# Patient Record
Sex: FEMALE | Race: WHITE | NOT HISPANIC OR LATINO | Employment: OTHER | URBAN - METROPOLITAN AREA
[De-identification: names, ages, dates, MRNs, and addresses within clinical notes are randomized per-mention and may not be internally consistent; named-entity substitution may affect disease eponyms.]

---

## 2017-01-06 RX ORDER — VENLAFAXINE HYDROCHLORIDE 37.5 MG/1
CAPSULE, EXTENDED RELEASE ORAL
Qty: 30 CAPSULE | Refills: 2 | Status: SHIPPED | OUTPATIENT
Start: 2017-01-06 | End: 2017-04-06 | Stop reason: SDUPTHER

## 2017-02-20 RX ORDER — OMEPRAZOLE 20 MG/1
CAPSULE, DELAYED RELEASE ORAL
Qty: 60 CAPSULE | Refills: 2 | Status: SHIPPED | OUTPATIENT
Start: 2017-02-20 | End: 2017-07-04 | Stop reason: SDUPTHER

## 2017-03-07 RX ORDER — CLOPIDOGREL BISULFATE 75 MG/1
75 TABLET ORAL DAILY
Qty: 30 TABLET | Refills: 0 | Status: SHIPPED | OUTPATIENT
Start: 2017-03-07 | End: 2017-04-01 | Stop reason: SDUPTHER

## 2017-03-14 ENCOUNTER — OFFICE VISIT (OUTPATIENT)
Dept: FAMILY MEDICINE CLINIC | Facility: CLINIC | Age: 57
End: 2017-03-14

## 2017-03-14 VITALS
SYSTOLIC BLOOD PRESSURE: 120 MMHG | DIASTOLIC BLOOD PRESSURE: 68 MMHG | OXYGEN SATURATION: 100 % | WEIGHT: 203 LBS | BODY MASS INDEX: 28.42 KG/M2 | HEIGHT: 71 IN | TEMPERATURE: 98.7 F | HEART RATE: 87 BPM

## 2017-03-14 DIAGNOSIS — N93.9 VAGINAL BLEEDING: ICD-10-CM

## 2017-03-14 DIAGNOSIS — L30.9 ECZEMA OF BOTH HANDS: Primary | ICD-10-CM

## 2017-03-14 PROCEDURE — 99213 OFFICE O/P EST LOW 20 MIN: CPT | Performed by: NURSE PRACTITIONER

## 2017-03-14 PROCEDURE — 96372 THER/PROPH/DIAG INJ SC/IM: CPT | Performed by: NURSE PRACTITIONER

## 2017-03-14 RX ORDER — TRIAMCINOLONE ACETONIDE 5 MG/G
OINTMENT TOPICAL 2 TIMES DAILY
Qty: 60 G | Refills: 3 | Status: SHIPPED | OUTPATIENT
Start: 2017-03-14 | End: 2018-10-11

## 2017-03-14 RX ORDER — METHYLPREDNISOLONE ACETATE 80 MG/ML
80 INJECTION, SUSPENSION INTRA-ARTICULAR; INTRALESIONAL; INTRAMUSCULAR; SOFT TISSUE ONCE
Status: COMPLETED | OUTPATIENT
Start: 2017-03-14 | End: 2017-03-14

## 2017-03-14 RX ORDER — HYDROXYZINE HYDROCHLORIDE 25 MG/1
TABLET, FILM COATED ORAL
Qty: 30 TABLET | Refills: 3 | Status: SHIPPED | OUTPATIENT
Start: 2017-03-14 | End: 2019-02-01 | Stop reason: SDUPTHER

## 2017-03-14 RX ADMIN — METHYLPREDNISOLONE ACETATE 80 MG: 80 INJECTION, SUSPENSION INTRA-ARTICULAR; INTRALESIONAL; INTRAMUSCULAR; SOFT TISSUE at 15:41

## 2017-03-14 NOTE — PROGRESS NOTES
Subjective   Tiera Abbott is a 57 y.o. female.     HPI Comments: Chronic eczema hands      Several time she's had some intermittent spotting  Last Menses. over 5 years ago  No pelvic pain no active bleeding presently        Rash   This is a recurrent problem. The current episode started more than 1 year ago. The problem has been gradually worsening since onset. Location: Bilateral hands, or surface between fingers. The rash is characterized by dryness, itchiness, peeling, scaling and redness. She was exposed to nothing. Treatments tried: clobesol cream. The treatment provided mild relief. Her past medical history is significant for allergies and eczema.        The following portions of the patient's history were reviewed and updated as appropriate: allergies, past family history, past medical history, past social history, past surgical history and problem list.    Review of Systems   Constitutional: Negative.    HENT: Negative.    Eyes: Negative.    Respiratory: Negative.    Cardiovascular: Negative.    Skin: Positive for rash.   Neurological: Negative.    Psychiatric/Behavioral: Negative.        Objective   Physical Exam   Constitutional: She is oriented to person, place, and time. She appears well-developed.   HENT:   Head: Normocephalic.   Eyes: Pupils are equal, round, and reactive to light.   Pulmonary/Chest: Effort normal.   Neurological: She is alert and oriented to person, place, and time.   Skin:   Bilateral irritated dry scaly mildly erythemic palm hands  Approximately 20% surface  No active vesicles     Psychiatric: She has a normal mood and affect. Her behavior is normal. Judgment and thought content normal.   Vitals reviewed.      Assessment/Plan   Tiera was seen today for rash.    Diagnoses and all orders for this visit:    Eczema of both hands  -     methylPREDNISolone acetate (DEPO-medrol) injection 80 mg; Inject 1 mL into the shoulder, thigh, or buttocks 1 (One) Time.    Vaginal  bleeding  Comments:  Postmenopausal intermittent refer to GYN  Orders:  -     Ambulatory Referral to Gynecology    Other orders  -     triamcinolone (KENALOG) 0.5 % ointment; Apply  topically 2 (Two) Times a Day. Sparingly to hands as needed avoid face  -     hydrOXYzine (ATARAX) 25 MG tablet; 1-2 tablets at bedtime as needed for itching                Caution steroid ointment  avoid face  Long-term use may call skin damage

## 2017-03-15 PROBLEM — L30.9 ECZEMA OF BOTH HANDS: Status: ACTIVE | Noted: 2017-03-15

## 2017-03-15 PROBLEM — N93.9 VAGINAL BLEEDING: Status: ACTIVE | Noted: 2017-03-15

## 2017-03-28 ENCOUNTER — TELEPHONE (OUTPATIENT)
Dept: FAMILY MEDICINE CLINIC | Facility: CLINIC | Age: 57
End: 2017-03-28

## 2017-03-28 DIAGNOSIS — L30.9 DERMATITIS: Primary | ICD-10-CM

## 2017-03-28 NOTE — TELEPHONE ENCOUNTER
----- Message from Rosa Espitia sent at 3/28/2017  8:38 AM EDT -----  Regarding: referral for dermatology  [employer] IS NOT WORKING. PATIENT WOULD LIKE REFERRAL FOR DERMATOLOGY

## 2017-04-04 RX ORDER — CLOPIDOGREL BISULFATE 75 MG/1
75 TABLET ORAL DAILY
Qty: 30 TABLET | Refills: 0 | Status: SHIPPED | OUTPATIENT
Start: 2017-04-04 | End: 2017-06-05 | Stop reason: SDUPTHER

## 2017-04-06 RX ORDER — VENLAFAXINE HYDROCHLORIDE 37.5 MG/1
CAPSULE, EXTENDED RELEASE ORAL
Qty: 30 CAPSULE | Refills: 2 | Status: SHIPPED | OUTPATIENT
Start: 2017-04-06 | End: 2017-07-05 | Stop reason: SDUPTHER

## 2017-04-06 RX ORDER — ATORVASTATIN CALCIUM 80 MG/1
80 TABLET, FILM COATED ORAL DAILY
Qty: 30 TABLET | Refills: 0 | Status: SHIPPED | OUTPATIENT
Start: 2017-04-06 | End: 2017-06-05 | Stop reason: SDUPTHER

## 2017-04-06 RX ORDER — CARVEDILOL 3.12 MG/1
3.12 TABLET ORAL 2 TIMES DAILY WITH MEALS
Qty: 60 TABLET | Refills: 0 | Status: SHIPPED | OUTPATIENT
Start: 2017-04-06 | End: 2017-06-05 | Stop reason: SDUPTHER

## 2017-04-13 ENCOUNTER — OFFICE VISIT (OUTPATIENT)
Dept: OBSTETRICS AND GYNECOLOGY | Facility: CLINIC | Age: 57
End: 2017-04-13

## 2017-04-13 VITALS
HEIGHT: 71 IN | WEIGHT: 194 LBS | HEART RATE: 93 BPM | BODY MASS INDEX: 27.16 KG/M2 | DIASTOLIC BLOOD PRESSURE: 81 MMHG | SYSTOLIC BLOOD PRESSURE: 130 MMHG

## 2017-04-13 DIAGNOSIS — N95.0 POSTMENOPAUSAL BLEEDING: ICD-10-CM

## 2017-04-13 DIAGNOSIS — Z01.419 ENCOUNTER FOR GYNECOLOGICAL EXAMINATION: Primary | ICD-10-CM

## 2017-04-13 PROCEDURE — G0101 CA SCREEN;PELVIC/BREAST EXAM: HCPCS | Performed by: OBSTETRICS & GYNECOLOGY

## 2017-04-13 NOTE — PROGRESS NOTES
Subjective   Tiera Abbott is a 57 y.o. female     History of Present Illness  CC: NP here for annual exam. Pt last pap 12/07/10 NL. Pt last mammogram last year NL. Pt also c/o post menopausal bleeding.     Patient is a 58 yo  who presents for annual gynecological exam.  Patient also has complaints of two episodes of spotting over the last years.  She underwent menopause approximately 4 years ago and denies any bleeding until her most recent two episodes of spotting.  She states both episodes lasted only two days and were only when wiping.  She has not been sexually active in over five years.  She does complain of vaginal dryness and occasional itching related to the dryness.  She denies any h/o abnormal pap smears.  She reports a h/o breast cancer in her mother.  Her last gyn exam was in  and her last mammogram was over 1 year ago.  She states she is up to date on her colonoscopy.  Her PMH is significant for prior MI.    The following portions of the patient's history were reviewed and updated as appropriate: allergies, current medications, past family history, past medical history, past social history, past surgical history and problem list.    Review of Systems   Genitourinary: Positive for vaginal bleeding.   All other systems reviewed and are negative.      Objective   Physical Exam   Constitutional: She appears well-developed and well-nourished.   Cardiovascular: Normal rate and regular rhythm.    Pulmonary/Chest: Effort normal and breath sounds normal. Right breast exhibits no inverted nipple, no mass, no nipple discharge, no skin change and no tenderness. Left breast exhibits no inverted nipple, no mass, no nipple discharge, no skin change and no tenderness.   Previous augmentation scars noted   Abdominal: Soft. She exhibits no distension. There is no tenderness.   Genitourinary:   Genitourinary Comments: Vaginal atrophy noted   Neurological: She is alert.   Psychiatric: She has a normal mood and  affect.   Vitals reviewed.        Assessment/Plan   Diagnoses and all orders for this visit:    Encounter for gynecological examination  -     IGP, Apt HPV,rfx 16 / 18,45    Postmenopausal bleeding  -     IGP, Apt HPV,rfx 16 / 18,45    Discussed need to order pelvic ultrasound and evaluate endometrial lining in the setting of postmenopausal bleeding.  Patient will followup after her pelvic ultrasound.  Screening mammogram ordered.

## 2017-04-19 LAB
CYTOLOGIST CVX/VAG CYTO: NORMAL
CYTOLOGY CVX/VAG DOC THIN PREP: NORMAL
DX ICD CODE: NORMAL
HIV 1 & 2 AB SER-IMP: NORMAL
HPV I/H RISK 4 DNA CVX QL PROBE+SIG AMP: NEGATIVE
OTHER STN SPEC: NORMAL
PATH REPORT.FINAL DX SPEC: NORMAL
STAT OF ADQ CVX/VAG CYTO-IMP: NORMAL

## 2017-04-24 ENCOUNTER — TELEPHONE (OUTPATIENT)
Dept: OBSTETRICS AND GYNECOLOGY | Facility: CLINIC | Age: 57
End: 2017-04-24

## 2017-04-24 NOTE — TELEPHONE ENCOUNTER
Richard Ann,  Can you call this patient and see why she is not scheduled for pelvic ultrasound and f/u visit with me for postmenopausal bleeding.  She was suppose to be scheduled for those things after her last appt, but I don't see that any of it was scheduled.

## 2017-04-25 ENCOUNTER — TELEPHONE (OUTPATIENT)
Dept: OBSTETRICS AND GYNECOLOGY | Facility: CLINIC | Age: 57
End: 2017-04-25

## 2017-04-25 NOTE — TELEPHONE ENCOUNTER
----- Message from Ann Marie Ramirez sent at 4/25/2017  2:08 PM EDT -----  PT RETURNED MISS CALL, STATED SHE NEEDS TO LOOK AT HER SX BEFORE SHE ABLE TO MAKE AN APPT.

## 2017-05-03 RX ORDER — GABAPENTIN 300 MG/1
CAPSULE ORAL
Qty: 60 CAPSULE | Refills: 3 | Status: SHIPPED | OUTPATIENT
Start: 2017-05-03 | End: 2018-07-23

## 2017-05-17 ENCOUNTER — OFFICE VISIT (OUTPATIENT)
Dept: OBSTETRICS AND GYNECOLOGY | Facility: CLINIC | Age: 57
End: 2017-05-17

## 2017-05-17 ENCOUNTER — PROCEDURE VISIT (OUTPATIENT)
Dept: OBSTETRICS AND GYNECOLOGY | Facility: CLINIC | Age: 57
End: 2017-05-17

## 2017-05-17 VITALS
HEART RATE: 82 BPM | WEIGHT: 190.2 LBS | SYSTOLIC BLOOD PRESSURE: 125 MMHG | HEIGHT: 71 IN | BODY MASS INDEX: 26.63 KG/M2 | DIASTOLIC BLOOD PRESSURE: 75 MMHG

## 2017-05-17 DIAGNOSIS — R93.89 ENDOMETRIAL THICKENING ON ULTRA SOUND: ICD-10-CM

## 2017-05-17 DIAGNOSIS — N83.202 CYSTS OF BOTH OVARIES: ICD-10-CM

## 2017-05-17 DIAGNOSIS — D25.9 UTERINE LEIOMYOMA, UNSPECIFIED LOCATION: ICD-10-CM

## 2017-05-17 DIAGNOSIS — N95.0 PMB (POSTMENOPAUSAL BLEEDING): Primary | ICD-10-CM

## 2017-05-17 DIAGNOSIS — N95.0 POSTMENOPAUSAL BLEEDING: Primary | ICD-10-CM

## 2017-05-17 DIAGNOSIS — N83.201 CYSTS OF BOTH OVARIES: ICD-10-CM

## 2017-05-17 PROCEDURE — 76830 TRANSVAGINAL US NON-OB: CPT | Performed by: OBSTETRICS & GYNECOLOGY

## 2017-05-17 PROCEDURE — 99213 OFFICE O/P EST LOW 20 MIN: CPT | Performed by: OBSTETRICS & GYNECOLOGY

## 2017-05-17 PROCEDURE — 58100 BIOPSY OF UTERUS LINING: CPT | Performed by: OBSTETRICS & GYNECOLOGY

## 2017-05-19 LAB
DX ICD CODE: NORMAL
DX ICD CODE: NORMAL
PATH REPORT.FINAL DX SPEC: NORMAL
PATH REPORT.GROSS SPEC: NORMAL
PATH REPORT.SITE OF ORIGIN SPEC: NORMAL
PATHOLOGIST NAME: NORMAL
PAYMENT PROCEDURE: NORMAL

## 2017-05-25 ENCOUNTER — TELEPHONE (OUTPATIENT)
Dept: OBSTETRICS AND GYNECOLOGY | Facility: CLINIC | Age: 57
End: 2017-05-25

## 2017-06-06 ENCOUNTER — OFFICE VISIT (OUTPATIENT)
Dept: CARDIOLOGY | Facility: CLINIC | Age: 57
End: 2017-06-06

## 2017-06-06 VITALS
SYSTOLIC BLOOD PRESSURE: 110 MMHG | WEIGHT: 192 LBS | HEIGHT: 71 IN | DIASTOLIC BLOOD PRESSURE: 66 MMHG | HEART RATE: 66 BPM | BODY MASS INDEX: 26.88 KG/M2

## 2017-06-06 DIAGNOSIS — I25.10 CORONARY ARTERY DISEASE INVOLVING NATIVE CORONARY ARTERY OF NATIVE HEART WITHOUT ANGINA PECTORIS: Primary | ICD-10-CM

## 2017-06-06 DIAGNOSIS — E78.49 OTHER HYPERLIPIDEMIA: ICD-10-CM

## 2017-06-06 DIAGNOSIS — I25.2 OLD MI (MYOCARDIAL INFARCTION): ICD-10-CM

## 2017-06-06 PROCEDURE — 93000 ELECTROCARDIOGRAM COMPLETE: CPT | Performed by: INTERNAL MEDICINE

## 2017-06-06 PROCEDURE — 99214 OFFICE O/P EST MOD 30 MIN: CPT | Performed by: INTERNAL MEDICINE

## 2017-06-06 NOTE — PROGRESS NOTES
Date of Office Visit: 17  Encounter Provider: Hollis Fontaine MD  Place of Service: [facility] CARDIOLOGY  Patient Name: Tiera Abbott  :1960      Chief Complaint   Patient presents with   • Coronary Artery Disease     History of Present Illness  HPI Comments: The patient is a 57-year-old female who presented in 2008 with an acute inferior  myocardial infarction. She had multiple ventricular fibrillation arrests during that  time. She was cardioverted a number of times. She ultimately was found to have total  occlusion of the right coronary artery. Her other vessels were free of disease. She had  angioplasty, clot extraction, and stent placement of that vessel. Her echocardiogram then  showed a left ventricular ejection fraction of 45%. She had atrial fibrillation and was  transiently on amiodarone. Then, in 2008, she had some atypical chest pain. Repeat  catheterization showed normal left ventricular systolic function and insignificant disease  of the left anterior descending. The stent was patent.  She now comes in for follow-up.  Since she was last year she had her left knee replaced at a perfusion stress test in 2016 that was normal.  She's working at [employer] where she walks constantly and has no problems doing that she doesn't do any structured exercise though but she does do some strengthening and stretching exercises.  She's had no chest pain or pressure.  No shortness breath, orthopnea, or PND.  No palpitations, near-syncope or syncope overall she feels like she's doing very well.    Coronary Artery Disease   Pertinent negatives include no dizziness, muscle weakness or weight gain. Her past medical history is significant for past myocardial infarction.         Past Medical History:   Diagnosis Date   • Acid reflux    • Alcoholism in remission     SINCE AGE 22   • Back pain    • Cardiac disease    • Coronary artery disease     mi   • Depression    •  Eczema    • Heart attack 2008   • History of bruising easily    • IBS (irritable bowel syndrome)    • Inappropriate (too hot or too cold) temperature in local application and packing     always hot or cold   • Joint pain, knee     LEFT KNEE   • Menopausal state    • Muscle pain    • Sleep apnea     DOES NOT USE MACHINE   • Stiffness in joint          Past Surgical History:   Procedure Laterality Date   • BILATERAL BREAST REDUCTION  2009   • CARDIAC SURGERY     • COLONOSCOPY  2010   • CORONARY STENT PLACEMENT      2   • ENDOSCOPY N/A 8/25/2016    Procedure: ESOPHAGOGASTRODUODENOSCOPY;  Surgeon: Ottoniel Alonso MD;  Location: [facility] ENDOSCOPY;  Service:    • JOINT REPLACEMENT Left    • KNEE SURGERY      2 knee sugeries on left knee   • PA TOTAL KNEE ARTHROPLASTY Left 3/14/2016    Procedure: LEFT TOTAL KNEE ARTHROPLASTY;  Surgeon: Bladimir Mehta MD;  Location: [facility] MAIN OR;  Service: Orthopedics   • WISDOM TOOTH EXTRACTION     • WOUND DEBRIDEMENT Bilateral     BREAST.  AFTER REDUCTION         Current Outpatient Prescriptions on File Prior to Visit   Medication Sig Dispense Refill   • Acetaminophen (TYLENOL ARTHRITIS PAIN PO) Take  by mouth.     • atorvastatin (LIPITOR) 80 MG tablet Take 1 tablet by mouth Daily. Pt needs to contact office for an appt to receive more refills. 30 tablet 0   • carvedilol (COREG) 3.125 MG tablet Take 1 tablet by mouth 2 (Two) Times a Day With Meals. Needs to contact office for an appt to receive more refills. (Patient taking differently: Take 3.125 mg by mouth Daily.) 60 tablet 0   • clobetasol (TEMOVATE) 0.05 % cream APPLY SPARINGLY TO RIGHT HAND TWICE DAILY FOR SHORT DURATION AND AVOID FACE OR PROLONGED USE 1 g 0   • clopidogrel (PLAVIX) 75 MG tablet Take 1 tablet by mouth Daily. * NEEDS TO CONTACT OFFICE FOR AN APPOINTMENT FOR MORE REFILLS 30 tablet 0   • gabapentin (NEURONTIN) 300 MG capsule take 2 capsules by mouth EVERY NIGHT 60 capsule 3   • hydrOXYzine (ATARAX) 25 MG tablet 1-2  tablets at bedtime as needed for itching 30 tablet 3   • montelukast (SINGULAIR) 10 MG tablet Take  by mouth as needed.     • omeprazole (priLOSEC) 20 MG capsule take 1 capsule by mouth twice a day 60 capsule 2   • triamcinolone (KENALOG) 0.5 % ointment Apply  topically 2 (Two) Times a Day. Sparingly to hands as needed avoid face 60 g 3   • venlafaxine XR (EFFEXOR-XR) 37.5 MG 24 hr capsule take 1 capsule by mouth once daily 30 capsule 2   • [DISCONTINUED] Calcium Carbonate Antacid (ROBERTO-SELTZER ANTACID PO) Take  by mouth.     • [DISCONTINUED] Dexlansoprazole (DEXILANT PO) Take 1 tablet by mouth daily.       No current facility-administered medications on file prior to visit.          Social History     Social History   • Marital status: Single     Spouse name: N/A   • Number of children: 1   • Years of education: N/A     Occupational History   • disabled      Social History Main Topics   • Smoking status: Former Smoker     Quit date: 2014   • Smokeless tobacco: Never Used      Comment: caffiene use - 3 cups coffee daily   • Alcohol use No   • Drug use: No   • Sexual activity: Not Currently     Birth control/ protection: Post-menopausal     Other Topics Concern   • Not on file     Social History Narrative       Family History   Problem Relation Age of Onset   • Alzheimer's disease Mother    • Breast cancer Mother    • Diabetes Mother    • Lung disease Father      pullmonary artery disease   • Heart disease Father    • Heart disease Brother    • JOSHUA disease Brother    • Heart attack Brother    • Heart disease Brother    • Diabetes Other          Review of Systems   Constitution: Negative for decreased appetite, diaphoresis, fever, weakness, malaise/fatigue, weight gain and weight loss.   HENT: Negative for congestion, headaches, hearing loss, nosebleeds and tinnitus.    Eyes: Negative for blurred vision, double vision, vision loss in left eye, vision loss in right eye and visual disturbance.   Cardiovascular:         "As noted in HPI   Respiratory:        As noted HPI   Endocrine: Negative for cold intolerance and heat intolerance.   Hematologic/Lymphatic: Negative for bleeding problem. Does not bruise/bleed easily.   Skin: Positive for itching. Negative for color change, flushing and rash.   Musculoskeletal: Negative for arthritis, back pain, joint pain, joint swelling, muscle weakness and myalgias.   Gastrointestinal: Negative for bloating, abdominal pain, constipation, diarrhea, dysphagia, heartburn, hematemesis, hematochezia, melena, nausea and vomiting.   Genitourinary: Negative for bladder incontinence, dysuria, frequency, nocturia and urgency.   Neurological: Negative for dizziness, focal weakness, light-headedness, loss of balance, numbness, paresthesias and vertigo.   Psychiatric/Behavioral: Negative for depression, memory loss and substance abuse.       Procedures      ECG 12 Lead  Date/Time: 6/6/2017 1:47 PM  Performed by: SAMEER GREEN  Authorized by: SAMEER GREEN   Comparison: compared with previous ECG   Similar to previous ECG  Rhythm: sinus rhythm  Rate: normal  QRS axis: normal                 Objective:    /66 (BP Location: Left arm, Patient Position: Sitting)  Pulse 66  Ht 70.5\" (179.1 cm)  Wt 192 lb (87.1 kg)  BMI 27.16 kg/m2       Physical Exam  Physical Exam   Constitutional: She is oriented to person, place, and time. She appears well-developed and well-nourished. No distress.   HENT:   Head: Normocephalic.   Eyes: Conjunctivae are normal. Pupils are equal, round, and reactive to light. No scleral icterus.   Neck: Normal carotid pulses, no hepatojugular reflux and no JVD present. Carotid bruit is not present. No tracheal deviation, no edema and no erythema present. No thyromegaly present.   Cardiovascular: Normal rate, regular rhythm, S1 normal, S2 normal, normal heart sounds and intact distal pulses.   No extrasystoles are present. PMI is not displaced.  Exam reveals no gallop, no " distant heart sounds and no friction rub.    No murmur heard.  Pulses:       Carotid pulses are 2+ on the right side, and 2+ on the left side.       Radial pulses are 2+ on the right side, and 2+ on the left side.        Femoral pulses are 2+ on the right side, and 2+ on the left side.       Dorsalis pedis pulses are 2+ on the right side, and 2+ on the left side.        Posterior tibial pulses are 2+ on the right side, and 2+ on the left side.   Pulmonary/Chest: Effort normal and breath sounds normal. No respiratory distress. She has no decreased breath sounds. She has no wheezes. She has no rhonchi. She has no rales. She exhibits no tenderness.   Abdominal: Soft. Bowel sounds are normal. She exhibits no distension and no mass. There is no hepatosplenomegaly. There is no tenderness. There is no rebound and no guarding.   Musculoskeletal: She exhibits no edema, tenderness or deformity.   Neurological: She is alert and oriented to person, place, and time.   Skin: Skin is warm and dry. No rash noted. She is not diaphoretic. No cyanosis or erythema. No pallor. Nails show no clubbing.   Psychiatric: She has a normal mood and affect. Her speech is normal and behavior is normal. Judgment and thought content normal.           Assessment:   1. This is a 57-year-old female with history of previous inferior myocardial infarction in 01/2008 with primary angioplasty, clot extraction, and stent placement of the right  coronary artery. Follow-up cardiac catheterization in 02/2008 showed normal left ventricular function, no evidence of stent stenosis, and insignificant disease of the left  anterior descending. She then had follow-up perfusion stress test in 04/2014 for atypical chest pain that was normal. Follow-up perfusion stress test in January 2016 was normal.  Coronary Artery Disease  Assessment  • The patient has no angina    Plan  • Lifestyle modifications discussed include adhering to a heart healthy diet, avoidance of  tobacco products, maintenance of a healthy weight, medication compliance, regular exercise and regular monitoring of cholesterol and blood pressure    Subjective - Objective  • There is a history of past MI  • There has been a previous stent procedure using STACEY  • Current antiplatelet therapy includes aspirin 81 mg and clopidogrel 75 mg    She is to continue the same.  She'll see skin follow up in urine callback for problems.    2. Hyperlipidemia.  Now on target dose atorvastatin.  Continue the same.  3.  Esophageal reflux.  She could not afford dexilant so she went back to omeprazolewe have asked her to try to take Protonix.         Plan:

## 2017-06-08 RX ORDER — MONTELUKAST SODIUM 10 MG/1
TABLET ORAL
Qty: 90 TABLET | Refills: 3 | Status: SHIPPED | OUTPATIENT
Start: 2017-06-08 | End: 2018-09-06 | Stop reason: SDUPTHER

## 2017-06-08 RX ORDER — CARVEDILOL 3.12 MG/1
TABLET ORAL
Qty: 60 TABLET | Refills: 4 | Status: SHIPPED | OUTPATIENT
Start: 2017-06-08 | End: 2017-12-02 | Stop reason: SDUPTHER

## 2017-06-08 RX ORDER — ATORVASTATIN CALCIUM 80 MG/1
80 TABLET, FILM COATED ORAL NIGHTLY
Qty: 30 TABLET | Refills: 3 | Status: SHIPPED | OUTPATIENT
Start: 2017-06-08 | End: 2017-10-01 | Stop reason: SDUPTHER

## 2017-06-08 RX ORDER — CLOPIDOGREL BISULFATE 75 MG/1
TABLET ORAL
Qty: 30 TABLET | Refills: 4 | Status: SHIPPED | OUTPATIENT
Start: 2017-06-08 | End: 2017-11-02 | Stop reason: SDUPTHER

## 2017-07-05 RX ORDER — VENLAFAXINE HYDROCHLORIDE 37.5 MG/1
CAPSULE, EXTENDED RELEASE ORAL
Qty: 30 CAPSULE | Refills: 2 | Status: SHIPPED | OUTPATIENT
Start: 2017-07-05 | End: 2017-10-01 | Stop reason: SDUPTHER

## 2017-07-05 RX ORDER — OMEPRAZOLE 20 MG/1
CAPSULE, DELAYED RELEASE ORAL
Qty: 60 CAPSULE | Refills: 2 | Status: SHIPPED | OUTPATIENT
Start: 2017-07-05 | End: 2017-08-22

## 2017-07-10 ENCOUNTER — TELEPHONE (OUTPATIENT)
Dept: FAMILY MEDICINE CLINIC | Facility: CLINIC | Age: 57
End: 2017-07-10

## 2017-07-10 RX ORDER — CEPHALEXIN 500 MG/1
CAPSULE ORAL
Qty: 4 CAPSULE | Refills: 3 | Status: SHIPPED | OUTPATIENT
Start: 2017-07-10 | End: 2017-08-18

## 2017-07-10 NOTE — TELEPHONE ENCOUNTER
Patient called stating that she needed a refill of her KEFLEX due to her breaking a tooth and will be having  some dental work done soon.

## 2017-07-10 NOTE — TELEPHONE ENCOUNTER
If she is referring to prophylaxis related to her total knee replacement etc.    Then she should take Amoxil 500  4 tablets 1 hour ×1 prior to dental procedure  Dispense 12      Please give her the prescription after you confirmed thank you

## 2017-07-11 ENCOUNTER — TELEPHONE (OUTPATIENT)
Dept: FAMILY MEDICINE CLINIC | Facility: CLINIC | Age: 57
End: 2017-07-11

## 2017-07-11 RX ORDER — AMOXICILLIN 500 MG/1
CAPSULE ORAL
Qty: 12 CAPSULE | Refills: 0 | Status: SHIPPED | OUTPATIENT
Start: 2017-07-11 | End: 2017-08-18

## 2017-07-17 ENCOUNTER — TELEPHONE (OUTPATIENT)
Dept: FAMILY MEDICINE CLINIC | Facility: CLINIC | Age: 57
End: 2017-07-17

## 2017-07-17 NOTE — TELEPHONE ENCOUNTER
PATIENT HAD KNEE REPLACEMENT 1 YR AGO. KNEE IS ACTING UP AGAIN AND PATIENT NEEDS A PHYSICAL THERAPY ORDER FOR RESULTS PHYSIOTHERAPY TO RE START

## 2017-07-17 NOTE — TELEPHONE ENCOUNTER
Tell patient I would  Physical therapy for her knee  But if she is having more issues with it she would need x-rays as well as to see her orthopedist    not been that long since the replacement     Thank you

## 2017-07-18 ENCOUNTER — TELEPHONE (OUTPATIENT)
Dept: FAMILY MEDICINE CLINIC | Facility: CLINIC | Age: 57
End: 2017-07-18

## 2017-07-18 ENCOUNTER — TELEPHONE (OUTPATIENT)
Dept: ORTHOPEDIC SURGERY | Facility: CLINIC | Age: 57
End: 2017-07-18

## 2017-08-18 ENCOUNTER — OFFICE VISIT (OUTPATIENT)
Dept: FAMILY MEDICINE CLINIC | Facility: CLINIC | Age: 57
End: 2017-08-18

## 2017-08-18 VITALS
HEIGHT: 71 IN | TEMPERATURE: 98 F | HEART RATE: 82 BPM | DIASTOLIC BLOOD PRESSURE: 70 MMHG | OXYGEN SATURATION: 100 % | SYSTOLIC BLOOD PRESSURE: 120 MMHG | BODY MASS INDEX: 26.46 KG/M2 | WEIGHT: 189 LBS

## 2017-08-18 DIAGNOSIS — H61.22 EXCESSIVE EAR WAX, LEFT: ICD-10-CM

## 2017-08-18 DIAGNOSIS — Z11.59 NEED FOR HEPATITIS C SCREENING TEST: ICD-10-CM

## 2017-08-18 DIAGNOSIS — R27.8 DECREASED COORDINATION: ICD-10-CM

## 2017-08-18 DIAGNOSIS — D50.8 OTHER IRON DEFICIENCY ANEMIA: ICD-10-CM

## 2017-08-18 DIAGNOSIS — R26.81 GAIT INSTABILITY: Primary | ICD-10-CM

## 2017-08-18 DIAGNOSIS — R07.9 EXERTIONAL CHEST PAIN: ICD-10-CM

## 2017-08-18 DIAGNOSIS — Z13.9 SCREENING: ICD-10-CM

## 2017-08-18 DIAGNOSIS — R53.83 FATIGUE, UNSPECIFIED TYPE: ICD-10-CM

## 2017-08-18 PROCEDURE — 99214 OFFICE O/P EST MOD 30 MIN: CPT | Performed by: NURSE PRACTITIONER

## 2017-08-18 RX ORDER — METHYLPREDNISOLONE 4 MG/1
TABLET ORAL
Qty: 21 TABLET | Refills: 0 | Status: SHIPPED | OUTPATIENT
Start: 2017-08-18 | End: 2017-08-22

## 2017-08-18 RX ORDER — FLUTICASONE PROPIONATE 50 MCG
2 SPRAY, SUSPENSION (ML) NASAL DAILY
Qty: 1 EACH | Refills: 11 | Status: SHIPPED | OUTPATIENT
Start: 2017-08-18 | End: 2018-07-23

## 2017-08-18 NOTE — PROGRESS NOTES
Procedure   Procedures     Adult ECG Report     Name: Tiera Abbott   Age: 57 y.o.   Gender: female       Rate: 59   Rhythm: normal sinus rhythm   QRS Axis: nml   IA Interval: 134   QRS Duration: 92   QTc: 422   Voltages: .   Conduction Disturbances: none   Other Abnormalities: none     Narrative Interpretation: nml, indication exertional chest pain, no change compared to EKG 6/6/2017

## 2017-08-18 NOTE — PATIENT INSTRUCTIONS
Discharge instructions    Follow-up with her cardiologist within the next 1-2 weeks  Any frequent or persistent chest pain  Nausea vomiting weakness shortness of breath emergency room    Neurology to evaluate balance  Discussed these issues as well with Dr. Mehta  Continue physical therapy    If confusion weakness slurred speech  Or gait instability loss of coordination emergency room      Recheck in 3 weeks    Thank You,      James Epley,  SATHISH

## 2017-08-18 NOTE — PROGRESS NOTES
Subjective   Tiera Abbott is a 57 y.o. female.     HPI Comments: Sinus drainage sneezing pressure summer cold without chest pain shortness of breath cough mild nonproductive      Symptoms to 3 days      Patient complains of coordination problems  Several times a week over the last several months 3-4 as she is walking she has difficult time stepping  Not due to pain not due to dizziness confusion or weakness  Seems to be a coordination problem with her gait  Knee replacement last year and she did not have this initially  She does not think it's related she concern is something else could be going on    Presently no back pain  She is getting physical therapy for her knee  Her knee is not giving out  Stepping difficulty occurs with walking or stairs and can occur with either leg    Due to the difficulty in coordination of her step be caused her to fall  No injury this occurred last week      She complains of an increase in exertional chest pain  Walking several blocks stairs etc.  None presently    Dr. Can is her cardiologist  She has had a heart attack in the past  I believe she's had a cardiac cath within the last year or so  I was unable to locate this  I do not believe she has any obstructive coronary artery disease  She does take Plavix daily      Eczema hands long history seeing allergy previously  Would like a steroid shot always helps  Received one in March  Using steroid ointment      Sinusitis   This is a new problem. The current episode started yesterday. The problem is unchanged. There has been no fever. Her pain is at a severity of 0/10. She is experiencing no pain. Associated symptoms include congestion, coughing, sinus pressure, sneezing and a sore throat. Pertinent negatives include no chills, diaphoresis, headaches, hoarse voice or shortness of breath. Past treatments include nothing. The treatment provided no relief.        The following portions of the patient's history were reviewed and updated  as appropriate: allergies, current medications, past family history, past medical history, past social history and problem list.    Review of Systems   Constitutional: Negative for chills and diaphoresis.   HENT: Positive for congestion, sinus pressure, sneezing and sore throat. Negative for hoarse voice.    Respiratory: Positive for cough. Negative for shortness of breath.    Cardiovascular: Positive for chest pain.   Musculoskeletal: Positive for gait problem.   Skin: Positive for rash.   Neurological: Negative for headaches.       Objective   Physical Exam   Constitutional: She is oriented to person, place, and time. She appears well-developed and well-nourished.   HENT:   Head: Normocephalic.   Nose: Nose normal.   Mouth/Throat: Oropharynx is clear and moist.   Tm clear chito no mass canal patent without d/c   Eyes: Conjunctivae are normal. Pupils are equal, round, and reactive to light. No scleral icterus.   Neck: Neck supple. No JVD present. No thyromegaly present.   Cardiovascular: Normal rate, regular rhythm and normal heart sounds.  Exam reveals no gallop and no friction rub.    No murmur heard.  Pulmonary/Chest: Effort normal and breath sounds normal. No stridor. No respiratory distress. She has no wheezes. She has no rales.   Abdominal: Soft. Bowel sounds are normal. She exhibits no distension. There is no tenderness.   No hepatosplenomegaly, no ascites,   Musculoskeletal: She exhibits no edema or tenderness.   Lymphadenopathy:     She has no cervical adenopathy.   Neurological: She is alert and oriented to person, place, and time. She has normal reflexes. She displays normal reflexes. No cranial nerve deficit. She exhibits normal muscle tone. Coordination normal.   Gait normal negative Romberg   Skin: Skin is warm and dry. No rash noted. No erythema.   Skin dry scaly patches fairly discrete to mild palmar surface  Bilateral   Psychiatric: She has a normal mood and affect. Her behavior is normal. Judgment  and thought content normal.   Vitals reviewed.      Assessment/Plan   Tiera was seen today for balance issues and sinusitis.    Diagnoses and all orders for this visit:    Gait instability  -     TSH Rfx On Abnormal To Free T4  -     Comprehensive Metabolic Panel  -     CBC & Differential  -     Lipid Panel With LDL / HDL Ratio  -     Ambulatory Referral to Neurology    Excessive ear wax, left  -     TSH Rfx On Abnormal To Free T4  -     Comprehensive Metabolic Panel  -     CBC & Differential  -     Lipid Panel With LDL / HDL Ratio    Exertional chest pain  -     TSH Rfx On Abnormal To Free T4  -     Comprehensive Metabolic Panel  -     CBC & Differential  -     Lipid Panel With LDL / HDL Ratio  -     ECG 12 Lead  -     XR Chest PA & Lateral    Fatigue, unspecified type  -     TSH Rfx On Abnormal To Free T4  -     Comprehensive Metabolic Panel  -     CBC & Differential  -     Lipid Panel With LDL / HDL Ratio    Decreased coordination  -     TSH Rfx On Abnormal To Free T4  -     Comprehensive Metabolic Panel  -     CBC & Differential  -     Lipid Panel With LDL / HDL Ratio  -     Ambulatory Referral to Neurology    Other iron deficiency anemia  Comments:  prev  Orders:  -     TSH Rfx On Abnormal To Free T4  -     Comprehensive Metabolic Panel  -     CBC & Differential  -     Lipid Panel With LDL / HDL Ratio    Screening  -     TSH Rfx On Abnormal To Free T4  -     Comprehensive Metabolic Panel  -     CBC & Differential  -     Lipid Panel With LDL / HDL Ratio    Need for hepatitis C screening test  -     Hepatitis C Antibody    Other orders  -     fluticasone (FLONASE) 50 MCG/ACT nasal spray; 2 sprays into each nostril Daily.  -     MethylPREDNISolone (MEDROL, ANISA,) 4 MG tablet; follow package directions                  EKG normal  No ST changes    She has a difficulty describing her concern  But basically she is complaining of a coordination problem which is developing more frequent  When she is taking steps  She  does not think is related to her knee replacement as that would be my first thought,  Not limited by pain  Not related to any joint given out  Difficult time with coordination of muscle movement as she pushes her extremity forward to take a step   No weakness or back pain    Please send her to a neurologist as although unlikely, neurological conditions could present early with gait difficulty    Discharge instructions    Follow-up with her cardiologist within the next 1-2 weeks  Any frequent or persistent chest pain  Nausea vomiting weakness shortness of breath emergency room    Neurology to evaluate balance  Discussed these issues as well with Dr. Mehta  Continue physical therapy    If confusion weakness slurred speech  Or gait instability loss of coordination emergency room      Recheck in 3 weeks    Thank You,      James Epley, NP    Regarding eczema hands  Appears to be pretty mild and fairly controlled  She uses cash register all day as well  I prefer not giving injection  I prefer no steroids, compromised with a Medrol Dosepak risk benefit  Patient agrees  See dermatology if not improving or worsening

## 2017-08-19 LAB
ALBUMIN SERPL-MCNC: 4 G/DL (ref 3.5–5.2)
ALBUMIN/GLOB SERPL: 1.5 G/DL
ALP SERPL-CCNC: 115 U/L (ref 39–117)
ALT SERPL-CCNC: 14 U/L (ref 1–33)
AST SERPL-CCNC: 26 U/L (ref 1–32)
BASOPHILS # BLD AUTO: 0.03 10*3/MM3 (ref 0–0.2)
BASOPHILS NFR BLD AUTO: 0.4 % (ref 0–1.5)
BILIRUB SERPL-MCNC: 0.2 MG/DL (ref 0.1–1.2)
BUN SERPL-MCNC: 12 MG/DL (ref 6–20)
BUN/CREAT SERPL: 17.9 (ref 7–25)
CALCIUM SERPL-MCNC: 9.5 MG/DL (ref 8.6–10.5)
CHLORIDE SERPL-SCNC: 108 MMOL/L (ref 98–107)
CHOLEST SERPL-MCNC: 154 MG/DL (ref 0–200)
CO2 SERPL-SCNC: 25.9 MMOL/L (ref 22–29)
CREAT SERPL-MCNC: 0.67 MG/DL (ref 0.57–1)
DIFFERENTIAL COMMENT: NORMAL
EOSINOPHIL # BLD AUTO: 0.23 10*3/MM3 (ref 0–0.7)
EOSINOPHIL NFR BLD AUTO: 3.2 % (ref 0.3–6.2)
ERYTHROCYTE [DISTWIDTH] IN BLOOD BY AUTOMATED COUNT: 18.6 % (ref 11.7–13)
GLOBULIN SER CALC-MCNC: 2.6 GM/DL
GLUCOSE SERPL-MCNC: 99 MG/DL (ref 65–99)
HCT VFR BLD AUTO: 27.2 % (ref 35.6–45.5)
HCV AB S/CO SERPL IA: <0.1 S/CO RATIO (ref 0–0.9)
HDLC SERPL-MCNC: 48 MG/DL (ref 40–60)
HGB BLD-MCNC: 7.5 G/DL (ref 11.9–15.5)
IMM GRANULOCYTES # BLD: 0 10*3/MM3 (ref 0–0.03)
IMM GRANULOCYTES NFR BLD: 0 % (ref 0–0.5)
LDLC SERPL CALC-MCNC: 89 MG/DL (ref 0–100)
LDLC/HDLC SERPL: 1.86 {RATIO}
LYMPHOCYTES # BLD AUTO: 2.3 10*3/MM3 (ref 0.9–4.8)
LYMPHOCYTES NFR BLD AUTO: 31.8 % (ref 19.6–45.3)
MCH RBC QN AUTO: 18.6 PG (ref 26.9–32)
MCHC RBC AUTO-ENTMCNC: 27.6 G/DL (ref 32.4–36.3)
MCV RBC AUTO: 67.3 FL (ref 80.5–98.2)
MONOCYTES # BLD AUTO: 0.42 10*3/MM3 (ref 0.2–1.2)
MONOCYTES NFR BLD AUTO: 5.8 % (ref 5–12)
NEUTROPHILS # BLD AUTO: 4.25 10*3/MM3 (ref 1.9–8.1)
NEUTROPHILS NFR BLD AUTO: 58.8 % (ref 42.7–76)
PLATELET # BLD AUTO: 238 10*3/MM3 (ref 140–500)
PLATELET BLD QL SMEAR: NORMAL
POTASSIUM SERPL-SCNC: 5.2 MMOL/L (ref 3.5–5.2)
PROT SERPL-MCNC: 6.6 G/DL (ref 6–8.5)
RBC # BLD AUTO: 4.04 10*6/MM3 (ref 3.9–5.2)
RBC MORPH BLD: NORMAL
SODIUM SERPL-SCNC: 145 MMOL/L (ref 136–145)
TRIGL SERPL-MCNC: 84 MG/DL (ref 0–150)
TSH SERPL DL<=0.005 MIU/L-ACNC: 0.68 MIU/ML (ref 0.27–4.2)
VLDLC SERPL CALC-MCNC: 16.8 MG/DL (ref 5–40)
WBC # BLD AUTO: 7.23 10*3/MM3 (ref 4.5–10.7)

## 2017-08-21 ENCOUNTER — TELEPHONE (OUTPATIENT)
Dept: FAMILY MEDICINE CLINIC | Facility: CLINIC | Age: 57
End: 2017-08-21

## 2017-08-21 DIAGNOSIS — D50.8 OTHER IRON DEFICIENCY ANEMIA: Primary | ICD-10-CM

## 2017-08-21 NOTE — TELEPHONE ENCOUNTER
Left patient a message  Make sure she keeps her appointment tomorrow  Has severe anemia    James Epley NP

## 2017-08-21 NOTE — TELEPHONE ENCOUNTER
Spoke with patient about her labs and the medications she needs to take with all instructions per James Epley. Patient wants to know the medication will be sent into her pharmacy or is it over the counter

## 2017-08-22 ENCOUNTER — OFFICE VISIT (OUTPATIENT)
Dept: FAMILY MEDICINE CLINIC | Facility: CLINIC | Age: 57
End: 2017-08-22

## 2017-08-22 VITALS
WEIGHT: 190 LBS | HEIGHT: 71 IN | DIASTOLIC BLOOD PRESSURE: 72 MMHG | HEART RATE: 88 BPM | TEMPERATURE: 97.9 F | OXYGEN SATURATION: 99 % | SYSTOLIC BLOOD PRESSURE: 122 MMHG | BODY MASS INDEX: 26.6 KG/M2

## 2017-08-22 DIAGNOSIS — D50.9 IRON DEFICIENCY ANEMIA, UNSPECIFIED IRON DEFICIENCY ANEMIA TYPE: Primary | ICD-10-CM

## 2017-08-22 DIAGNOSIS — R53.83 OTHER FATIGUE: ICD-10-CM

## 2017-08-22 LAB
IRON SATN MFR SERPL: 2 % (ref 20–50)
IRON SERPL-MCNC: 13 MCG/DL (ref 37–145)
SPECIMEN STATUS: NORMAL
TIBC SERPL-MCNC: 544 MCG/DL
UIBC SERPL-MCNC: 531 MCG/DL
WRITTEN AUTHORIZATION: NORMAL

## 2017-08-22 PROCEDURE — 99214 OFFICE O/P EST MOD 30 MIN: CPT | Performed by: NURSE PRACTITIONER

## 2017-08-22 RX ORDER — PANTOPRAZOLE SODIUM 40 MG/1
40 TABLET, DELAYED RELEASE ORAL DAILY
Qty: 30 TABLET | Refills: 5 | Status: SHIPPED | OUTPATIENT
Start: 2017-08-22 | End: 2018-03-11 | Stop reason: SDUPTHER

## 2017-08-22 NOTE — PATIENT INSTRUCTIONS
Stop omeprazole    Start generic Protonix superior product very similar side effect profile  Safe with Prevacid    Ferrous sulfate 325  Take 3 times a day with meals  Take with 250 mg vitamin C as tolerated    Push fluids water  Stay hydrated very important  Avoid strenuous activity  If chest pain shortness of breath syncope abdominal pain weakness  Coffee-ground emesis black tarry stools emergency room    Return occult blood  ASAP

## 2017-08-22 NOTE — PROGRESS NOTES
Subjective   Tiera Abbott is a 57 y.o. female.     HPI Comments: Follow-up severe anemia microcytic likely chronic blood loss noted on yesterday's labs    Likely explains patient's fatigue  Gait difficulty  As well as she has had some exertional chest pain nothing acute    Patient has a history nonobstructive coronary artery disease  Presently without chest pains    Hemoglobin 7.5 hematocrit 27.2    Added TIBC panel pending Re: T count pending   Hemoccult pending     Patient's here for recheck, she was called to return office     No history GI bleed   EGD normal last year   For reflux   Does not take chronic NSAIDs   Occasionally aspirin for ache or pain   Plavix daily     No coffee-ground emesis no black tarry stools   No fever chills or other issues     Colonoscopy several years ago       Postmenopausal bleeding nothing active   Initial biopsy was negative   Will have close follow-up GYN to repeat         The following portions of the patient's history were reviewed and updated as appropriate: allergies, current medications, past medical history, past social history, past surgical history and problem list.    Review of Systems   Constitutional: Positive for fever. Negative for appetite change, chills, fatigue and unexpected weight change.   HENT: Negative for congestion, ear pain and sore throat.    Eyes: Negative.    Respiratory: Negative for cough, chest tightness, shortness of breath and wheezing.    Cardiovascular: Negative for chest pain, palpitations and leg swelling.   Gastrointestinal: Negative for abdominal pain and constipation.   Genitourinary: Negative for difficulty urinating, dysuria and urgency.   Musculoskeletal: Negative for arthralgias and myalgias.        Gait difficulty   Skin: Negative for rash and wound.   Neurological: Negative for dizziness, speech difficulty, weakness, numbness and headaches.   Psychiatric/Behavioral: Negative for sleep disturbance. The patient is not nervous/anxious.         Objective   Physical Exam   Constitutional: She is oriented to person, place, and time. She appears well-developed and well-nourished.   Pleasant appears well  Talkative   HENT:   Head: Normocephalic.   Nose: Nose normal.   Mouth/Throat: Oropharynx is clear and moist.   Tm clear chito no mass canal patent without d/c   Eyes: Conjunctivae are normal. Pupils are equal, round, and reactive to light. No scleral icterus.   Neck: Neck supple. No JVD present. No thyromegaly present.   Cardiovascular: Normal rate, regular rhythm and normal heart sounds.  Exam reveals no gallop and no friction rub.    No murmur heard.  Pulmonary/Chest: Effort normal and breath sounds normal. No stridor. No respiratory distress. She has no wheezes. She has no rales.   Abdominal: Soft. Bowel sounds are normal. She exhibits no distension. There is no tenderness.   No hepatosplenomegaly, no ascites, nontender normal   Musculoskeletal: She exhibits no edema or tenderness.   Lymphadenopathy:     She has no cervical adenopathy.   Neurological: She is alert and oriented to person, place, and time. She has normal reflexes.   Skin: Skin is warm and dry. No rash noted. No erythema.   Psychiatric: She has a normal mood and affect. Her behavior is normal. Judgment and thought content normal.   Vitals reviewed.      Assessment/Plan   Tiera was seen today for follow-up.    Diagnoses and all orders for this visit:    Iron deficiency anemia, unspecified iron deficiency anemia type  -     CBC & Differential  -     Protime-INR  -     Reticulocytes  -     Iron Profile  -     Occult Blood, Fecal By Immunoassay  -     Ambulatory Referral to Gastroenterology    Other fatigue  -     CBC & Differential  -     Protime-INR  -     Reticulocytes  -     Iron Profile  -     Occult Blood, Fecal By Immunoassay  -     Ambulatory Referral to Gastroenterology    Other orders  -     pantoprazole (PROTONIX) 40 MG EC tablet; Take 1 tablet by mouth Daily.                   Patient's anemia explains her fatigue exertional shortness of breath  Previous episodes exertional chest pain none presently stable      She will follow-up 1 week  Referral to GI  No naproxen  No aspirin  Continue Plavix  Any active bleeding emergency room    Likely iron deficiency anemia quite severe  CAD stable without chest pain    Ferrous sulfate 325, not started yet  Take with vitamin C    Stop omeprazole    Start generic Protonix superior product very similar side effect profile  Safe with Prevacid    Ferrous sulfate 325  Take 3 times a day with meals  Take with 250 mg vitamin C as tolerated    Push fluids water  Stay hydrated very important  Avoid strenuous activity  If chest pain shortness of breath syncope abdominal pain weakness  Coffee-ground emesis black tarry stools emergency room    Return occult blood  ASAP

## 2017-08-23 LAB
BASOPHILS # BLD AUTO: 0.03 10*3/MM3 (ref 0–0.2)
BASOPHILS NFR BLD AUTO: 0.4 % (ref 0–1.5)
DIFFERENTIAL COMMENT: NORMAL
EOSINOPHIL # BLD AUTO: 0.25 10*3/MM3 (ref 0–0.7)
EOSINOPHIL NFR BLD AUTO: 3.4 % (ref 0.3–6.2)
ERYTHROCYTE [DISTWIDTH] IN BLOOD BY AUTOMATED COUNT: 18.7 % (ref 11.7–13)
HCT VFR BLD AUTO: 26.4 % (ref 35.6–45.5)
HGB BLD-MCNC: 7 G/DL (ref 11.9–15.5)
IMM GRANULOCYTES # BLD: 0 10*3/MM3 (ref 0–0.03)
IMM GRANULOCYTES NFR BLD: 0 % (ref 0–0.5)
INR PPP: 1.15 (ref 0.9–1.1)
IRON SATN MFR SERPL: 3 % (ref 20–50)
IRON SERPL-MCNC: 16 MCG/DL (ref 37–145)
LYMPHOCYTES # BLD AUTO: 2.38 10*3/MM3 (ref 0.9–4.8)
LYMPHOCYTES NFR BLD AUTO: 32 % (ref 19.6–45.3)
MCH RBC QN AUTO: 17.9 PG (ref 26.9–32)
MCHC RBC AUTO-ENTMCNC: 26.5 G/DL (ref 32.4–36.3)
MCV RBC AUTO: 67.7 FL (ref 80.5–98.2)
MONOCYTES # BLD AUTO: 0.57 10*3/MM3 (ref 0.2–1.2)
MONOCYTES NFR BLD AUTO: 7.7 % (ref 5–12)
NEUTROPHILS # BLD AUTO: 4.2 10*3/MM3 (ref 1.9–8.1)
NEUTROPHILS NFR BLD AUTO: 56.5 % (ref 42.7–76)
NRBC BLD AUTO-RTO: 0 /100 WBC (ref 0–0)
PLATELET # BLD AUTO: 261 10*3/MM3 (ref 140–500)
PLATELET BLD QL SMEAR: NORMAL
PROTHROMBIN TIME: 14.3 SECONDS (ref 11.7–14.2)
RBC # BLD AUTO: 3.9 10*6/MM3 (ref 3.9–5.2)
RBC MORPH BLD: NORMAL
RETICS/RBC NFR AUTO: 1.68 % (ref 0.5–1.5)
TIBC SERPL-MCNC: 536 MCG/DL
UIBC SERPL-MCNC: 520 MCG/DL
WBC # BLD AUTO: 7.43 10*3/MM3 (ref 4.5–10.7)

## 2017-08-24 ENCOUNTER — HOSPITAL ENCOUNTER (INPATIENT)
Facility: HOSPITAL | Age: 57
LOS: 4 days | Discharge: HOME | End: 2017-08-28
Attending: EMERGENCY MEDICINE | Admitting: INTERNAL MEDICINE

## 2017-08-24 ENCOUNTER — APPOINTMENT (OUTPATIENT)
Dept: GENERAL RADIOLOGY | Facility: HOSPITAL | Age: 57
End: 2017-08-24

## 2017-08-24 ENCOUNTER — TELEPHONE (OUTPATIENT)
Dept: GASTROENTEROLOGY | Facility: CLINIC | Age: 57
End: 2017-08-24

## 2017-08-24 ENCOUNTER — TELEPHONE (OUTPATIENT)
Dept: FAMILY MEDICINE CLINIC | Facility: CLINIC | Age: 57
End: 2017-08-24

## 2017-08-24 DIAGNOSIS — D64.9 ANEMIA, UNSPECIFIED TYPE: Primary | ICD-10-CM

## 2017-08-24 DIAGNOSIS — D50.0 IRON DEFICIENCY ANEMIA DUE TO CHRONIC BLOOD LOSS: ICD-10-CM

## 2017-08-24 DIAGNOSIS — R53.1 GENERALIZED WEAKNESS: ICD-10-CM

## 2017-08-24 DIAGNOSIS — Z12.11 COLON CANCER SCREENING: Primary | ICD-10-CM

## 2017-08-24 LAB
ABO GROUP BLD: NORMAL
ALBUMIN SERPL-MCNC: 4.1 G/DL (ref 3.5–5.2)
ALBUMIN/GLOB SERPL: 1.4 G/DL
ALP SERPL-CCNC: 117 U/L (ref 39–117)
ALT SERPL W P-5'-P-CCNC: 12 U/L (ref 1–33)
ANION GAP SERPL CALCULATED.3IONS-SCNC: 10.8 MMOL/L
ANISOCYTOSIS BLD QL: NORMAL
AST SERPL-CCNC: 21 U/L (ref 1–32)
BASOPHILS # BLD AUTO: 0.05 10*3/MM3 (ref 0–0.2)
BASOPHILS NFR BLD AUTO: 0.5 % (ref 0–1.5)
BILIRUB SERPL-MCNC: 0.2 MG/DL (ref 0.1–1.2)
BLD GP AB SCN SERPL QL: NEGATIVE
BUN BLD-MCNC: 8 MG/DL (ref 6–20)
BUN/CREAT SERPL: 10.5 (ref 7–25)
CALCIUM SPEC-SCNC: 9.6 MG/DL (ref 8.6–10.5)
CHLORIDE SERPL-SCNC: 104 MMOL/L (ref 98–107)
CO2 SERPL-SCNC: 26.2 MMOL/L (ref 22–29)
CREAT BLD-MCNC: 0.76 MG/DL (ref 0.57–1)
DEPRECATED RDW RBC AUTO: 45.5 FL (ref 37–54)
EOSINOPHIL # BLD AUTO: 0.31 10*3/MM3 (ref 0–0.7)
EOSINOPHIL NFR BLD AUTO: 3.1 % (ref 0.3–6.2)
ERYTHROCYTE [DISTWIDTH] IN BLOOD BY AUTOMATED COUNT: 18.9 % (ref 11.7–13)
GFR SERPL CREATININE-BSD FRML MDRD: 78 ML/MIN/1.73
GLOBULIN UR ELPH-MCNC: 3 GM/DL
GLUCOSE BLD-MCNC: 96 MG/DL (ref 65–99)
HCT VFR BLD AUTO: 26.6 % (ref 35.6–45.5)
HGB BLD-MCNC: 7 G/DL (ref 11.9–15.5)
HYPOCHROMIA BLD QL: NORMAL
IMM GRANULOCYTES # BLD: 0.07 10*3/MM3 (ref 0–0.03)
IMM GRANULOCYTES NFR BLD: 0.7 % (ref 0–0.5)
LYMPHOCYTES # BLD AUTO: 3.96 10*3/MM3 (ref 0.9–4.8)
LYMPHOCYTES NFR BLD AUTO: 39.6 % (ref 19.6–45.3)
MCH RBC QN AUTO: 17.9 PG (ref 26.9–32)
MCHC RBC AUTO-ENTMCNC: 26.3 G/DL (ref 32.4–36.3)
MCV RBC AUTO: 68 FL (ref 80.5–98.2)
MICROCYTES BLD QL: NORMAL
MONOCYTES # BLD AUTO: 0.62 10*3/MM3 (ref 0.2–1.2)
MONOCYTES NFR BLD AUTO: 6.2 % (ref 5–12)
NEUTROPHILS # BLD AUTO: 4.99 10*3/MM3 (ref 1.9–8.1)
NEUTROPHILS NFR BLD AUTO: 49.9 % (ref 42.7–76)
NRBC BLD MANUAL-RTO: 0.7 /100 WBC (ref 0–0)
OVALOCYTES BLD QL SMEAR: NORMAL
PLAT MORPH BLD: NORMAL
PLATELET # BLD AUTO: 278 10*3/MM3 (ref 140–500)
PMV BLD AUTO: ABNORMAL FL (ref 6–12)
POLYCHROMASIA BLD QL SMEAR: NORMAL
POTASSIUM BLD-SCNC: 3.8 MMOL/L (ref 3.5–5.2)
PROT SERPL-MCNC: 7.1 G/DL (ref 6–8.5)
RBC # BLD AUTO: 3.91 10*6/MM3 (ref 3.9–5.2)
RH BLD: POSITIVE
SODIUM BLD-SCNC: 141 MMOL/L (ref 136–145)
WBC MORPH BLD: NORMAL
WBC NRBC COR # BLD: 10 10*3/MM3 (ref 4.5–10.7)

## 2017-08-24 PROCEDURE — 71020 HC CHEST PA AND LATERAL: CPT

## 2017-08-24 PROCEDURE — 86900 BLOOD TYPING SEROLOGIC ABO: CPT | Performed by: INTERNAL MEDICINE

## 2017-08-24 PROCEDURE — 82728 ASSAY OF FERRITIN: CPT | Performed by: INTERNAL MEDICINE

## 2017-08-24 PROCEDURE — 86923 COMPATIBILITY TEST ELECTRIC: CPT

## 2017-08-24 PROCEDURE — P9016 RBC LEUKOCYTES REDUCED: HCPCS

## 2017-08-24 PROCEDURE — 93010 ELECTROCARDIOGRAM REPORT: CPT | Performed by: INTERNAL MEDICINE

## 2017-08-24 PROCEDURE — 85007 BL SMEAR W/DIFF WBC COUNT: CPT | Performed by: INTERNAL MEDICINE

## 2017-08-24 PROCEDURE — 84484 ASSAY OF TROPONIN QUANT: CPT | Performed by: NURSE PRACTITIONER

## 2017-08-24 PROCEDURE — 86901 BLOOD TYPING SEROLOGIC RH(D): CPT | Performed by: INTERNAL MEDICINE

## 2017-08-24 PROCEDURE — 36430 TRANSFUSION BLD/BLD COMPNT: CPT

## 2017-08-24 PROCEDURE — 80053 COMPREHEN METABOLIC PANEL: CPT | Performed by: INTERNAL MEDICINE

## 2017-08-24 PROCEDURE — 86850 RBC ANTIBODY SCREEN: CPT | Performed by: INTERNAL MEDICINE

## 2017-08-24 PROCEDURE — 86900 BLOOD TYPING SEROLOGIC ABO: CPT

## 2017-08-24 PROCEDURE — 93005 ELECTROCARDIOGRAM TRACING: CPT | Performed by: NURSE PRACTITIONER

## 2017-08-24 PROCEDURE — 99284 EMERGENCY DEPT VISIT MOD MDM: CPT

## 2017-08-24 PROCEDURE — 85025 COMPLETE CBC W/AUTO DIFF WBC: CPT | Performed by: INTERNAL MEDICINE

## 2017-08-24 RX ORDER — MULTIVIT WITH MINERALS/LUTEIN
250 TABLET ORAL DAILY
COMMUNITY
End: 2018-07-23

## 2017-08-24 RX ORDER — SODIUM CHLORIDE 0.9 % (FLUSH) 0.9 %
10 SYRINGE (ML) INJECTION AS NEEDED
Status: DISCONTINUED | OUTPATIENT
Start: 2017-08-24 | End: 2017-08-28 | Stop reason: HOSPADM

## 2017-08-24 RX ADMIN — SODIUM CHLORIDE 1000 ML: 9 INJECTION, SOLUTION INTRAVENOUS at 22:53

## 2017-08-24 NOTE — TELEPHONE ENCOUNTER
Patient called requesting her lab resutls and also stating that she had some dark stool this morning and doesnt know what she should do

## 2017-08-24 NOTE — TELEPHONE ENCOUNTER
Pt called the office because her hgb is 7.0 on CBC drawn by James Epley 8/22/17. Pt states he recommends for pt to f/u with Dr Celeste LOCKHART. 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 Pt states he stool has been fairly dark but hard to tell if just very dark brown or black. Pt states she turned in her occult blood stool sample to that office yesterday and is awaiting the results. Pt states she is very weak and tired and is taking iron but needs to follow up with Dr Alonso as soon as possible. Advised will speak with Dr Alonso and see what he recommends. Verified she is still taking Plavix prescribed by Dr Fontaine.     Spoke with Dr Alonso. MD states pt needs to have blood transfused ASAP, today or tomorrow, and should call the ordering provider who checked the CBC to get this set up. Dr Alonso states pt can have blood transfused this week and then he can see pt on Monday afternoon and most likely scope later in the week, possible Wednesday.     When I got back to my phone, pt had hung up. Called pt back and left her a message with info from Dr Alonso. Asked that pt call James Epley's office to request blood transfusion and then call us back to make appt to see Dr Alonso on Monday afternoon.

## 2017-08-24 NOTE — TELEPHONE ENCOUNTER
Please see the message I sent here yesterday regarding her labs    INR will call some darker stools  But she should not have black tarry stools  Or blood  If so the emergency room      Coffee-ground emesis black tarry stools rectal bleeding  Chest pain shortness of breath increased weakness  Increasing fatigue  Skin turning yellow or white  Emergency room

## 2017-08-25 ENCOUNTER — TELEPHONE (OUTPATIENT)
Dept: FAMILY MEDICINE CLINIC | Facility: CLINIC | Age: 57
End: 2017-08-25

## 2017-08-25 PROBLEM — D64.9 ANEMIA: Status: RESOLVED | Noted: 2017-08-24 | Resolved: 2017-08-25

## 2017-08-25 PROBLEM — I25.10 CAD (CORONARY ARTERY DISEASE): Status: ACTIVE | Noted: 2017-08-25

## 2017-08-25 PROBLEM — K92.1 TARRY STOOL: Status: ACTIVE | Noted: 2017-08-25

## 2017-08-25 LAB
ABO + RH BLD: NORMAL
ABO + RH BLD: NORMAL
ALBUMIN SERPL-MCNC: 3.3 G/DL (ref 3.5–5.2)
ALBUMIN/GLOB SERPL: 1.1 G/DL
ALP SERPL-CCNC: 108 U/L (ref 39–117)
ALT SERPL W P-5'-P-CCNC: 16 U/L (ref 1–33)
ANION GAP SERPL CALCULATED.3IONS-SCNC: 12.5 MMOL/L
ANISOCYTOSIS BLD QL: NORMAL
ARTERIAL PATENCY WRIST A: POSITIVE
AST SERPL-CCNC: 32 U/L (ref 1–32)
ATMOSPHERIC PRESS: 750.1 MMHG
BASE EXCESS BLDA CALC-SCNC: -3.6 MMOL/L (ref 0–2)
BASOPHILS # BLD AUTO: 0.04 10*3/MM3 (ref 0–0.2)
BASOPHILS NFR BLD AUTO: 0.4 % (ref 0–1.5)
BDY SITE: ABNORMAL
BH BB BLOOD EXPIRATION DATE: NORMAL
BH BB BLOOD EXPIRATION DATE: NORMAL
BH BB BLOOD TYPE BARCODE: 5100
BH BB BLOOD TYPE BARCODE: 5100
BH BB DISPENSE STATUS: NORMAL
BH BB DISPENSE STATUS: NORMAL
BH BB PRODUCT CODE: NORMAL
BH BB PRODUCT CODE: NORMAL
BH BB UNIT NUMBER: NORMAL
BH BB UNIT NUMBER: NORMAL
BILIRUB SERPL-MCNC: 0.3 MG/DL (ref 0.1–1.2)
BUN BLD-MCNC: 10 MG/DL (ref 6–20)
BUN/CREAT SERPL: 15.6 (ref 7–25)
CALCIUM SPEC-SCNC: 8.9 MG/DL (ref 8.6–10.5)
CHLORIDE SERPL-SCNC: 107 MMOL/L (ref 98–107)
CO2 SERPL-SCNC: 24.5 MMOL/L (ref 22–29)
CREAT BLD-MCNC: 0.64 MG/DL (ref 0.57–1)
DEPRECATED RDW RBC AUTO: 50.3 FL (ref 37–54)
EOSINOPHIL # BLD AUTO: 0.26 10*3/MM3 (ref 0–0.7)
EOSINOPHIL NFR BLD AUTO: 2.8 % (ref 0.3–6.2)
ERYTHROCYTE [DISTWIDTH] IN BLOOD BY AUTOMATED COUNT: 21 % (ref 11.7–13)
FERRITIN SERPL-MCNC: 16.1 NG/ML (ref 13–150)
GFR SERPL CREATININE-BSD FRML MDRD: 96 ML/MIN/1.73
GLOBULIN UR ELPH-MCNC: 3 GM/DL
GLUCOSE BLD-MCNC: 102 MG/DL (ref 65–99)
HCO3 BLDA-SCNC: 18.4 MMOL/L (ref 22–28)
HCT VFR BLD AUTO: 30.8 % (ref 35.6–45.5)
HEMOCCULT STL QL IA: NEGATIVE
HGB BLD-MCNC: 8.9 G/DL (ref 11.9–15.5)
IMM GRANULOCYTES # BLD: 0.03 10*3/MM3 (ref 0–0.03)
IMM GRANULOCYTES NFR BLD: 0.3 % (ref 0–0.5)
INR PPP: 1.11 (ref 0.9–1.1)
LYMPHOCYTES # BLD AUTO: 3.59 10*3/MM3 (ref 0.9–4.8)
LYMPHOCYTES NFR BLD AUTO: 38.2 % (ref 19.6–45.3)
MCH RBC QN AUTO: 20.1 PG (ref 26.9–32)
MCHC RBC AUTO-ENTMCNC: 28.9 G/DL (ref 32.4–36.3)
MCV RBC AUTO: 69.7 FL (ref 80.5–98.2)
MODALITY: ABNORMAL
MONOCYTES # BLD AUTO: 0.55 10*3/MM3 (ref 0.2–1.2)
MONOCYTES NFR BLD AUTO: 5.8 % (ref 5–12)
NEUTROPHILS # BLD AUTO: 4.94 10*3/MM3 (ref 1.9–8.1)
NEUTROPHILS NFR BLD AUTO: 52.5 % (ref 42.7–76)
OVALOCYTES BLD QL SMEAR: NORMAL
PCO2 BLDA: 25.2 MM HG (ref 35–45)
PH BLDA: 7.47 PH UNITS (ref 7.35–7.45)
PLAT MORPH BLD: NORMAL
PLATELET # BLD AUTO: 238 10*3/MM3 (ref 140–500)
PO2 BLDA: 103.2 MM HG (ref 80–100)
POLYCHROMASIA BLD QL SMEAR: NORMAL
POTASSIUM BLD-SCNC: 4.1 MMOL/L (ref 3.5–5.2)
PROT SERPL-MCNC: 6.3 G/DL (ref 6–8.5)
PROTHROMBIN TIME: 13.9 SECONDS (ref 11.7–14.2)
RBC # BLD AUTO: 4.42 10*6/MM3 (ref 3.9–5.2)
SAO2 % BLDCOA: 98.5 % (ref 92–99)
SODIUM BLD-SCNC: 144 MMOL/L (ref 136–145)
TOTAL RATE: 22 BREATHS/MINUTE
TROPONIN T SERPL-MCNC: <0.01 NG/ML (ref 0–0.03)
TROPONIN T SERPL-MCNC: <0.01 NG/ML (ref 0–0.03)
UNIT  ABO: NORMAL
UNIT  ABO: NORMAL
UNIT  RH: NORMAL
UNIT  RH: NORMAL
WBC MORPH BLD: NORMAL
WBC NRBC COR # BLD: 9.41 10*3/MM3 (ref 4.5–10.7)

## 2017-08-25 PROCEDURE — 36430 TRANSFUSION BLD/BLD COMPNT: CPT

## 2017-08-25 PROCEDURE — 97161 PT EVAL LOW COMPLEX 20 MIN: CPT | Performed by: PHYSICAL THERAPIST

## 2017-08-25 PROCEDURE — 99222 1ST HOSP IP/OBS MODERATE 55: CPT | Performed by: INTERNAL MEDICINE

## 2017-08-25 PROCEDURE — 86900 BLOOD TYPING SEROLOGIC ABO: CPT

## 2017-08-25 PROCEDURE — 85610 PROTHROMBIN TIME: CPT | Performed by: INTERNAL MEDICINE

## 2017-08-25 PROCEDURE — 84484 ASSAY OF TROPONIN QUANT: CPT | Performed by: INTERNAL MEDICINE

## 2017-08-25 PROCEDURE — 25010000002 IRON SUCROSE PER 1 MG: Performed by: INTERNAL MEDICINE

## 2017-08-25 PROCEDURE — 63710000001 DIPHENHYDRAMINE PER 50 MG: Performed by: INTERNAL MEDICINE

## 2017-08-25 PROCEDURE — 80053 COMPREHEN METABOLIC PANEL: CPT | Performed by: INTERNAL MEDICINE

## 2017-08-25 PROCEDURE — 99221 1ST HOSP IP/OBS SF/LOW 40: CPT | Performed by: INTERNAL MEDICINE

## 2017-08-25 PROCEDURE — 82803 BLOOD GASES ANY COMBINATION: CPT

## 2017-08-25 PROCEDURE — 97110 THERAPEUTIC EXERCISES: CPT | Performed by: PHYSICAL THERAPIST

## 2017-08-25 PROCEDURE — P9016 RBC LEUKOCYTES REDUCED: HCPCS

## 2017-08-25 PROCEDURE — 99223 1ST HOSP IP/OBS HIGH 75: CPT | Performed by: INTERNAL MEDICINE

## 2017-08-25 PROCEDURE — 85025 COMPLETE CBC W/AUTO DIFF WBC: CPT | Performed by: INTERNAL MEDICINE

## 2017-08-25 PROCEDURE — 85007 BL SMEAR W/DIFF WBC COUNT: CPT | Performed by: INTERNAL MEDICINE

## 2017-08-25 PROCEDURE — 36600 WITHDRAWAL OF ARTERIAL BLOOD: CPT

## 2017-08-25 RX ORDER — HYDROXYZINE HYDROCHLORIDE 25 MG/1
25 TABLET, FILM COATED ORAL EVERY 6 HOURS PRN
Status: DISCONTINUED | OUTPATIENT
Start: 2017-08-25 | End: 2017-08-28 | Stop reason: HOSPADM

## 2017-08-25 RX ORDER — DIPHENHYDRAMINE HCL 25 MG
25 CAPSULE ORAL EVERY 4 HOURS PRN
Status: DISCONTINUED | OUTPATIENT
Start: 2017-08-25 | End: 2017-08-28 | Stop reason: HOSPADM

## 2017-08-25 RX ORDER — ONDANSETRON 4 MG/1
4 TABLET, FILM COATED ORAL EVERY 6 HOURS PRN
Status: DISCONTINUED | OUTPATIENT
Start: 2017-08-25 | End: 2017-08-28 | Stop reason: HOSPADM

## 2017-08-25 RX ORDER — NICOTINE 21 MG/24HR
1 PATCH, TRANSDERMAL 24 HOURS TRANSDERMAL DAILY
Status: DISCONTINUED | OUTPATIENT
Start: 2017-08-25 | End: 2017-08-28 | Stop reason: HOSPADM

## 2017-08-25 RX ORDER — CARVEDILOL 3.12 MG/1
3.12 TABLET ORAL 2 TIMES DAILY WITH MEALS
Status: DISCONTINUED | OUTPATIENT
Start: 2017-08-25 | End: 2017-08-28 | Stop reason: HOSPADM

## 2017-08-25 RX ORDER — FLUTICASONE PROPIONATE 50 MCG
2 SPRAY, SUSPENSION (ML) NASAL DAILY
Status: DISCONTINUED | OUTPATIENT
Start: 2017-08-25 | End: 2017-08-28 | Stop reason: HOSPADM

## 2017-08-25 RX ORDER — MONTELUKAST SODIUM 10 MG/1
10 TABLET ORAL NIGHTLY
Status: DISCONTINUED | OUTPATIENT
Start: 2017-08-25 | End: 2017-08-28 | Stop reason: HOSPADM

## 2017-08-25 RX ORDER — ONDANSETRON 2 MG/ML
4 INJECTION INTRAMUSCULAR; INTRAVENOUS EVERY 6 HOURS PRN
Status: DISCONTINUED | OUTPATIENT
Start: 2017-08-25 | End: 2017-08-28 | Stop reason: HOSPADM

## 2017-08-25 RX ORDER — SODIUM CHLORIDE 0.9 % (FLUSH) 0.9 %
1-10 SYRINGE (ML) INJECTION AS NEEDED
Status: DISCONTINUED | OUTPATIENT
Start: 2017-08-25 | End: 2017-08-28 | Stop reason: HOSPADM

## 2017-08-25 RX ORDER — DIPHENHYDRAMINE HCL 25 MG
25 CAPSULE ORAL ONCE
Status: COMPLETED | OUTPATIENT
Start: 2017-08-25 | End: 2017-08-25

## 2017-08-25 RX ORDER — ONDANSETRON 4 MG/1
4 TABLET, ORALLY DISINTEGRATING ORAL EVERY 6 HOURS PRN
Status: DISCONTINUED | OUTPATIENT
Start: 2017-08-25 | End: 2017-08-28 | Stop reason: HOSPADM

## 2017-08-25 RX ORDER — ATORVASTATIN CALCIUM 80 MG/1
80 TABLET, FILM COATED ORAL NIGHTLY
Status: DISCONTINUED | OUTPATIENT
Start: 2017-08-25 | End: 2017-08-28 | Stop reason: HOSPADM

## 2017-08-25 RX ORDER — GABAPENTIN 300 MG/1
600 CAPSULE ORAL NIGHTLY
Status: DISCONTINUED | OUTPATIENT
Start: 2017-08-25 | End: 2017-08-28 | Stop reason: HOSPADM

## 2017-08-25 RX ORDER — ACETAMINOPHEN 325 MG/1
650 TABLET ORAL EVERY 4 HOURS PRN
Status: DISCONTINUED | OUTPATIENT
Start: 2017-08-25 | End: 2017-08-28 | Stop reason: HOSPADM

## 2017-08-25 RX ORDER — VENLAFAXINE HYDROCHLORIDE 37.5 MG/1
37.5 CAPSULE, EXTENDED RELEASE ORAL
Status: DISCONTINUED | OUTPATIENT
Start: 2017-08-25 | End: 2017-08-28 | Stop reason: HOSPADM

## 2017-08-25 RX ORDER — ACETAMINOPHEN 325 MG/1
650 TABLET ORAL ONCE
Status: COMPLETED | OUTPATIENT
Start: 2017-08-25 | End: 2017-08-25

## 2017-08-25 RX ORDER — PANTOPRAZOLE SODIUM 40 MG/1
40 TABLET, DELAYED RELEASE ORAL DAILY
Status: DISCONTINUED | OUTPATIENT
Start: 2017-08-25 | End: 2017-08-28 | Stop reason: HOSPADM

## 2017-08-25 RX ADMIN — VENLAFAXINE HYDROCHLORIDE 37.5 MG: 37.5 CAPSULE, EXTENDED RELEASE ORAL at 09:42

## 2017-08-25 RX ADMIN — ATORVASTATIN CALCIUM 80 MG: 80 TABLET, FILM COATED ORAL at 04:23

## 2017-08-25 RX ADMIN — DIPHENHYDRAMINE HYDROCHLORIDE 25 MG: 25 CAPSULE ORAL at 14:33

## 2017-08-25 RX ADMIN — NICOTINE 1 PATCH: 14 PATCH, EXTENDED RELEASE TRANSDERMAL at 14:32

## 2017-08-25 RX ADMIN — FLUTICASONE PROPIONATE 2 SPRAY: 50 SPRAY, METERED NASAL at 09:43

## 2017-08-25 RX ADMIN — ACETAMINOPHEN 650 MG: 325 TABLET ORAL at 14:33

## 2017-08-25 RX ADMIN — MONTELUKAST 10 MG: 10 TABLET, FILM COATED ORAL at 04:22

## 2017-08-25 RX ADMIN — GABAPENTIN 600 MG: 300 CAPSULE ORAL at 20:28

## 2017-08-25 RX ADMIN — DIPHENHYDRAMINE HYDROCHLORIDE 25 MG: 25 CAPSULE ORAL at 01:42

## 2017-08-25 RX ADMIN — ATORVASTATIN CALCIUM 80 MG: 80 TABLET, FILM COATED ORAL at 20:28

## 2017-08-25 RX ADMIN — MONTELUKAST 10 MG: 10 TABLET, FILM COATED ORAL at 20:28

## 2017-08-25 RX ADMIN — IRON SUCROSE 300 MG: 20 INJECTION, SOLUTION INTRAVENOUS at 14:33

## 2017-08-25 RX ADMIN — GABAPENTIN 600 MG: 300 CAPSULE ORAL at 02:39

## 2017-08-25 RX ADMIN — CARVEDILOL 3.12 MG: 3.12 TABLET, FILM COATED ORAL at 20:28

## 2017-08-25 RX ADMIN — PANTOPRAZOLE SODIUM 40 MG: 40 TABLET, DELAYED RELEASE ORAL at 09:43

## 2017-08-25 RX ADMIN — CARVEDILOL 3.12 MG: 3.12 TABLET, FILM COATED ORAL at 09:43

## 2017-08-25 NOTE — TELEPHONE ENCOUNTER
Spoke with patient about how she is doing and she was actually at the ER due the increased weakness,they are running test and all of her specialist has been called. They are looking in doing a lower GI and wants to stop some of her medications.

## 2017-08-26 LAB
ANION GAP SERPL CALCULATED.3IONS-SCNC: 10.7 MMOL/L
ANISOCYTOSIS BLD QL: NORMAL
BASOPHILS # BLD AUTO: 0.03 10*3/MM3 (ref 0–0.2)
BASOPHILS NFR BLD AUTO: 0.3 % (ref 0–1.5)
BUN BLD-MCNC: 9 MG/DL (ref 6–20)
BUN/CREAT SERPL: 14.1 (ref 7–25)
CALCIUM SPEC-SCNC: 9.1 MG/DL (ref 8.6–10.5)
CHLORIDE SERPL-SCNC: 103 MMOL/L (ref 98–107)
CO2 SERPL-SCNC: 28.3 MMOL/L (ref 22–29)
CREAT BLD-MCNC: 0.64 MG/DL (ref 0.57–1)
DEPRECATED RDW RBC AUTO: 51.3 FL (ref 37–54)
EOSINOPHIL # BLD AUTO: 0.26 10*3/MM3 (ref 0–0.7)
EOSINOPHIL NFR BLD AUTO: 3 % (ref 0.3–6.2)
ERYTHROCYTE [DISTWIDTH] IN BLOOD BY AUTOMATED COUNT: 22.1 % (ref 11.7–13)
FOLATE SERPL-MCNC: 12.74 NG/ML (ref 4.78–24.2)
GFR SERPL CREATININE-BSD FRML MDRD: 96 ML/MIN/1.73
GLUCOSE BLD-MCNC: 85 MG/DL (ref 65–99)
HCT VFR BLD AUTO: 33.6 % (ref 35.6–45.5)
HGB BLD-MCNC: 9.7 G/DL (ref 11.9–15.5)
HYPOCHROMIA BLD QL: NORMAL
IMM GRANULOCYTES # BLD: 0.02 10*3/MM3 (ref 0–0.03)
IMM GRANULOCYTES NFR BLD: 0.2 % (ref 0–0.5)
LYMPHOCYTES # BLD AUTO: 3.01 10*3/MM3 (ref 0.9–4.8)
LYMPHOCYTES NFR BLD AUTO: 34.4 % (ref 19.6–45.3)
MCH RBC QN AUTO: 20.5 PG (ref 26.9–32)
MCHC RBC AUTO-ENTMCNC: 28.9 G/DL (ref 32.4–36.3)
MCV RBC AUTO: 71 FL (ref 80.5–98.2)
MICROCYTES BLD QL: NORMAL
MONOCYTES # BLD AUTO: 0.66 10*3/MM3 (ref 0.2–1.2)
MONOCYTES NFR BLD AUTO: 7.5 % (ref 5–12)
NEUTROPHILS # BLD AUTO: 4.78 10*3/MM3 (ref 1.9–8.1)
NEUTROPHILS NFR BLD AUTO: 54.6 % (ref 42.7–76)
PLAT MORPH BLD: NORMAL
PLATELET # BLD AUTO: 251 10*3/MM3 (ref 140–500)
POLYCHROMASIA BLD QL SMEAR: NORMAL
POTASSIUM BLD-SCNC: 4 MMOL/L (ref 3.5–5.2)
RBC # BLD AUTO: 4.73 10*6/MM3 (ref 3.9–5.2)
SODIUM BLD-SCNC: 142 MMOL/L (ref 136–145)
STOMATOCYTES BLD QL SMEAR: NORMAL
VIT B12 BLD-MCNC: 267 PG/ML (ref 211–946)
WBC MORPH BLD: NORMAL
WBC NRBC COR # BLD: 8.76 10*3/MM3 (ref 4.5–10.7)

## 2017-08-26 PROCEDURE — 63710000001 DIPHENHYDRAMINE PER 50 MG: Performed by: INTERNAL MEDICINE

## 2017-08-26 PROCEDURE — 99232 SBSQ HOSP IP/OBS MODERATE 35: CPT | Performed by: INTERNAL MEDICINE

## 2017-08-26 PROCEDURE — 85007 BL SMEAR W/DIFF WBC COUNT: CPT | Performed by: INTERNAL MEDICINE

## 2017-08-26 PROCEDURE — 85025 COMPLETE CBC W/AUTO DIFF WBC: CPT | Performed by: INTERNAL MEDICINE

## 2017-08-26 PROCEDURE — 80048 BASIC METABOLIC PNL TOTAL CA: CPT | Performed by: INTERNAL MEDICINE

## 2017-08-26 PROCEDURE — 25010000002 IRON SUCROSE PER 1 MG: Performed by: INTERNAL MEDICINE

## 2017-08-26 PROCEDURE — 82607 VITAMIN B-12: CPT | Performed by: INTERNAL MEDICINE

## 2017-08-26 PROCEDURE — 82746 ASSAY OF FOLIC ACID SERUM: CPT | Performed by: INTERNAL MEDICINE

## 2017-08-26 RX ORDER — ACETAMINOPHEN 325 MG/1
650 TABLET ORAL ONCE
Status: COMPLETED | OUTPATIENT
Start: 2017-08-26 | End: 2017-08-26

## 2017-08-26 RX ORDER — DIPHENHYDRAMINE HCL 25 MG
25 CAPSULE ORAL ONCE
Status: COMPLETED | OUTPATIENT
Start: 2017-08-26 | End: 2017-08-26

## 2017-08-26 RX ADMIN — ACETAMINOPHEN 650 MG: 325 TABLET ORAL at 09:59

## 2017-08-26 RX ADMIN — DIPHENHYDRAMINE HYDROCHLORIDE 25 MG: 25 CAPSULE ORAL at 09:59

## 2017-08-26 RX ADMIN — IRON SUCROSE 300 MG: 20 INJECTION, SOLUTION INTRAVENOUS at 10:05

## 2017-08-26 RX ADMIN — CARVEDILOL 3.12 MG: 3.12 TABLET, FILM COATED ORAL at 17:52

## 2017-08-26 RX ADMIN — ACETAMINOPHEN 650 MG: 325 TABLET ORAL at 06:37

## 2017-08-26 RX ADMIN — CARVEDILOL 3.12 MG: 3.12 TABLET, FILM COATED ORAL at 10:00

## 2017-08-26 RX ADMIN — MONTELUKAST 10 MG: 10 TABLET, FILM COATED ORAL at 21:12

## 2017-08-26 RX ADMIN — VENLAFAXINE HYDROCHLORIDE 37.5 MG: 37.5 CAPSULE, EXTENDED RELEASE ORAL at 10:00

## 2017-08-26 RX ADMIN — NICOTINE 1 PATCH: 14 PATCH, EXTENDED RELEASE TRANSDERMAL at 09:59

## 2017-08-26 RX ADMIN — PANTOPRAZOLE SODIUM 40 MG: 40 TABLET, DELAYED RELEASE ORAL at 09:59

## 2017-08-26 RX ADMIN — HYDROXYZINE HYDROCHLORIDE 25 MG: 25 TABLET ORAL at 22:49

## 2017-08-26 RX ADMIN — FLUTICASONE PROPIONATE 2 SPRAY: 50 SPRAY, METERED NASAL at 09:59

## 2017-08-26 RX ADMIN — ATORVASTATIN CALCIUM 80 MG: 80 TABLET, FILM COATED ORAL at 21:12

## 2017-08-26 RX ADMIN — GABAPENTIN 600 MG: 300 CAPSULE ORAL at 21:12

## 2017-08-27 PROBLEM — D50.0 IRON DEFICIENCY ANEMIA DUE TO CHRONIC BLOOD LOSS: Status: ACTIVE | Noted: 2017-08-24

## 2017-08-27 LAB
BASOPHILS # BLD AUTO: 0.03 10*3/MM3 (ref 0–0.2)
BASOPHILS NFR BLD AUTO: 0.4 % (ref 0–1.5)
DEPRECATED RDW RBC AUTO: 52.3 FL (ref 37–54)
EOSINOPHIL # BLD AUTO: 0.29 10*3/MM3 (ref 0–0.7)
EOSINOPHIL NFR BLD AUTO: 3.4 % (ref 0.3–6.2)
ERYTHROCYTE [DISTWIDTH] IN BLOOD BY AUTOMATED COUNT: 23.4 % (ref 11.7–13)
HCT VFR BLD AUTO: 34.8 % (ref 35.6–45.5)
HGB BLD-MCNC: 9.9 G/DL (ref 11.9–15.5)
IMM GRANULOCYTES # BLD: 0 10*3/MM3 (ref 0–0.03)
IMM GRANULOCYTES NFR BLD: 0 % (ref 0–0.5)
LYMPHOCYTES # BLD AUTO: 2.71 10*3/MM3 (ref 0.9–4.8)
LYMPHOCYTES NFR BLD AUTO: 32 % (ref 19.6–45.3)
MCH RBC QN AUTO: 20.3 PG (ref 26.9–32)
MCHC RBC AUTO-ENTMCNC: 28.4 G/DL (ref 32.4–36.3)
MCV RBC AUTO: 71.3 FL (ref 80.5–98.2)
MONOCYTES # BLD AUTO: 0.61 10*3/MM3 (ref 0.2–1.2)
MONOCYTES NFR BLD AUTO: 7.2 % (ref 5–12)
NEUTROPHILS # BLD AUTO: 4.82 10*3/MM3 (ref 1.9–8.1)
NEUTROPHILS NFR BLD AUTO: 57 % (ref 42.7–76)
PLATELET # BLD AUTO: 278 10*3/MM3 (ref 140–500)
RBC # BLD AUTO: 4.88 10*6/MM3 (ref 3.9–5.2)
WBC NRBC COR # BLD: 8.46 10*3/MM3 (ref 4.5–10.7)

## 2017-08-27 PROCEDURE — 99231 SBSQ HOSP IP/OBS SF/LOW 25: CPT | Performed by: INTERNAL MEDICINE

## 2017-08-27 PROCEDURE — 99232 SBSQ HOSP IP/OBS MODERATE 35: CPT | Performed by: INTERNAL MEDICINE

## 2017-08-27 PROCEDURE — 85025 COMPLETE CBC W/AUTO DIFF WBC: CPT | Performed by: INTERNAL MEDICINE

## 2017-08-27 RX ORDER — POLYETHYLENE GLYCOL 3350, SODIUM CHLORIDE, POTASSIUM CHLORIDE, SODIUM BICARBONATE, AND SODIUM SULFATE 240; 5.84; 2.98; 6.72; 22.72 G/4L; G/4L; G/4L; G/4L; G/4L
2000 POWDER, FOR SOLUTION ORAL
Status: COMPLETED | OUTPATIENT
Start: 2017-08-27 | End: 2017-08-28

## 2017-08-27 RX ORDER — POLYETHYLENE GLYCOL 3350, SODIUM CHLORIDE, POTASSIUM CHLORIDE, SODIUM BICARBONATE, AND SODIUM SULFATE 240; 5.84; 2.98; 6.72; 22.72 G/4L; G/4L; G/4L; G/4L; G/4L
2000 POWDER, FOR SOLUTION ORAL 2 TIMES DAILY
Status: DISCONTINUED | OUTPATIENT
Start: 2017-08-27 | End: 2017-08-27

## 2017-08-27 RX ADMIN — GABAPENTIN 600 MG: 300 CAPSULE ORAL at 20:24

## 2017-08-27 RX ADMIN — NICOTINE 1 PATCH: 14 PATCH, EXTENDED RELEASE TRANSDERMAL at 09:54

## 2017-08-27 RX ADMIN — FLUTICASONE PROPIONATE 2 SPRAY: 50 SPRAY, METERED NASAL at 09:51

## 2017-08-27 RX ADMIN — CARVEDILOL 3.12 MG: 3.12 TABLET, FILM COATED ORAL at 18:14

## 2017-08-27 RX ADMIN — MONTELUKAST 10 MG: 10 TABLET, FILM COATED ORAL at 20:24

## 2017-08-27 RX ADMIN — ATORVASTATIN CALCIUM 80 MG: 80 TABLET, FILM COATED ORAL at 20:24

## 2017-08-27 RX ADMIN — CARVEDILOL 3.12 MG: 3.12 TABLET, FILM COATED ORAL at 09:50

## 2017-08-27 RX ADMIN — VENLAFAXINE HYDROCHLORIDE 37.5 MG: 37.5 CAPSULE, EXTENDED RELEASE ORAL at 09:50

## 2017-08-27 RX ADMIN — POLYETHYLENE GLYCOL 3350, SODIUM CHLORIDE, POTASSIUM CHLORIDE, SODIUM BICARBONATE, AND SODIUM SULFATE 2000 ML: 240; 5.84; 2.98; 6.72; 22.72 POWDER, FOR SOLUTION ORAL at 18:15

## 2017-08-27 RX ADMIN — PANTOPRAZOLE SODIUM 40 MG: 40 TABLET, DELAYED RELEASE ORAL at 09:50

## 2017-08-28 ENCOUNTER — ANESTHESIA EVENT (OUTPATIENT)
Dept: GASTROENTEROLOGY | Facility: HOSPITAL | Age: 57
End: 2017-08-28

## 2017-08-28 ENCOUNTER — ANESTHESIA (OUTPATIENT)
Dept: GASTROENTEROLOGY | Facility: HOSPITAL | Age: 57
End: 2017-08-28

## 2017-08-28 VITALS
HEIGHT: 70 IN | DIASTOLIC BLOOD PRESSURE: 71 MMHG | BODY MASS INDEX: 26.63 KG/M2 | WEIGHT: 186 LBS | HEART RATE: 66 BPM | TEMPERATURE: 97.6 F | OXYGEN SATURATION: 97 % | SYSTOLIC BLOOD PRESSURE: 119 MMHG | RESPIRATION RATE: 16 BRPM

## 2017-08-28 LAB
HCT VFR BLD AUTO: 36.2 % (ref 35.6–45.5)
HGB BLD-MCNC: 10.5 G/DL (ref 11.9–15.5)

## 2017-08-28 PROCEDURE — 85014 HEMATOCRIT: CPT | Performed by: INTERNAL MEDICINE

## 2017-08-28 PROCEDURE — 88305 TISSUE EXAM BY PATHOLOGIST: CPT | Performed by: INTERNAL MEDICINE

## 2017-08-28 PROCEDURE — 43239 EGD BIOPSY SINGLE/MULTIPLE: CPT | Performed by: INTERNAL MEDICINE

## 2017-08-28 PROCEDURE — 45380 COLONOSCOPY AND BIOPSY: CPT | Performed by: INTERNAL MEDICINE

## 2017-08-28 PROCEDURE — 0DBK8ZZ EXCISION OF ASCENDING COLON, VIA NATURAL OR ARTIFICIAL OPENING ENDOSCOPIC: ICD-10-PCS | Performed by: INTERNAL MEDICINE

## 2017-08-28 PROCEDURE — 0DB98ZX EXCISION OF DUODENUM, VIA NATURAL OR ARTIFICIAL OPENING ENDOSCOPIC, DIAGNOSTIC: ICD-10-PCS | Performed by: INTERNAL MEDICINE

## 2017-08-28 PROCEDURE — 0DBN8ZZ EXCISION OF SIGMOID COLON, VIA NATURAL OR ARTIFICIAL OPENING ENDOSCOPIC: ICD-10-PCS | Performed by: INTERNAL MEDICINE

## 2017-08-28 PROCEDURE — 0DBP8ZZ EXCISION OF RECTUM, VIA NATURAL OR ARTIFICIAL OPENING ENDOSCOPIC: ICD-10-PCS | Performed by: INTERNAL MEDICINE

## 2017-08-28 PROCEDURE — 85018 HEMOGLOBIN: CPT | Performed by: INTERNAL MEDICINE

## 2017-08-28 PROCEDURE — 88312 SPECIAL STAINS GROUP 1: CPT | Performed by: INTERNAL MEDICINE

## 2017-08-28 PROCEDURE — 0DB58ZX EXCISION OF ESOPHAGUS, VIA NATURAL OR ARTIFICIAL OPENING ENDOSCOPIC, DIAGNOSTIC: ICD-10-PCS | Performed by: INTERNAL MEDICINE

## 2017-08-28 PROCEDURE — 99232 SBSQ HOSP IP/OBS MODERATE 35: CPT | Performed by: INTERNAL MEDICINE

## 2017-08-28 PROCEDURE — 25010000002 PROPOFOL 10 MG/ML EMULSION: Performed by: NURSE ANESTHETIST, CERTIFIED REGISTERED

## 2017-08-28 PROCEDURE — 45385 COLONOSCOPY W/LESION REMOVAL: CPT | Performed by: INTERNAL MEDICINE

## 2017-08-28 PROCEDURE — 0DB68ZX EXCISION OF STOMACH, VIA NATURAL OR ARTIFICIAL OPENING ENDOSCOPIC, DIAGNOSTIC: ICD-10-PCS | Performed by: INTERNAL MEDICINE

## 2017-08-28 DEVICE — DEV CLIP ENDO RESOLUTION360 CONTRL ROT 235CM: Type: IMPLANTABLE DEVICE | Site: RECTUM | Status: FUNCTIONAL

## 2017-08-28 RX ORDER — LIDOCAINE HYDROCHLORIDE 20 MG/ML
INJECTION, SOLUTION INFILTRATION; PERINEURAL AS NEEDED
Status: DISCONTINUED | OUTPATIENT
Start: 2017-08-28 | End: 2017-08-28 | Stop reason: SURG

## 2017-08-28 RX ORDER — SODIUM CHLORIDE 0.9 % (FLUSH) 0.9 %
1-10 SYRINGE (ML) INJECTION AS NEEDED
Status: DISCONTINUED | OUTPATIENT
Start: 2017-08-28 | End: 2017-08-28

## 2017-08-28 RX ORDER — SODIUM CHLORIDE 9 MG/ML
50 INJECTION, SOLUTION INTRAVENOUS CONTINUOUS
Status: DISCONTINUED | OUTPATIENT
Start: 2017-08-28 | End: 2017-08-28 | Stop reason: HOSPADM

## 2017-08-28 RX ORDER — GLYCOPYRROLATE 0.2 MG/ML
INJECTION INTRAMUSCULAR; INTRAVENOUS AS NEEDED
Status: DISCONTINUED | OUTPATIENT
Start: 2017-08-28 | End: 2017-08-28 | Stop reason: SURG

## 2017-08-28 RX ORDER — SODIUM CHLORIDE, SODIUM LACTATE, POTASSIUM CHLORIDE, CALCIUM CHLORIDE 600; 310; 30; 20 MG/100ML; MG/100ML; MG/100ML; MG/100ML
30 INJECTION, SOLUTION INTRAVENOUS CONTINUOUS PRN
Status: DISCONTINUED | OUTPATIENT
Start: 2017-08-28 | End: 2017-08-28

## 2017-08-28 RX ORDER — PROPOFOL 10 MG/ML
VIAL (ML) INTRAVENOUS AS NEEDED
Status: DISCONTINUED | OUTPATIENT
Start: 2017-08-28 | End: 2017-08-28 | Stop reason: SURG

## 2017-08-28 RX ORDER — PROPOFOL 10 MG/ML
VIAL (ML) INTRAVENOUS CONTINUOUS PRN
Status: DISCONTINUED | OUTPATIENT
Start: 2017-08-28 | End: 2017-08-28 | Stop reason: SURG

## 2017-08-28 RX ADMIN — HYDROXYZINE HYDROCHLORIDE 25 MG: 25 TABLET ORAL at 00:05

## 2017-08-28 RX ADMIN — SODIUM CHLORIDE 50 ML/HR: 9 INJECTION, SOLUTION INTRAVENOUS at 13:42

## 2017-08-28 RX ADMIN — NICOTINE 1 PATCH: 14 PATCH, EXTENDED RELEASE TRANSDERMAL at 08:20

## 2017-08-28 RX ADMIN — FLUTICASONE PROPIONATE 2 SPRAY: 50 SPRAY, METERED NASAL at 08:18

## 2017-08-28 RX ADMIN — GLYCOPYRROLATE 0.2 MCG: 0.2 INJECTION INTRAMUSCULAR; INTRAVENOUS at 14:15

## 2017-08-28 RX ADMIN — PROPOFOL 200 MCG/KG/MIN: 10 INJECTION, EMULSION INTRAVENOUS at 14:16

## 2017-08-28 RX ADMIN — PROPOFOL 60 MG: 10 INJECTION, EMULSION INTRAVENOUS at 14:16

## 2017-08-28 RX ADMIN — PANTOPRAZOLE SODIUM 40 MG: 40 TABLET, DELAYED RELEASE ORAL at 08:17

## 2017-08-28 RX ADMIN — CARVEDILOL 3.12 MG: 3.12 TABLET, FILM COATED ORAL at 08:17

## 2017-08-28 RX ADMIN — POLYETHYLENE GLYCOL 3350, SODIUM CHLORIDE, POTASSIUM CHLORIDE, SODIUM BICARBONATE, AND SODIUM SULFATE 2000 ML: 240; 5.84; 2.98; 6.72; 22.72 POWDER, FOR SOLUTION ORAL at 04:02

## 2017-08-28 RX ADMIN — LIDOCAINE HYDROCHLORIDE 60 MG: 20 INJECTION, SOLUTION INFILTRATION; PERINEURAL at 14:16

## 2017-08-28 RX ADMIN — VENLAFAXINE HYDROCHLORIDE 37.5 MG: 37.5 CAPSULE, EXTENDED RELEASE ORAL at 08:17

## 2017-08-28 NOTE — ANESTHESIA PREPROCEDURE EVALUATION
Anesthesia Evaluation     Patient summary reviewed and Nursing notes reviewed          Airway   Mallampati: II  TM distance: >3 FB  Neck ROM: full  no difficulty expected  Dental - normal exam     Pulmonary - normal exam   (+) a smoker Former, sleep apnea,   Cardiovascular - normal exam    (+) past MI  >12 months, CAD,       Neuro/Psych  (+) psychiatric history Depression,    GI/Hepatic/Renal/Endo    (+)  GERD,     Musculoskeletal     (+) back pain,   Abdominal  - normal exam   Substance History   (+) alcohol use,      OB/GYN negative ob/gyn ROS         Other                                    Anesthesia Plan    ASA 3     MAC     intravenous induction   Anesthetic plan and risks discussed with patient.

## 2017-08-28 NOTE — ANESTHESIA POSTPROCEDURE EVALUATION
Patient: Tiera Abbott    Procedure Summary     Date Anesthesia Start Anesthesia Stop Room / Location    08/28/17 1412 1511  [facility]       Procedure Diagnosis Surgeon Provider    COLONOSCOPY into cecum/terminal ileum with polypectomy (N/A ); ESOPHAGOGASTRODUODENOSCOPY with biopsy (N/A Esophagus) Iron deficiency anemia due to chronic blood loss  (Iron deficiency anemia due to chronic blood loss [D50.0]) MD Fareed Whitmore MD          Anesthesia Type: MAC  Last vitals  BP   100/60 (08/28/17 1519)    Temp   36.4 °C (97.6 °F) (08/28/17 1519)    Pulse   81 (08/28/17 1519)   Resp   16 (08/28/17 1519)    SpO2   92 % (08/28/17 1519)      Post Anesthesia Care and Evaluation    Patient location during evaluation: PHASE II  Patient participation: complete - patient participated  Level of consciousness: awake and alert  Pain management: adequate  Airway patency: patent  Anesthetic complications: No anesthetic complications  PONV Status: none  Cardiovascular status: acceptable  Respiratory status: acceptable  Hydration status: acceptable

## 2017-08-30 LAB
LAB AP CASE REPORT: NORMAL
Lab: NORMAL
PATH REPORT.FINAL DX SPEC: NORMAL
PATH REPORT.GROSS SPEC: NORMAL

## 2017-09-07 ENCOUNTER — TELEPHONE (OUTPATIENT)
Dept: FAMILY MEDICINE CLINIC | Facility: CLINIC | Age: 57
End: 2017-09-07

## 2017-09-07 ENCOUNTER — TELEPHONE (OUTPATIENT)
Dept: GASTROENTEROLOGY | Facility: CLINIC | Age: 57
End: 2017-09-07

## 2017-09-07 NOTE — TELEPHONE ENCOUNTER
Call to pt.  Advise per Dr Bee that four of the five polyps removed in colon were tubular adenomas, which are precancerous.  The other one was a benign hyperplastic polyp.  In the absence of symptoms, next c/s should be in 3 yrs.    EGD did not have any evidence of celiac disease, no evidence of H pylori, and only mild inflammation of GE junction with no evidence of Mansfield's.    F/u with Dr Esparza or [facility] for hosp f/u in about 8 weeks.    Pt verb understanding and declines to make f/u appt.  States has other f/u appts to complete, which sees as more important.  Advise pt may contact office for appt as desired.  Verb understanding.    C/s for 8/28/20 placed in recall.

## 2017-09-07 NOTE — TELEPHONE ENCOUNTER
Patient called with concerns of getting her labs drawn again and having to wait over the weekend for the results. Patient has an appointment 9/8/17 to see Edgardo, she informed me that he underwent two blood transfusions while in the hospital for 5 days, She felt as if she was starting to feel the same way and didn't want to end up in the hospital again. Her levels have improved.

## 2017-09-07 NOTE — TELEPHONE ENCOUNTER
----- Message from Joycelyn Lepe sent at 9/7/2017 10:31 AM EDT -----  Regarding: PT CALLED  Contact: [phone]  PT IS CALLING FOR C/S & EGD RESULTS.

## 2017-09-07 NOTE — TELEPHONE ENCOUNTER
----- Message from Minna Bee MD sent at 8/31/2017  2:59 PM EDT -----  For the 5 polyps removed in her colon were tubular adenomas which are precancerous.  The other one was a benign hyperplastic polyp.  In absence of symptoms, her next colonoscopy should be performed in 3 years.  Please place in 3 year recall.    Her EGD did not have any evidence of celiac disease, no evidence of H. pylori infection, and only mild inflammation of her GE junction with no evidence of Mansfield's esophagus.    Please arrange for follow-up with Dr. Esparza or nurse practitioner for hospital follow up in ~8 weeks.  Thx

## 2017-09-08 ENCOUNTER — OFFICE VISIT (OUTPATIENT)
Dept: FAMILY MEDICINE CLINIC | Facility: CLINIC | Age: 57
End: 2017-09-08

## 2017-09-08 VITALS
WEIGHT: 190 LBS | DIASTOLIC BLOOD PRESSURE: 68 MMHG | OXYGEN SATURATION: 100 % | SYSTOLIC BLOOD PRESSURE: 120 MMHG | HEART RATE: 84 BPM | TEMPERATURE: 98.3 F | BODY MASS INDEX: 27.2 KG/M2 | HEIGHT: 70 IN

## 2017-09-08 DIAGNOSIS — D50.8 OTHER IRON DEFICIENCY ANEMIA: Primary | ICD-10-CM

## 2017-09-08 DIAGNOSIS — E53.8 B12 DEFICIENCY: ICD-10-CM

## 2017-09-08 PROBLEM — K92.1 TARRY STOOL: Status: RESOLVED | Noted: 2017-08-25 | Resolved: 2017-09-08

## 2017-09-08 LAB
BASOPHILS # BLD AUTO: 0.03 10*3/MM3 (ref 0–0.2)
BASOPHILS NFR BLD AUTO: 0.3 % (ref 0–1.5)
EOSINOPHIL # BLD AUTO: 0.15 10*3/MM3 (ref 0–0.7)
EOSINOPHIL NFR BLD AUTO: 1.6 % (ref 0.3–6.2)
ERYTHROCYTE [DISTWIDTH] IN BLOOD BY AUTOMATED COUNT: 29.6 % (ref 11.7–13)
HCT VFR BLD AUTO: 42.6 % (ref 35.6–45.5)
HGB BLD-MCNC: 12.7 G/DL (ref 11.9–15.5)
IMM GRANULOCYTES # BLD: 0.02 10*3/MM3 (ref 0–0.03)
IMM GRANULOCYTES NFR BLD: 0.2 % (ref 0–0.5)
LYMPHOCYTES # BLD AUTO: 3.6 10*3/MM3 (ref 0.9–4.8)
LYMPHOCYTES NFR BLD AUTO: 39.3 % (ref 19.6–45.3)
MCH RBC QN AUTO: 22.6 PG (ref 26.9–32)
MCHC RBC AUTO-ENTMCNC: 29.8 G/DL (ref 32.4–36.3)
MCV RBC AUTO: 75.9 FL (ref 80.5–98.2)
MONOCYTES # BLD AUTO: 0.64 10*3/MM3 (ref 0.2–1.2)
MONOCYTES NFR BLD AUTO: 7 % (ref 5–12)
NEUTROPHILS # BLD AUTO: 4.73 10*3/MM3 (ref 1.9–8.1)
NEUTROPHILS NFR BLD AUTO: 51.6 % (ref 42.7–76)
PLATELET # BLD AUTO: 263 10*3/MM3 (ref 140–500)
RBC # BLD AUTO: 5.61 10*6/MM3 (ref 3.9–5.2)
WBC # BLD AUTO: 9.17 10*3/MM3 (ref 4.5–10.7)

## 2017-09-08 PROCEDURE — 99213 OFFICE O/P EST LOW 20 MIN: CPT | Performed by: NURSE PRACTITIONER

## 2017-09-08 PROCEDURE — 96372 THER/PROPH/DIAG INJ SC/IM: CPT | Performed by: NURSE PRACTITIONER

## 2017-09-08 RX ORDER — CYANOCOBALAMIN 1000 UG/ML
1000 INJECTION, SOLUTION INTRAMUSCULAR; SUBCUTANEOUS ONCE
Status: COMPLETED | OUTPATIENT
Start: 2017-09-08 | End: 2017-09-08

## 2017-09-08 RX ADMIN — CYANOCOBALAMIN 1000 MCG: 1000 INJECTION, SOLUTION INTRAMUSCULAR; SUBCUTANEOUS at 14:14

## 2017-09-08 NOTE — PROGRESS NOTES
Subjective   Tiera Abbott is a 57 y.o. female.     HPI Comments: Follow-up recent hospitalization regarding severe iron deficiency anemia she is status post several blood transfusions iron infusions  Presently taking ferrous sulfate 325 3 times a day  She has had EGD and colonoscopy no source of bleeding found Hemoccults have been negative  She has an appointment soon with hematology    She initially had quite a bit of weakness and some gait instability  She had a few episodes of chest pain but none recent    Recent hemoglobin was 7, at that time she was asymptomatic without chest pain but she became weak over the next several days and as instructed she went to the emergency room and was infused blood.  She left the hospital with a hemoglobin around 10 and feeling better    She still feels much better but over the last couple days  Feels a little unsteady a little weak but has no chest pain or shortness of breath and certainly feels much better than she did prior to receiving her transfusions    She is here for recheck, as well as to recheck her CBC    I reviewed her labs and her B12 although not clearly deficient was a little low normal range  She had profound iron deficient anemia,, so cannot rule out that she may have a component of B12 deficiency anemia as well?      Overall she is feeling much better    She has appointment with neurology in a couple months and she was having difficult time stopping and some coordination problems related to balance which I suspect was totally related to her severe anemia.      After she remains stable with her hemoglobin, and improve her B12 levels and iron levels  If her symptoms completely resolve and cancel her neurology appointment  As a suspect this was the etiology of her issues         The following portions of the patient's history were reviewed and updated as appropriate: allergies, current medications, past family history, past social history, past surgical history and  problem list.    Review of Systems   Constitutional: Positive for fatigue. Negative for fever.   HENT: Negative.    Eyes: Negative.    Respiratory: Negative.  Negative for shortness of breath.    Cardiovascular: Negative for chest pain.   Gastrointestinal: Negative.    Genitourinary: Negative.    Musculoskeletal: Negative.    Psychiatric/Behavioral: Negative.        Objective   Physical Exam   Constitutional: She is oriented to person, place, and time. She appears well-developed and well-nourished.   Pleasant appears well   HENT:   Head: Normocephalic.   Nose: Nose normal.   Mouth/Throat: Oropharynx is clear and moist.   Tm clear chito no mass canal patent without d/c   Eyes: Conjunctivae are normal. Pupils are equal, round, and reactive to light. No scleral icterus.   Neck: Neck supple. No JVD present. No thyromegaly present.   Cardiovascular: Normal rate, regular rhythm and normal heart sounds.  Exam reveals no gallop and no friction rub.    No murmur heard.  Pulmonary/Chest: Effort normal and breath sounds normal. No stridor. No respiratory distress. She has no wheezes. She has no rales.   Abdominal: Soft. Bowel sounds are normal. She exhibits no distension. There is no tenderness.   No hepatosplenomegaly, no ascites,   Musculoskeletal: She exhibits no edema or tenderness.   Lymphadenopathy:     She has no cervical adenopathy.   Neurological: She is alert and oriented to person, place, and time. She has normal reflexes.   Skin: Skin is warm and dry. No rash noted. No erythema.   Psychiatric: She has a normal mood and affect. Her behavior is normal. Judgment and thought content normal.   Vitals reviewed.      Assessment/Plan   Tiera was seen today for follow-up.    Diagnoses and all orders for this visit:    Other iron deficiency anemia  -     CBC & Differential    B12 deficiency  -     cyanocobalamin injection 1,000 mcg; Inject 1 mL into the shoulder, thigh, or buttocks 1 (One) Time.  -     CBC &  Differential                    B12 injection given the office today she'll start oral B12  She will continue ferrous sulfate 3 times a day with vitamin C  She will follow-up with hematology  I suspect her hemoglobin likely to be stable  However if less than 7 she will need another blood transfusion  If on-call provider receives a critical level less than 7, please send her to the emergency room for evaluation and blood transfusion    She is presently having no chest pain or shortness of breath, she has a history of stable CAD  If she is symptomatic and hemoglobin less than 8 she will require transfusions as well  Of course with any chest pain or shortness of breath she's been instructed to go to the emergency room    Any weakness increasing fatigue chest pain shortness of breath fever abdominal pain and coffee-ground emesis black tarry stools  Emergency room    Discharge instructions    Keep appointment with hematology  Continue ferrous sulfate    If weakness shortness of breath chest pain syncope nausea vomiting abdominal pain  Emergency room    Start B12 1000 µg daily over-the-counter    Hematology can repeat your  B12 levels in a month or so    Injection today B12 1000 µg IM completed

## 2017-09-08 NOTE — PATIENT INSTRUCTIONS
Discharge instructions    Keep appointment with hematology  Continue ferrous sulfate    If weakness shortness of breath chest pain syncope nausea vomiting abdominal pain  Emergency room    Start B12 1000 µg daily over-the-counter    Hematology can repeat your  B12 levels in a month or so    Injection today

## 2017-09-11 ENCOUNTER — TELEPHONE (OUTPATIENT)
Dept: FAMILY MEDICINE CLINIC | Facility: CLINIC | Age: 57
End: 2017-09-11

## 2017-09-19 ENCOUNTER — OFFICE VISIT (OUTPATIENT)
Dept: ORTHOPEDIC SURGERY | Facility: CLINIC | Age: 57
End: 2017-09-19

## 2017-09-19 VITALS — WEIGHT: 182 LBS | BODY MASS INDEX: 26.05 KG/M2 | TEMPERATURE: 98.1 F | HEIGHT: 70 IN

## 2017-09-19 DIAGNOSIS — M25.562 CHRONIC PAIN OF LEFT KNEE: Primary | ICD-10-CM

## 2017-09-19 DIAGNOSIS — G89.29 CHRONIC PAIN OF LEFT KNEE: Primary | ICD-10-CM

## 2017-09-19 DIAGNOSIS — Z96.652 STATUS POST TOTAL LEFT KNEE REPLACEMENT: ICD-10-CM

## 2017-09-19 PROCEDURE — 99213 OFFICE O/P EST LOW 20 MIN: CPT | Performed by: ORTHOPAEDIC SURGERY

## 2017-09-19 PROCEDURE — 73562 X-RAY EXAM OF KNEE 3: CPT | Performed by: ORTHOPAEDIC SURGERY

## 2017-09-19 RX ORDER — CEPHALEXIN 500 MG/1
CAPSULE ORAL
Qty: 4 CAPSULE | Refills: 2 | Status: SHIPPED | OUTPATIENT
Start: 2017-09-19 | End: 2018-10-05

## 2017-09-19 NOTE — PROGRESS NOTES
Patient: Tiera Abbott  YOB: 1960 57 y.o. female  Medical Record Number: [id number]    Chief Complaints:   Chief Complaint   Patient presents with   • Left Knee - Follow-up       History of Present Illness:Tiera Abbott is a 57 y.o. female who presents for follow-up of  Left total knee she is about a year and half out from surgery.  She just wanted it checked she did have a history of low hemoglobin a few months back she received a transfusion and wanted to have her knee checked to make sure that things were okay there.  Overall from a symptom standpoint she feels good she's very active and no complaints with regard to the knee.    Allergies:   Allergies   Allergen Reactions   • Latex Itching       Medications:   Current Outpatient Prescriptions   Medication Sig Dispense Refill   • Acetaminophen (TYLENOL ARTHRITIS PAIN PO) Take 2 tablets by mouth Every 4 (Four) Hours As Needed.     • atorvastatin (LIPITOR) 80 MG tablet Take 1 tablet by mouth Every Night. 30 tablet 3   • carvedilol (COREG) 3.125 MG tablet take 1 tablet by mouth twice a day with meals (Patient taking differently: take 1 tablet by mouth twice a day with meals / pt taking nightly) 60 tablet 4   • cephalexin (KEFLEX) 500 MG capsule Take 4 tabs by mouth 1 hour prior to dental procedure. 4 capsule 2   • clobetasol (TEMOVATE) 0.05 % cream APPLY SPARINGLY TO RIGHT HAND TWICE DAILY FOR SHORT DURATION AND AVOID FACE OR PROLONGED USE (Patient taking differently: APPLY SPARINGLY TO RIGHT HAND TWICE DAILY FOR SHORT DURATION AND AVOID FACE OR PROLONGED USE / BID prn) 1 g 0   • clopidogrel (PLAVIX) 75 MG tablet take 1 tablet by mouth once daily 30 tablet 4   • fluticasone (FLONASE) 50 MCG/ACT nasal spray 2 sprays into each nostril Daily. 1 each 11   • gabapentin (NEURONTIN) 300 MG capsule take 2 capsules by mouth EVERY NIGHT 60 capsule 3   • hydrOXYzine (ATARAX) 25 MG tablet 1-2 tablets at bedtime as needed for itching 30 tablet 3   • montelukast  "(SINGULAIR) 10 MG tablet take 1 tablet by mouth once daily if needed (Patient taking differently: take 1 tablet by mouth once daily if needed / pt taking nightly) 90 tablet 3   • pantoprazole (PROTONIX) 40 MG EC tablet Take 1 tablet by mouth Daily. 30 tablet 5   • triamcinolone (KENALOG) 0.5 % ointment Apply  topically 2 (Two) Times a Day. Sparingly to hands as needed avoid face 60 g 3   • venlafaxine XR (EFFEXOR-XR) 37.5 MG 24 hr capsule take 1 capsule by mouth once daily 30 capsule 2   • vitamin C (ASCORBIC ACID) 250 MG tablet Take 250 mg by mouth Daily.       No current facility-administered medications for this visit.          The following portions of the patient's history were reviewed and updated as appropriate: allergies, current medications, past family history, past medical history, past social history, past surgical history and problem list.    Review of Systems:   A 14 point review of systems was performed. All systems negative except pertinent positives/negative listed in HPI above    Physical Exam:   Vitals:    09/19/17 1557   Temp: 98.1 °F (36.7 °C)   Weight: 182 lb (82.6 kg)   Height: 70\" (177.8 cm)       General: A and O x 3, ASA, NAD    SCLERA:    Normal    DENTITION:   Normal  Knee:  left    ALIGNMENT:     Neutral  ,   Patella  tracks  Midline without crepitance    GAIT:    Nonantalgic    SKIN:    Well healed midline incision, no erythema or fluctuance    RANGE OF MOTION:   0  -  125   DEG    STRENGTH:   5  / 5    LIGAMENTS:    No varus / valgus instability.   No  Flexion   instability.       DISTAL PULSES:    Paplable    DISTAL SENSATION :   Intact    LYMPHATICS:     No   lymphadenopathy    OTHER:     No   Effusion      Calf soft / nontender ,   Negative Rufina's sign            Radiology:  Xrays 3views left knee (ap,lateral, sunrise) were ordered and reviewed for evaluation of knee pain demonstratinga well positioned knee replacement without evidence of wear, loosening or osteolysis  todays xrays " were compared to previous xrays and demonstrate no change    Assessment/Plan:  Left total knee functioning well x-rays and exam are normal.  I think her anemia has nothing to do with the knee replacement.  She was instructed to continue to work on quad strengthening exercises walking riding a bike etc.  I will see her back as needed.

## 2017-09-29 ENCOUNTER — LAB (OUTPATIENT)
Dept: LAB | Facility: HOSPITAL | Age: 57
End: 2017-09-29

## 2017-09-29 ENCOUNTER — OFFICE VISIT (OUTPATIENT)
Dept: ONCOLOGY | Facility: CLINIC | Age: 57
End: 2017-09-29

## 2017-09-29 VITALS
SYSTOLIC BLOOD PRESSURE: 118 MMHG | RESPIRATION RATE: 18 BRPM | HEIGHT: 70 IN | OXYGEN SATURATION: 98 % | WEIGHT: 192.2 LBS | TEMPERATURE: 97.9 F | HEART RATE: 62 BPM | BODY MASS INDEX: 27.52 KG/M2 | DIASTOLIC BLOOD PRESSURE: 76 MMHG

## 2017-09-29 DIAGNOSIS — D50.8 OTHER IRON DEFICIENCY ANEMIA: Primary | ICD-10-CM

## 2017-09-29 DIAGNOSIS — D50.0 IRON DEFICIENCY ANEMIA DUE TO CHRONIC BLOOD LOSS: Primary | ICD-10-CM

## 2017-09-29 LAB
BASOPHILS # BLD AUTO: 0.03 10*3/MM3 (ref 0–0.1)
BASOPHILS NFR BLD AUTO: 0.5 % (ref 0–1.1)
EOSINOPHIL # BLD AUTO: 0.23 10*3/MM3 (ref 0–0.36)
EOSINOPHIL NFR BLD AUTO: 3.7 % (ref 1–5)
ERYTHROCYTE [DISTWIDTH] IN BLOOD BY AUTOMATED COUNT: ABNORMAL % (ref 11.7–14.5)
FERRITIN SERPL-MCNC: 62.1 NG/ML (ref 11–207)
HCT VFR BLD AUTO: 38.9 % (ref 34–45)
HGB BLD-MCNC: 12.4 G/DL (ref 11.5–14.9)
HOLD SPECIMEN: NORMAL
IMM GRANULOCYTES # BLD: 0.02 10*3/MM3 (ref 0–0.03)
IMM GRANULOCYTES NFR BLD: 0.3 % (ref 0–0.5)
IRON 24H UR-MRATE: 59 MCG/DL (ref 37–145)
IRON SATN MFR SERPL: 18 % (ref 14–48)
LYMPHOCYTES # BLD AUTO: 2.4 10*3/MM3 (ref 1–3.5)
LYMPHOCYTES NFR BLD AUTO: 38.3 % (ref 20–49)
MCH RBC QN AUTO: 24.3 PG (ref 27–33)
MCHC RBC AUTO-ENTMCNC: 31.9 G/DL (ref 32–35)
MCV RBC AUTO: 76.3 FL (ref 83–97)
MONOCYTES # BLD AUTO: 0.45 10*3/MM3 (ref 0.25–0.8)
MONOCYTES NFR BLD AUTO: 7.2 % (ref 4–12)
NEUTROPHILS # BLD AUTO: 3.13 10*3/MM3 (ref 1.5–7)
NEUTROPHILS NFR BLD AUTO: 50 % (ref 39–75)
NRBC BLD MANUAL-RTO: 0 /100 WBC (ref 0–0)
PLATELET # BLD AUTO: 181 10*3/MM3 (ref 150–375)
RBC # BLD AUTO: 5.1 10*6/MM3 (ref 3.9–5)
TIBC SERPL-MCNC: 329 MCG/DL (ref 249–505)
TRANSFERRIN SERPL-MCNC: 235 MG/DL (ref 200–360)
WBC NRBC COR # BLD: 6.26 10*3/MM3 (ref 4–10)

## 2017-09-29 PROCEDURE — 83540 ASSAY OF IRON: CPT | Performed by: INTERNAL MEDICINE

## 2017-09-29 PROCEDURE — 85025 COMPLETE CBC W/AUTO DIFF WBC: CPT | Performed by: INTERNAL MEDICINE

## 2017-09-29 PROCEDURE — 84466 ASSAY OF TRANSFERRIN: CPT | Performed by: INTERNAL MEDICINE

## 2017-09-29 PROCEDURE — 36415 COLL VENOUS BLD VENIPUNCTURE: CPT | Performed by: INTERNAL MEDICINE

## 2017-09-29 PROCEDURE — 99213 OFFICE O/P EST LOW 20 MIN: CPT | Performed by: INTERNAL MEDICINE

## 2017-09-29 PROCEDURE — 82728 ASSAY OF FERRITIN: CPT | Performed by: INTERNAL MEDICINE

## 2017-09-29 NOTE — PROGRESS NOTES
"  REASON FOR FOLLOW-UP:  Iron deficiency anemia      History of Present Illness    Mrs. Abbott is a pleasant 57-year-old woman that I met 8/25/17 during a hospital stay at [facility].  She presented with progressive fatigue and dyspnea with exertion.  She was found to have a significant microcytic, hypochromic anemia with hemoglobin of 7.0.  She was found to be severely iron deficient.  Her hemoglobin had declined over a approximate 2 year period becoming more hypochromic and microcytic.  She has history of menorrhagia but underwent menopause around age 53.    During the hospital stay, she received transfusion of 2 units packed red blood cells and received 600 mg of IV Venofer.  She had an EGD and colonoscopy performed which showed no obvious bleeding.  Several polyps were resected and follow-up colonoscopy recommended in 3 years.  She had no evidence of Mansfield's esophagus, celiac disease or H. pylori.  She continues to take oral iron tablet 2-3 times a day.    She returns today feeling substantially better since her discharge.  She has improved stamina, improved fatigue, resolved ice craving.  She is tolerating oral iron without GI upset or constipation.      Past Medical History, Past Surgical History, Social History, Family History have been reviewed and are without significant changes except as mentioned from my consult note on 8/25/17.    Review of Systems   A comprehensive 14 point review of systems was performed and was negative except as mentioned.    Medications:  The current medication list was reviewed in the EMR    ALLERGIES:    Allergies   Allergen Reactions   • Latex Itching       Objective      Vitals:    09/29/17 1357   BP: 118/76   Pulse: 62   Resp: 18   Temp: 97.9 °F (36.6 °C)   TempSrc: Oral   SpO2: 98%   Weight: 192 lb 3.2 oz (87.2 kg)   Height: 70.47\" (179 cm)   PainSc: 0-No pain  Comment: Headaches x2 weeks.     Current Status 9/29/2017   ECOG score 0       Physical Exam    Gen: pleasant " well-nourished woman  HEENT: no icterus  CV: RRR  CHEST: CTA bilat  ABD: soft, no masses    RECENT LABS:  Hematology WBC   Date Value Ref Range Status   09/29/2017 6.26 4.00 - 10.00 10*3/mm3 Final     RBC   Date Value Ref Range Status   09/29/2017 5.10 (H) 3.90 - 5.00 10*6/mm3 Final     Hemoglobin   Date Value Ref Range Status   09/29/2017 12.4 11.5 - 14.9 g/dL Final     Hematocrit   Date Value Ref Range Status   09/29/2017 38.9 34.0 - 45.0 % Final     Platelets   Date Value Ref Range Status   09/29/2017 181 150 - 375 10*3/mm3 Final              Assessment/Plan   This Abbott returns today for follow-up of her iron deficiency anemia.  Her hemoglobin has now normalized at 12.4.  Her red blood cell indices remain microcytic, so I recommended that she remain on an oral iron tablet 2-3 times a day.  She will likely need 3-4 more months of oral iron therapy to replace her stores.  Colonoscopy and EGD requiring her recent hospital stay showed no evidence of GI blood loss.  She has history of menorrhagia prior to menopause.    I plan to see her back in 3-4 months with repeat CBC, ferritin, iron profile and I will repeat her B12 level that visit.                      9/29/2017      CC:

## 2017-10-02 RX ORDER — VENLAFAXINE HYDROCHLORIDE 37.5 MG/1
CAPSULE, EXTENDED RELEASE ORAL
Qty: 30 CAPSULE | Refills: 2 | Status: SHIPPED | OUTPATIENT
Start: 2017-10-02 | End: 2018-01-11 | Stop reason: SDUPTHER

## 2017-10-02 RX ORDER — ATORVASTATIN CALCIUM 80 MG/1
TABLET, FILM COATED ORAL
Qty: 90 TABLET | Refills: 3 | Status: SHIPPED | OUTPATIENT
Start: 2017-10-02 | End: 2018-08-08 | Stop reason: SDUPTHER

## 2017-11-02 RX ORDER — CLOPIDOGREL BISULFATE 75 MG/1
TABLET ORAL
Qty: 30 TABLET | Refills: 4 | Status: SHIPPED | OUTPATIENT
Start: 2017-11-02 | End: 2018-03-09 | Stop reason: SDUPTHER

## 2017-11-09 ENCOUNTER — OFFICE VISIT (OUTPATIENT)
Dept: FAMILY MEDICINE CLINIC | Facility: CLINIC | Age: 57
End: 2017-11-09

## 2017-11-09 VITALS
WEIGHT: 193 LBS | TEMPERATURE: 98 F | SYSTOLIC BLOOD PRESSURE: 122 MMHG | DIASTOLIC BLOOD PRESSURE: 78 MMHG | HEART RATE: 74 BPM | OXYGEN SATURATION: 100 % | HEIGHT: 70 IN | BODY MASS INDEX: 27.63 KG/M2
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
## 2017-11-09 DIAGNOSIS — S39.012A STRAIN OF LUMBAR REGION, INITIAL ENCOUNTER: Primary | ICD-10-CM

## 2017-11-09 DIAGNOSIS — S46.912A STRAIN OF LEFT SHOULDER, INITIAL ENCOUNTER: ICD-10-CM

## 2017-11-09 PROCEDURE — 99214 OFFICE O/P EST MOD 30 MIN: CPT | Performed by: NURSE PRACTITIONER

## 2017-11-09 RX ORDER — MELOXICAM 7.5 MG/1
7.5 TABLET ORAL DAILY
Qty: 10 TABLET | Refills: 0 | Status: SHIPPED | OUTPATIENT
Start: 2017-11-09 | End: 2018-07-23
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
## 2017-11-09 NOTE — PATIENT INSTRUCTIONS
Discharge instructions    Heat massage as needed  Follow-up with the orthopedic    If pain not resolved in 3 weeks for recheck  We'll check x-rays as well  C-spine, lumbar spine if not resolved  Physical therapy    If weakness groin paresthesias  Incontinence or urine or bowel retention  Urgent recheck ER    Thank You,      James Epley,  NP

## 2017-11-12 PROBLEM — S39.012A STRAIN OF LUMBAR REGION: Status: ACTIVE | Noted: 2017-11-12

## 2017-11-12 PROBLEM — S46.912A STRAIN OF LEFT SHOULDER: Status: ACTIVE | Noted: 2017-11-12
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
## 2017-11-28 ENCOUNTER — OFFICE VISIT (OUTPATIENT)
Dept: FAMILY MEDICINE CLINIC | Facility: CLINIC | Age: 57
End: 2017-11-28

## 2017-11-28 VITALS
OXYGEN SATURATION: 100 % | SYSTOLIC BLOOD PRESSURE: 128 MMHG | BODY MASS INDEX: 28.35 KG/M2 | TEMPERATURE: 98 F | HEIGHT: 70 IN | HEART RATE: 85 BPM | WEIGHT: 198 LBS | DIASTOLIC BLOOD PRESSURE: 80 MMHG

## 2017-11-28 DIAGNOSIS — S16.1XXA STRAIN OF NECK MUSCLE, INITIAL ENCOUNTER: Primary | ICD-10-CM

## 2017-11-28 DIAGNOSIS — S93.401A SPRAIN OF RIGHT ANKLE, UNSPECIFIED LIGAMENT, INITIAL ENCOUNTER: ICD-10-CM

## 2017-11-28 DIAGNOSIS — S46.912A STRAIN OF LEFT SHOULDER, INITIAL ENCOUNTER: ICD-10-CM

## 2017-11-28 DIAGNOSIS — T07.XXXA STRAINS OF MULTIPLE LIGAMENTS OR MUSCLES: ICD-10-CM
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
## 2017-11-28 PROCEDURE — 99214 OFFICE O/P EST MOD 30 MIN: CPT | Performed by: NURSE PRACTITIONER
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
## 2017-12-04 RX ORDER — CARVEDILOL 3.12 MG/1
TABLET ORAL
Qty: 180 TABLET | Refills: 1 | Status: SHIPPED | OUTPATIENT
Start: 2017-12-04 | End: 2018-03-12 | Stop reason: SDUPTHER

## 2017-12-06 ENCOUNTER — TELEPHONE (OUTPATIENT)
Dept: FAMILY MEDICINE CLINIC | Facility: CLINIC | Age: 57
End: 2017-12-06

## 2017-12-06 NOTE — TELEPHONE ENCOUNTER
Patient called stating at her last office visit she was told she would be getting a prescription for massage therapy. She did not recieve it when leaving the office that day

## 2017-12-07 ENCOUNTER — TELEPHONE (OUTPATIENT)
Dept: FAMILY MEDICINE CLINIC | Facility: CLINIC | Age: 57
End: 2017-12-07

## 2017-12-07 NOTE — TELEPHONE ENCOUNTER
Left a detailed message for patient in regards to her massage therapy order, would like to know where she wants to go so the order can be faxed

## 2018-01-08 ENCOUNTER — APPOINTMENT (OUTPATIENT)
Dept: ONCOLOGY | Facility: CLINIC | Age: 58
End: 2018-01-08

## 2018-01-08 ENCOUNTER — APPOINTMENT (OUTPATIENT)
Dept: LAB | Facility: HOSPITAL | Age: 58
End: 2018-01-08

## 2018-01-12 RX ORDER — VENLAFAXINE HYDROCHLORIDE 37.5 MG/1
CAPSULE, EXTENDED RELEASE ORAL
Qty: 30 CAPSULE | Refills: 2 | Status: SHIPPED | OUTPATIENT
Start: 2018-01-12 | End: 2018-04-12 | Stop reason: SDUPTHER

## 2018-01-19 ENCOUNTER — APPOINTMENT (OUTPATIENT)
Dept: ONCOLOGY | Facility: CLINIC | Age: 58
End: 2018-01-19

## 2018-01-19 ENCOUNTER — APPOINTMENT (OUTPATIENT)
Dept: LAB | Facility: HOSPITAL | Age: 58
End: 2018-01-19

## 2018-03-12 RX ORDER — CLOPIDOGREL BISULFATE 75 MG/1
TABLET ORAL
Qty: 90 TABLET | Refills: 0 | Status: SHIPPED | OUTPATIENT
Start: 2018-03-12 | End: 2018-07-23 | Stop reason: SDUPTHER

## 2018-03-12 RX ORDER — PANTOPRAZOLE SODIUM 40 MG/1
TABLET, DELAYED RELEASE ORAL
Qty: 30 TABLET | Refills: 5 | Status: SHIPPED | OUTPATIENT
Start: 2018-03-12 | End: 2018-09-06 | Stop reason: SDUPTHER

## 2018-03-12 RX ORDER — CLOPIDOGREL BISULFATE 75 MG/1
TABLET ORAL
Qty: 30 TABLET | Refills: 2 | Status: SHIPPED | OUTPATIENT
Start: 2018-03-12 | End: 2018-08-08 | Stop reason: SDUPTHER

## 2018-03-12 RX ORDER — CARVEDILOL 3.12 MG/1
TABLET ORAL
Qty: 180 TABLET | Refills: 0 | Status: SHIPPED | OUTPATIENT
Start: 2018-03-12 | End: 2018-10-05

## 2018-04-13 RX ORDER — VENLAFAXINE HYDROCHLORIDE 37.5 MG/1
CAPSULE, EXTENDED RELEASE ORAL
Qty: 30 CAPSULE | Refills: 1 | Status: SHIPPED | OUTPATIENT
Start: 2018-04-13 | End: 2018-06-04 | Stop reason: SDUPTHER

## 2018-06-05 RX ORDER — VENLAFAXINE HYDROCHLORIDE 37.5 MG/1
CAPSULE, EXTENDED RELEASE ORAL
Qty: 30 CAPSULE | Refills: 1 | Status: SHIPPED | OUTPATIENT
Start: 2018-06-05 | End: 2018-08-08 | Stop reason: SDUPTHER

## 2018-06-06 ENCOUNTER — OFFICE VISIT (OUTPATIENT)
Dept: FAMILY MEDICINE CLINIC | Facility: CLINIC | Age: 58
End: 2018-06-06

## 2018-06-06 VITALS
DIASTOLIC BLOOD PRESSURE: 80 MMHG | HEIGHT: 70 IN | HEART RATE: 75 BPM | OXYGEN SATURATION: 98 % | SYSTOLIC BLOOD PRESSURE: 116 MMHG | WEIGHT: 199.9 LBS | BODY MASS INDEX: 28.62 KG/M2

## 2018-06-06 DIAGNOSIS — N30.00 ACUTE CYSTITIS WITHOUT HEMATURIA: Primary | ICD-10-CM

## 2018-06-06 DIAGNOSIS — N93.9 VAGINA BLEEDING: ICD-10-CM

## 2018-06-06 DIAGNOSIS — R35.0 URINARY FREQUENCY: ICD-10-CM

## 2018-06-06 DIAGNOSIS — Z12.31 ENCOUNTER FOR SCREENING MAMMOGRAM FOR BREAST CANCER: ICD-10-CM

## 2018-06-06 LAB
BILIRUB BLD-MCNC: NEGATIVE MG/DL
CLARITY, POC: CLEAR
COLOR UR: YELLOW
GLUCOSE UR STRIP-MCNC: NEGATIVE MG/DL
KETONES UR QL: NEGATIVE
LEUKOCYTE EST, POC: ABNORMAL
NITRITE UR-MCNC: NEGATIVE MG/ML
PH UR: 5.5 [PH] (ref 5–8)
PROT UR STRIP-MCNC: NEGATIVE MG/DL
RBC # UR STRIP: ABNORMAL /UL
SP GR UR: 1.01 (ref 1–1.03)
UROBILINOGEN UR QL: NORMAL

## 2018-06-06 PROCEDURE — 99214 OFFICE O/P EST MOD 30 MIN: CPT | Performed by: NURSE PRACTITIONER

## 2018-06-06 PROCEDURE — 81003 URINALYSIS AUTO W/O SCOPE: CPT | Performed by: NURSE PRACTITIONER

## 2018-06-06 RX ORDER — NITROFURANTOIN 25; 75 MG/1; MG/1
100 CAPSULE ORAL 2 TIMES DAILY
Qty: 10 CAPSULE | Refills: 0 | Status: SHIPPED | OUTPATIENT
Start: 2018-06-06 | End: 2018-06-11

## 2018-06-10 NOTE — PROGRESS NOTES
Subjective   Tiera Abbott is a 58 y.o. female.     Thinks She may have a UTI     utiurinary freqMild urgency  But little coming out  No fever no chills no abdominal pain  No recurrent UTI    Did not go back to GYN  She said ultrasound for postmenopausal bleeding  No Chris bleeding needs a follow-up                    Urinary Tract Infection    Associated symptoms include frequency. Pertinent negatives include no chills or flank pain.        The following portions of the patient's history were reviewed and updated as appropriate: allergies, current medications, past medical history, past social history, past surgical history and problem list.    Review of Systems   Constitutional: Negative for chills, fatigue and fever.   Gastrointestinal: Negative for abdominal pain.   Genitourinary: Positive for difficulty urinating and frequency. Negative for flank pain and vaginal discharge.       Objective   Physical Exam   Constitutional: She is oriented to person, place, and time. She appears well-developed and well-nourished.   HENT:   Head: Normocephalic and atraumatic.   Mouth/Throat: Oropharynx is clear and moist.   Eyes: Conjunctivae are normal. Pupils are equal, round, and reactive to light.   Neck: Neck supple.   Cardiovascular: Normal rate, regular rhythm and normal heart sounds.  Exam reveals no friction rub.    No murmur heard.  Pulmonary/Chest: Effort normal and breath sounds normal. No respiratory distress. She has no wheezes.   Abdominal: Soft. Bowel sounds are normal. There is no tenderness.   No CVA tenderness   Musculoskeletal: She exhibits no edema.   Neurological: She is alert and oriented to person, place, and time.   Skin: Skin is warm and dry.   Psychiatric: She has a normal mood and affect. Her behavior is normal. Judgment and thought content normal.   Vitals reviewed.        Assessment/Plan   Tiera was seen today for follow-up on anemia, urinary tract infection and need referral to gyn.    Diagnoses and  all orders for this visit:    Acute cystitis without hematuria    Urinary frequency  -     POC Urinalysis Dipstick, Automated    Vagina bleeding  -     Ambulatory Referral to Gynecology    Encounter for screening mammogram for breast cancer  -     Mammo Screening Bilateral With CAD    Other orders  -     nitrofurantoin, macrocrystal-monohydrate, (MACROBID) 100 MG capsule; Take 1 capsule by mouth 2 (Two) Times a Day for 5 days.

## 2018-06-11 ENCOUNTER — LAB (OUTPATIENT)
Dept: LAB | Facility: HOSPITAL | Age: 58
End: 2018-06-11

## 2018-06-11 ENCOUNTER — OFFICE VISIT (OUTPATIENT)
Dept: ONCOLOGY | Facility: CLINIC | Age: 58
End: 2018-06-11

## 2018-06-11 VITALS
BODY MASS INDEX: 28.72 KG/M2 | RESPIRATION RATE: 16 BRPM | HEART RATE: 65 BPM | SYSTOLIC BLOOD PRESSURE: 118 MMHG | TEMPERATURE: 97.5 F | HEIGHT: 70 IN | WEIGHT: 200.6 LBS | OXYGEN SATURATION: 98 % | DIASTOLIC BLOOD PRESSURE: 68 MMHG

## 2018-06-11 DIAGNOSIS — D50.9 IRON DEFICIENCY ANEMIA, UNSPECIFIED IRON DEFICIENCY ANEMIA TYPE: Primary | ICD-10-CM

## 2018-06-11 DIAGNOSIS — D50.0 IRON DEFICIENCY ANEMIA DUE TO CHRONIC BLOOD LOSS: ICD-10-CM

## 2018-06-11 LAB
BASOPHILS # BLD AUTO: 0.02 10*3/MM3 (ref 0–0.1)
BASOPHILS NFR BLD AUTO: 0.3 % (ref 0–1.1)
DEPRECATED RDW RBC AUTO: 41.6 FL (ref 37–49)
EOSINOPHIL # BLD AUTO: 0.22 10*3/MM3 (ref 0–0.36)
EOSINOPHIL NFR BLD AUTO: 3.6 % (ref 1–5)
ERYTHROCYTE [DISTWIDTH] IN BLOOD BY AUTOMATED COUNT: 13.4 % (ref 11.7–14.5)
FERRITIN SERPL-MCNC: 10.4 NG/ML (ref 11–207)
HCT VFR BLD AUTO: 38.7 % (ref 34–45)
HGB BLD-MCNC: 12.1 G/DL (ref 11.5–14.9)
IMM GRANULOCYTES # BLD: 0.04 10*3/MM3 (ref 0–0.03)
IMM GRANULOCYTES NFR BLD: 0.7 % (ref 0–0.5)
IRON 24H UR-MRATE: 46 MCG/DL (ref 37–145)
IRON SATN MFR SERPL: 11 % (ref 14–48)
LYMPHOCYTES # BLD AUTO: 2.24 10*3/MM3 (ref 1–3.5)
LYMPHOCYTES NFR BLD AUTO: 36.9 % (ref 20–49)
MCH RBC QN AUTO: 27.1 PG (ref 27–33)
MCHC RBC AUTO-ENTMCNC: 31.3 G/DL (ref 32–35)
MCV RBC AUTO: 86.6 FL (ref 83–97)
MONOCYTES # BLD AUTO: 0.41 10*3/MM3 (ref 0.25–0.8)
MONOCYTES NFR BLD AUTO: 6.8 % (ref 4–12)
NEUTROPHILS # BLD AUTO: 3.14 10*3/MM3 (ref 1.5–7)
NEUTROPHILS NFR BLD AUTO: 51.7 % (ref 39–75)
NRBC BLD MANUAL-RTO: 0 /100 WBC (ref 0–0)
PLATELET # BLD AUTO: 226 10*3/MM3 (ref 150–375)
PMV BLD AUTO: 11.1 FL (ref 8.9–12.1)
RBC # BLD AUTO: 4.47 10*6/MM3 (ref 3.9–5)
TIBC SERPL-MCNC: 423 MCG/DL (ref 249–505)
TRANSFERRIN SERPL-MCNC: 302 MG/DL (ref 200–360)
VIT B12 BLD-MCNC: 317 PG/ML (ref 211–946)
WBC NRBC COR # BLD: 6.07 10*3/MM3 (ref 4–10)

## 2018-06-11 PROCEDURE — 36415 COLL VENOUS BLD VENIPUNCTURE: CPT | Performed by: INTERNAL MEDICINE

## 2018-06-11 PROCEDURE — 85025 COMPLETE CBC W/AUTO DIFF WBC: CPT | Performed by: INTERNAL MEDICINE

## 2018-06-11 PROCEDURE — 82607 VITAMIN B-12: CPT | Performed by: INTERNAL MEDICINE

## 2018-06-11 PROCEDURE — 83540 ASSAY OF IRON: CPT | Performed by: INTERNAL MEDICINE

## 2018-06-11 PROCEDURE — 82728 ASSAY OF FERRITIN: CPT | Performed by: INTERNAL MEDICINE

## 2018-06-11 PROCEDURE — 84466 ASSAY OF TRANSFERRIN: CPT | Performed by: INTERNAL MEDICINE

## 2018-06-11 PROCEDURE — 99213 OFFICE O/P EST LOW 20 MIN: CPT | Performed by: INTERNAL MEDICINE

## 2018-06-11 NOTE — PROGRESS NOTES
REASON FOR FOLLOW-UP:  Iron deficiency anemia      History of Present Illness    Mrs. Abbott is a pleasant 58-year-old woman that I met 8/25/17 during a hospital stay at [facility].  She presented with progressive fatigue and dyspnea with exertion.  She was found to have a significant microcytic, hypochromic anemia with hemoglobin of 7.0.  She was found to be severely iron deficient.  Her hemoglobin had declined over a approximate 2 year period becoming more hypochromic and microcytic.  She has history of menorrhagia but underwent menopause around age 53.    During the hospital stay, she received transfusion of 2 units packed red blood cells and received 600 mg of IV Venofer.  She had an EGD and colonoscopy performed which showed no obvious bleeding.  Several polyps were resected and follow-up colonoscopy recommended in 3 years.  She had no evidence of Mansfield's esophagus, celiac disease or H. pylori.      She returns today for follow-up.  She discontinued oral iron 3 or 4 months ago.  She complains of fatigue but no lightheadedness, shortness of breath or other anemia related symptoms.    Past Medical History, Past Surgical History, Social History, Family History have been reviewed and are without significant changes except as mentioned from my consult note on 8/25/17.    Review of Systems   Constitutional: Positive for fatigue. Negative for activity change and unexpected weight change.   HENT: Negative.    Respiratory: Negative.    Cardiovascular: Negative.    Genitourinary: Negative.    Musculoskeletal: Negative.    Neurological: Negative.    Hematological: Negative.       .    Medications:  The current medication list was reviewed in the EMR    ALLERGIES:    Allergies   Allergen Reactions   • Latex Itching       Objective      Vitals:    06/11/18 1411   BP: 118/68   Pulse: 65   Resp: 16   Temp: 97.5 °F (36.4 °C)   TempSrc: Oral   SpO2: 98%   Weight: 91 kg (200 lb 9.6 oz)   Height: 178.5 cm  "(70.28\")  Comment: New Ht Taken   PainSc: 0-No pain  Comment: UTI     Current Status 6/11/2018   ECOG score 0       Physical Exam    Gen: pleasant well-nourished woman  HEENT: no icterus  CV: RRR  CHEST: CTA bilat  ABD: soft, no masses  SKIN: no petechiae or bruising    RECENT LABS:  Hematology WBC   Date Value Ref Range Status   06/11/2018 6.07 4.00 - 10.00 10*3/mm3 Final     RBC   Date Value Ref Range Status   06/11/2018 4.47 3.90 - 5.00 10*6/mm3 Final     Hemoglobin   Date Value Ref Range Status   06/11/2018 12.1 11.5 - 14.9 g/dL Final     Hematocrit   Date Value Ref Range Status   06/11/2018 38.7 34.0 - 45.0 % Final     Platelets   Date Value Ref Range Status   06/11/2018 226 150 - 375 10*3/mm3 Final              Assessment/Plan   1.   iron deficiency anemia:  Her hemoglobin is normal and the MCV normal.  He discontinued oral iron a few months ago secondary to constipation.  I am checking her ferritin and iron profile today we will call her with results and further recommendations.    2.  low B12 stores: Her B12 level was 267 and August 2017 when she complains of fatigue and a B12 level will be repeated today.  We will call her with this result also.    I will defer the patient's care back to her primary care provider at this time.              6/11/2018      CC:          "

## 2018-06-13 ENCOUNTER — TELEPHONE (OUTPATIENT)
Dept: ONCOLOGY | Facility: CLINIC | Age: 58
End: 2018-06-13

## 2018-06-13 ENCOUNTER — TELEPHONE (OUTPATIENT)
Dept: GENERAL RADIOLOGY | Facility: HOSPITAL | Age: 58
End: 2018-06-13

## 2018-06-13 NOTE — TELEPHONE ENCOUNTER
Called and spoke with patient. She will restart oral Iron and call with any issues. Also, inbasket sent to scheduling to set up appt.     ----- Message from Edgardo Harris MD sent at 6/13/2018  7:25 AM EDT -----  Please let her know iron levels are again very low.  She needs to restart oral iron or if she thinks she can't tolerate because of constipation get insurance approval for Feraheme x2 doses.  Have her come in 3 months with repeat cbc ferritin iron prof and see me couple days after labs.  She may want to follow up with her GI doc for recurrent iron deficiency.  ----- Message -----  From: Lab, Background User  Sent: 6/11/2018   2:22 PM  To: Edgardo Harris MD

## 2018-06-13 NOTE — TELEPHONE ENCOUNTER
----- Message from Mitra Alvarado RN sent at 6/13/2018  9:59 AM EDT -----  Please set patient up for 3 month f/u with Dr Harris -- CBC, Ferritin and iron profile a few days or week before. Thanks

## 2018-07-23 ENCOUNTER — OFFICE VISIT (OUTPATIENT)
Dept: CARDIOLOGY | Facility: CLINIC | Age: 58
End: 2018-07-23

## 2018-07-23 VITALS
BODY MASS INDEX: 28.7 KG/M2 | WEIGHT: 205 LBS | SYSTOLIC BLOOD PRESSURE: 114 MMHG | HEIGHT: 71 IN | DIASTOLIC BLOOD PRESSURE: 72 MMHG | HEART RATE: 62 BPM

## 2018-07-23 DIAGNOSIS — I25.110 CORONARY ARTERY DISEASE INVOLVING NATIVE CORONARY ARTERY OF NATIVE HEART WITH UNSTABLE ANGINA PECTORIS (HCC): Primary | ICD-10-CM

## 2018-07-23 DIAGNOSIS — E78.2 MIXED HYPERLIPIDEMIA: ICD-10-CM

## 2018-07-23 DIAGNOSIS — I25.2 OLD MI (MYOCARDIAL INFARCTION): ICD-10-CM

## 2018-07-23 PROCEDURE — 99214 OFFICE O/P EST MOD 30 MIN: CPT | Performed by: INTERNAL MEDICINE

## 2018-07-23 PROCEDURE — 93000 ELECTROCARDIOGRAM COMPLETE: CPT | Performed by: INTERNAL MEDICINE

## 2018-07-23 RX ORDER — FERROUS SULFATE 325(65) MG
325 TABLET ORAL
COMMUNITY
End: 2018-10-11

## 2018-07-23 NOTE — PROGRESS NOTES
Date of Office Visit: 18  Encounter Provider: Hollis Fontaine MD  Place of Service: [facility] CARDIOLOGY  Patient Name: Tiera Abbott  :1960  Referral Provider:No ref. provider found      Chief Complaint   Patient presents with   • Shortness of Breath     History of Present Illness  The patient is a 58-year-old female who presented in 2008 with an acute inferior myocardial infarction. She had multiple ventricular fibrillation arrests during that time. She was cardioverted a number of times. She ultimately was found to have total occlusion of the right coronary artery. Her other vessels were free of disease. She had angioplasty, clot extraction, and stent placement of that vessel. Her echocardiogram then showed a left ventricular ejection fraction of 45%. She had atrial fibrillation and was transiently on amiodarone. Then, in 2008, she had some atypical chest pain. Repeat catheterization showed normal left ventricular systolic function and insignificant disease of the left anterior descending. The stent was patent.  She now comes in for follow-up.  The patient denies chest pain, pressure and heaviness. No shortness of breath, othopnea or PND. No palpitations, near syncope or syncope. No stroke type symptoms like paralysis, paresthesia, abrupt vision loss and dysarthria. No bleeding like blood in the stool or dark stools.  She did have this episode while in [place] a few weeks ago where she became acutely lightheaded diaphoretic dizzy and his sensation in her throat which is similar to what she had before with her heart attack.  She had none prior to that none since then she's active at work but doesn't know structured exercise.  Also in 2018 she was in the hospital with some anemia and had a go on iron.      Coronary Artery Disease   Pertinent negatives include no dizziness, muscle weakness or weight gain. Her past medical history is significant for past myocardial  infarction.         Past Medical History:   Diagnosis Date   • Acid reflux    • Alcoholism in remission (CMS/HCC)     SINCE AGE 22   • Arthritis    • Back pain    • Cardiac disease    • Coronary artery disease     mi   • Depression    • Diabetic peripheral neuropathy (CMS/HCC)    • Eczema    • Heart attack 2008   • Heart disease    • History of anemia    • History of bruising easily    • Hypercholesterolemia    • Hypertension    • IBS (irritable bowel syndrome)    • Inappropriate (too hot or too cold) temperature in local application and packing     always hot or cold   • Joint pain, knee     LEFT KNEE   • Menopausal state    • Muscle pain    • Sleep apnea     DOES NOT USE MACHINE   • Stiffness in joint    • Type 2 diabetes mellitus (CMS/[facility])          Past Surgical History:   Procedure Laterality Date   • APPENDECTOMY     • BILATERAL BREAST REDUCTION  2009   • CARDIAC CATHETERIZATION     • CARDIAC SURGERY     • CATARACT EXTRACTION     • COLONOSCOPY  2010   • COLONOSCOPY N/A 8/28/2017    Procedure: COLONOSCOPY into cecum/terminal ileum with polypectomy;  Surgeon: Minna Bee MD;  Location: [facility] ENDOSCOPY;  Service:    • CORONARY ANGIOPLASTY     • CORONARY STENT PLACEMENT      2   • ENDOSCOPY N/A 8/25/2016    Procedure: ESOPHAGOGASTRODUODENOSCOPY;  Surgeon: Ottoniel Alonso MD;  Location: [facility] ENDOSCOPY;  Service:    • ENDOSCOPY N/A 8/28/2017    Procedure: ESOPHAGOGASTRODUODENOSCOPY with biopsy;  Surgeon: Minna Bee MD;  Location: [facility] ENDOSCOPY;  Service:    • HYSTERECTOMY     • JOINT REPLACEMENT Left     total knee   • KNEE SURGERY      2 knee sugeries on left knee   • [place] TOTAL KNEE ARTHROPLASTY Left 3/14/2016    Procedure: LEFT TOTAL KNEE ARTHROPLASTY;  Surgeon: Bladimir Mehta MD;  Location: [facility] OR;  Service: Orthopedics   • WISDOM TOOTH EXTRACTION     • WOUND DEBRIDEMENT Bilateral     BREAST.  AFTER REDUCTION         Current Outpatient Prescriptions on File Prior to Visit   Medication Sig  Dispense Refill   • Acetaminophen (TYLENOL ARTHRITIS PAIN PO) Take 2 tablets by mouth Every 4 (Four) Hours As Needed.     • atorvastatin (LIPITOR) 80 MG tablet take 1 tablet by mouth every evening 90 tablet 3   • carvedilol (COREG) 3.125 MG tablet take 1 tablet by mouth twice a day with meals (Patient taking differently: take 1 tablet by mouth twice a day with meals, only taking once a day) 180 tablet 0   • cephalexin (KEFLEX) 500 MG capsule Take 4 tabs by mouth 1 hour prior to dental procedure. 4 capsule 2   • clobetasol (TEMOVATE) 0.05 % cream APPLY SPARINGLY TO RIGHT HAND TWICE DAILY FOR SHORT DURATION AND AVOID FACE OR PROLONGED USE (Patient taking differently: APPLY SPARINGLY TO RIGHT HAND TWICE DAILY FOR SHORT DURATION AND AVOID FACE OR PROLONGED USE / BID prn) 1 g 0   • clopidogrel (PLAVIX) 75 MG tablet take 1 tablet by mouth once daily 30 tablet 2   • hydrOXYzine (ATARAX) 25 MG tablet 1-2 tablets at bedtime as needed for itching 30 tablet 3   • montelukast (SINGULAIR) 10 MG tablet take 1 tablet by mouth once daily if needed (Patient taking differently: take 1 tablet by mouth once daily if needed / pt taking nightly) 90 tablet 3   • pantoprazole (PROTONIX) 40 MG EC tablet take 1 tablet by mouth once daily 30 tablet 5   • triamcinolone (KENALOG) 0.5 % ointment Apply  topically 2 (Two) Times a Day. Sparingly to hands as needed avoid face 60 g 3   • venlafaxine XR (EFFEXOR-XR) 37.5 MG 24 hr capsule take 1 capsule by mouth once daily 30 capsule 1   • [DISCONTINUED] clopidogrel (PLAVIX) 75 MG tablet take 1 tablet by mouth once daily 90 tablet 0   • [DISCONTINUED] fluticasone (FLONASE) 50 MCG/ACT nasal spray 2 sprays into each nostril Daily. 1 each 11   • [DISCONTINUED] gabapentin (NEURONTIN) 300 MG capsule take 2 capsules by mouth EVERY NIGHT 60 capsule 3   • [DISCONTINUED] meloxicam (MOBIC) 7.5 MG tablet Take 1 tablet by mouth Daily. With dinner until better, avoid chronic use 10 tablet 0   • [DISCONTINUED]  vitamin C (ASCORBIC ACID) 250 MG tablet Take 250 mg by mouth Daily.       No current facility-administered medications on file prior to visit.          Social History     Social History   • Marital status:      Spouse name: N/A   • Number of children: 1   • Years of education: College     Occupational History   •  Disabled     Social History Main Topics   • Smoking status: Current Every Day Smoker     Types: Electronic Cigarette     Last attempt to quit: 2014   • Smokeless tobacco: Never Used      Comment: caffiene use - 3 cups coffee daily   • Alcohol use No   • Drug use: No   • Sexual activity: Not Currently     Birth control/ protection: Post-menopausal     Other Topics Concern   • Not on file     Social History Narrative   • No narrative on file       Family History   Problem Relation Age of Onset   • Alzheimer's disease Mother    • Breast cancer Mother    • Diabetes Mother    • Lung disease Father         pullmonary artery disease   • Heart disease Father    • Cancer Father 32   • Heart disease Brother    • JOSHUA disease Brother    • Heart attack Brother    • Heart disease Brother    • Colon cancer Other    • Heart attack Other    • Diabetes Other    • Heart disease Other          Review of Systems   Constitution: Negative for decreased appetite, diaphoresis, fever, weakness, malaise/fatigue, weight gain and weight loss.   HENT: Negative for congestion, hearing loss, nosebleeds and tinnitus.    Eyes: Negative for blurred vision, double vision, vision loss in left eye, vision loss in right eye and visual disturbance.   Cardiovascular:        As noted in HPI   Respiratory:        As noted HPI   Endocrine: Negative for cold intolerance and heat intolerance.   Hematologic/Lymphatic: Negative for bleeding problem. Does not bruise/bleed easily.   Skin: Negative for color change, flushing, itching and rash.   Musculoskeletal: Positive for joint pain and myalgias. Negative for arthritis, back pain, joint swelling  and muscle weakness.   Gastrointestinal: Negative for bloating, abdominal pain, constipation, diarrhea, dysphagia, heartburn, hematemesis, hematochezia, melena, nausea and vomiting.   Genitourinary: Negative for bladder incontinence, dysuria, frequency, nocturia and urgency.   Neurological: Negative for dizziness, focal weakness, headaches, light-headedness, loss of balance, numbness, paresthesias and vertigo.   Psychiatric/Behavioral: Negative for depression, memory loss and substance abuse.       Procedures      ECG 12 Lead  Date/Time: 7/23/2018 3:19 PM  Performed by: SAMEER GREEN  Authorized by: SAMEER GREEN   Comparison: compared with previous ECG   Comparison to previous ECG: PVC's noted  Rhythm: sinus rhythm  Ectopy: unifocal PVCs  Rate: normal  QRS axis: normal                  Objective:    There were no vitals taken for this visit.       Physical Exam  Physical Exam   Constitutional: She is oriented to person, place, and time. She appears well-developed and well-nourished. No distress.   HENT:   Head: Normocephalic.   Eyes: Pupils are equal, round, and reactive to light. Conjunctivae are normal. No scleral icterus.   Neck: Normal carotid pulses, no hepatojugular reflux and no JVD present. Carotid bruit is not present. No tracheal deviation, no edema and no erythema present. No thyromegaly present.   Cardiovascular: Normal rate, regular rhythm, S1 normal, S2 normal, normal heart sounds and intact distal pulses.   No extrasystoles are present. PMI is not displaced.  Exam reveals no gallop, no distant heart sounds and no friction rub.    No murmur heard.  Pulses:       Carotid pulses are 2+ on the right side, and 2+ on the left side.       Radial pulses are 2+ on the right side, and 2+ on the left side.        Femoral pulses are 2+ on the right side, and 2+ on the left side.       Dorsalis pedis pulses are 2+ on the right side, and 2+ on the left side.        Posterior tibial pulses are 2+ on the  right side, and 2+ on the left side.   Pulmonary/Chest: Effort normal and breath sounds normal. No respiratory distress. She has no decreased breath sounds. She has no wheezes. She has no rhonchi. She has no rales. She exhibits no tenderness.   Abdominal: Soft. Bowel sounds are normal. She exhibits no distension and no mass. There is no hepatosplenomegaly. There is no tenderness. There is no rebound and no guarding.   Musculoskeletal: She exhibits no edema, tenderness or deformity.   Neurological: She is alert and oriented to person, place, and time.   Skin: Skin is warm and dry. No rash noted. She is not diaphoretic. No cyanosis or erythema. No pallor. Nails show no clubbing.   Psychiatric: She has a normal mood and affect. Her speech is normal and behavior is normal. Judgment and thought content normal.           Assessment:   1. This is a 58-year-old female with history of previous inferior myocardial infarction in 01/2008 with primary angioplasty, clot extraction, and stent placement of the right  coronary artery. Follow-up cardiac catheterization in 02/2008 showed normal left ventricular function, no evidence of stent stenosis, and insignificant disease of the left  anterior descending. Follow-up perfusion stress test in January 2016 was normal.  Coronary Artery Disease  Assessment  • The patient has CCS class IV - angina with minimal activity, or at rest    Plan  • Lifestyle modifications discussed include adhering to a heart healthy diet, avoidance of tobacco products, maintenance of a healthy weight, medication compliance, regular exercise and regular monitoring of cholesterol and blood pressure    Subjective - Objective  • There is a history of past MI  • There has been a previous stent procedure using STACEY  • Current antiplatelet therapy includes aspirin 81 mg and clopidogrel 75 mg    Now with this atypical episode that certainly could've been an anginal equivalent but very odd.  It could've been  arrhythmia.  She is to return for a perfusion stress test if normal liver LV function is normal we'll follow her if recurs may need prolonged monitoring.     2. Hyperlipidemia.  Now on target dose atorvastatin.  Continue the same.  3.  Esophageal reflux.         Plan:

## 2018-08-09 RX ORDER — ATORVASTATIN CALCIUM 80 MG/1
TABLET, FILM COATED ORAL
Qty: 90 TABLET | Refills: 2 | Status: SHIPPED | OUTPATIENT
Start: 2018-08-09 | End: 2018-10-05

## 2018-08-09 RX ORDER — CLOPIDOGREL BISULFATE 75 MG/1
TABLET ORAL
Qty: 90 TABLET | Refills: 2 | Status: SHIPPED | OUTPATIENT
Start: 2018-08-09 | End: 2018-10-05

## 2018-08-09 RX ORDER — VENLAFAXINE HYDROCHLORIDE 37.5 MG/1
CAPSULE, EXTENDED RELEASE ORAL
Qty: 30 CAPSULE | Refills: 1 | Status: SHIPPED | OUTPATIENT
Start: 2018-08-09 | End: 2018-10-05

## 2018-08-23 ENCOUNTER — APPOINTMENT (OUTPATIENT)
Dept: CARDIOLOGY | Facility: HOSPITAL | Age: 58
End: 2018-08-23
Attending: INTERNAL MEDICINE

## 2018-09-07 RX ORDER — PANTOPRAZOLE SODIUM 40 MG/1
TABLET, DELAYED RELEASE ORAL
Qty: 30 TABLET | Refills: 5 | Status: SHIPPED | OUTPATIENT
Start: 2018-09-07 | End: 2018-10-05

## 2018-09-07 RX ORDER — MONTELUKAST SODIUM 10 MG/1
TABLET ORAL
Qty: 90 TABLET | Refills: 3 | Status: SHIPPED | OUTPATIENT
Start: 2018-09-07 | End: 2018-10-05

## 2018-10-05 ENCOUNTER — APPOINTMENT (OUTPATIENT)
Dept: GENERAL RADIOLOGY | Facility: HOSPITAL | Age: 58
End: 2018-10-05

## 2018-10-05 ENCOUNTER — HOSPITAL ENCOUNTER (OUTPATIENT)
Facility: HOSPITAL | Age: 58
Setting detail: OBSERVATION
Discharge: HOME | End: 2018-10-06
Attending: FAMILY MEDICINE | Admitting: FAMILY MEDICINE

## 2018-10-05 DIAGNOSIS — R07.9 CHEST PAIN, UNSPECIFIED TYPE: Primary | ICD-10-CM

## 2018-10-05 LAB
ALBUMIN SERPL-MCNC: 4.5 G/DL (ref 3.5–5.2)
ALBUMIN/GLOB SERPL: 1.4 G/DL
ALP SERPL-CCNC: 118 U/L (ref 39–117)
ALT SERPL W P-5'-P-CCNC: 15 U/L (ref 1–33)
ANION GAP SERPL CALCULATED.3IONS-SCNC: 11.4 MMOL/L
AST SERPL-CCNC: 20 U/L (ref 1–32)
BASOPHILS # BLD AUTO: 0.03 10*3/MM3 (ref 0–0.2)
BASOPHILS NFR BLD AUTO: 0.4 % (ref 0–1.5)
BILIRUB SERPL-MCNC: 0.3 MG/DL (ref 0.1–1.2)
BUN BLD-MCNC: 13 MG/DL (ref 6–20)
BUN/CREAT SERPL: 15.5 (ref 7–25)
CALCIUM SPEC-SCNC: 9.9 MG/DL (ref 8.6–10.5)
CHLORIDE SERPL-SCNC: 105 MMOL/L (ref 98–107)
CO2 SERPL-SCNC: 25.6 MMOL/L (ref 22–29)
CREAT BLD-MCNC: 0.84 MG/DL (ref 0.57–1)
DEPRECATED RDW RBC AUTO: 46.4 FL (ref 37–54)
EOSINOPHIL # BLD AUTO: 0.23 10*3/MM3 (ref 0–0.7)
EOSINOPHIL NFR BLD AUTO: 3 % (ref 0.3–6.2)
ERYTHROCYTE [DISTWIDTH] IN BLOOD BY AUTOMATED COUNT: 15.5 % (ref 11.7–13)
GFR SERPL CREATININE-BSD FRML MDRD: 70 ML/MIN/1.73
GLOBULIN UR ELPH-MCNC: 3.2 GM/DL
GLUCOSE BLD-MCNC: 102 MG/DL (ref 65–99)
HCT VFR BLD AUTO: 38.4 % (ref 35.6–45.5)
HGB BLD-MCNC: 11.9 G/DL (ref 11.9–15.5)
IMM GRANULOCYTES # BLD: 0.01 10*3/MM3 (ref 0–0.03)
IMM GRANULOCYTES NFR BLD: 0.1 % (ref 0–0.5)
LYMPHOCYTES # BLD AUTO: 3.03 10*3/MM3 (ref 0.9–4.8)
LYMPHOCYTES NFR BLD AUTO: 39.6 % (ref 19.6–45.3)
MCH RBC QN AUTO: 25.5 PG (ref 26.9–32)
MCHC RBC AUTO-ENTMCNC: 31 G/DL (ref 32.4–36.3)
MCV RBC AUTO: 82.2 FL (ref 80.5–98.2)
MONOCYTES # BLD AUTO: 0.48 10*3/MM3 (ref 0.2–1.2)
MONOCYTES NFR BLD AUTO: 6.3 % (ref 5–12)
NEUTROPHILS # BLD AUTO: 3.89 10*3/MM3 (ref 1.9–8.1)
NEUTROPHILS NFR BLD AUTO: 50.7 % (ref 42.7–76)
PLATELET # BLD AUTO: 268 10*3/MM3 (ref 140–500)
PMV BLD AUTO: 11.6 FL (ref 6–12)
POTASSIUM BLD-SCNC: 4.3 MMOL/L (ref 3.5–5.2)
PROT SERPL-MCNC: 7.7 G/DL (ref 6–8.5)
RBC # BLD AUTO: 4.67 10*6/MM3 (ref 3.9–5.2)
SODIUM BLD-SCNC: 142 MMOL/L (ref 136–145)
TROPONIN T SERPL-MCNC: <0.01 NG/ML (ref 0–0.03)
WBC NRBC COR # BLD: 7.66 10*3/MM3 (ref 4.5–10.7)

## 2018-10-05 PROCEDURE — 36415 COLL VENOUS BLD VENIPUNCTURE: CPT | Performed by: INTERNAL MEDICINE

## 2018-10-05 PROCEDURE — G0378 HOSPITAL OBSERVATION PER HR: HCPCS

## 2018-10-05 PROCEDURE — 99220 PR INITIAL OBSERVATION CARE/DAY 70 MINUTES: CPT | Performed by: INTERNAL MEDICINE

## 2018-10-05 PROCEDURE — 93005 ELECTROCARDIOGRAM TRACING: CPT | Performed by: FAMILY MEDICINE

## 2018-10-05 PROCEDURE — 85025 COMPLETE CBC W/AUTO DIFF WBC: CPT | Performed by: NURSE PRACTITIONER

## 2018-10-05 PROCEDURE — 99285 EMERGENCY DEPT VISIT HI MDM: CPT

## 2018-10-05 PROCEDURE — 93005 ELECTROCARDIOGRAM TRACING: CPT

## 2018-10-05 PROCEDURE — 80053 COMPREHEN METABOLIC PANEL: CPT | Performed by: NURSE PRACTITIONER

## 2018-10-05 PROCEDURE — 84484 ASSAY OF TROPONIN QUANT: CPT | Performed by: INTERNAL MEDICINE

## 2018-10-05 PROCEDURE — 84484 ASSAY OF TROPONIN QUANT: CPT | Performed by: NURSE PRACTITIONER

## 2018-10-05 PROCEDURE — 71045 X-RAY EXAM CHEST 1 VIEW: CPT

## 2018-10-05 PROCEDURE — 93010 ELECTROCARDIOGRAM REPORT: CPT | Performed by: INTERNAL MEDICINE

## 2018-10-05 PROCEDURE — 84484 ASSAY OF TROPONIN QUANT: CPT | Performed by: FAMILY MEDICINE

## 2018-10-05 RX ORDER — SODIUM CHLORIDE 0.9 % (FLUSH) 0.9 %
3 SYRINGE (ML) INJECTION EVERY 12 HOURS SCHEDULED
Status: DISCONTINUED | OUTPATIENT
Start: 2018-10-05 | End: 2018-10-06 | Stop reason: HOSPADM

## 2018-10-05 RX ORDER — PANTOPRAZOLE SODIUM 40 MG/1
40 TABLET, DELAYED RELEASE ORAL DAILY
Status: DISCONTINUED | OUTPATIENT
Start: 2018-10-05 | End: 2018-10-06

## 2018-10-05 RX ORDER — ACETAMINOPHEN 325 MG/1
650 TABLET ORAL EVERY 6 HOURS PRN
Status: DISCONTINUED | OUTPATIENT
Start: 2018-10-05 | End: 2018-10-06 | Stop reason: HOSPADM

## 2018-10-05 RX ORDER — ATORVASTATIN CALCIUM 80 MG/1
80 TABLET, FILM COATED ORAL NIGHTLY
COMMUNITY
End: 2019-12-14 | Stop reason: SDUPTHER

## 2018-10-05 RX ORDER — CLOPIDOGREL BISULFATE 75 MG/1
75 TABLET ORAL DAILY
COMMUNITY
End: 2019-04-22 | Stop reason: SDUPTHER

## 2018-10-05 RX ORDER — CARVEDILOL 3.12 MG/1
3.12 TABLET ORAL DAILY
COMMUNITY
End: 2019-12-14 | Stop reason: SDUPTHER

## 2018-10-05 RX ORDER — PANTOPRAZOLE SODIUM 40 MG/1
40 TABLET, DELAYED RELEASE ORAL DAILY
COMMUNITY
End: 2018-10-06 | Stop reason: HOSPADM

## 2018-10-05 RX ORDER — SODIUM CHLORIDE 0.9 % (FLUSH) 0.9 %
3-10 SYRINGE (ML) INJECTION AS NEEDED
Status: DISCONTINUED | OUTPATIENT
Start: 2018-10-05 | End: 2018-10-06 | Stop reason: HOSPADM

## 2018-10-05 RX ORDER — HYDROXYZINE HYDROCHLORIDE 25 MG/1
25 TABLET, FILM COATED ORAL 3 TIMES DAILY PRN
Status: DISCONTINUED | OUTPATIENT
Start: 2018-10-05 | End: 2018-10-06 | Stop reason: HOSPADM

## 2018-10-05 RX ORDER — VENLAFAXINE HYDROCHLORIDE 37.5 MG/1
37.5 CAPSULE, EXTENDED RELEASE ORAL DAILY
COMMUNITY
End: 2018-11-13 | Stop reason: SDUPTHER

## 2018-10-05 RX ORDER — SENNOSIDES 8.6 MG
650 CAPSULE ORAL EVERY 8 HOURS PRN
COMMUNITY

## 2018-10-05 RX ORDER — ATORVASTATIN CALCIUM 80 MG/1
80 TABLET, FILM COATED ORAL NIGHTLY
Status: DISCONTINUED | OUTPATIENT
Start: 2018-10-05 | End: 2018-10-06 | Stop reason: HOSPADM

## 2018-10-05 RX ORDER — MONTELUKAST SODIUM 10 MG/1
10 TABLET ORAL AS NEEDED
COMMUNITY
End: 2020-04-15 | Stop reason: SDUPTHER

## 2018-10-05 RX ORDER — VENLAFAXINE HYDROCHLORIDE 37.5 MG/1
37.5 CAPSULE, EXTENDED RELEASE ORAL DAILY
Status: DISCONTINUED | OUTPATIENT
Start: 2018-10-05 | End: 2018-10-06 | Stop reason: HOSPADM

## 2018-10-05 RX ORDER — NITROGLYCERIN 0.4 MG/1
0.4 TABLET SUBLINGUAL
Status: DISCONTINUED | OUTPATIENT
Start: 2018-10-05 | End: 2018-10-06 | Stop reason: HOSPADM

## 2018-10-05 RX ORDER — CLOBETASOL PROPIONATE 0.5 MG/G
1 CREAM TOPICAL 2 TIMES DAILY PRN
COMMUNITY
End: 2020-04-13

## 2018-10-05 RX ADMIN — PANTOPRAZOLE SODIUM 40 MG: 40 TABLET, DELAYED RELEASE ORAL at 21:43

## 2018-10-05 RX ADMIN — VENLAFAXINE HYDROCHLORIDE 37.5 MG: 37.5 CAPSULE, EXTENDED RELEASE ORAL at 23:44

## 2018-10-05 RX ADMIN — Medication 3 ML: at 23:48

## 2018-10-05 RX ADMIN — ATORVASTATIN CALCIUM 80 MG: 80 TABLET, FILM COATED ORAL at 23:43

## 2018-10-05 RX ADMIN — Medication 3 ML: at 23:47

## 2018-10-05 NOTE — H&P
Date of Hospital Visit: 10/05/2018  Encounter Provider: Corey Pearce MD  Place of Service: [facility] CARDIOLOGY  Patient Name: Tiera Abbott  :1960    Chief complaint  Chest Pain    History of Present Illness  Mrs. Abbott reports chest pain starting around 12 pm today.  It started in her chest and radiated to the left jaw and shoulder.  She associates it with some breathlessness and diaphoresis.  The pain lasted about 15 minutes.  It was improved after she was treated by EMS and received aspirin and possibly some nitroglycerin.  She is currently chest pain free. She denies any chest pain with usual activity.  She had an inferior MI 10 years ago, treated with stenting.  She has had occasional chest pain since then.  Her most recent stress test in  and  were normal.  She remains an occasional user of tobacco.         Past Medical History:   Diagnosis Date   • Acid reflux    • Alcoholism in remission (CMS/[facility])     SINCE AGE 22   • Arthritis    • Back pain    • Cardiac disease    • Coronary artery disease     mi   • Depression    • Diabetic peripheral neuropathy (CMS/[facility])    • Eczema    • Heart attack (CMS/[facility])    • Heart disease    • History of anemia    • History of bruising easily    • Hypercholesterolemia    • Hypertension    • IBS (irritable bowel syndrome)    • Inappropriate (too hot or too cold) temperature in local application and packing     always hot or cold   • Joint pain, knee     LEFT KNEE   • Menopausal state    • Muscle pain    • Sleep apnea     DOES NOT USE MACHINE   • Stiffness in joint    • Type 2 diabetes mellitus (CMS/[facility])          Past Surgical History:   Procedure Laterality Date   • APPENDECTOMY     • BILATERAL BREAST REDUCTION     • CARDIAC CATHETERIZATION     • CARDIAC SURGERY     • CATARACT EXTRACTION     • COLONOSCOPY     • COLONOSCOPY N/A 2017    Procedure: COLONOSCOPY into cecum/terminal ileum with polypectomy;  Surgeon: Minna BOSWELL  MD Rohit;  Location: [facility] ENDOSCOPY;  Service:    • CORONARY ANGIOPLASTY     • CORONARY STENT PLACEMENT      2   • ENDOSCOPY N/A 8/25/2016    Procedure: ESOPHAGOGASTRODUODENOSCOPY;  Surgeon: Ottoniel Alonso MD;  Location: [facility] ENDOSCOPY;  Service:    • ENDOSCOPY N/A 8/28/2017    Procedure: ESOPHAGOGASTRODUODENOSCOPY with biopsy;  Surgeon: Minna Bee MD;  Location: [facility] ENDOSCOPY;  Service:    • HYSTERECTOMY     • JOINT REPLACEMENT Left     total knee   • KNEE SURGERY      2 knee sugeries on left knee   • [place] TOTAL KNEE ARTHROPLASTY Left 3/14/2016    Procedure: LEFT TOTAL KNEE ARTHROPLASTY;  Surgeon: lBadimir Mehta MD;  Location: [facility] MAIN OR;  Service: Orthopedics   • WISDOM TOOTH EXTRACTION     • WOUND DEBRIDEMENT Bilateral     BREAST.  AFTER REDUCTION         (Not in a hospital admission)    Current Meds  No current facility-administered medications on file prior to encounter.      Current Outpatient Prescriptions on File Prior to Encounter   Medication Sig Dispense Refill   • triamcinolone (KENALOG) 0.5 % ointment Apply  topically 2 (Two) Times a Day. Sparingly to hands as needed avoid face 60 g 3   • [DISCONTINUED] Acetaminophen (TYLENOL ARTHRITIS PAIN PO) Take 2 tablets by mouth Every 4 (Four) Hours As Needed.     • [DISCONTINUED] atorvastatin (LIPITOR) 80 MG tablet take 1 tablet by mouth at bedtime 90 tablet 2   • [DISCONTINUED] carvedilol (COREG) 3.125 MG tablet take 1 tablet by mouth twice a day with meals (Patient taking differently: take 1 tablet by mouth twice a day with meals, only taking once a day) 180 tablet 0   • [DISCONTINUED] clobetasol (TEMOVATE) 0.05 % cream APPLY SPARINGLY TO RIGHT HAND TWICE DAILY FOR SHORT DURATION AND AVOID FACE OR PROLONGED USE (Patient taking differently: APPLY SPARINGLY TO RIGHT HAND TWICE DAILY FOR SHORT DURATION AND AVOID FACE OR PROLONGED USE / BID prn) 1 g 0   • [DISCONTINUED] clopidogrel (PLAVIX) 75 MG tablet take 1 tablet by mouth once daily 90 tablet 2    • [DISCONTINUED] pantoprazole (PROTONIX) 40 MG EC tablet take 1 tablet by mouth once daily 30 tablet 5   • [DISCONTINUED] venlafaxine XR (EFFEXOR-XR) 37.5 MG 24 hr capsule take 1 capsule by mouth once daily 30 capsule 1   • ferrous sulfate 325 (65 FE) MG tablet Take 325 mg by mouth Daily With Breakfast.     • hydrOXYzine (ATARAX) 25 MG tablet 1-2 tablets at bedtime as needed for itching (Patient taking differently: Take 25-50 mg by mouth At Night As Needed for Itching.) 30 tablet 3   • [DISCONTINUED] cephalexin (KEFLEX) 500 MG capsule Take 4 tabs by mouth 1 hour prior to dental procedure. 4 capsule 2   • [DISCONTINUED] montelukast (SINGULAIR) 10 MG tablet take 1 tablet by mouth once daily if needed 90 tablet 3       Social History     Social History   • Marital status: Single     Spouse name: N/A   • Number of children: 1   • Years of education: College     Occupational History   •  Disabled     Social History Main Topics   • Smoking status: Current Every Day Smoker     Types: Electronic Cigarette     Last attempt to quit: 2014   • Smokeless tobacco: Never Used      Comment: caffiene use - 3 cups coffee daily   • Alcohol use No   • Drug use: No   • Sexual activity: Not Currently     Birth control/ protection: Post-menopausal     Other Topics Concern   • Not on file     Social History Narrative   • No narrative on file       REVIEW OF SYSTEMS:   CONSTITUTIONAL: No weight loss, fever, chills, weakness or fatigue.   HEENT: Eyes: No visual loss, blurred vision, double vision or yellow sclerae. Ears, Nose, Throat: No hearing loss, sneezing, congestion, runny nose or sore throat.   SKIN: No rash or itching.     RESPIRATORY: No shortness of breath, hemoptysis, cough or sputum.   GASTROINTESTINAL: No anorexia, nausea, vomiting or diarrhea. No abdominal pain, bright red blood per rectum or melena.  GENITOURINARY: No burning on urination, hematuria or increased frequency.  NEUROLOGICAL: No headache, dizziness, syncope,  paralysis, ataxia, numbness or tingling in the extremities. No change in bowel or bladder control.   MUSCULOSKELETAL: No muscle, back pain, joint pain or stiffness.   HEMATOLOGIC: No anemia, bleeding or bruising.   LYMPHATICS: No enlarged nodes. No history of splenectomy.   PSYCHIATRIC: No history of depression, anxiety, hallucinations.   ENDOCRINOLOGIC: No reports of sweating, cold or heat intolerance. No polyuria or polydipsia.        Objective:     Vitals:    10/05/18 1724 10/05/18 1733 10/05/18 1800 10/05/18 1803   BP:  137/71  130/63   Pulse:  59 56 58   Resp: 16      Temp:       TempSrc:       SpO2:  95% 98% 94%   Weight:       Height:         Body mass index is 27.63 kg/m².    General: No acute distress, resting comfortably on stretcher  Neck: no jvd evident, no bruits  Eyes:normal external appearance, no discharge  Ears:normal external appearance, normal hearing  Chest:RRR, no m/g/r  Lungs:normal respiratory effort, clear to auscultation  Abdomen:clear, no tenderness  Extremites: no edema  Vascular:normal radial and dorsalis pedis pulse  Neuro: no significant deficits.   Psych:normal mood and affect    Lab Review:       Results from last 7 days  Lab Units 10/05/18  1343   SODIUM mmol/L 142   POTASSIUM mmol/L 4.3   CHLORIDE mmol/L 105   CO2 mmol/L 25.6   BUN mg/dL 13   CREATININE mg/dL 0.84   CALCIUM mg/dL 9.9   BILIRUBIN mg/dL 0.3   ALK PHOS U/L 118*   ALT (SGPT) U/L 15   AST (SGOT) U/L 20   GLUCOSE mg/dL 102*       Results from last 7 days  Lab Units 10/05/18  1724 10/05/18  1343   TROPONIN T ng/mL <0.010 <0.010     @LABRCNT(bnp)@    Results from last 7 days  Lab Units 10/05/18  1343   WBC 10*3/mm3 7.66   HEMOGLOBIN g/dL 11.9   HEMATOCRIT % 38.4   PLATELETS 10*3/mm3 268                 I personally viewed and interpreted the patient's EKG/Telemetry data    Assessment:       Diagnosis Plan   1. Chest pain, unspecified type       Plan:         Will trend cardiac enzymes, if positive treat as ACS to include  heparin, anti-platlet, and intervention as needed.  If enzymes remain negative, will order perfusion test in am.

## 2018-10-05 NOTE — ED NOTES
"Pt reports she was at work and developed sudden, midsternal chest pain that felt like \"pressure\". Pt states she was nauseous with the pain. She also states \"the pain is gone now, but I can feel it there just a little. Its like residual pain. And since I had a heart attack previously it scared me\". Pt alert and oriented and on monitor.  No acute distress     Niurka Andino RN  10/05/18 2528    "

## 2018-10-05 NOTE — PROGRESS NOTES
Discharge Planning Assessment  [facility]     Patient Name: Tiera Abbott  MRN: [id number]  Today's Date: 10/5/2018    Admit Date: 10/5/2018          Discharge Needs Assessment     Row Name 10/05/18 1744       Living Environment    Lives With sibling(s)    Name(s) of Who Lives With Patient Chapin Abbott    Current Living Arrangements home/apartment/condo    Duration at Residence 9 yrs    Primary Care Provided by self    Provides Primary Care For no one    Family Caregiver if Needed child(warren), adult    Family Caregiver Names Svitlana Abbott    Quality of Family Relationships helpful;supportive    Able to Return to Prior Arrangements yes       Resource/Environmental Concerns    Resource/Environmental Concerns none    Transportation Concerns car, none       Transition Planning    Patient/Family Anticipates Transition to home    Patient/Family Anticipated Services at Transition none    Transportation Anticipated car, drives self;family or friend will provide       Discharge Needs Assessment    Readmission Within the Last 30 Days no previous admission in last 30 days    Concerns to be Addressed no discharge needs identified;denies needs/concerns at this time    Equipment Currently Used at Home cane, straight    Anticipated Changes Related to Illness none    Equipment Needed After Discharge none            Discharge Plan     Row Name 10/05/18 1746       Plan    Plan d/c needs assessment completed. No needs identified at this time    Plan Comments pt intends to return home upon d\c        Destination     No service coordination in this encounter.      Durable Medical Equipment     No service coordination in this encounter.      Dialysis/Infusion     No service coordination in this encounter.      Home Medical Care     No service coordination in this encounter.      Social Care     No service coordination in this encounter.                Demographic Summary    No documentation.           Functional Status    No documentation.            Psychosocial    No documentation.           Abuse/Neglect    No documentation.           Legal    No documentation.           Substance Abuse    No documentation.           Patient Forms    No documentation.         Mary Lawson RN
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
## 2018-10-05 NOTE — ED NOTES
This RN attempted to call report to 4N, RN unable to take report at this time     Niurka Andino RN  10/05/18 4840

## 2018-10-05 NOTE — PROGRESS NOTES
Clinical Pharmacy Services: Medication History    Tiera Abbott is a 58 y.o. female presenting to [facility] for   Chief Complaint   Patient presents with   • Chest Pain       She  has a past medical history of Acid reflux; Alcoholism in remission (CMS/[facility]); Arthritis; Back pain; Cardiac disease; Coronary artery disease; Depression; Diabetic peripheral neuropathy (CMS/[facility]); Eczema; Heart attack (CMS/[facility]) (2008); Heart disease; History of anemia; History of bruising easily; Hypercholesterolemia; Hypertension; IBS (irritable bowel syndrome); Inappropriate (too hot or too cold) temperature in local application and packing; Joint pain, knee; Menopausal state; Muscle pain; Sleep apnea; Stiffness in joint; and Type 2 diabetes mellitus (CMS/[facility]).    Allergies as of 10/05/2018 - Reviewed 10/05/2018   Allergen Reaction Noted   • Latex Itching 08/24/2017       Medication information was obtained from: Patient  Pharmacy and Phone Number: [facility] [phone]    Prior to Admission Medications     Prescriptions Last Dose Informant Patient Reported? Taking?    acetaminophen (TYLENOL) 650 MG 8 hr tablet  Self Yes Yes    Take 650 mg by mouth Every 8 (Eight) Hours As Needed for Mild Pain .    atorvastatin (LIPITOR) 80 MG tablet  Self Yes Yes    Take 80 mg by mouth Every Night.    carvedilol (COREG) 3.125 MG tablet  Self Yes Yes    Take 3.125 mg by mouth Daily.    clobetasol (TEMOVATE) 0.05 % cream  Self Yes No    Apply 1 application topically to the appropriate area as directed 2 (Two) Times a Day As Needed (APPLY TO THE RIGHT HAND SPARINGLY FOR SHORT DURATION. AVOID FACE OR PROLONGED USE.).    clopidogrel (PLAVIX) 75 MG tablet  Self Yes Yes    Take 75 mg by mouth Daily.    ferrous sulfate 325 (65 FE) MG tablet  Self Yes No    Take 325 mg by mouth Daily With Breakfast.    hydrOXYzine (ATARAX) 25 MG tablet  Self No No    1-2 tablets at bedtime as needed for itching    Patient taking differently:  Take 25-50 mg by mouth  At Night As Needed for Itching.    montelukast (SINGULAIR) 10 MG tablet  Self Yes No    Take 10 mg by mouth Daily.    pantoprazole (PROTONIX) 40 MG EC tablet  Self Yes Yes    Take 40 mg by mouth Daily.    triamcinolone (KENALOG) 0.5 % ointment  Self No Yes    Apply  topically 2 (Two) Times a Day. Sparingly to hands as needed avoid face    venlafaxine XR (EFFEXOR-XR) 37.5 MG 24 hr capsule  Self Yes Yes    Take 37.5 mg by mouth Daily.            Medication notes: Patient says she stopped taking Ferrous Sulfate due to issues with constipation. Also, received a recall notice for Singulair from her pharmacy, so has discontinued until she speaks with her physician. Patient says she is not currently using the Clobetasol cream or taking Hydroxyzine, only uses PRN.    This medication list is complete to the best of my knowledge as of 10/5/2018    Please call if questions.    Africa Mercado, Medication History Technician  10/5/2018 5:28 PM

## 2018-10-06 ENCOUNTER — APPOINTMENT (OUTPATIENT)
Dept: NUCLEAR MEDICINE | Facility: HOSPITAL | Age: 58
End: 2018-10-06

## 2018-10-06 VITALS
TEMPERATURE: 97.4 F | SYSTOLIC BLOOD PRESSURE: 99 MMHG | HEIGHT: 72 IN | WEIGHT: 205.2 LBS | HEART RATE: 55 BPM | OXYGEN SATURATION: 94 % | RESPIRATION RATE: 18 BRPM | DIASTOLIC BLOOD PRESSURE: 69 MMHG | BODY MASS INDEX: 27.79 KG/M2

## 2018-10-06 LAB
BH CV NUCLEAR PRIOR STUDY: 3
BH CV STRESS BP STAGE 1: NORMAL
BH CV STRESS BP STAGE 2: NORMAL
BH CV STRESS DURATION MIN STAGE 1: 3
BH CV STRESS DURATION MIN STAGE 2: 3
BH CV STRESS DURATION SEC STAGE 1: 0
BH CV STRESS DURATION SEC STAGE 2: 0
BH CV STRESS GRADE STAGE 1: 10
BH CV STRESS GRADE STAGE 2: 12
BH CV STRESS HR STAGE 1: 129
BH CV STRESS HR STAGE 2: 147
BH CV STRESS METS STAGE 1: 5
BH CV STRESS METS STAGE 2: 7.5
BH CV STRESS PROTOCOL 1: NORMAL
BH CV STRESS RECOVERY BP: NORMAL MMHG
BH CV STRESS RECOVERY HR: 71 BPM
BH CV STRESS SPEED STAGE 1: 1.7
BH CV STRESS SPEED STAGE 2: 2.5
BH CV STRESS STAGE 1: 1
BH CV STRESS STAGE 2: 2
LV EF NUC BP: 69 %
MAXIMAL PREDICTED HEART RATE: 162 BPM
PERCENT MAX PREDICTED HR: 90.74 %
STRESS BASELINE BP: NORMAL MMHG
STRESS BASELINE HR: 63 BPM
STRESS PERCENT HR: 107 %
STRESS POST ESTIMATED WORKLOAD: 6.8 METS
STRESS POST EXERCISE DUR MIN: 5 MIN
STRESS POST EXERCISE DUR SEC: 30 SEC
STRESS POST PEAK BP: NORMAL MMHG
STRESS POST PEAK HR: 147 BPM
STRESS TARGET HR: 138 BPM
TROPONIN T SERPL-MCNC: <0.01 NG/ML (ref 0–0.03)
TROPONIN T SERPL-MCNC: <0.01 NG/ML (ref 0–0.03)

## 2018-10-06 PROCEDURE — G0378 HOSPITAL OBSERVATION PER HR: HCPCS

## 2018-10-06 PROCEDURE — 84484 ASSAY OF TROPONIN QUANT: CPT | Performed by: INTERNAL MEDICINE

## 2018-10-06 PROCEDURE — 93018 CV STRESS TEST I&R ONLY: CPT | Performed by: INTERNAL MEDICINE

## 2018-10-06 PROCEDURE — 93017 CV STRESS TEST TRACING ONLY: CPT

## 2018-10-06 PROCEDURE — G0008 ADMIN INFLUENZA VIRUS VAC: HCPCS | Performed by: INTERNAL MEDICINE

## 2018-10-06 PROCEDURE — 78452 HT MUSCLE IMAGE SPECT MULT: CPT

## 2018-10-06 PROCEDURE — 78452 HT MUSCLE IMAGE SPECT MULT: CPT | Performed by: INTERNAL MEDICINE

## 2018-10-06 PROCEDURE — 90661 CCIIV3 VAC ABX FR 0.5 ML IM: CPT | Performed by: INTERNAL MEDICINE

## 2018-10-06 PROCEDURE — 25010000002 INFLUENZA VAC SUBUNIT QUAD 0.5 ML SUSPENSION PREFILLED SYRINGE: Performed by: INTERNAL MEDICINE

## 2018-10-06 PROCEDURE — A9500 TC99M SESTAMIBI: HCPCS | Performed by: INTERNAL MEDICINE

## 2018-10-06 PROCEDURE — 93016 CV STRESS TEST SUPVJ ONLY: CPT | Performed by: INTERNAL MEDICINE

## 2018-10-06 PROCEDURE — 99217 PR OBSERVATION CARE DISCHARGE MANAGEMENT: CPT | Performed by: INTERNAL MEDICINE

## 2018-10-06 PROCEDURE — 0 TECHNETIUM SESTAMIBI: Performed by: INTERNAL MEDICINE

## 2018-10-06 RX ORDER — CARVEDILOL 3.12 MG/1
3.12 TABLET ORAL DAILY
Status: DISCONTINUED | OUTPATIENT
Start: 2018-10-06 | End: 2018-10-06 | Stop reason: HOSPADM

## 2018-10-06 RX ORDER — CLOPIDOGREL BISULFATE 75 MG/1
75 TABLET ORAL DAILY
Status: DISCONTINUED | OUTPATIENT
Start: 2018-10-06 | End: 2018-10-06 | Stop reason: HOSPADM

## 2018-10-06 RX ORDER — PANTOPRAZOLE SODIUM 40 MG/1
40 TABLET, DELAYED RELEASE ORAL
Status: DISCONTINUED | OUTPATIENT
Start: 2018-10-06 | End: 2018-10-06 | Stop reason: HOSPADM

## 2018-10-06 RX ORDER — PANTOPRAZOLE SODIUM 40 MG/1
40 TABLET, DELAYED RELEASE ORAL
Qty: 60 TABLET | Refills: 0 | Status: SHIPPED | OUTPATIENT
Start: 2018-10-06 | End: 2019-04-22 | Stop reason: SDUPTHER

## 2018-10-06 RX ORDER — MONTELUKAST SODIUM 10 MG/1
10 TABLET ORAL DAILY
Status: DISCONTINUED | OUTPATIENT
Start: 2018-10-06 | End: 2018-10-06 | Stop reason: HOSPADM

## 2018-10-06 RX ADMIN — TECHNETIUM TC 99M SESTAMIBI 1 DOSE: 1 INJECTION INTRAVENOUS at 10:08

## 2018-10-06 RX ADMIN — HYDROXYZINE HYDROCHLORIDE 25 MG: 25 TABLET ORAL at 01:02

## 2018-10-06 RX ADMIN — TECHNETIUM TC 99M SESTAMIBI 1 DOSE: 1 INJECTION INTRAVENOUS at 07:55

## 2018-10-06 RX ADMIN — INFLUENZA A VIRUS A/SINGAPORE/GP1908/2015 IVR-180 (H1N1) ANTIGEN (MDCK CELL DERIVED, PROPIOLACTONE INACTIVATED), INFLUENZA A VIRUS A/NORTH CAROLINA/04/2016 (H3N2) HEMAGGLUTININ ANTIGEN (MDCK CELL DERIVED, PROPIOLACTONE INACTIVATED), INFLUENZA B VIRUS B/IOWA/06/2017 HEMAGGLUTININ ANTIGEN (MDCK CELL DERIVED, PROPIOLACTONE INACTIVATED), INFLUENZA B VIRUS B/SINGAPORE/INFTT-16-0610/2016 HEMAGGLUTININ ANTIGEN (MDCK CELL DERIVED, PROPIOLACTONE INACTIVATED) 0.5 ML: 15; 15; 15; 15 INJECTION, SUSPENSION INTRAMUSCULAR at 16:03

## 2018-10-06 RX ADMIN — Medication 3 ML: at 10:56

## 2018-10-06 NOTE — PROGRESS NOTES
Specificity and sensitivity of the stress test/ cardiac workup has been explained. Pt has been explained if  Symptoms continue please go to ER, and further w/p will be required.    Also explained this does not rule out coronary artery disease or the future events, continue to emphasize on risk reductions for coronary artery disease    Pt also advised to contact PCP for other causes of symptoms

## 2018-10-06 NOTE — DISCHARGE SUMMARY
Tiera Abbott  [id number]    Date of Admit: 10/5/2018  Date of Discharge:  10/6/2018    Discharge Diagnosis:  Active Hospital Problems    Diagnosis Date Noted   • **Chest pain [R07.9] 10/05/2018   • CAD (coronary artery disease) [I25.10] 08/25/2017      Resolved Hospital Problems    Diagnosis Date Noted Date Resolved   No resolved problems to display.           Hospital Course:   Ms. Abbott was admitted yesterday for chest pain. Troponin was <0.010 x 2. She underwent myocardial perfusion stress test today which showed EF 69% and showed no evidence of ischemia. Medicine was to be consulted for noncardiac source of pain but patient declines at this time. She is chest pain free and wishes to be discharged home. She should follow-up with [facility] in one week and Dr. Fontaine in one month.     Procedures Performed  Myocardial Perfusion Stress Test     Interpretation Summary     · Findings consistent with a normal ECG stress test.  · Left ventricular ejection fraction is normal (Calculated EF = 69%).  · Myocardial perfusion imaging indicates a normal myocardial perfusion study with no evidence of ischemia.  · Impressions are consistent with a low risk study.         Consults     Date and Time Order Name Status Description    10/6/2018 1037 Inpatient Internal Medicine Consult      10/5/2018 1702 [facility] (on-call MD unless specified) Completed           Discharge Medications     Your medication list      CHANGE how you take these medications      Instructions Last Dose Given Next Dose Due   hydrOXYzine 25 MG tablet  Commonly known as:  ATARAX  What changed:  · how much to take  · how to take this  · when to take this  · reasons to take this  · additional instructions      1-2 tablets at bedtime as needed for itching          CONTINUE taking these medications      Instructions Last Dose Given Next Dose Due   ferrous sulfate 325 (65 FE) MG tablet      Take 325 mg by mouth Daily With Breakfast.       triamcinolone 0.5 %  ointment  Commonly known as:  KENALOG      Apply  topically 2 (Two) Times a Day. Sparingly to hands as needed avoid face          ASK your doctor about these medications      Instructions Last Dose Given Next Dose Due   acetaminophen 650 MG 8 hr tablet  Commonly known as:  TYLENOL  Ask about: Which instructions should I use?      Take 650 mg by mouth Every 8 (Eight) Hours As Needed for Mild Pain .       atorvastatin 80 MG tablet  Commonly known as:  LIPITOR  Ask about: Which instructions should I use?      Take 80 mg by mouth Every Night.       carvedilol 3.125 MG tablet  Commonly known as:  COREG  Ask about: Which instructions should I use?      Take 3.125 mg by mouth Daily.       clobetasol 0.05 % cream  Commonly known as:  TEMOVATE  Ask about: Which instructions should I use?      Apply 1 application topically to the appropriate area as directed 2 (Two) Times a Day As Needed (APPLY TO THE RIGHT HAND SPARINGLY FOR SHORT DURATION. AVOID FACE OR PROLONGED USE.).       clopidogrel 75 MG tablet  Commonly known as:  PLAVIX  Ask about: Which instructions should I use?      Take 75 mg by mouth Daily.       montelukast 10 MG tablet  Commonly known as:  SINGULAIR  Ask about: Which instructions should I use?      Take 10 mg by mouth Daily.       pantoprazole 40 MG EC tablet  Commonly known as:  PROTONIX  Ask about: Which instructions should I use?      Take 40 mg by mouth Daily.       venlafaxine XR 37.5 MG 24 hr capsule  Commonly known as:  EFFEXOR-XR  Ask about: Which instructions should I use?      Take 37.5 mg by mouth Daily.              Discharge Diet:     Activity at Discharge:     Discharge disposition: home    Condition on Discharge: stable    Follow-up Appointments  No future appointments.           Sharmila Bean MD  10/06/18  12:35 PM

## 2018-10-06 NOTE — PLAN OF CARE
Problem: Patient Care Overview  Goal: Plan of Care Review  Outcome: Ongoing (interventions implemented as appropriate)   10/05/18 4673   Coping/Psychosocial   Plan of Care Reviewed With patient   Plan of Care Review   Progress improving   OTHER   Outcome Summary Pt admitted for observation came in with EMS for with chest pains. Pt states that since she has been here in hospital chest pains seem to have subsided. No c/o pain now. Troponin neg x2. Npo after midnight stress test tomorrow. Cont to monitor, safety maintained,.      Goal: Interprofessional Rounds/Family Conf  Outcome: Ongoing (interventions implemented as appropriate)      Problem: Cardiac: ACS (Acute Coronary Syndrome) (Adult)  Goal: Signs and Symptoms of Listed Potential Problems Will be Absent, Minimized or Managed (Cardiac: ACS)  Outcome: Ongoing (interventions implemented as appropriate)

## 2018-10-06 NOTE — CONSULTS
Internal medicine consult    Ref.  Physician  Dr. MACHADO    Chief complaint  Chest pain    Reason for consult  Follow medical problems    History of present illness  58-year-old white female with known coronary artery disease hypertension gastroesophageal reflux disease admitted through emergency room to cardiology service with midsternal chest pain radiating to the neck while she was at work.  Patient denies any nausea vomiting but she was short of breath.  Pain lasted for 40 minutes and relieved by itself.  She and evaluated in ER with cardiac enzymes EKG chest x-ray and admitted to cardiology service as she has known coronary artery disease.  I'm asked to follow the patient for medical problem.  At the time of interview she denies any chest pain but very anxious.  Patient denies any fever chills cough nausea vomiting diarrhea leg swelling weight loss or weight gain.    PAST MEDICAL HISTORY  • Acid reflux     • Alcoholism in remission (CMS/HCC)     • Arthritis     • Back pain     • Cardiac disease     • Coronary artery disease     • Depression     • Diabetic peripheral neuropathy (CMS/HCC)     • Eczema     • Heart attack (CMS/HCC) 2008   • Heart disease     • History of anemia     • History of bruising easily     • Hypercholesterolemia     • Hypertension     • IBS (irritable bowel syndrome)     • Inappropriate (too hot or too cold) temperature in local application and packing     • Joint pain, knee     • Menopausal state     • Muscle pain     • Sleep apnea     • Stiffness in joint     • Type 2 diabetes mellitus (CMS/HCC)        PAST SURGICAL HISTORY  Surgical History         Past Surgical History:   Procedure Laterality Date   • APPENDECTOMY       • BILATERAL BREAST REDUCTION   2009   • CARDIAC CATHETERIZATION       • CARDIAC SURGERY       • CATARACT EXTRACTION       • COLONOSCOPY   2010   • COLONOSCOPY N/A 8/28/2017     Procedure: COLONOSCOPY into cecum/terminal ileum with polypectomy;  Surgeon: Minna Bee MD;   Location: [facility] ENDOSCOPY;  Service:    • CORONARY ANGIOPLASTY       • CORONARY STENT PLACEMENT         2   • ENDOSCOPY N/A 8/25/2016     Procedure: ESOPHAGOGASTRODUODENOSCOPY;  Surgeon: Ottoniel Alonso MD;  Location: [facility] ENDOSCOPY;  Service:    • ENDOSCOPY N/A 8/28/2017     Procedure: ESOPHAGOGASTRODUODENOSCOPY with biopsy;  Surgeon: Minna Bee MD;  Location: [facility] ENDOSCOPY;  Service:    • HYSTERECTOMY       • JOINT REPLACEMENT Left       total knee   • KNEE SURGERY         2 knee sugeries on left knee   • NE TOTAL KNEE ARTHROPLASTY Left 3/14/2016     Procedure: LEFT TOTAL KNEE ARTHROPLASTY;  Surgeon: Bladimir Mehta MD;  Location: [facility] MAIN OR;  Service: Orthopedics   • WISDOM TOOTH EXTRACTION       • WOUND DEBRIDEMENT Bilateral       BREAST.  AFTER REDUCTION         FAMILY HISTORY        Family History   Problem Relation Age of Onset   • Alzheimer's disease Mother     • Breast cancer Mother     • Diabetes Mother     • Lung disease Father           pullmonary artery disease   • Heart disease Father     • Cancer Father 32   • Heart disease Brother     • JOSHUA disease Brother     • Heart attack Brother     • Heart disease Brother     • Colon cancer Other     • Heart attack Other     • Diabetes Other     • Heart disease Other        SOCIAL HISTORY  Social History   Social History            Social History   • Marital status: Single       Spouse name: N/A   • Number of children: 1   • Years of education: College           Occupational History   •   Disabled             Social History Main Topics   • Smoking status: Current Every Day Smoker       Types: Electronic Cigarette       Last attempt to quit: 2014   • Smokeless tobacco: Never Used         Comment: caffiene use - 3 cups coffee daily   • Alcohol use No   • Drug use: No   • Sexual activity: Not Currently       Birth control/ protection: Post-menopausal           Other Topics Concern   • Not on file          Social History Narrative   • No narrative on  "file         ALLERGIES  Latex  Home medications reviewed     REVIEW OF SYSTEMS  Review of Systems   Constitutional: Negative for fever.   HENT: Negative for sore throat.    Eyes: Negative.    Respiratory: Positive for shortness of breath. Negative for cough.    Cardiovascular: Positive for chest pain (midsternal radiating into neck ).   Gastrointestinal: Negative for abdominal pain, diarrhea, nausea and vomiting.   Genitourinary: Negative for dysuria.   Musculoskeletal: Negative for neck pain.   Skin: Negative for rash.   Allergic/Immunologic: Negative.    Neurological: Negative for weakness, numbness and headaches.   Hematological: Negative.    Psychiatric/Behavioral: Negative.    All other systems reviewed and are negative.     PHYSICAL EXAM  Blood pressure 99/69, pulse 55, temperature 97.4 °F (36.3 °C), temperature source Oral, resp. rate 18, height 182.9 cm (72\"), weight 93.1 kg (205 lb 3.2 oz), SpO2 94 %, not currently breastfeeding.    Constitutional: She is oriented to person, place, and time. No distress.   Head: Normocephalic and atraumatic.   Eyes: Pupils are equal, round, and reactive to light. EOM are normal.   Neck: Normal range of motion. Neck supple.   Cardiovascular: Normal rate, regular rhythm and normal heart sounds.    Pulmonary/Chest: Effort normal and breath sounds normal. No respiratory distress.   Abdominal: Soft. There is no tenderness. There is no rebound and no guarding.   Musculoskeletal: Normal range of motion. She exhibits no edema.   Neurological: She is alert and oriented to person, place, and time. She has normal sensation and normal strength.   Skin: Skin is warm and dry. No rash noted.   Psychiatric: Mood and affect normal.     LAB RESULTS  Lab Results (last 24 hours)     Procedure Component Value Units Date/Time    Troponin [[id number]]  (Normal) Collected:  10/06/18 0607    Specimen:  Blood Updated:  10/06/18 0732     Troponin T <0.010 ng/mL     Narrative:       Troponin T " Reference Ranges:  Less than 0.03 ng/mL:    Negative for AMI  0.03 to 0.09 ng/mL:      Indeterminant for AMI  Greater than 0.09 ng/mL: Positive for AMI    Troponin [[id number]]  (Normal) Collected:  10/06/18 0008    Specimen:  Blood Updated:  10/06/18 0053     Troponin T <0.010 ng/mL     Narrative:       Troponin T Reference Ranges:  Less than 0.03 ng/mL:    Negative for AMI  0.03 to 0.09 ng/mL:      Indeterminant for AMI  Greater than 0.09 ng/mL: Positive for AMI    Troponin [[id number]]  (Normal) Collected:  10/05/18 1838    Specimen:  Blood Updated:  10/05/18 1947     Troponin T <0.010 ng/mL     Narrative:       Troponin T Reference Ranges:  Less than 0.03 ng/mL:    Negative for AMI  0.03 to 0.09 ng/mL:      Indeterminant for AMI  Greater than 0.09 ng/mL: Positive for AMI    Troponin [[id number]]  (Normal) Collected:  10/05/18 1724    Specimen:  Blood Updated:  10/05/18 1756     Troponin T <0.010 ng/mL     Narrative:       Troponin T Reference Ranges:  Less than 0.03 ng/mL:    Negative for AMI  0.03 to 0.09 ng/mL:      Indeterminant for AMI  Greater than 0.09 ng/mL: Positive for AMI        Imaging Results (last 24 hours)     Procedure Component Value Units Date/Time    XR Chest 1 View [[id number]] Collected:  10/05/18 1435     Updated:  10/05/18 1609    Narrative:       ONE VIEW PORTABLE CHEST     HISTORY: Sternal chest pain.     FINDINGS: The lungs are well-expanded and clear. The heart is top normal  in size and there is no acute disease or change from 08/24/2017.      This report was finalized on 10/5/2018 4:06 PM by Dr. Raúl Perez M.D.         EKG                              Rhythm/Rate: NSR, 74  P waves and CA: NL  QRS, axis: NL   ST and T waves: NL     Current Facility-Administered Medications:   •  acetaminophen (TYLENOL) tablet 650 mg, 650 mg, Oral, Q6H PRN, Corey Pearce MD  •  atorvastatin (LIPITOR) tablet 80 mg, 80 mg, Oral, Nightly, Corey Pearce MD, 80 mg at 10/05/18 1799  •   hydrOXYzine (ATARAX) tablet 25 mg, 25 mg, Oral, TID PRN, Leslie Lees S, APRN, 25 mg at 10/06/18 0102  •  Influenza Vac Subunit Quad (FLUCELVAX) injection 0.5 mL, 0.5 mL, Intramuscular, Once, Corey Pearce MD  •  nitroglycerin (NITROSTAT) SL tablet 0.4 mg, 0.4 mg, Sublingual, Q5 Min PRN, Corey Pearce MD  •  pantoprazole (PROTONIX) EC tablet 40 mg, 40 mg, Oral, Daily, Corey Pearce MD, 40 mg at 10/05/18 2143  •  pneumococcal polysaccharide 23-valent (PNEUMOVAX-23) vaccine 0.5 mL, 0.5 mL, Intramuscular, During Hospitalization, Corey Pearce MD  •  sodium chloride 0.9 % flush 3 mL, 3 mL, Intravenous, Q12H, Corey Pearce MD, 3 mL at 10/06/18 1056  •  sodium chloride 0.9 % flush 3 mL, 3 mL, Intravenous, Q12H, Corey Pearce MD, 3 mL at 10/05/18 2347  •  sodium chloride 0.9 % flush 3-10 mL, 3-10 mL, Intravenous, PRN, Corey Pearce MD  •  sodium chloride 0.9 % flush 3-10 mL, 3-10 mL, Intravenous, PRN, Corey Pearce MD  •  venlafaxine XR (EFFEXOR-XR) 24 hr capsule 37.5 mg, 37.5 mg, Oral, Daily, Corey Pearce MD, 37.5 mg at 10/05/18 2344     ASSESSMENT  Chest pain with known coronary artery disease  Gastroesophageal reflux disease  Hypertension  Hyperlipidemia  Depression    PLAN  Agree with current care  Increase Protonix to twice a day  Continue home medications  May go home after stress Cardiolite if no ischemia on home medications if okay with cardiology.  May need outpatient GI workup if chest pain recurs for possible upper endoscopy.  Patient stable to be discharged.  Patient will follow-up with primary care doctor in 1 week.    LILIA QURESHI MD

## 2018-10-11 ENCOUNTER — OFFICE VISIT (OUTPATIENT)
Dept: CARDIOLOGY | Facility: CLINIC | Age: 58
End: 2018-10-11

## 2018-10-11 VITALS
DIASTOLIC BLOOD PRESSURE: 88 MMHG | HEIGHT: 72 IN | WEIGHT: 205 LBS | BODY MASS INDEX: 27.77 KG/M2 | SYSTOLIC BLOOD PRESSURE: 130 MMHG | HEART RATE: 66 BPM

## 2018-10-11 DIAGNOSIS — Z13.6 SCREENING FOR AAA (AORTIC ABDOMINAL ANEURYSM): ICD-10-CM

## 2018-10-11 DIAGNOSIS — I25.2 OLD MI (MYOCARDIAL INFARCTION): ICD-10-CM

## 2018-10-11 DIAGNOSIS — Z86.74 HISTORY OF CARDIAC ARREST: ICD-10-CM

## 2018-10-11 DIAGNOSIS — R06.02 SHORTNESS OF BREATH: ICD-10-CM

## 2018-10-11 DIAGNOSIS — E78.5 HYPERLIPIDEMIA LDL GOAL <70: ICD-10-CM

## 2018-10-11 DIAGNOSIS — I25.10 CORONARY ARTERY DISEASE INVOLVING NATIVE CORONARY ARTERY OF NATIVE HEART WITHOUT ANGINA PECTORIS: Primary | ICD-10-CM

## 2018-10-11 PROCEDURE — 93000 ELECTROCARDIOGRAM COMPLETE: CPT | Performed by: NURSE PRACTITIONER

## 2018-10-11 PROCEDURE — 99214 OFFICE O/P EST MOD 30 MIN: CPT | Performed by: NURSE PRACTITIONER

## 2018-10-11 NOTE — PROGRESS NOTES
Date of Office Visit: 10/11/2018  Encounter Provider: SUMMER Graham  Place of Service: [facility] CARDIOLOGY  Patient Name: Tiera Abbott  :1960    Chief Complaint   Patient presents with   • Coronary Artery Disease   • Hyperlipidemia   • Follow-up   :     HPI: Tiera Abbott is a 58 y.o. female is a patient of Dr. Fontaine. I am seeing her today and have reviewed his/her record.     Her past medical history is significant of coronary artery disease, hypertension, hyperlipidemia, diabetes mellitus, obstructive sleep apnea.    Rate  she had an acute inferior myocardial infarction.  She had multiple ventricular fibrillation arrest during that time and was cardioverted numerous times. She was found to have total occlusion of the RCA and other vessels were free of disease.  She had angioplasty, clot extracted in its stent placement of that vessel.  Left ventricular ejection fraction on echocardiogram revealed 45%.  She had atrial fibrillation and was on amiodarone for short period of time.  Then in 2008 she had some atypical chest pain.  Repeat catheterization showed normal left ventricular systolic function and insignificant these of the LAD.  The stent was patent and RCA.    She was last seenn the office in 2018.  She had an episode a few weeks prior while in [place] when she became acutely lightheaded, diaphoretic and dizzy and had a sensation in her throat similar to what she had prior to heart attack.  She had no other episodes since then and denied doing structured exercise.  She also reported being on iron for some anemia prior.  A perfusion stress test was ordered but never completed.    She then presented to the hospital with chest discomfort.  Serial troponins were negative.  She continued to have chest discomfort and had a myocardial perfusion study on 10/6/2018 which showed EF 69% and no evidence of ischemia.  Medicine was consultative for noncardiac  source of pain however the patient was chest pain-free and wished to be discharged home to have follow-up as outpatient.    Patient presents today for hospital follow-up.  She has had no further chest discomfort since the ER visit.  She reported that she had been on vacation and actually smoked cigarettes for the week prior and perhaps that contributed.  However she only smokes a cigarette daily now.   She did not palpitation, lightheadedness, near syncope, syncope, edema, or blood in the urine or stool.  She has shortness of breath sometimes at rest as well as with activity but is able to walk 2 miles twice a week without great limitation.  She has fatigue that is waxing and waning.  She continues to have neck and back pain and has had physical therapy for this in the past. She is concerned about neck pain being stroke like symptoms. She denies slurred speech, weakness, dysarthria, or paraesthesias.      Allergies   Allergen Reactions   • Latex Itching       Past Medical History:   Diagnosis Date   • Acid reflux    • Alcoholism in remission (CMS/[facility])     SINCE AGE 22   • Arthritis    • Back pain    • Cardiac disease    • Chest pain    • Coronary artery disease     mi   • Depression    • Diabetic peripheral neuropathy (CMS/[facility])    • Eczema    • Heart attack (CMS/[facility]) 2008   • Heart disease    • History of anemia    • History of bruising easily    • Hypercholesterolemia    • Hypertension    • IBS (irritable bowel syndrome)    • Inappropriate (too hot or too cold) temperature in local application and packing     always hot or cold   • Joint pain, knee     LEFT KNEE   • Menopausal state    • Muscle pain    • Sleep apnea     DOES NOT USE MACHINE   • Stiffness in joint    • Type 2 diabetes mellitus (CMS/[facility])        Past Surgical History:   Procedure Laterality Date   • APPENDECTOMY     • BILATERAL BREAST REDUCTION  2009   • CARDIAC CATHETERIZATION     • CARDIAC SURGERY     • CATARACT EXTRACTION     • COLONOSCOPY  2010  "  • COLONOSCOPY N/A 8/28/2017    Procedure: COLONOSCOPY into cecum/terminal ileum with polypectomy;  Surgeon: Minna Bee MD;  Location: [facility] ENDOSCOPY;  Service:    • CORONARY ANGIOPLASTY     • CORONARY STENT PLACEMENT      2   • ENDOSCOPY N/A 8/25/2016    Procedure: ESOPHAGOGASTRODUODENOSCOPY;  Surgeon: Ottoniel Alonso MD;  Location: [facility] ENDOSCOPY;  Service:    • ENDOSCOPY N/A 8/28/2017    Procedure: ESOPHAGOGASTRODUODENOSCOPY with biopsy;  Surgeon: Minna Bee MD;  Location: [facility] ENDOSCOPY;  Service:    • HYSTERECTOMY     • JOINT REPLACEMENT Left     total knee   • KNEE SURGERY      2 knee sugeries on left knee   • MI TOTAL KNEE ARTHROPLASTY Left 3/14/2016    Procedure: LEFT TOTAL KNEE ARTHROPLASTY;  Surgeon: Bladimir Mehta MD;  Location: [facility] OR;  Service: Orthopedics   • WISDOM TOOTH EXTRACTION     • WOUND DEBRIDEMENT Bilateral     BREAST.  AFTER REDUCTION         Family and social history reviewed.     Review of Systems   Constitution: Positive for malaise/fatigue.   Cardiovascular: Positive for dyspnea on exertion.   Respiratory: Positive for shortness of breath.    Skin: Positive for itching.   Musculoskeletal: Positive for back pain, joint pain, myalgias and neck pain.     All other systems were reviewed and are negative          Objective:     Vitals:    10/11/18 1411   BP: 130/88   BP Location: Left arm   Patient Position: Sitting   Pulse: 66   Weight: 93 kg (205 lb)   Height: 182.9 cm (72\")     Body mass index is 27.8 kg/m².    PHYSICAL EXAM:  Physical Exam   Constitutional: She is oriented to person, place, and time. She appears well-developed and well-nourished. No distress.   HENT:   Head: Normocephalic.   Eyes: Conjunctivae are normal.   glasses   Neck: Normal range of motion. No JVD present.   Cardiovascular: Normal rate, regular rhythm, normal heart sounds and intact distal pulses.    No murmur heard.  Pulses:       Carotid pulses are 2+ on the right side, and 2+ on the left " side.       Radial pulses are 2+ on the right side, and 2+ on the left side.        Posterior tibial pulses are 2+ on the right side, and 2+ on the left side.   Pulmonary/Chest: Effort normal and breath sounds normal. No respiratory distress. She has no wheezes. She has no rhonchi. She has no rales. She exhibits no tenderness.   Abdominal: Soft. Bowel sounds are normal. She exhibits no distension.   Musculoskeletal: Normal range of motion. She exhibits no edema.   Neurological: She is alert and oriented to person, place, and time.   Skin: Skin is warm, dry and intact. No rash noted. She is not diaphoretic. No cyanosis.   Psychiatric: She has a normal mood and affect. Her behavior is normal. Judgment and thought content normal.         ECG 12 Lead  Date/Time: 10/11/2018 3:44 PM  Performed by: RICKI FIGUEROA  Authorized by: RICKI FIGUEROA   Comparison: compared with previous ECG from 10/5/2018  Similar to previous ECG  Rhythm: sinus rhythm  Rate: normal  Conduction: conduction normal  ST Segments: ST segments normal  T Waves: T waves normal  QRS axis: normal  Clinical impression: normal ECG            Current Outpatient Prescriptions   Medication Sig Dispense Refill   • acetaminophen (TYLENOL) 650 MG 8 hr tablet Take 650 mg by mouth Every 8 (Eight) Hours As Needed for Mild Pain .     • atorvastatin (LIPITOR) 80 MG tablet Take 80 mg by mouth Every Night.     • carvedilol (COREG) 3.125 MG tablet Take 3.125 mg by mouth Daily.     • clobetasol (TEMOVATE) 0.05 % cream Apply 1 application topically to the appropriate area as directed 2 (Two) Times a Day As Needed (APPLY TO THE RIGHT HAND SPARINGLY FOR SHORT DURATION. AVOID FACE OR PROLONGED USE.).     • clopidogrel (PLAVIX) 75 MG tablet Take 75 mg by mouth Daily.     • hydrOXYzine (ATARAX) 25 MG tablet 1-2 tablets at bedtime as needed for itching (Patient taking differently: Take 25-50 mg by mouth At Night As Needed for Itching.) 30 tablet 3   • montelukast (SINGULAIR) 10 MG  tablet Take 10 mg by mouth Daily.     • pantoprazole (PROTONIX) 40 MG EC tablet Take 1 tablet by mouth 2 (Two) Times a Day Before Meals for 30 days. 60 tablet 0   • venlafaxine XR (EFFEXOR-XR) 37.5 MG 24 hr capsule Take 37.5 mg by mouth Daily.       No current facility-administered medications for this visit.      Assessment:       Diagnosis Plan   1. Coronary artery disease involving native coronary artery of native heart without angina pectoris  Vascular Screening    Adult Transthoracic Echo Complete W/ Cont if Necessary Per Protocol   2. Hyperlipidemia LDL goal <70  Vascular Screening   3. Screening for AAA (aortic abdominal aneurysm)  Vascular Screening   4. Shortness of breath  Adult Transthoracic Echo Complete W/ Cont if Necessary Per Protocol   5. Old MI (myocardial infarction)     6. History of cardiac arrest          Orders Placed This Encounter   Procedures   • ECG 12 Lead     This order was created via procedure documentation   • Adult Transthoracic Echo Complete W/ Cont if Necessary Per Protocol     Standing Status:   Future     Standing Expiration Date:   10/11/2019     Order Specific Question:   Reason for exam?     Answer:   Dyspnea         Plan:         1. Coronary artery disease with history of inferior myocardial infarction in January 2008 with multiple ventricular fibrillation arrests and multiple cardioversion.  Ultimately found to have total occlusion of the RCA then had angioplasty, clot extraction and stent placement of that vessel.  EF 45%.  Then repeat catheterization due to atypical chest pain showing insignificant disease of LAD and patent RCA stent, normal LVEF in February 2008.  Then with recurrent chest pain in October 2018 and had an exercise perfusion study showing normal ECG stress test normal EF 69% and no evidence of ischemia. No further pain. No ischemic changes on EKG  Coronary Artery Disease  Assessment  • The patient has no angina    Plan  • Lifestyle modifications discussed  include adhering to a heart healthy diet, avoidance of tobacco products, maintenance of a healthy weight, medication compliance, regular exercise and regular monitoring of cholesterol and blood pressure    Subjective - Objective  • There is a history of past MI  • There has been a previous stent procedure using STACEY  • Current antiplatelet therapy includes clopidogrel 75 mg         2. Hypertension adequate control  3.Hypelipidemia on maximumdose atorvastatin  4. She has never had vascular screening and is interested in that due to her concern for stroke risk. She will inquire about free vascular screening  5. Shortness of breath- this seems worse at times however she tolerates a 2 mile walk twice a week. There may be a deconditioning component. She has not had an echo in several year. Will obtain a TTE to evaluate LV function and valvular structure and function.  6. History of iron deficient anemia- not on iron supplement currently due to constipation  7. History of Atrial fibrillation after arrest, transiently on amiodarone. No further dysthymia   8. Infrequent tobacco use- advised complete abstinence  9.Chest pain- she is to follow up with Gastroenterology for non cardiac source of chest pain. Formerly followed by Dr. Alonso. Interested in following with female doctor.     Follow up on 11/13/2018 with Dr. tan    Patient was instructed to call the office if new symptoms develop or report to nearest ER if heart attack or stroke is suspected.        It has been a pleasure to participate in this patient's care.      Thank you,  SUMMER Graham      **Debra Disclaimer:**  Much of this encounter note is an electronic transcription/translation of spoken language to printed text. The electronic translation of spoken language may permit erroneous, or at times, nonsensical words or phrases to be inadvertently transcribed. Although I have reviewed the note for such errors, some may still exist.

## 2018-10-22 ENCOUNTER — TELEPHONE (OUTPATIENT)
Dept: OBSTETRICS AND GYNECOLOGY | Age: 58
End: 2018-10-22

## 2018-10-31 ENCOUNTER — OFFICE VISIT (OUTPATIENT)
Dept: FAMILY MEDICINE CLINIC | Facility: CLINIC | Age: 58
End: 2018-10-31

## 2018-10-31 VITALS
SYSTOLIC BLOOD PRESSURE: 120 MMHG | BODY MASS INDEX: 28.31 KG/M2 | OXYGEN SATURATION: 95 % | HEART RATE: 98 BPM | DIASTOLIC BLOOD PRESSURE: 80 MMHG | HEIGHT: 72 IN | WEIGHT: 209 LBS

## 2018-10-31 DIAGNOSIS — R07.9 CHEST PAIN, UNSPECIFIED TYPE: ICD-10-CM

## 2018-10-31 DIAGNOSIS — D50.9 IRON DEFICIENCY ANEMIA, UNSPECIFIED IRON DEFICIENCY ANEMIA TYPE: Primary | ICD-10-CM

## 2018-10-31 DIAGNOSIS — Z72.0 TOBACCO ABUSE: ICD-10-CM

## 2018-10-31 PROCEDURE — 99214 OFFICE O/P EST MOD 30 MIN: CPT | Performed by: NURSE PRACTITIONER

## 2018-10-31 RX ORDER — ALBUTEROL SULFATE 90 UG/1
2 AEROSOL, METERED RESPIRATORY (INHALATION) EVERY 4 HOURS PRN
Qty: 1 INHALER | Refills: 1 | Status: SHIPPED | OUTPATIENT
Start: 2018-10-31 | End: 2020-04-13

## 2018-11-04 NOTE — PROGRESS NOTES
Subjective   Tiera Abbott is a 58 y.o. female.     Recent episode of weakness chest heaviness nausea shortness of breath  Occurred at work  Evaluating the emergency room  Stress test negative  She doing much better now    Quit smoking quite a few years ago but still uses vapor nicotine  We discussed about the risk as well  Concerned about cardiovascular risk  We discussed chantix risk-benefit appropriate use    Occasionally has some shortness of breath  No chest pain no fever no chills overall she feels much better  No leg pain or swelling or redness or fever         The following portions of the patient's history were reviewed and updated as appropriate: allergies, past family history, past medical history, past social history, past surgical history and problem list.    Review of Systems   Constitutional: Negative.  Negative for chills, fatigue and fever.   Respiratory: Positive for shortness of breath.    Cardiovascular: Positive for chest pain. Negative for palpitations and leg swelling.   Gastrointestinal: Negative.    Genitourinary: Negative.        Objective   Physical Exam   Constitutional: She is oriented to person, place, and time. She appears well-developed and well-nourished. No distress.   HENT:   Head: Normocephalic.   Eyes: Pupils are equal, round, and reactive to light. Conjunctivae are normal.   Neck: Neck supple.   Cardiovascular: Normal rate, regular rhythm and normal heart sounds.  Exam reveals no friction rub.    No murmur heard.  Pulmonary/Chest: Effort normal and breath sounds normal. No respiratory distress. She has no wheezes.   Musculoskeletal: She exhibits no edema.   Neurological: She is alert and oriented to person, place, and time.   Skin: Skin is warm and dry.   Psychiatric: She has a normal mood and affect. Her behavior is normal. Judgment and thought content normal.   Vitals reviewed.        Assessment/Plan   Tiera was seen today for hospital f/up.    Diagnoses and all orders for this  visit:    Iron deficiency anemia, unspecified iron deficiency anemia type    Chest pain, unspecified type    Tobacco abuse    Other orders  -     albuterol (PROVENTIL HFA;VENTOLIN HFA) 108 (90 Base) MCG/ACT inhaler; Inhale 2 puffs Every 4 (Four) Hours As Needed for Wheezing.  -     varenicline (CHANTIX STARTING MONTH PAK) 0.5 MG X 11 & 1 MG X 42 tablet; Take 0.5 mg one daily on days 1-3 and and 0.5 mg twice daily on days 4-7.Then 1 mg twice daily as directed                Encourage patient he quit smoking  Teaching with Chantix  Albuterol trial  If chest pain increase shortness of breath fever or chills  Emergency room

## 2018-11-13 RX ORDER — VENLAFAXINE HYDROCHLORIDE 37.5 MG/1
37.5 CAPSULE, EXTENDED RELEASE ORAL DAILY
Qty: 90 CAPSULE | Refills: 1 | Status: SHIPPED | OUTPATIENT
Start: 2018-11-13 | End: 2019-04-22 | Stop reason: SDUPTHER

## 2018-12-03 ENCOUNTER — OFFICE VISIT (OUTPATIENT)
Dept: OBSTETRICS AND GYNECOLOGY | Age: 58
End: 2018-12-03

## 2018-12-03 VITALS
WEIGHT: 195.6 LBS | BODY MASS INDEX: 26.49 KG/M2 | DIASTOLIC BLOOD PRESSURE: 68 MMHG | HEIGHT: 72 IN | SYSTOLIC BLOOD PRESSURE: 92 MMHG

## 2018-12-03 DIAGNOSIS — N93.9 VAGINAL BLEEDING: ICD-10-CM

## 2018-12-03 DIAGNOSIS — Z12.31 SCREENING MAMMOGRAM, ENCOUNTER FOR: Primary | ICD-10-CM

## 2018-12-03 PROCEDURE — 99213 OFFICE O/P EST LOW 20 MIN: CPT | Performed by: OBSTETRICS & GYNECOLOGY

## 2018-12-03 RX ORDER — ESTRADIOL 0.1 MG/G
2 CREAM VAGINAL DAILY
Qty: 42.5 G | Refills: 12 | Status: SHIPPED | OUTPATIENT
Start: 2018-12-03 | End: 2020-04-13

## 2018-12-03 RX ORDER — PANTOPRAZOLE SODIUM 40 MG/1
40 TABLET, DELAYED RELEASE ORAL DAILY
COMMUNITY
End: 2019-02-25 | Stop reason: SDUPTHER

## 2018-12-03 NOTE — PROGRESS NOTES
New GYN Problem    Chief Complaint   Patient presents with   • Gynecologic Exam     Concerned about hx of postmenopausal bleeding and biopsy results       She comes to the visit to discuss her history of postmenopausal bleeding and biopsy.  She notes that April of last year she had had a couple of spots of bleeding, and she even seen another practice.  Endometrial biopsy was performed, however she was confused about the results and was concerned that there was something else going on.  She denies any further bleeding since that time.  She was aware that she had a normal Pap at that time, and she denies history of abnormal Paps.    Hx of MI    Review of Systems- she has occasional pain in her neck, occasional shortness of breath, rare chest pain history of breast reduction with scarring, she has frequent headaches and weakness.    Health Maintenance  Last mammogram: 2015  Last pap: 2017  Colonoscopy: 2017    Histories  Past Medical History:   Diagnosis Date   • Acid reflux    • Alcoholism in remission (CMS/HCC)     SINCE AGE 22   • Arthritis    • Back pain    • Cardiac disease    • Chest pain    • Coronary artery disease     mi   • Depression    • Diabetic peripheral neuropathy (CMS/HCC)    • Eczema    • Eczema    • Heart attack (CMS/HCC) 2008   • Heart attack (CMS/HCC)    • Heart disease    • History of anemia    • History of bruising easily    • Hypercholesterolemia    • Hypertension    • IBS (irritable bowel syndrome)    • Inappropriate (too hot or too cold) temperature in local application and packing     always hot or cold   • Joint pain, knee     LEFT KNEE   • Menopausal state    • Muscle pain    • Sleep apnea     DOES NOT USE MACHINE   • Stiffness in joint    • Type 2 diabetes mellitus (CMS/HCC)        Past Surgical History:   Procedure Laterality Date   • APPENDECTOMY     • BILATERAL BREAST REDUCTION  2009   • CARDIAC CATHETERIZATION     • CARDIAC SURGERY     • CATARACT EXTRACTION     • COLONOSCOPY  2010   •  "CORONARY ANGIOPLASTY     • CORONARY STENT PLACEMENT      2   • JOINT REPLACEMENT Left     total knee   • KNEE SURGERY      2 knee sugeries on left knee   • WISDOM TOOTH EXTRACTION     • WOUND DEBRIDEMENT Bilateral     BREAST.  AFTER REDUCTION       Family History   Problem Relation Age of Onset   • Alzheimer's disease Mother    • Breast cancer Mother    • Diabetes Mother    • Lung disease Father         pullmonary artery disease   • Heart disease Father    • Cancer Father 32   • Heart disease Brother    • Diabetes Brother    • Heart attack Brother    • Heart disease Brother    • Colon cancer Other    • Heart attack Other    • Diabetes Other    • Heart disease Other    • Diabetes Brother    • JOSHUA disease Brother        Social History     Socioeconomic History   • Marital status: Single     Spouse name: Not on file   • Number of children: 1   • Years of education: College   • Highest education level: Not on file   Social Needs   • Financial resource strain: Not on file   • Food insecurity - worry: Not on file   • Food insecurity - inability: Not on file   • Transportation needs - medical: Not on file   • Transportation needs - non-medical: Not on file   Occupational History     Employer: DISABLED   Tobacco Use   • Smoking status: Current Every Day Smoker     Types: Electronic Cigarette     Last attempt to quit: 2014     Years since quittin.9   • Smokeless tobacco: Never Used   • Tobacco comment: caffiene use - 3 cups coffee daily   Substance and Sexual Activity   • Alcohol use: No   • Drug use: No   • Sexual activity: Not Currently     Birth control/protection: Post-menopausal   Other Topics Concern   • Not on file   Social History Narrative   • Not on file       OB History      Para Term  AB Living    3 1 1   2 1    SAB TAB Ectopic Molar Multiple Live Births                         Physical Exam    BP 92/68   Ht 182.9 cm (72\")   Wt 88.7 kg (195 lb 9.6 oz)   BMI 26.53 kg/m²     BMI: Body mass " index is 26.53 kg/m².     General:   alert, appears stated age and cooperative   Neck Nontender, no lymphadenopathy, no thyromegaly   Lung lungs clear to auscultation, no wheezes or rhonchi   Heart: heart regular rate and rhythm, no murmurs   Abdomen: soft, non-tender, without masses or organomegaly   Breast: inspection negative, no nipple discharge or bleeding, no masses or nodularity palpable and dense scar tissue in breast crease and inferior to nipple bilaterally. hx of breast surgery with nonhealing incisions   Urethra and bladder: urethral meatus normal; bladder nontender to palpation;   Vulva: normal, Bartholin's, Urethra, [place]'s normal   Vagina: normal mucosa, normal discharge   Cervix: no lesions   Uterus: normal size or anteverted   Adnexa: normal adnexa and no mass, fullness, tenderness         Assessment/Plan    Problems Addressed this Visit     Vaginal bleeding     She had an episode of postmenopausal bleeding and had an endometrial biopsy in April 2017.  She denies any further bleeding after this.  Records reviewed from endometrial biopsy at that time and was inactive endometrium, no hyperplasia or malignancy.  Given that she has not had any further bleeding, would not perform a repeat biopsy at this time.  Given that it was a very small amount of spotting, it was likely leading due to atrophy at that time.  She also had a GYN ultrasound at the time with thin stripe at 8 mm and no evidence of a polyp.           Other Visit Diagnoses     Screening mammogram, encounter for    -  Primary    Relevant Orders    Mammo Screening Bilateral With CAD          Pap not done, done last yr and not abnormal. Recommend q2 annual exam with medicare, repeat pap April 2020 (three years from prior)    Marion Rodriguez MD  12/03/2018  3:55 PM

## 2018-12-03 NOTE — ASSESSMENT & PLAN NOTE
She had an episode of postmenopausal bleeding and had an endometrial biopsy in April 2017.  She denies any further bleeding after this.  Records reviewed from endometrial biopsy at that time and was inactive endometrium, no hyperplasia or malignancy.  Given that she has not had any further bleeding, would not perform a repeat biopsy at this time.  Given that it was a very small amount of spotting, it was likely leading due to atrophy at that time.  She also had a GYN ultrasound at the time with thin stripe at 8 mm and no evidence of a polyp.

## 2018-12-20 ENCOUNTER — TELEPHONE (OUTPATIENT)
Dept: FAMILY MEDICINE CLINIC | Facility: CLINIC | Age: 58
End: 2018-12-20

## 2018-12-20 NOTE — TELEPHONE ENCOUNTER
Patient states she has a head cold has been working out in the garden at [employer] for 3 hour or so. She has to work out there for the next couple of days she would like a note stating she should not be working out in the cold due to her being sick.    Please advise.

## 2019-01-02 ENCOUNTER — TELEPHONE (OUTPATIENT)
Dept: OBSTETRICS AND GYNECOLOGY | Age: 59
End: 2019-01-02

## 2019-01-02 NOTE — TELEPHONE ENCOUNTER
RE: SCREENING MAMMOGRAM  We have left the patient 5 voicemails to call us back re: scheduling.  Mailed a reminder letter on 1/2/19.      Patient can call [facility] [phone], or she can schedule wherever she would like and we can forward the order.

## 2019-02-01 RX ORDER — HYDROXYZINE HYDROCHLORIDE 25 MG/1
TABLET, FILM COATED ORAL
Qty: 30 TABLET | Refills: 3 | Status: SHIPPED | OUTPATIENT
Start: 2019-02-01 | End: 2020-04-13

## 2019-02-11 DIAGNOSIS — L30.9 ECZEMA, UNSPECIFIED TYPE: Primary | ICD-10-CM

## 2019-02-25 RX ORDER — PANTOPRAZOLE SODIUM 40 MG/1
40 TABLET, DELAYED RELEASE ORAL DAILY
Qty: 60 TABLET | Refills: 2 | Status: SHIPPED | OUTPATIENT
Start: 2019-02-25 | End: 2019-04-01 | Stop reason: SDUPTHER

## 2019-04-02 RX ORDER — PANTOPRAZOLE SODIUM 40 MG/1
TABLET, DELAYED RELEASE ORAL
Qty: 90 TABLET | Refills: 0 | Status: SHIPPED | OUTPATIENT
Start: 2019-04-02 | End: 2019-04-23 | Stop reason: SDUPTHER

## 2019-04-23 RX ORDER — CLOPIDOGREL BISULFATE 75 MG/1
TABLET ORAL
Qty: 90 TABLET | Refills: 0 | Status: SHIPPED | OUTPATIENT
Start: 2019-04-23 | End: 2019-05-16 | Stop reason: SDUPTHER

## 2019-04-23 RX ORDER — PANTOPRAZOLE SODIUM 40 MG/1
TABLET, DELAYED RELEASE ORAL
Qty: 60 TABLET | Refills: 0 | Status: SHIPPED | OUTPATIENT
Start: 2019-04-23 | End: 2019-04-23 | Stop reason: SDUPTHER

## 2019-04-23 RX ORDER — VENLAFAXINE HYDROCHLORIDE 37.5 MG/1
37.5 CAPSULE, EXTENDED RELEASE ORAL DAILY
Qty: 90 CAPSULE | Refills: 0 | Status: SHIPPED | OUTPATIENT
Start: 2019-04-23 | End: 2019-08-21 | Stop reason: SDUPTHER

## 2019-04-23 RX ORDER — PANTOPRAZOLE SODIUM 40 MG/1
TABLET, DELAYED RELEASE ORAL
Qty: 180 TABLET | Refills: 0 | Status: SHIPPED | OUTPATIENT
Start: 2019-04-23 | End: 2020-01-13

## 2019-05-16 RX ORDER — CLOPIDOGREL BISULFATE 75 MG/1
TABLET ORAL
Qty: 90 TABLET | Refills: 0 | Status: SHIPPED | OUTPATIENT
Start: 2019-05-16 | End: 2019-11-26

## 2019-05-20 ENCOUNTER — TELEPHONE (OUTPATIENT)
Dept: FAMILY MEDICINE CLINIC | Facility: CLINIC | Age: 59
End: 2019-05-20

## 2019-05-22 ENCOUNTER — TELEPHONE (OUTPATIENT)
Dept: OBSTETRICS AND GYNECOLOGY | Age: 59
End: 2019-05-22

## 2019-05-22 NOTE — TELEPHONE ENCOUNTER
"----- Message from aMrion Rodriguez MD sent at 5/21/2019  6:19 PM EDT -----  Regarding: RE: Cancellation of Order # [id number]  I had ordered a mammogram for her but was cancelled as she refused. Could you please call and see if she had one done elsewhere or if just refuses to have MG. Thanks.    ----- Message -----  From: Ashanti Kiran MA  Sent: 5/20/2019  11:17 AM  To: Marion Rodriguez MD  Subject: Cancellation of Order # [id number]                Order number [id number] for the procedure MAMMO SCREENING   BILATERAL W CAD [[id number]] has been canceled by Ashanti Kiran MA   [[id number]]. This procedure was ordered by you on Dec 3, 2018 for   the patient Tiera Abbott [[id number]]. The reason for cancellation  was \"Patient Refused\".    "

## 2019-05-23 DIAGNOSIS — Z00.00 PREVENTATIVE HEALTH CARE: ICD-10-CM

## 2019-05-23 DIAGNOSIS — R53.83 OTHER FATIGUE: ICD-10-CM

## 2019-05-23 DIAGNOSIS — D50.9 IRON DEFICIENCY ANEMIA, UNSPECIFIED IRON DEFICIENCY ANEMIA TYPE: Primary | ICD-10-CM

## 2019-05-23 DIAGNOSIS — R53.83 FATIGUE, UNSPECIFIED TYPE: ICD-10-CM

## 2019-05-24 LAB
ALBUMIN SERPL-MCNC: 3.8 G/DL (ref 3.5–5.2)
ALBUMIN/GLOB SERPL: 1.4 G/DL
ALP SERPL-CCNC: 112 U/L (ref 39–117)
ALT SERPL-CCNC: 7 U/L (ref 1–33)
APPEARANCE UR: CLEAR
AST SERPL-CCNC: 10 U/L (ref 1–32)
BASOPHILS # BLD AUTO: (no result) 10*3/UL
BASOPHILS # BLD MANUAL: 0.24 10*3/MM3 (ref 0–0.2)
BASOPHILS NFR BLD MANUAL: 3 % (ref 0–1.5)
BILIRUB SERPL-MCNC: 0.2 MG/DL (ref 0.2–1.2)
BILIRUB UR QL STRIP: NEGATIVE
BUN SERPL-MCNC: 12 MG/DL (ref 6–20)
BUN/CREAT SERPL: 18.8 (ref 7–25)
CALCIUM SERPL-MCNC: 9.6 MG/DL (ref 8.6–10.5)
CHLORIDE SERPL-SCNC: 103 MMOL/L (ref 98–107)
CO2 SERPL-SCNC: 25.2 MMOL/L (ref 22–29)
COLOR UR: YELLOW
CREAT SERPL-MCNC: 0.64 MG/DL (ref 0.57–1)
DIFFERENTIAL COMMENT: ABNORMAL
EOSINOPHIL # BLD AUTO: (no result) 10*3/UL
EOSINOPHIL # BLD MANUAL: 0.24 10*3/MM3 (ref 0–0.4)
EOSINOPHIL NFR BLD AUTO: (no result) %
EOSINOPHIL NFR BLD MANUAL: 3 % (ref 0.3–6.2)
ERYTHROCYTE [DISTWIDTH] IN BLOOD BY AUTOMATED COUNT: 18.9 % (ref 12.3–15.4)
GLOBULIN SER CALC-MCNC: 2.8 GM/DL
GLUCOSE SERPL-MCNC: 70 MG/DL (ref 65–99)
GLUCOSE UR QL: NEGATIVE
HCT VFR BLD AUTO: 28.2 % (ref 34–46.6)
HGB BLD-MCNC: 7.8 G/DL (ref 12–15.9)
HGB UR QL STRIP: NEGATIVE
IRON SATN MFR SERPL: 3 % (ref 20–50)
IRON SERPL-MCNC: 15 MCG/DL (ref 37–145)
KETONES UR QL STRIP: NEGATIVE
LEUKOCYTE ESTERASE UR QL STRIP: NEGATIVE
LYMPHOCYTES # BLD AUTO: (no result) 10*3/UL
LYMPHOCYTES # BLD MANUAL: 1.81 10*3/MM3 (ref 0.7–3.1)
LYMPHOCYTES NFR BLD AUTO: (no result) %
LYMPHOCYTES NFR BLD MANUAL: 23 % (ref 19.6–45.3)
MCH RBC QN AUTO: 19.2 PG (ref 26.6–33)
MCHC RBC AUTO-ENTMCNC: 27.7 G/DL (ref 31.5–35.7)
MCV RBC AUTO: 69.3 FL (ref 79–97)
MONOCYTES # BLD MANUAL: 0.24 10*3/MM3 (ref 0.1–0.9)
MONOCYTES NFR BLD AUTO: (no result) %
MONOCYTES NFR BLD MANUAL: 3 % (ref 5–12)
NEUTROPHILS # BLD MANUAL: 5.35 10*3/MM3 (ref 1.7–7)
NEUTROPHILS NFR BLD AUTO: (no result) %
NEUTROPHILS NFR BLD MANUAL: 68 % (ref 42.7–76)
NITRITE UR QL STRIP: NEGATIVE
PH UR STRIP: 6 [PH] (ref 5–8)
PLATELET # BLD AUTO: 294 10*3/MM3 (ref 140–450)
PLATELET BLD QL SMEAR: ABNORMAL
POTASSIUM SERPL-SCNC: 4.2 MMOL/L (ref 3.5–5.2)
PROT SERPL-MCNC: 6.6 G/DL (ref 6–8.5)
PROT UR QL STRIP: NEGATIVE
RBC # BLD AUTO: 4.07 10*6/MM3 (ref 3.77–5.28)
RBC MORPH BLD: ABNORMAL
RETICS/RBC NFR AUTO: 1.43 % (ref 0.7–1.9)
SODIUM SERPL-SCNC: 140 MMOL/L (ref 136–145)
SP GR UR: 1.01 (ref 1–1.03)
TIBC SERPL-MCNC: 495 MCG/DL
TSH SERPL DL<=0.005 MIU/L-ACNC: 0.86 MIU/ML (ref 0.27–4.2)
UIBC SERPL-MCNC: 480 MCG/DL (ref 112–346)
UROBILINOGEN UR STRIP-MCNC: NORMAL MG/DL
VIT B12 SERPL-MCNC: 246 PG/ML (ref 211–946)
WBC # BLD AUTO: 7.87 10*3/MM3 (ref 3.4–10.8)

## 2019-05-28 ENCOUNTER — TELEPHONE (OUTPATIENT)
Dept: ONCOLOGY | Facility: HOSPITAL | Age: 59
End: 2019-05-28

## 2019-05-28 NOTE — TELEPHONE ENCOUNTER
Returned call to pt.  No answer, no voicemail available at this number.  Sent message to Dr. Harris to see when he would like for us to get pt in.       ----- Message from Kristen Dunn sent at 5/28/2019  3:13 PM EDT -----  Contact: [phone]  Pt says she needs to be seen ASAP because her HGB is 7.8

## 2019-05-29 ENCOUNTER — TELEPHONE (OUTPATIENT)
Dept: ONCOLOGY | Facility: CLINIC | Age: 59
End: 2019-05-29

## 2019-05-29 ENCOUNTER — OFFICE VISIT (OUTPATIENT)
Dept: GASTROENTEROLOGY | Facility: CLINIC | Age: 59
End: 2019-05-29

## 2019-05-29 ENCOUNTER — TELEPHONE (OUTPATIENT)
Dept: FAMILY MEDICINE CLINIC | Facility: CLINIC | Age: 59
End: 2019-05-29

## 2019-05-29 VITALS
DIASTOLIC BLOOD PRESSURE: 72 MMHG | WEIGHT: 179.8 LBS | HEIGHT: 72 IN | BODY MASS INDEX: 24.35 KG/M2 | TEMPERATURE: 98.1 F | SYSTOLIC BLOOD PRESSURE: 106 MMHG

## 2019-05-29 DIAGNOSIS — R11.0 NAUSEA: ICD-10-CM

## 2019-05-29 DIAGNOSIS — D64.9 ANEMIA, UNSPECIFIED TYPE: Primary | ICD-10-CM

## 2019-05-29 PROCEDURE — 99214 OFFICE O/P EST MOD 30 MIN: CPT | Performed by: NURSE PRACTITIONER

## 2019-05-29 RX ORDER — CYCLOBENZAPRINE HCL 10 MG
10 TABLET ORAL 3 TIMES DAILY PRN
COMMUNITY
End: 2020-08-24

## 2019-05-29 RX ORDER — DICLOFENAC SODIUM 75 MG/1
75 TABLET, DELAYED RELEASE ORAL 2 TIMES DAILY
COMMUNITY
End: 2020-02-10 | Stop reason: SDDI

## 2019-05-29 NOTE — TELEPHONE ENCOUNTER
----- Message from Edgardo Harris MD sent at 5/28/2019  7:01 PM EDT -----  Contact: [phone]  I have an opening Thursday 11 am if you want to offer her that.  Needs CBC and ferritin  ----- Message -----  From: Carol Hutchins RN  Sent: 5/28/2019   3:21 PM  To: Edgardo Harris MD    Labs in epic.  Let the appt desk know when to bring her in please.  Last seen June 2018.  Are there any labs we need to order?  ----- Message -----  From: Kristen Dunn  Sent: 5/28/2019   3:13 PM  To: [facility]    Pt says she needs to be seen ASAP because her HGB is 7.8

## 2019-05-29 NOTE — TELEPHONE ENCOUNTER
Grzegorz....Patient saw GI today. Have appointment tomorrow at 10:30  With CBC. She had to cancel her appointment tomorrow with you. She will call back to reschedule.

## 2019-05-29 NOTE — PROGRESS NOTES
Chief Complaint   Patient presents with   • Anemia     HPI    Tiera Abbott is a  59 y.o. female here for a follow up visit for anemia.  In the issue for her with prior hospitalization in 2017 with work-up as follows:     I reviewed EGD performed in 2017 which revealed irregular Z line, hiatal hernia, erythematous mucosa the gastric body, antrum.  Erythematous to adenopathy.  Normal second part of the duodenum and third part of the duodenum.    I reviewed colonoscopy performed in 2017 with polyps, diverticulosis, internal hemorrhoids.    Pathology revealed tubular adenomas polyps and she was recommended to have repeat colonoscopy in 3 years.  She did not have any evidence of celiac disease, no H. pylori, only mild inflammation of the GE junction, no evidence of [place]'s esophagus.    At that time patient required 2 units of blood and IV iron infusions and hemoglobin stabilized.    Patient is been somewhat inconsistent with following up with hematology.  She states she is appointment tomorrow. Labs from 5/23/2019 with hemoglobin of 7.9, hematocrit 28.2, platelet count of 294.  Normal kidney and liver function.  Following lab results patient was started on iron supplements 2 times a day.    On visit today the patient reports nausea but no vomiting.  No abdominal pain.  She is on Protonix 40 mg once daily, controls her reflux.  No dysphagia.    BM are daily with soft stools.  Stools have been dark since starting iron supplementation.  No diarrhea, constipation, rectal bleeding.    Patient does report excessive fatigue, shortness of breath, and episodes of dizziness.  She had to take off work yesterday and today due to dizziness.    Past Medical History:   Diagnosis Date   • Acid reflux    • Alcoholism in remission (CMS/HCC)     SINCE AGE 22   • Arthritis    • Back pain    • Cardiac disease    • Chest pain    • Coronary artery disease     mi   • Depression    • Diabetic peripheral neuropathy (CMS/HCC)    • Eczema    •  Eczema    • Heart attack (CMS/HCC) 2008   • Heart attack (CMS/HCC)    • Heart disease    • History of anemia    • History of bruising easily    • Hypercholesterolemia    • Hypertension    • IBS (irritable bowel syndrome)    • Inappropriate (too hot or too cold) temperature in local application and packing     always hot or cold   • Joint pain, knee     LEFT KNEE   • Menopausal state    • Muscle pain    • Sleep apnea     DOES NOT USE MACHINE   • Stiffness in joint    • Type 2 diabetes mellitus (CMS/HCC)        Past Surgical History:   Procedure Laterality Date   • APPENDECTOMY     • BILATERAL BREAST REDUCTION  2009   • CARDIAC CATHETERIZATION     • CARDIAC SURGERY     • CATARACT EXTRACTION     • COLONOSCOPY  2010   • COLONOSCOPY N/A 8/28/2017    One 4 mm polyp in the ascending colon, One 6 mm polyp in the ascending colon, One 4 mm polyp in the sigmoid colon, One 6 mm polyp in the rectum, One 5 mm polyp in the rectum,Diverticulosis, IH. PATH:  Tubular adenoma, hyperplastic polyp    • CORONARY ANGIOPLASTY     • CORONARY STENT PLACEMENT      2   • ENDOSCOPY N/A 8/25/2016    small hiatal hernia, normal stomach/duodenum, no specimens collected   • ENDOSCOPY N/A 8/28/2017    Z line irregular, 3cm hiatal hernia, erythematous mucosa in gastric body/antrum and duodenopathy, normal 2-3 portions of duodenum   • JOINT REPLACEMENT Left     total knee   • KNEE SURGERY      2 knee sugeries on left knee   • DE TOTAL KNEE ARTHROPLASTY Left 3/14/2016    Procedure: LEFT TOTAL KNEE ARTHROPLASTY;  Surgeon: Bladimir Mehta MD;  Location: [facility];  Service: Orthopedics   • WISDOM TOOTH EXTRACTION     • WOUND DEBRIDEMENT Bilateral     BREAST.  AFTER REDUCTION       Scheduled Meds:  Outpatient Encounter Medications as of 5/29/2019   Medication Sig Dispense Refill   • acetaminophen (TYLENOL) 650 MG 8 hr tablet Take 650 mg by mouth Every 8 (Eight) Hours As Needed for Mild Pain .     • albuterol (PROVENTIL HFA;VENTOLIN HFA) 108 (90 Base)  MCG/ACT inhaler Inhale 2 puffs Every 4 (Four) Hours As Needed for Wheezing. 1 inhaler 1   • atorvastatin (LIPITOR) 80 MG tablet Take 80 mg by mouth Every Night.     • carvedilol (COREG) 3.125 MG tablet Take 3.125 mg by mouth Daily.     • clobetasol (TEMOVATE) 0.05 % cream Apply 1 application topically to the appropriate area as directed 2 (Two) Times a Day As Needed (APPLY TO THE RIGHT HAND SPARINGLY FOR SHORT DURATION. AVOID FACE OR PROLONGED USE.).     • clopidogrel (PLAVIX) 75 MG tablet TAKE 1 TABLET BY MOUTH EVERY DAY 90 tablet 0   • cyclobenzaprine (FLEXERIL) 10 MG tablet Take 10 mg by mouth 3 (Three) Times a Day As Needed for Muscle Spasms.     • diclofenac (VOLTAREN) 75 MG EC tablet Take 75 mg by mouth 2 (Two) Times a Day.     • estradiol (ESTRACE VAGINAL) 0.1 MG/GM vaginal cream Insert 2 g into the vagina Daily. 42.5 g 12   • hydrOXYzine (ATARAX) 25 MG tablet 1-2 tablets at bedtime as needed for itching 30 tablet 3   • montelukast (SINGULAIR) 10 MG tablet Take 10 mg by mouth Daily.     • pantoprazole (PROTONIX) 40 MG EC tablet TAKE 1 TABLET BY MOUTH TWICE DAILY BEFORE MEALS 180 tablet 0   • venlafaxine XR (EFFEXOR-XR) 37.5 MG 24 hr capsule TAKE 1 CAPSULE BY MOUTH DAILY 90 capsule 0   • varenicline (CHANTIX STARTING MONTH ANISA) 0.5 MG X 11 & 1 MG X 42 tablet Take 0.5 mg one daily on days 1-3 and and 0.5 mg twice daily on days 4-7.Then 1 mg twice daily as directed 42 tablet 0     No facility-administered encounter medications on file as of 5/29/2019.        Continuous Infusions:  No current facility-administered medications for this visit.     PRN Meds:.    Allergies   Allergen Reactions   • Latex Itching       Social History     Socioeconomic History   • Marital status: Single     Spouse name: Not on file   • Number of children: 1   • Years of education: College   • Highest education level: Not on file   Occupational History     Employer: DISABLED   Tobacco Use   • Smoking status: Current Every Day Smoker      Types: Electronic Cigarette     Last attempt to quit:      Years since quittin.4   • Smokeless tobacco: Never Used   • Tobacco comment: caffiene use - 3 cups coffee daily   Substance and Sexual Activity   • Alcohol use: No   • Drug use: No   • Sexual activity: Not Currently     Birth control/protection: Post-menopausal       Family History   Problem Relation Age of Onset   • Alzheimer's disease Mother    • Breast cancer Mother    • Diabetes Mother    • Lung disease Father         pullmonary artery disease   • Heart disease Father    • Cancer Father 32   • Heart disease Brother    • Diabetes Brother    • Heart attack Brother    • Heart disease Brother    • Colon cancer Other    • Heart attack Other    • Diabetes Other    • Heart disease Other    • Diabetes Brother    • JOSHUA disease Brother        Review of Systems   Constitutional: Positive for fatigue. Negative for activity change, appetite change, fever and unexpected weight change.   HENT: Negative for trouble swallowing.    Respiratory: Negative for apnea, cough, choking, chest tightness, shortness of breath and wheezing.    Cardiovascular: Negative for chest pain, palpitations and leg swelling.   Gastrointestinal: Positive for nausea. Negative for abdominal distention, abdominal pain, anal bleeding, blood in stool, constipation, diarrhea, rectal pain and vomiting.       Vitals:    19 1429   BP: 106/72   Temp: 98.1 °F (36.7 °C)       Physical Exam   Constitutional: She is oriented to person, place, and time. She appears well-developed and well-nourished.   Eyes: Pupils are equal, round, and reactive to light.   Cardiovascular: Normal rate, regular rhythm and normal heart sounds.   Pulmonary/Chest: Effort normal and breath sounds normal. No respiratory distress. She has no wheezes.   Abdominal: Soft. Bowel sounds are normal. She exhibits no distension and no mass. There is no tenderness. There is no guarding. No hernia.   Musculoskeletal: Normal range  of motion.   Neurological: She is alert and oriented to person, place, and time.   Skin: Skin is warm and dry. Capillary refill takes less than 2 seconds.   Psychiatric: She has a normal mood and affect. Her behavior is normal.       No images are attached to the encounter.    Tiera was seen today for anemia.    Diagnoses and all orders for this visit:    Anemia, unspecified type  -     Case Request; Standing  -     Obtain Informed Consent; Standing  -     Verify Bowel Prep Was Successful; Standing  -     Give Tap Water Enema If Bowel Prep Insufficient; Standing  -     Case Request    Nausea    Impression:    This is a 59-year-old female seen today for anemia and nausea.  She had similar symptoms in 2017 with unclear etiology for anemia.  Patient presents now with recurrence of anemia with associated nausea.  Given complaints of dizziness and fatigue I instructed the patient to go to the emergency room but she declines.  I discussed patient issues with Dr. Bee and she also agrees with transferring the patient to the emergency room but again the patient declines.  The patient would like to see her hematologist tomorrow and schedule bidirectional endoscopic evaluation as an outpatient.     Differential diagnosis for iron-deficiency anemia must include sources of occult gastrointestinal blood loss such as ulceration, AVM, inflammation, celiac disease, or mass/malignancy. We will proceed with bidirectional endoscopy for further evaluation. Further recommendations will be made pending results of procedures.     I did asked that the patient go to the emergency room if symptoms worsen.

## 2019-05-30 ENCOUNTER — OFFICE VISIT (OUTPATIENT)
Dept: ONCOLOGY | Facility: CLINIC | Age: 59
End: 2019-05-30

## 2019-05-30 ENCOUNTER — APPOINTMENT (OUTPATIENT)
Dept: LAB | Facility: HOSPITAL | Age: 59
End: 2019-05-30

## 2019-05-30 VITALS
WEIGHT: 180 LBS | DIASTOLIC BLOOD PRESSURE: 74 MMHG | RESPIRATION RATE: 18 BRPM | HEIGHT: 72 IN | SYSTOLIC BLOOD PRESSURE: 139 MMHG | BODY MASS INDEX: 24.38 KG/M2 | OXYGEN SATURATION: 98 % | TEMPERATURE: 97.8 F | HEART RATE: 72 BPM

## 2019-05-30 DIAGNOSIS — D50.0 IRON DEFICIENCY ANEMIA DUE TO CHRONIC BLOOD LOSS: Primary | ICD-10-CM

## 2019-05-30 LAB
BASOPHILS # BLD AUTO: 0.04 10*3/MM3 (ref 0–0.2)
BASOPHILS NFR BLD AUTO: 0.5 % (ref 0–1.5)
DEPRECATED RDW RBC AUTO: 45.1 FL (ref 37–54)
EOSINOPHIL # BLD AUTO: 0.4 10*3/MM3 (ref 0–0.4)
EOSINOPHIL NFR BLD AUTO: 4.7 % (ref 0.3–6.2)
ERYTHROCYTE [DISTWIDTH] IN BLOOD BY AUTOMATED COUNT: 20.9 % (ref 12.3–15.4)
FERRITIN SERPL-MCNC: 29.6 NG/ML (ref 11–207)
HCT VFR BLD AUTO: 29.9 % (ref 34–46.6)
HGB BLD-MCNC: 8.6 G/DL (ref 12–15.9)
IMM GRANULOCYTES # BLD AUTO: 0.16 10*3/MM3 (ref 0–0.05)
IMM GRANULOCYTES NFR BLD AUTO: 1.9 % (ref 0–0.5)
LYMPHOCYTES # BLD AUTO: 2.61 10*3/MM3 (ref 0.7–3.1)
LYMPHOCYTES NFR BLD AUTO: 30.9 % (ref 19.6–45.3)
MCH RBC QN AUTO: 19.5 PG (ref 26.6–33)
MCHC RBC AUTO-ENTMCNC: 28.8 G/DL (ref 31.5–35.7)
MCV RBC AUTO: 68 FL (ref 79–97)
MONOCYTES # BLD AUTO: 0.68 10*3/MM3 (ref 0.1–0.9)
MONOCYTES NFR BLD AUTO: 8 % (ref 5–12)
NEUTROPHILS # BLD AUTO: 4.56 10*3/MM3 (ref 1.7–7)
NEUTROPHILS NFR BLD AUTO: 54 % (ref 42.7–76)
NRBC BLD AUTO-RTO: 0.2 /100 WBC (ref 0–0.2)
PLATELET # BLD AUTO: 293 10*3/MM3 (ref 140–450)
PMV BLD AUTO: 10.9 FL (ref 6–12)
RBC # BLD AUTO: 4.4 10*6/MM3 (ref 3.77–5.28)
WBC NRBC COR # BLD: 8.45 10*3/MM3 (ref 3.4–10.8)

## 2019-05-30 PROCEDURE — 36416 COLLJ CAPILLARY BLOOD SPEC: CPT | Performed by: INTERNAL MEDICINE

## 2019-05-30 PROCEDURE — 82728 ASSAY OF FERRITIN: CPT | Performed by: INTERNAL MEDICINE

## 2019-05-30 PROCEDURE — 85025 COMPLETE CBC W/AUTO DIFF WBC: CPT | Performed by: INTERNAL MEDICINE

## 2019-05-30 PROCEDURE — 36415 COLL VENOUS BLD VENIPUNCTURE: CPT | Performed by: INTERNAL MEDICINE

## 2019-05-30 PROCEDURE — 99213 OFFICE O/P EST LOW 20 MIN: CPT | Performed by: INTERNAL MEDICINE

## 2019-05-30 RX ORDER — PROCHLORPERAZINE MALEATE 10 MG
10 TABLET ORAL ONCE
Status: CANCELLED
Start: 2019-05-30 | End: 2019-05-30

## 2019-05-30 RX ORDER — METHYLPREDNISOLONE SODIUM SUCCINATE 125 MG/2ML
125 INJECTION, POWDER, LYOPHILIZED, FOR SOLUTION INTRAMUSCULAR; INTRAVENOUS AS NEEDED
Status: CANCELLED | OUTPATIENT
Start: 2019-05-30

## 2019-05-30 RX ORDER — SODIUM CHLORIDE 9 MG/ML
250 INJECTION, SOLUTION INTRAVENOUS ONCE
Status: CANCELLED | OUTPATIENT
Start: 2019-05-30 | End: 2019-05-30

## 2019-05-30 RX ORDER — DIPHENHYDRAMINE HYDROCHLORIDE 50 MG/ML
50 INJECTION INTRAMUSCULAR; INTRAVENOUS AS NEEDED
Status: CANCELLED | OUTPATIENT
Start: 2019-05-30

## 2019-05-30 NOTE — PROGRESS NOTES
"  REASON FOR FOLLOW-UP:  Iron deficiency anemia      History of Present Illness    Mrs. Abbott is a pleasant 59-year-old woman seen back today for recurrent iron deficiency anemia.  She canceled her last appointment and did not reschedule.  She recently has been having increased lightheadedness and dizziness and had a CBC with her primary care physician on 5/23/2019 showing significant microcytic anemia with hemoglobin 7.8 and MCV 69.3.  The iron saturation was 3% and no ferritin available.  She was placed on oral iron therapy which is causing nausea and constipation.  She saw GI yesterday and repeat scopes are planned followed by capsule study if scopes are negative.    Past Medical History, Past Surgical History, Social History, Family History have been reviewed and are without significant changes except as mentioned from my consult note on 8/25/17.    Review of Systems   Constitutional: Positive for fatigue. Negative for activity change and unexpected weight change.   HENT: Negative.    Respiratory: Negative.    Cardiovascular: Negative.    Gastrointestinal: Positive for constipation and nausea.   Genitourinary: Negative.    Musculoskeletal: Negative.    Neurological: Positive for dizziness.   Hematological: Negative.       .    Medications:  The current medication list was reviewed in the EMR    ALLERGIES:    Allergies   Allergen Reactions   • Latex Itching       Objective      Vitals:    05/30/19 1111   BP: 139/74   Pulse: 72   Resp: 18   Temp: 97.8 °F (36.6 °C)   TempSrc: Oral   SpO2: 98%   Weight: 81.6 kg (180 lb)   Height: 182.9 cm (72.01\")   PainSc: 0-No pain     Current Status 5/30/2019   ECOG score 0       Physical Exam    Gen: pleasant well-nourished woman  HEENT: no icterus  CV: RRR  CHEST: CTA bilat  ABD: soft, no masses  SKIN: no petechiae or bruising, pallor noted    RECENT LABS:  Hematology WBC   Date Value Ref Range Status   05/30/2019 8.45 3.40 - 10.80 10*3/mm3 Final   05/23/2019 7.87 3.40 - 10.80 " 10*3/mm3 Final     RBC   Date Value Ref Range Status   05/30/2019 4.40 3.77 - 5.28 10*6/mm3 Final   05/23/2019 4.07 3.77 - 5.28 10*6/mm3 Final     Hemoglobin   Date Value Ref Range Status   05/30/2019 8.6 (L) 12.0 - 15.9 g/dL Final     Hematocrit   Date Value Ref Range Status   05/30/2019 29.9 (L) 34.0 - 46.6 % Final     Platelets   Date Value Ref Range Status   05/30/2019 293 140 - 450 10*3/mm3 Final              Assessment/Plan   Recurrent iron deficiency anemia, worse: The patient is again iron deficient with significant anemia hemoglobin 7.8.  She has been started on oral iron for the past week and her hemoglobin improved today to 8.6.  She is however having difficulty tolerating oral iron secondary to nausea and constipation.  I therefore recommended we replace her iron intravenously with 2 doses of Injectafer.  She was agreeable.  I agree with repeat GI evaluation which is underway.  We will recheck her CBC, ferritin, iron profile in 3 months.              5/30/2019      CC:

## 2019-05-31 ENCOUNTER — OFFICE VISIT (OUTPATIENT)
Dept: FAMILY MEDICINE CLINIC | Facility: CLINIC | Age: 59
End: 2019-05-31

## 2019-05-31 VITALS
DIASTOLIC BLOOD PRESSURE: 70 MMHG | HEIGHT: 72 IN | BODY MASS INDEX: 24.38 KG/M2 | WEIGHT: 180 LBS | TEMPERATURE: 98.4 F | OXYGEN SATURATION: 98 % | HEART RATE: 79 BPM | SYSTOLIC BLOOD PRESSURE: 110 MMHG

## 2019-05-31 DIAGNOSIS — I25.10 CORONARY ARTERY DISEASE DUE TO LIPID RICH PLAQUE: ICD-10-CM

## 2019-05-31 DIAGNOSIS — I25.83 CORONARY ARTERY DISEASE DUE TO LIPID RICH PLAQUE: ICD-10-CM

## 2019-05-31 DIAGNOSIS — D50.0 IRON DEFICIENCY ANEMIA DUE TO CHRONIC BLOOD LOSS: Primary | ICD-10-CM

## 2019-05-31 DIAGNOSIS — R53.83 FATIGUE, UNSPECIFIED TYPE: ICD-10-CM

## 2019-05-31 PROCEDURE — 99213 OFFICE O/P EST LOW 20 MIN: CPT | Performed by: NURSE PRACTITIONER

## 2019-06-06 ENCOUNTER — INFUSION (OUTPATIENT)
Dept: ONCOLOGY | Facility: HOSPITAL | Age: 59
End: 2019-06-06

## 2019-06-06 VITALS
HEART RATE: 86 BPM | SYSTOLIC BLOOD PRESSURE: 119 MMHG | WEIGHT: 179.6 LBS | BODY MASS INDEX: 24.35 KG/M2 | DIASTOLIC BLOOD PRESSURE: 66 MMHG | TEMPERATURE: 98.6 F

## 2019-06-06 DIAGNOSIS — D50.0 IRON DEFICIENCY ANEMIA DUE TO CHRONIC BLOOD LOSS: Primary | ICD-10-CM

## 2019-06-06 PROCEDURE — 63710000001 PROCHLORPERAZINE MALEATE PER 5 MG: Performed by: INTERNAL MEDICINE

## 2019-06-06 PROCEDURE — 96374 THER/PROPH/DIAG INJ IV PUSH: CPT | Performed by: INTERNAL MEDICINE

## 2019-06-06 PROCEDURE — 25010000002 FERRIC CARBOXYMALTOSE 750 MG/15ML SOLUTION 15 ML VIAL: Performed by: INTERNAL MEDICINE

## 2019-06-06 RX ORDER — DIPHENHYDRAMINE HYDROCHLORIDE 50 MG/ML
50 INJECTION INTRAMUSCULAR; INTRAVENOUS AS NEEDED
Status: CANCELLED | OUTPATIENT
Start: 2019-06-06

## 2019-06-06 RX ORDER — PROCHLORPERAZINE MALEATE 10 MG
10 TABLET ORAL ONCE
Status: CANCELLED
Start: 2019-06-06 | End: 2019-06-06

## 2019-06-06 RX ORDER — SODIUM CHLORIDE 9 MG/ML
250 INJECTION, SOLUTION INTRAVENOUS ONCE
Status: COMPLETED | OUTPATIENT
Start: 2019-06-06 | End: 2019-06-06

## 2019-06-06 RX ORDER — PROCHLORPERAZINE MALEATE 5 MG/1
10 TABLET ORAL ONCE
Status: COMPLETED | OUTPATIENT
Start: 2019-06-06 | End: 2019-06-06

## 2019-06-06 RX ORDER — METHYLPREDNISOLONE SODIUM SUCCINATE 125 MG/2ML
125 INJECTION, POWDER, LYOPHILIZED, FOR SOLUTION INTRAMUSCULAR; INTRAVENOUS AS NEEDED
Status: CANCELLED | OUTPATIENT
Start: 2019-06-06

## 2019-06-06 RX ORDER — SODIUM CHLORIDE 9 MG/ML
250 INJECTION, SOLUTION INTRAVENOUS ONCE
Status: CANCELLED | OUTPATIENT
Start: 2019-06-06 | End: 2019-06-06

## 2019-06-06 RX ADMIN — PROCHLORPERAZINE MALEATE 10 MG: 5 TABLET, FILM COATED ORAL at 15:24

## 2019-06-06 RX ADMIN — SODIUM CHLORIDE 250 ML: 9 INJECTION, SOLUTION INTRAVENOUS at 15:40

## 2019-06-06 RX ADMIN — FERRIC CARBOXYMALTOSE INJECTION 750 MG: 50 INJECTION, SOLUTION INTRAVENOUS at 15:40

## 2019-06-08 ENCOUNTER — TELEPHONE (OUTPATIENT)
Dept: CARDIOLOGY | Facility: CLINIC | Age: 59
End: 2019-06-08

## 2019-06-08 NOTE — TELEPHONE ENCOUNTER
06/08/19  2:40 PM  Tiera Abbott  1960  Home Phone [phone]   Mobile [phone]     Ms. Abbott is on plavix for history of stent placed in 2008. She has previously had issues with anemia and required hospitalization while her plavix was held prior to scope.    She was recently found to have drop in hemoglobin and scope is planned for Tuesday, 6/11/19. She is going to hold her Plavix starting Saturday, 6/8/19.    Marianna BARRIENTOS RN

## 2019-06-11 ENCOUNTER — ANESTHESIA (OUTPATIENT)
Dept: GASTROENTEROLOGY | Facility: HOSPITAL | Age: 59
End: 2019-06-11

## 2019-06-11 ENCOUNTER — ANESTHESIA EVENT (OUTPATIENT)
Dept: GASTROENTEROLOGY | Facility: HOSPITAL | Age: 59
End: 2019-06-11

## 2019-06-11 ENCOUNTER — HOSPITAL ENCOUNTER (OUTPATIENT)
Facility: HOSPITAL | Age: 59
Setting detail: HOSPITAL OUTPATIENT SURGERY
Discharge: HOME | End: 2019-06-11
Attending: INTERNAL MEDICINE | Admitting: INTERNAL MEDICINE

## 2019-06-11 VITALS
OXYGEN SATURATION: 99 % | DIASTOLIC BLOOD PRESSURE: 74 MMHG | RESPIRATION RATE: 16 BRPM | SYSTOLIC BLOOD PRESSURE: 119 MMHG | BODY MASS INDEX: 24.12 KG/M2 | HEIGHT: 72 IN | WEIGHT: 178.06 LBS | HEART RATE: 64 BPM

## 2019-06-11 DIAGNOSIS — D64.9 ANEMIA, UNSPECIFIED TYPE: ICD-10-CM

## 2019-06-11 PROCEDURE — 25010000002 PROPOFOL 1000 MG/ML EMULSION: Performed by: ANESTHESIOLOGY

## 2019-06-11 PROCEDURE — 25010000002 PROPOFOL 10 MG/ML EMULSION: Performed by: ANESTHESIOLOGY

## 2019-06-11 PROCEDURE — 45380 COLONOSCOPY AND BIOPSY: CPT | Performed by: INTERNAL MEDICINE

## 2019-06-11 PROCEDURE — 43239 EGD BIOPSY SINGLE/MULTIPLE: CPT | Performed by: INTERNAL MEDICINE

## 2019-06-11 PROCEDURE — 88305 TISSUE EXAM BY PATHOLOGIST: CPT | Performed by: INTERNAL MEDICINE

## 2019-06-11 RX ORDER — SODIUM CHLORIDE 0.9 % (FLUSH) 0.9 %
3 SYRINGE (ML) INJECTION AS NEEDED
Status: DISCONTINUED | OUTPATIENT
Start: 2019-06-11 | End: 2019-06-11 | Stop reason: HOSPADM

## 2019-06-11 RX ORDER — LIDOCAINE HYDROCHLORIDE 10 MG/ML
0.5 INJECTION, SOLUTION INFILTRATION; PERINEURAL ONCE AS NEEDED
Status: DISCONTINUED | OUTPATIENT
Start: 2019-06-11 | End: 2019-06-11 | Stop reason: HOSPADM

## 2019-06-11 RX ORDER — LIDOCAINE HYDROCHLORIDE 20 MG/ML
INJECTION, SOLUTION INFILTRATION; PERINEURAL AS NEEDED
Status: DISCONTINUED | OUTPATIENT
Start: 2019-06-11 | End: 2019-06-11 | Stop reason: SURG

## 2019-06-11 RX ORDER — SODIUM CHLORIDE, SODIUM LACTATE, POTASSIUM CHLORIDE, CALCIUM CHLORIDE 600; 310; 30; 20 MG/100ML; MG/100ML; MG/100ML; MG/100ML
1000 INJECTION, SOLUTION INTRAVENOUS CONTINUOUS
Status: DISCONTINUED | OUTPATIENT
Start: 2019-06-11 | End: 2019-06-11 | Stop reason: HOSPADM

## 2019-06-11 RX ORDER — PROPOFOL 10 MG/ML
VIAL (ML) INTRAVENOUS AS NEEDED
Status: DISCONTINUED | OUTPATIENT
Start: 2019-06-11 | End: 2019-06-11 | Stop reason: SURG

## 2019-06-11 RX ADMIN — PROPOFOL 140 MG: 10 INJECTION, EMULSION INTRAVENOUS at 07:33

## 2019-06-11 RX ADMIN — PROPOFOL 180 MCG/KG/MIN: 10 INJECTION, EMULSION INTRAVENOUS at 07:33

## 2019-06-11 RX ADMIN — SODIUM CHLORIDE, POTASSIUM CHLORIDE, SODIUM LACTATE AND CALCIUM CHLORIDE 1000 ML: 600; 310; 30; 20 INJECTION, SOLUTION INTRAVENOUS at 07:16

## 2019-06-11 RX ADMIN — LIDOCAINE HYDROCHLORIDE 60 MG: 20 INJECTION, SOLUTION INFILTRATION; PERINEURAL at 07:33

## 2019-06-11 NOTE — ANESTHESIA POSTPROCEDURE EVALUATION
"Patient: Tiera Abbott    Procedure Summary     Date:  06/11/19 Room / Location:  [facility] ENDOSCOPY 8 /  [facility]    Anesthesia Start:  0728 Anesthesia Stop:  0808    Procedures:       ESOPHAGOGASTRODUODENOSCOPY WITH BIOPSY (N/A Esophagus)      COLONOSCOPY TO CECUM TO TERMINAL ILEUM WITH COLD BIOPSY POLYPECTOMY (N/A ) Diagnosis:       Anemia, unspecified type      (Anemia, unspecified type [D64.9])    Surgeon:  Minna Bee MD Provider:  Tiera Blackwell MD    Anesthesia Type:  MAC ASA Status:  3          Anesthesia Type: MAC  Last vitals  BP   119/74 (06/11/19 0830)   Temp       Pulse   64 (06/11/19 0830)   Resp   16 (06/11/19 0830)     SpO2   99 % (06/11/19 0830)     Post Anesthesia Care and Evaluation    Patient location during evaluation: bedside  Patient participation: complete - patient participated  Level of consciousness: awake and alert  Pain management: adequate  Airway patency: patent  Anesthetic complications: No anesthetic complications  PONV Status: none  Cardiovascular status: acceptable  Respiratory status: acceptable  Hydration status: acceptable    Comments: /74 (BP Location: Left arm, Patient Position: Lying)   Pulse 64   Resp 16   Ht 182.9 cm (72\")   Wt 80.8 kg (178 lb 1 oz)   SpO2 99%   BMI 24.15 kg/m²         "

## 2019-06-11 NOTE — ANESTHESIA PREPROCEDURE EVALUATION
Anesthesia Evaluation     Patient summary reviewed   NPO Solid Status: > 8 hours  NPO Liquid Status: > 8 hours           Airway   Mallampati: III  TM distance: >3 FB  Dental      Pulmonary    (+) a smoker Current Abstained day of surgery, sleep apnea,   Cardiovascular     Rhythm: regular  Rate: normal    (+) past MI  >12 months, CAD, cardiac stents more than 12 months ago hyperlipidemia,       Neuro/Psych  GI/Hepatic/Renal/Endo    (+)  GERD,      Musculoskeletal     Abdominal    Substance History      OB/GYN          Other   (+) arthritis                     Anesthesia Plan    ASA 3     MAC   total IV anesthesia  Anesthetic plan, all risks, benefits, and alternatives have been provided, discussed and informed consent has been obtained with: patient.

## 2019-06-12 LAB
CYTO UR: NORMAL
LAB AP CASE REPORT: NORMAL
PATH REPORT.FINAL DX SPEC: NORMAL
PATH REPORT.GROSS SPEC: NORMAL

## 2019-06-13 ENCOUNTER — LAB (OUTPATIENT)
Dept: LAB | Facility: HOSPITAL | Age: 59
End: 2019-06-13

## 2019-06-13 ENCOUNTER — APPOINTMENT (OUTPATIENT)
Dept: ONCOLOGY | Facility: HOSPITAL | Age: 59
End: 2019-06-13

## 2019-06-13 ENCOUNTER — TELEPHONE (OUTPATIENT)
Dept: GENERAL RADIOLOGY | Facility: HOSPITAL | Age: 59
End: 2019-06-13

## 2019-06-13 ENCOUNTER — INFUSION (OUTPATIENT)
Dept: ONCOLOGY | Facility: HOSPITAL | Age: 59
End: 2019-06-13

## 2019-06-13 ENCOUNTER — TELEPHONE (OUTPATIENT)
Dept: ONCOLOGY | Facility: HOSPITAL | Age: 59
End: 2019-06-13

## 2019-06-13 VITALS
WEIGHT: 180 LBS | TEMPERATURE: 97.7 F | SYSTOLIC BLOOD PRESSURE: 125 MMHG | BODY MASS INDEX: 24.41 KG/M2 | RESPIRATION RATE: 16 BRPM | DIASTOLIC BLOOD PRESSURE: 73 MMHG | OXYGEN SATURATION: 99 % | HEART RATE: 73 BPM

## 2019-06-13 DIAGNOSIS — D50.9 IRON DEFICIENCY ANEMIA, UNSPECIFIED IRON DEFICIENCY ANEMIA TYPE: ICD-10-CM

## 2019-06-13 DIAGNOSIS — D50.0 IRON DEFICIENCY ANEMIA DUE TO CHRONIC BLOOD LOSS: Primary | ICD-10-CM

## 2019-06-13 LAB
BASOPHILS # BLD AUTO: 0.03 10*3/MM3 (ref 0–0.2)
BASOPHILS NFR BLD AUTO: 0.4 % (ref 0–1.5)
EOSINOPHIL # BLD AUTO: 0.27 10*3/MM3 (ref 0–0.4)
EOSINOPHIL NFR BLD AUTO: 3.9 % (ref 0.3–6.2)
ERYTHROCYTE [DISTWIDTH] IN BLOOD BY AUTOMATED COUNT: ABNORMAL % (ref 12.3–15.4)
HCT VFR BLD AUTO: 37.9 % (ref 34–46.6)
HGB BLD-MCNC: 11.4 G/DL (ref 12–15.9)
IMM GRANULOCYTES # BLD AUTO: 0.03 10*3/MM3 (ref 0–0.05)
IMM GRANULOCYTES NFR BLD AUTO: 0.4 % (ref 0–0.5)
LYMPHOCYTES # BLD AUTO: 1.67 10*3/MM3 (ref 0.7–3.1)
LYMPHOCYTES NFR BLD AUTO: 24.1 % (ref 19.6–45.3)
MCH RBC QN AUTO: 22.8 PG (ref 26.6–33)
MCHC RBC AUTO-ENTMCNC: 30.1 G/DL (ref 31.5–35.7)
MCV RBC AUTO: 75.6 FL (ref 79–97)
MONOCYTES # BLD AUTO: 0.46 10*3/MM3 (ref 0.1–0.9)
MONOCYTES NFR BLD AUTO: 6.6 % (ref 5–12)
NEUTROPHILS # BLD AUTO: 4.47 10*3/MM3 (ref 1.7–7)
NEUTROPHILS NFR BLD AUTO: 64.6 % (ref 42.7–76)
NRBC BLD AUTO-RTO: 0 /100 WBC (ref 0–0.2)
PLATELET # BLD AUTO: 242 10*3/MM3 (ref 140–450)
PMV BLD AUTO: 11 FL (ref 6–12)
RBC # BLD AUTO: 5.01 10*6/MM3 (ref 3.77–5.28)
WBC NRBC COR # BLD: 6.93 10*3/MM3 (ref 3.4–10.8)

## 2019-06-13 PROCEDURE — 85025 COMPLETE CBC W/AUTO DIFF WBC: CPT | Performed by: INTERNAL MEDICINE

## 2019-06-13 PROCEDURE — 96374 THER/PROPH/DIAG INJ IV PUSH: CPT | Performed by: INTERNAL MEDICINE

## 2019-06-13 PROCEDURE — 63710000001 PROCHLORPERAZINE MALEATE PER 5 MG: Performed by: INTERNAL MEDICINE

## 2019-06-13 PROCEDURE — 25010000002 FERRIC CARBOXYMALTOSE 750 MG/15ML SOLUTION 15 ML VIAL: Performed by: INTERNAL MEDICINE

## 2019-06-13 PROCEDURE — 36415 COLL VENOUS BLD VENIPUNCTURE: CPT | Performed by: INTERNAL MEDICINE

## 2019-06-13 RX ORDER — SODIUM CHLORIDE 9 MG/ML
250 INJECTION, SOLUTION INTRAVENOUS ONCE
Status: CANCELLED | OUTPATIENT
Start: 2019-06-13 | End: 2019-06-13

## 2019-06-13 RX ORDER — SODIUM CHLORIDE 9 MG/ML
250 INJECTION, SOLUTION INTRAVENOUS ONCE
Status: COMPLETED | OUTPATIENT
Start: 2019-06-13 | End: 2019-06-13

## 2019-06-13 RX ORDER — PROCHLORPERAZINE MALEATE 5 MG/1
10 TABLET ORAL ONCE
Status: COMPLETED | OUTPATIENT
Start: 2019-06-13 | End: 2019-06-13

## 2019-06-13 RX ORDER — METHYLPREDNISOLONE SODIUM SUCCINATE 125 MG/2ML
125 INJECTION, POWDER, LYOPHILIZED, FOR SOLUTION INTRAMUSCULAR; INTRAVENOUS AS NEEDED
Status: CANCELLED | OUTPATIENT
Start: 2019-06-13

## 2019-06-13 RX ORDER — PROCHLORPERAZINE MALEATE 10 MG
10 TABLET ORAL ONCE
Status: CANCELLED
Start: 2019-06-13 | End: 2019-06-13

## 2019-06-13 RX ORDER — DIPHENHYDRAMINE HYDROCHLORIDE 50 MG/ML
50 INJECTION INTRAMUSCULAR; INTRAVENOUS AS NEEDED
Status: CANCELLED | OUTPATIENT
Start: 2019-06-13

## 2019-06-13 RX ADMIN — PROCHLORPERAZINE MALEATE 10 MG: 5 TABLET, FILM COATED ORAL at 15:44

## 2019-06-13 RX ADMIN — SODIUM CHLORIDE 250 ML: 9 INJECTION, SOLUTION INTRAVENOUS at 15:42

## 2019-06-13 RX ADMIN — FERRIC CARBOXYMALTOSE INJECTION 750 MG: 50 INJECTION, SOLUTION INTRAVENOUS at 15:55

## 2019-06-13 NOTE — PROGRESS NOTES
The biopsies from her EGD all were benign    The polyps removed from her colon were benign    Her next colonoscopy should be in 10 years, please place in recall    Follow-up with Elzbieta in 4 weeks.  I have ordered iron supplementation for her to be taking daily if she is not already on it.

## 2019-06-13 NOTE — TELEPHONE ENCOUNTER
----- Message from Miriam Zacarias sent at 6/13/2019 12:37 PM EDT -----  Contact: [phone]  Pt has been feeling very weak recently would like labs drawn to check HGB.    Spoke with pt. She states since this weekend after a graduation party she has been consistently staying more tired and weak. She would like to have a CBC drawn today before her iron infusion to see what her HGB is. Order placed. Message sent to scheduling to add lab appt.

## 2019-06-13 NOTE — PROGRESS NOTES
"CBC results reviewed with pt. Informed her hgb has improved to 11.4. Pt pleased to hear this. She does c/o continued fatigue and weakness. Also, c/o unusual \"feeling\" across chest at sternum. Denies chest pain, pressure, SOA, or cough. No recent fevers or infectious symptoms. Pt states she does have recent injury to shoulder and questions if pain is related to that. Pt instructed to f/u with PCP or go to ER if develops chest pain, pressure, SOA, or worsening complaints. Pt v/u.   "

## 2019-06-13 NOTE — TELEPHONE ENCOUNTER
----- Message from Pilar Ann RN sent at 6/13/2019  1:49 PM EDT -----  Please add pt on for CBC to today's appt. Thanks. Please call pt if arrival time changes.

## 2019-06-19 ENCOUNTER — OFFICE VISIT (OUTPATIENT)
Dept: FAMILY MEDICINE CLINIC | Facility: CLINIC | Age: 59
End: 2019-06-19

## 2019-06-19 ENCOUNTER — TELEPHONE (OUTPATIENT)
Dept: GASTROENTEROLOGY | Facility: CLINIC | Age: 59
End: 2019-06-19

## 2019-06-19 VITALS
SYSTOLIC BLOOD PRESSURE: 130 MMHG | BODY MASS INDEX: 24.52 KG/M2 | DIASTOLIC BLOOD PRESSURE: 70 MMHG | TEMPERATURE: 98.3 F | HEART RATE: 75 BPM | HEIGHT: 72 IN | OXYGEN SATURATION: 97 % | WEIGHT: 181 LBS

## 2019-06-19 DIAGNOSIS — Z78.0 POST-MENOPAUSAL: ICD-10-CM

## 2019-06-19 DIAGNOSIS — D50.8 OTHER IRON DEFICIENCY ANEMIA: Primary | ICD-10-CM

## 2019-06-19 DIAGNOSIS — I25.10 CORONARY ARTERY DISEASE DUE TO LIPID RICH PLAQUE: ICD-10-CM

## 2019-06-19 DIAGNOSIS — Z12.31 ENCOUNTER FOR SCREENING MAMMOGRAM FOR BREAST CANCER: ICD-10-CM

## 2019-06-19 DIAGNOSIS — R53.83 FATIGUE, UNSPECIFIED TYPE: ICD-10-CM

## 2019-06-19 DIAGNOSIS — I25.83 CORONARY ARTERY DISEASE DUE TO LIPID RICH PLAQUE: ICD-10-CM

## 2019-06-19 PROCEDURE — 99213 OFFICE O/P EST LOW 20 MIN: CPT | Performed by: NURSE PRACTITIONER

## 2019-06-19 NOTE — TELEPHONE ENCOUNTER
----- Message from Minna Bee MD sent at 6/13/2019  2:34 PM EDT -----  The biopsies from her EGD all were benign    The polyps removed from her colon were benign    Her next colonoscopy should be in 10 years, please place in recall    Follow-up with Elzbieta in 4 weeks.  I have ordered iron supplementation for her to be taking daily if she is not already on it.

## 2019-06-19 NOTE — PROGRESS NOTES
Subjective   Tiera Abbott is a 59 y.o. female.     Follow-up moderately severe anemia, fatigue weakness without chest pain shortness of breath nausea vomiting  Recent iron infusion x2 sees hematology  Coronary artery disease stable she is having no chest pain with exertion  The anemia cause profound fatigue she is been unable to work due to fatigue  Weakness  As well as some nausea weakness after the infusion no new complaints  No abdominal pain no bleeding no coffee-ground emesis or black tarry stools taking oral iron as well      Anemia          The following portions of the patient's history were reviewed and updated as appropriate: allergies, current medications, past family history, past medical history, past social history, past surgical history and problem list.    Review of Systems   Constitutional: Positive for fatigue.   All other systems reviewed and are negative.      Objective   Physical Exam   Constitutional: She is oriented to person, place, and time. She appears well-developed and well-nourished.   HENT:   Head: Normocephalic.   Mouth/Throat: Oropharynx is clear and moist. No oropharyngeal exudate.   Eyes: Conjunctivae are normal. Pupils are equal, round, and reactive to light.   Neck: Neck supple. No JVD present.   Cardiovascular: Normal rate, regular rhythm and normal heart sounds. Exam reveals no friction rub.   No murmur heard.  Pulmonary/Chest: Effort normal and breath sounds normal. No respiratory distress. She has no wheezes.   Abdominal: Soft. Bowel sounds are normal. She exhibits no distension and no mass. There is no tenderness. There is no guarding. No hernia.   Musculoskeletal: She exhibits no edema or tenderness.   Lymphadenopathy:     She has no cervical adenopathy.   Neurological: She is alert and oriented to person, place, and time.   Skin: Skin is warm and dry.   Psychiatric: She has a normal mood and affect. Her behavior is normal. Judgment and thought content normal.   Vitals  reviewed.        Assessment/Plan   Tiera was seen today for anemia.    Diagnoses and all orders for this visit:    Other iron deficiency anemia    Encounter for screening mammogram for breast cancer  -     Mammo Screening Bilateral With CAD  -     DEXA Bone Density Axial    Post-menopausal  -     DEXA Bone Density Axial    Coronary artery disease due to lipid rich plaque    Fatigue, unspecified type      Plenty fluids rest strength will come back  Avoid prolonged standing walking reaching pulling pushing squatting repetitive motion  Unable to work at this time  Continue over-the-counter iron follow-up with hematology if chest pain shortness of breath weakness emergency room              There are no Patient Instructions on file for this visit.

## 2019-06-21 NOTE — TELEPHONE ENCOUNTER
Call from pt.  Advise per Dr Bee that bx from EGD all were benign.    Polyps removed from colon were benign.    Next c/s should be in 10  Yrs.    Iron supplement has been ordered to be taking daily.    Verb understanding.  Requests switch rx to [facility].  Did so.    Appt scheduled with HIEU Menchaca for 7/22 @ 1pm.    Pt concerned that next c/s will be in 10 yrs.  Understood that next step would be capsule study.  Advise that DR Bee out of office today - will send question.  Verb understanding.

## 2019-06-23 PROBLEM — Z78.0 POST-MENOPAUSAL: Status: ACTIVE | Noted: 2019-06-23

## 2019-06-23 PROBLEM — Z12.31 ENCOUNTER FOR SCREENING MAMMOGRAM FOR BREAST CANCER: Status: ACTIVE | Noted: 2019-06-23

## 2019-06-24 NOTE — TELEPHONE ENCOUNTER
We will see how she responds to the iron supplementation. I have ordered labs for her to have drawn in the first part of July. If she persists with low blood count, capsule study is next, otherwise will continue to monitor.

## 2019-06-25 NOTE — TELEPHONE ENCOUNTER
Call from pt.  Advise per Dr Bee that will see how responds to the iron supplementation.  Labs have been ordered to be done in the first part of July.  If persists with low blood count, capsule study is next, otherwise will continue to monitor.    Verb understanding.  Lab appt scheduled for 7/8 @ 3pm.

## 2019-06-27 ENCOUNTER — TELEPHONE (OUTPATIENT)
Dept: FAMILY MEDICINE CLINIC | Facility: CLINIC | Age: 59
End: 2019-06-27

## 2019-06-27 NOTE — TELEPHONE ENCOUNTER
Insurance company received disability forms but it's missing two pieces of information.    1. First day patient missed work.  2. When do you plan to let pt RETURN to work date    If you do not know what day she can return to work, then please write date of her next OV and specify.    Rep can be reached at [phone] if there are concerns or questions.

## 2019-07-01 ENCOUNTER — TELEPHONE (OUTPATIENT)
Dept: FAMILY MEDICINE CLINIC | Facility: CLINIC | Age: 59
End: 2019-07-01

## 2019-07-01 ENCOUNTER — RESULTS ENCOUNTER (OUTPATIENT)
Dept: GASTROENTEROLOGY | Facility: CLINIC | Age: 59
End: 2019-07-01

## 2019-07-01 DIAGNOSIS — D50.8 OTHER IRON DEFICIENCY ANEMIA: ICD-10-CM

## 2019-07-02 NOTE — TELEPHONE ENCOUNTER
I do not think we work these up anytime soon as this correct I do not see anything in the chart recently regarding back hip  Return to office to discuss if not    Thanks

## 2019-07-08 ENCOUNTER — TELEPHONE (OUTPATIENT)
Dept: GASTROENTEROLOGY | Facility: CLINIC | Age: 59
End: 2019-07-08

## 2019-07-08 ENCOUNTER — TELEPHONE (OUTPATIENT)
Dept: ONCOLOGY | Facility: HOSPITAL | Age: 59
End: 2019-07-08

## 2019-07-08 NOTE — TELEPHONE ENCOUNTER
Pt is freaking out over how she is feeling.  Thinks she is feeling this way because her hgb might be low.  Is at the GI doc office now and had blood work done but not seeing anyone to go over results.  She does not want to got to ER.  Results are not available yet in the computer.  Pt will call tomorrow to see what the results are.  Suggested she call the PCP as this may not be related to her hgb and may need to be seen.       ----- Message from Mary Pan sent at 7/8/2019  3:50 PM EDT -----  [phone]    Having trouble with  Many issues, many. Dizziness  And cognitive ability is worse. Almost turned into oncoming traffic

## 2019-07-08 NOTE — TELEPHONE ENCOUNTER
Called pt, directly to VM, left message advising to ER if she thinks her brain isn't functioning correctly or following up with her PCP regarding this.

## 2019-07-09 LAB
FERRITIN SERPL-MCNC: 266 NG/ML (ref 13–150)
HCT VFR BLD AUTO: 38.8 % (ref 34–46.6)
HGB BLD-MCNC: 11.6 G/DL (ref 12–15.9)

## 2019-07-10 ENCOUNTER — TELEPHONE (OUTPATIENT)
Dept: GASTROENTEROLOGY | Facility: CLINIC | Age: 59
End: 2019-07-10

## 2019-07-10 NOTE — TELEPHONE ENCOUNTER
VM to pt - identifies as Tiera.  Advise per Dr Bee that Hgb stable at 11.6, ferritin good at 266.  Contact office if any questions.

## 2019-07-10 NOTE — TELEPHONE ENCOUNTER
----- Message from Minna Bee MD sent at 7/9/2019  5:53 PM EDT -----  Hb stable at 11.6, ferritin (iron stores) good at 266

## 2019-07-17 ENCOUNTER — TELEPHONE (OUTPATIENT)
Dept: FAMILY MEDICINE CLINIC | Facility: CLINIC | Age: 59
End: 2019-07-17

## 2019-07-17 NOTE — TELEPHONE ENCOUNTER
Patient may return to work on    with restrictions  Maximum 4-hour  shifts per day with at least 1 day off per week.  No working in outside heat or excessive heat indoors  No working outdoors in direct sunlight        Please ask patient how long she feels like she needs these restrictions  Clarify before sending thank you

## 2019-07-17 NOTE — TELEPHONE ENCOUNTER
----- Message from Qing Jay MA sent at 7/17/2019  8:53 AM EDT -----  Pt called and states she was not able to make it to her appt this morning due to weather, she is requesting that you call her to speak with her about a letter she is wanting with restrictions. Please Advise.    Qing GUO

## 2019-07-17 NOTE — TELEPHONE ENCOUNTER
Spoke to pt, she is requesting that we write a letter to her employer [employer] and Fax to : (pt calling rebecca leonardo fax #)  She is requesting letter to say, her restrictions are :   Return to Work , with Restrictions....  Start Slow... 4 Hr Shifts Only.... No work in Heat.. Out of the Sun

## 2019-07-22 ENCOUNTER — OFFICE VISIT (OUTPATIENT)
Dept: GASTROENTEROLOGY | Facility: CLINIC | Age: 59
End: 2019-07-22

## 2019-07-22 VITALS — DIASTOLIC BLOOD PRESSURE: 70 MMHG | SYSTOLIC BLOOD PRESSURE: 100 MMHG | TEMPERATURE: 98.2 F

## 2019-07-22 DIAGNOSIS — K21.00 GASTROESOPHAGEAL REFLUX DISEASE WITH ESOPHAGITIS: ICD-10-CM

## 2019-07-22 DIAGNOSIS — D64.9 ANEMIA, UNSPECIFIED TYPE: Primary | ICD-10-CM

## 2019-07-22 PROCEDURE — 99213 OFFICE O/P EST LOW 20 MIN: CPT | Performed by: NURSE PRACTITIONER

## 2019-07-22 NOTE — PROGRESS NOTES
Chief Complaint   Patient presents with   • Follow-up   • Anemia     HPI    Tiera Abbott is a  59 y.o. female here for a follow up visit for anemia.  Patient follows Dr. Bee.  Patient subsequently underwent bidirectional endoscopic evaluation which I reviewed as follows:    EGD dated 6/11/2019 --nonsevere esophagitis, gastritis, small paraesophageal hernia.    Colonoscopy--perianal skin tags, normal ileum, polyps, diverticulosis, nonbleeding internal hemorrhoids.    Biopsies from EGD were benign.  Colon polyps were benign, recall 10 years for surveillance purposes.  Patient was started on iron supplement.    On visit today patient still complains of fatigue which is improving.  She continues on Protonix 40 mg once daily.  She is trying to quit smoking.  She is on Chantix.  No issues with nausea, vomiting, heartburn, acid reflux, or dysphagia.  Appetite is good.  Weight is stable.    Denies issues diarrhea, constipation, rectal bleeding.  Bowels are moving daily with soft stools.  She is currently taking iron tablets once daily.    Patient is also following with hematology, [facility].  I reviewed lab work dated 7/8/2019 with hemoglobin of 11.6, hematocrit 38.8.  She is to follow-up with their office in August.    Past Medical History:   Diagnosis Date   • Acid reflux    • Alcoholism in remission (CMS/HCC)     SINCE AGE 22   • Arthritis    • Back pain    • Cardiac disease    • Chest pain    • Coronary artery disease     mi   • Depression    • Eczema    • Eczema    • Heart attack (CMS/HCC) 2008   • Heart attack (CMS/HCC)    • Heart disease    • History of anemia    • History of bruising easily    • Hypercholesterolemia    • IBS (irritable bowel syndrome)    • Inappropriate (too hot or too cold) temperature in local application and packing     always hot or cold   • Joint pain, knee     LEFT KNEE   • Menopausal state    • Muscle pain    • Sleep apnea     DOES NOT USE MACHINE   • Stiffness in joint        Past Surgical  History:   Procedure Laterality Date   • BILATERAL BREAST REDUCTION  2009   • CARDIAC CATHETERIZATION     • COLONOSCOPY  2010   • COLONOSCOPY N/A 8/28/2017    One 4 mm polyp in the ascending colon, One 6 mm polyp in the ascending colon, One 4 mm polyp in the sigmoid colon, One 6 mm polyp in the rectum, One 5 mm polyp in the rectum,Diverticulosis, IH. PATH:  Tubular adenoma, hyperplastic polyp    • COLONOSCOPY N/A 6/11/2019    Perianal skin tags found on perianal exam, One 3 mm polyp in the transverse colon, Two 3 to 4 mm polyps in the sigmoid colon, Diverticulosis, NBIH. Path: Hyperplastic polyp.   • CORONARY STENT PLACEMENT      2   • ENDOSCOPY N/A 8/25/2016    small hiatal hernia, normal stomach/duodenum, no specimens collected   • ENDOSCOPY N/A 8/28/2017    Z line irregular, 3cm hiatal hernia, erythematous mucosa in gastric body/antrum and duodenopathy, normal 2-3 portions of duodenum   • ENDOSCOPY N/A 6/11/2019    Z-line regular, Non-severe esophagitis, Gastritis, Small paraesophageal hernia. Path: Mild chronic inflammation.   • JOINT REPLACEMENT Left     total knee   • KNEE SURGERY      2 knee sugeries on left knee   • VT TOTAL KNEE ARTHROPLASTY Left 3/14/2016    Procedure: LEFT TOTAL KNEE ARTHROPLASTY;  Surgeon: Bladimir Mehta MD;  Location: [facility];  Service: Orthopedics   • WISDOM TOOTH EXTRACTION     • WOUND DEBRIDEMENT Bilateral     BREAST.  AFTER REDUCTION       Scheduled Meds:  Outpatient Encounter Medications as of 7/22/2019   Medication Sig Dispense Refill   • acetaminophen (TYLENOL) 650 MG 8 hr tablet Take 650 mg by mouth Every 8 (Eight) Hours As Needed for Mild Pain .     • albuterol (PROVENTIL HFA;VENTOLIN HFA) 108 (90 Base) MCG/ACT inhaler Inhale 2 puffs Every 4 (Four) Hours As Needed for Wheezing. 1 inhaler 1   • atorvastatin (LIPITOR) 80 MG tablet Take 80 mg by mouth Every Night.     • carvedilol (COREG) 3.125 MG tablet Take 3.125 mg by mouth Daily.     • clobetasol (TEMOVATE) 0.05 % cream  Apply 1 application topically to the appropriate area as directed 2 (Two) Times a Day As Needed (APPLY TO THE RIGHT HAND SPARINGLY FOR SHORT DURATION. AVOID FACE OR PROLONGED USE.).     • clopidogrel (PLAVIX) 75 MG tablet TAKE 1 TABLET BY MOUTH EVERY DAY 90 tablet 0   • cyclobenzaprine (FLEXERIL) 10 MG tablet Take 10 mg by mouth 3 (Three) Times a Day As Needed for Muscle Spasms.     • diclofenac (VOLTAREN) 75 MG EC tablet Take 75 mg by mouth 2 (Two) Times a Day.     • estradiol (ESTRACE VAGINAL) 0.1 MG/GM vaginal cream Insert 2 g into the vagina Daily. 42.5 g 12   • hydrOXYzine (ATARAX) 25 MG tablet 1-2 tablets at bedtime as needed for itching 30 tablet 3   • Iron-Vitamin C (VITRON-C)  MG tablet Take 1 tablet by mouth Daily. 60 tablet 0   • montelukast (SINGULAIR) 10 MG tablet Take 10 mg by mouth Daily.     • pantoprazole (PROTONIX) 40 MG EC tablet TAKE 1 TABLET BY MOUTH TWICE DAILY BEFORE MEALS 180 tablet 0   • varenicline (CHANTIX STARTING MONTH ANISA) 0.5 MG X 11 & 1 MG X 42 tablet Take 0.5 mg one daily on days 1-3 and and 0.5 mg twice daily on days 4-7.Then 1 mg twice daily as directed 42 tablet 0   • venlafaxine XR (EFFEXOR-XR) 37.5 MG 24 hr capsule TAKE 1 CAPSULE BY MOUTH DAILY 90 capsule 0     No facility-administered encounter medications on file as of 2019.        Continuous Infusions:  No current facility-administered medications for this visit.     PRN Meds:.    Allergies   Allergen Reactions   • Latex Itching       Social History     Socioeconomic History   • Marital status: Single     Spouse name: Not on file   • Number of children: 1   • Years of education: College   • Highest education level: Not on file   Occupational History     Employer: DISABLED   Tobacco Use   • Smoking status: Current Every Day Smoker     Types: Electronic Cigarette     Last attempt to quit:      Years since quittin.5   • Smokeless tobacco: Never Used   • Tobacco comment: caffiene use - 3 cups coffee daily    Substance and Sexual Activity   • Alcohol use: No   • Drug use: No   • Sexual activity: Not Currently     Birth control/protection: Post-menopausal       Family History   Problem Relation Age of Onset   • Alzheimer's disease Mother    • Breast cancer Mother    • Diabetes Mother    • Lung disease Father         pullmonary artery disease   • Heart disease Father    • Cancer Father 32   • Heart disease Brother    • Diabetes Brother    • Heart attack Brother    • Heart disease Brother    • Colon cancer Other    • Heart attack Other    • Diabetes Other    • Heart disease Other    • Diabetes Brother    • JOSHUA disease Brother        Review of Systems   Constitutional: Positive for fatigue. Negative for activity change, appetite change, fever and unexpected weight change.   HENT: Negative for trouble swallowing.    Respiratory: Negative for apnea, cough, choking, chest tightness, shortness of breath and wheezing.    Cardiovascular: Negative for chest pain, palpitations and leg swelling.   Gastrointestinal: Negative for abdominal distention, abdominal pain, anal bleeding, blood in stool, constipation, diarrhea, nausea, rectal pain and vomiting.       Vitals:    07/22/19 1340   BP: 100/70   Temp: 98.2 °F (36.8 °C)       Physical Exam   Constitutional: She is oriented to person, place, and time. She appears well-developed and well-nourished.   Eyes: Pupils are equal, round, and reactive to light.   Cardiovascular: Normal rate, regular rhythm and normal heart sounds.   Pulmonary/Chest: Effort normal and breath sounds normal. No respiratory distress. She has no wheezes.   Abdominal: Soft. Bowel sounds are normal. She exhibits no distension and no mass. There is no tenderness. There is no guarding. No hernia.   Musculoskeletal: Normal range of motion.   Neurological: She is alert and oriented to person, place, and time.   Skin: Skin is warm and dry. Capillary refill takes less than 2 seconds.   Psychiatric: She has a normal mood  and affect. Her behavior is normal.       No images are attached to the encounter.    Tiera was seen today for follow-up and anemia.    Diagnoses and all orders for this visit:    Anemia, unspecified type    Gastroesophageal reflux disease with esophagitis      Impression:      This is a 59-year-old female seen today in follow-up for anemia with recent endoscopic evaluation.  I reviewed endoscopic findings as well as pathology and recommendations moving forward.  Her only symptom on visit today is continued fatigue which is slightly improved.  She is on daily PPI as well as iron supplement.  Recent hemoglobin stable at 11.6.  Patient follows the hematology group.  At this point I will discuss patient issues with Dr. Bee.  She was found to have esophagitis and gastritis on recent endoscopy.  May need to consider small bowel follow-through or capsule endoscopy versus follow-up labs in 6 weeks to reassess anemia.  For now continue current medication regimen and follow-up in the office in 6 weeks.

## 2019-07-23 ENCOUNTER — OFFICE VISIT (OUTPATIENT)
Dept: FAMILY MEDICINE CLINIC | Facility: CLINIC | Age: 59
End: 2019-07-23

## 2019-07-23 ENCOUNTER — TELEPHONE (OUTPATIENT)
Dept: GASTROENTEROLOGY | Facility: CLINIC | Age: 59
End: 2019-07-23

## 2019-07-23 VITALS
DIASTOLIC BLOOD PRESSURE: 68 MMHG | BODY MASS INDEX: 24.52 KG/M2 | WEIGHT: 181 LBS | HEART RATE: 82 BPM | SYSTOLIC BLOOD PRESSURE: 102 MMHG | HEIGHT: 72 IN | OXYGEN SATURATION: 97 %

## 2019-07-23 DIAGNOSIS — D50.9 IRON DEFICIENCY ANEMIA, UNSPECIFIED IRON DEFICIENCY ANEMIA TYPE: Primary | ICD-10-CM

## 2019-07-23 DIAGNOSIS — G89.29 CHRONIC MIDLINE LOW BACK PAIN WITH LEFT-SIDED SCIATICA: ICD-10-CM

## 2019-07-23 DIAGNOSIS — R53.1 WEAKNESS: ICD-10-CM

## 2019-07-23 DIAGNOSIS — M54.42 CHRONIC MIDLINE LOW BACK PAIN WITH LEFT-SIDED SCIATICA: ICD-10-CM

## 2019-07-23 PROCEDURE — 99214 OFFICE O/P EST MOD 30 MIN: CPT | Performed by: NURSE PRACTITIONER

## 2019-07-23 NOTE — PROGRESS NOTES
Subjective   Tiera Abbott is a 59 y.o. female.     Follow-up severe anemia  Fatigue weakness as well as recent episode of weakness having to go to the emergency room by EMS.  Patient is feeling better, she cannot afford to be out of work any longer despite getting disability.  She seen hematology, her CBC is improved and her strength is coming back.  No chest pain no shortness of breath no dizziness.  She has no active bleeding.  Recent EGD and colonoscopy without any evidence of any occult bleeding.  she has a follow-up plan for lab with Dr. Harris hematology.  She had an EGD as well in 2017 for similar episode  As well as negative colonoscopy and her Hemoccults were negative at that time  I do not know if she is had a recent Hemoccult will need to clarify this at her follow-up.    At discharge she is requesting physical therapy he has chronic intermittent low back pain degenerative disc disease lumbar  Says she has no new back pain no severe back pain no weakness fevers chills abdominal pain nausea vomiting bowel bladder changes  Mostly back pain mid radiates to the left and left upper buttock and hip  She has no pain radiating to her groin she has no weakness    Usually physical therapy and exercises to help is already getting therapy for her shoulder by a different prescriber, but Work[employer]'s Comp. injury she would like to go ahead and get physical therapy at the same location but for  Chronic intermittent back pain.        She would like to return to work ASAP with the only restrictions as prescribed by her note be sent in yesterday to avoid excessive heat, as well as direct sunlight  See letters.  She may return to full duty starting tomorrow             The following portions of the patient's history were reviewed and updated as appropriate: allergies, current medications, past family history, past medical history, past social history, past surgical history and problem list.    Review of Systems    Constitutional: Negative for chills, fatigue, fever and unexpected weight loss.   HENT: Negative.  Negative for trouble swallowing.    Eyes: Negative.    Respiratory: Negative for cough and shortness of breath.    Cardiovascular: Negative for chest pain, palpitations and leg swelling.   Gastrointestinal: Negative for abdominal pain and blood in stool.   Genitourinary: Negative.  Negative for pelvic pain.   Musculoskeletal: Negative.    Skin: Negative.    Neurological: Negative.  Negative for dizziness, speech difficulty, weakness and confusion.   Psychiatric/Behavioral: Negative.        Objective   Physical Exam   Constitutional: She is oriented to person, place, and time. She appears well-developed and well-nourished.   HENT:   Head: Normocephalic.   Mouth/Throat: Oropharynx is clear and moist. No oropharyngeal exudate.   Eyes: Conjunctivae are normal. Pupils are equal, round, and reactive to light.   Neck: Neck supple. No JVD present.   Cardiovascular: Normal rate, regular rhythm, normal heart sounds and intact distal pulses. Exam reveals no friction rub.   No murmur heard.  Pulmonary/Chest: Effort normal and breath sounds normal. No respiratory distress. She has no wheezes.   Abdominal: Soft. Bowel sounds are normal. She exhibits no distension and no mass. There is no tenderness. There is no guarding. No hernia.   Musculoskeletal: Normal range of motion. She exhibits no edema or tenderness.   No lumbar tenderness straight leg raise negative plantar flexion dorsiflexion normal normal gait   Lymphadenopathy:     She has no cervical adenopathy.   Neurological: She is alert and oriented to person, place, and time.   Skin: Skin is warm and dry.   Psychiatric: She has a normal mood and affect. Her behavior is normal. Judgment and thought content normal.   Vitals reviewed.        Assessment/Plan   Tiera was seen today for follow-up on iron.    Diagnoses and all orders for this visit:    Iron deficiency anemia,  unspecified iron deficiency anemia type    Chronic midline low back pain with left-sided sciatica  -     Ambulatory Referral to Physical Therapy    Weakness    Other orders  -     varenicline (CHANTIX STARTING MONTH ANISA) 0.5 MG X 11 & 1 MG X 42 tablet; Take 0.5 mg one daily on days 1-3 and and 0.5 mg twice daily on days 4-7.Then 1 mg twice daily as directed      Patient to follow-up with hematology  She will continue ferrous sulfate she will need serial CBC monitoring  She will follow-up with gastroenterology as well  Her anemia continues without a source and if not related to GI bleeding, she may need a camera evaluation.    We should avoid long periods without monitoring her blood work to avoid any weakness episodes and severe anemia especially as she has a history of CAD.    Physical therapy for her back regular exercise  She will follow-up after physical therapy in 6 to 8 weeks any increasing back pain fever chills nausea vomiting weakness saddlebag paresthesias emergency room    Any persistent or increased back pain she should get an MRI and not delay.    Also with discharge requesting refill Chantix did find previously help needs to quit smoking                There are no Patient Instructions on file for this visit.

## 2019-07-23 NOTE — TELEPHONE ENCOUNTER
----- Message from SUMMER Wood sent at 7/22/2019  3:55 PM EDT -----  Please notify patient that I spoke with Dr. Bee regarding her issues.  No need for further testing.  Lets repeat CBC and iron studies in 4  weeks.  Continue with once daily PPI.      ----- Message -----  From: Minna Bee MD  Sent: 7/22/2019   2:33 PM  To: SUMMER Wood    Only if her Hb drops again.  Would follow labs,  thx    ----- Message -----  From: Elzbitea Menchaca APRN  Sent: 7/22/2019   2:18 PM  To: Minna Bee MD    Patient seen in f/u for anemia. Recent Hgb 11.6. EGD/C/s with you in June with nonsevere esophagitis, gastritis.  No evidence of bleeding on colonoscopy.  Patient is on iron supplement following the hematology group.  Any reason to move forward with a small bowel follow-through or capsule endoscopy?  Thanks BG

## 2019-07-24 NOTE — TELEPHONE ENCOUNTER
Call from pt.  Advise per HIEU Menchaca that case discussed with DR Bee.  No need for further testing.  Repeat labs in 4 wks.  Continue with once daily ppi.    Verb understanding.  [facility] has f/u lab/appt with CBC on 8/22 - it is noted that labs that will be obtained there will be cbc, ferritin, iron profile.    Has f/u appt with DR Bee on 9/4.   [facility] has been on pantoprazole 40 mg twice daily since hospitalization.  Asking if should decrease this to once daily.      Update/question to Dr Bee.

## 2019-07-24 NOTE — TELEPHONE ENCOUNTER
Okay to decrease the pantoprazole to once daily--she should take it every morning before breakfast.

## 2019-07-25 NOTE — TELEPHONE ENCOUNTER
Call to pt.  Advise per Dr Bee that ok to decrease the pantoprazole to once daily - should take every morning before breakfast.  Verb understanding.

## 2019-08-07 ENCOUNTER — TELEPHONE (OUTPATIENT)
Dept: OBSTETRICS AND GYNECOLOGY | Age: 59
End: 2019-08-07

## 2019-08-22 RX ORDER — VENLAFAXINE HYDROCHLORIDE 37.5 MG/1
CAPSULE, EXTENDED RELEASE ORAL
Qty: 90 CAPSULE | Refills: 2 | Status: SHIPPED | OUTPATIENT
Start: 2019-08-22 | End: 2020-05-29 | Stop reason: SDUPTHER

## 2019-09-04 ENCOUNTER — OFFICE VISIT (OUTPATIENT)
Dept: GASTROENTEROLOGY | Facility: CLINIC | Age: 59
End: 2019-09-04

## 2019-09-04 VITALS
SYSTOLIC BLOOD PRESSURE: 112 MMHG | BODY MASS INDEX: 24.71 KG/M2 | TEMPERATURE: 98 F | HEIGHT: 72 IN | WEIGHT: 182.4 LBS | DIASTOLIC BLOOD PRESSURE: 82 MMHG

## 2019-09-04 DIAGNOSIS — D50.8 OTHER IRON DEFICIENCY ANEMIA: Primary | ICD-10-CM

## 2019-09-04 DIAGNOSIS — Z86.010 PERSONAL HISTORY OF COLONIC POLYPS: ICD-10-CM

## 2019-09-04 PROBLEM — Z86.0100 PERSONAL HISTORY OF COLONIC POLYPS: Status: ACTIVE | Noted: 2019-09-04

## 2019-09-04 PROCEDURE — 99213 OFFICE O/P EST LOW 20 MIN: CPT | Performed by: INTERNAL MEDICINE

## 2019-09-04 NOTE — PATIENT INSTRUCTIONS
Labs today    Will follow blood count, small bowel study if blood count is still low or not improving    For any additional questions, concerns or changes to your condition after today's office visit please contact the office at [phone].

## 2019-09-04 NOTE — PROGRESS NOTES
Chief Complaint   Patient presents with   • Anemia     Subjective     HPI  Tiera Abbott is a 59 y.o. female who presents for follow up of iron deficiency anemia that is chronic and personal history of colon polyps.  She had repeat endoscopy in  with no attributable cause to her anemia.      Last labs early July showed Hb 11.6    She is taking oral iron.  She has seen hematology.     Reports normal diet.  Feeling better-- no lightheadedness, dizziness, shortness of breath.    Denies any overt bleeding.    Past Medical History:   Diagnosis Date   • Acid reflux    • Alcoholism in remission (CMS/[facility])     SINCE AGE 22   • Arthritis    • Back pain    • Cardiac disease    • Chest pain    • Coronary artery disease     mi   • Depression    • Eczema    • Eczema    • Heart attack (CMS/[facility])    • Heart attack (CMS/[facility])    • Heart disease    • History of anemia    • History of bruising easily    • Hypercholesterolemia    • IBS (irritable bowel syndrome)    • Inappropriate (too hot or too cold) temperature in local application and packing     always hot or cold   • Joint pain, knee     LEFT KNEE   • Menopausal state    • Muscle pain    • Sleep apnea     DOES NOT USE MACHINE   • Stiffness in joint        Social History     Socioeconomic History   • Marital status: Single     Spouse name: Not on file   • Number of children: 1   • Years of education: College   • Highest education level: Not on file   Occupational History     Employer: DISABLED   Tobacco Use   • Smoking status: Current Every Day Smoker     Types: Electronic Cigarette     Last attempt to quit: 2014     Years since quittin.6   • Smokeless tobacco: Never Used   • Tobacco comment: caffiene use - 3 cups coffee daily   Substance and Sexual Activity   • Alcohol use: No   • Drug use: No   • Sexual activity: Not Currently     Birth control/protection: Post-menopausal         Current Outpatient Medications:   •  acetaminophen (TYLENOL) 650 MG 8 hr tablet, Take 650 mg  by mouth Every 8 (Eight) Hours As Needed for Mild Pain ., Disp: , Rfl:   •  albuterol (PROVENTIL HFA;VENTOLIN HFA) 108 (90 Base) MCG/ACT inhaler, Inhale 2 puffs Every 4 (Four) Hours As Needed for Wheezing., Disp: 1 inhaler, Rfl: 1  •  atorvastatin (LIPITOR) 80 MG tablet, Take 80 mg by mouth Every Night., Disp: , Rfl:   •  carvedilol (COREG) 3.125 MG tablet, Take 3.125 mg by mouth Daily., Disp: , Rfl:   •  clobetasol (TEMOVATE) 0.05 % cream, Apply 1 application topically to the appropriate area as directed 2 (Two) Times a Day As Needed (APPLY TO THE RIGHT HAND SPARINGLY FOR SHORT DURATION. AVOID FACE OR PROLONGED USE.)., Disp: , Rfl:   •  clopidogrel (PLAVIX) 75 MG tablet, TAKE 1 TABLET BY MOUTH EVERY DAY, Disp: 90 tablet, Rfl: 0  •  cyclobenzaprine (FLEXERIL) 10 MG tablet, Take 10 mg by mouth 3 (Three) Times a Day As Needed for Muscle Spasms., Disp: , Rfl:   •  diclofenac (VOLTAREN) 75 MG EC tablet, Take 75 mg by mouth 2 (Two) Times a Day., Disp: , Rfl:   •  estradiol (ESTRACE VAGINAL) 0.1 MG/GM vaginal cream, Insert 2 g into the vagina Daily., Disp: 42.5 g, Rfl: 12  •  hydrOXYzine (ATARAX) 25 MG tablet, 1-2 tablets at bedtime as needed for itching, Disp: 30 tablet, Rfl: 3  •  Iron-Vitamin C (VITRON-C)  MG tablet, Take 1 tablet by mouth Daily., Disp: 60 tablet, Rfl: 0  •  montelukast (SINGULAIR) 10 MG tablet, Take 10 mg by mouth Daily., Disp: , Rfl:   •  pantoprazole (PROTONIX) 40 MG EC tablet, TAKE 1 TABLET BY MOUTH TWICE DAILY BEFORE MEALS, Disp: 180 tablet, Rfl: 0  •  varenicline (CHANTIX STARTING MONTH ANISA) 0.5 MG X 11 & 1 MG X 42 tablet, Take 0.5 mg one daily on days 1-3 and and 0.5 mg twice daily on days 4-7.Then 1 mg twice daily as directed, Disp: 53 tablet, Rfl: 0  •  venlafaxine XR (EFFEXOR-XR) 37.5 MG 24 hr capsule, TAKE ONE CAPSULE BY MOUTH EVERY DAY, Disp: 90 capsule, Rfl: 2    Review of Systems   Constitutional: Negative for activity change, appetite change, chills and fever.   HENT: Negative for  trouble swallowing.    Respiratory: Negative.    Cardiovascular: Negative.  Negative for chest pain.   Gastrointestinal: Negative for abdominal distention, abdominal pain, anal bleeding, constipation, diarrhea, nausea and vomiting.   Genitourinary: Negative for dysuria, frequency and hematuria.       Objective   Vitals:    09/04/19 1512   BP: 112/82   Temp: 98 °F (36.7 °C)         09/04/19  1512   Weight: 82.7 kg (182 lb 6.4 oz)     Body mass index is 24.74 kg/m².      Physical Exam   Constitutional: She is oriented to person, place, and time. She appears well-developed and well-nourished. No distress.   HENT:   Head: Normocephalic and atraumatic.   Right Ear: External ear normal.   Left Ear: External ear normal.   Nose: Nose normal.   Mouth/Throat: Oropharynx is clear and moist.   Eyes: Conjunctivae and EOM are normal. Right eye exhibits no discharge. Left eye exhibits no discharge. No scleral icterus.   Neck: Normal range of motion. Neck supple. No thyromegaly present.   No supraclavicular adenopathy   Cardiovascular: Normal rate, regular rhythm, normal heart sounds and intact distal pulses. Exam reveals no gallop.   No murmur heard.  No lower extremity edema   Pulmonary/Chest: Effort normal and breath sounds normal. No respiratory distress. She has no wheezes.   Abdominal: Soft. Normal appearance and bowel sounds are normal. She exhibits no distension and no mass. There is no hepatosplenomegaly. There is no tenderness. There is no rigidity, no rebound and no guarding. No hernia.   Genitourinary:   Genitourinary Comments: Rectal exam deferred   Musculoskeletal: Normal range of motion. She exhibits no edema or tenderness.   No atrophy of upper or lower extremities.  Normal digits and nails of both hands.   Lymphadenopathy:     She has no cervical adenopathy.   Neurological: She is alert and oriented to person, place, and time. She displays no atrophy. Coordination normal.   Skin: Skin is warm and dry. No rash  noted. She is not diaphoretic. No erythema.   Psychiatric: She has a normal mood and affect. Her behavior is normal. Judgment and thought content normal.   Vitals reviewed.      WBC   Date Value Ref Range Status   06/13/2019 6.93 3.40 - 10.80 10*3/mm3 Final   05/23/2019 7.87 3.40 - 10.80 10*3/mm3 Final     RBC   Date Value Ref Range Status   06/13/2019 5.01 3.77 - 5.28 10*6/mm3 Final   05/23/2019 4.07 3.77 - 5.28 10*6/mm3 Final     Hemoglobin   Date Value Ref Range Status   07/08/2019 11.6 (L) 12.0 - 15.9 g/dL Final   06/13/2019 11.4 (L) 12.0 - 15.9 g/dL Final     Hematocrit   Date Value Ref Range Status   07/08/2019 38.8 34.0 - 46.6 % Final   06/13/2019 37.9 34.0 - 46.6 % Final     MCV   Date Value Ref Range Status   06/13/2019 75.6 (L) 79.0 - 97.0 fL Final     MCH   Date Value Ref Range Status   06/13/2019 22.8 (L) 26.6 - 33.0 pg Final     MCHC   Date Value Ref Range Status   06/13/2019 30.1 (L) 31.5 - 35.7 g/dL Final     RDW   Date Value Ref Range Status   06/13/2019  12.3 - 15.4 % Final     Comment:     NOT NEEDED     RDW-SD   Date Value Ref Range Status   05/30/2019 45.1 37.0 - 54.0 fl Final     MPV   Date Value Ref Range Status   06/13/2019 11.0 6.0 - 12.0 fL Final     Platelets   Date Value Ref Range Status   06/13/2019 242 140 - 450 10*3/mm3 Final     Neutrophil %   Date Value Ref Range Status   06/13/2019 64.6 42.7 - 76.0 % Final     Lymphocyte %   Date Value Ref Range Status   06/13/2019 24.1 19.6 - 45.3 % Final     Monocyte %   Date Value Ref Range Status   06/13/2019 6.6 5.0 - 12.0 % Final     Eosinophil %   Date Value Ref Range Status   06/13/2019 3.9 0.3 - 6.2 % Final     Basophil %   Date Value Ref Range Status   06/13/2019 0.4 0.0 - 1.5 % Final     Immature Grans %   Date Value Ref Range Status   06/13/2019 0.4 0.0 - 0.5 % Final     Neutrophils, Absolute   Date Value Ref Range Status   06/13/2019 4.47 1.70 - 7.00 10*3/mm3 Final     Lymphocytes, Absolute   Date Value Ref Range Status   06/13/2019 1.67  0.70 - 3.10 10*3/mm3 Final     Monocytes, Absolute   Date Value Ref Range Status   06/13/2019 0.46 0.10 - 0.90 10*3/mm3 Final     Eosinophils, Absolute   Date Value Ref Range Status   06/13/2019 0.27 0.00 - 0.40 10*3/mm3 Final     Basophils, Absolute   Date Value Ref Range Status   06/13/2019 0.03 0.00 - 0.20 10*3/mm3 Final     Immature Grans, Absolute   Date Value Ref Range Status   06/13/2019 0.03 0.00 - 0.05 10*3/mm3 Final     nRBC   Date Value Ref Range Status   06/13/2019 0.0 0.0 - 0.2 /100 WBC Final       Lab Results   Component Value Date    GLUCOSE 102 (H) 10/05/2018    BUN 12 05/23/2019    CREATININE 0.64 05/23/2019    EGFRIFNONA 95 05/23/2019    EGFRIFAFRI 115 05/23/2019    BCR 18.8 05/23/2019    CO2 25.2 05/23/2019    CALCIUM 9.6 05/23/2019    PROTENTOTREF 6.6 05/23/2019    ALBUMIN 3.80 05/23/2019    LABIL2 1.4 05/23/2019    AST 10 05/23/2019    ALT 7 05/23/2019         Imaging Results (last 7 days)     ** No results found for the last 168 hours. **            Assessment/Plan    Iron deficiency anemia: recurrent issue.  Improving on supplementation    Personal hx colon polyps: next colonoscopy will be due 2024    Plan  H and H today, ferritin level today  Continue iron  If her hemoglobin is lower or not improved from the last check, will pursue capsule endoscopy.  Otherwise we will continue to follow her hemoglobin on a regular basis.  She will need capsule endoscopy at any point that it declines  Tiera was seen today for anemia.    Diagnoses and all orders for this visit:    Other iron deficiency anemia  -     Hemoglobin & Hematocrit, Blood  -     Ferritin    Personal history of colonic polyps        Dictated utilizing Dragon dictation

## 2019-09-05 ENCOUNTER — TELEPHONE (OUTPATIENT)
Dept: GASTROENTEROLOGY | Facility: CLINIC | Age: 59
End: 2019-09-05

## 2019-09-05 LAB
FERRITIN SERPL-MCNC: 40.9 NG/ML (ref 13–150)
HCT VFR BLD AUTO: 39.8 % (ref 34–46.6)
HGB BLD-MCNC: 12.2 G/DL (ref 12–15.9)

## 2019-09-09 NOTE — PROGRESS NOTES
Hemoglobin as well as iron stores have normalized.    Continue iron supplementation.    Office follow-up with nurse practitioner in 4 months

## 2019-09-10 NOTE — PROGRESS NOTES
REASON FOR FOLLOW-UP: History of recurrent iron deficiency anemia requiring repeated IV iron therapy.      History of Present Illness    Mrs. Abbott is a pleasant 59-year-old woman who returns today for four-month follow-up.  When she was seen in May 2019 by Dr. Harris, the patient was significantly anemic with a hemoglobin of 7.8, MCV 69.  Iron saturation was 3%, ferritin 29.  Though she was placed on oral iron, this caused her nausea and constipation.  We elected to give her IV Injectafer which took place in June 2019.  She also underwent repeat upper and lower endoscopy per Dr. Minna Bee with evidence of nonsevere esophagitis, scattered mild inflammation with biopsies taken, pathology negative.  She had 3 polyps removed in the colon, again pathology negative.  She had internal bleeding hemorrhoids.    All that to say, she just saw Dr. Bee in follow-up last week and repeat labs done showed a hemoglobin of 12.2 (improved from 11.4 in May) and ferritin of 40.  The patient reports feeling well overall with no pica (previously a significant issue).  She states there are no plans for small bowel capsule endoscopy at this time.    I explained to her that I would like her to at least have an iron saturation drawn today to have this stayed in conjunction with labs done last week.  We discussed the importance of this in combination with knowing the ferritin and hemoglobin.  She verbalized understanding after lengthy discussion.  Of note, she states she actually has continued to take oral iron even after receiving IV Injectafer.  She notes her stools are a black sticky consistency but is not having much in the way of nausea or GI upset currently.    She denies other concerns this time.    Past Medical History, Past Surgical History, Social History, Family History have been reviewed and are without significant changes except as mentioned from my consult note on 8/25/17.    Review of Systems   Constitutional: Positive  "for fatigue (improved). Negative for activity change and unexpected weight change.   HENT: Negative.    Respiratory: Negative.    Cardiovascular: Negative.    Gastrointestinal: Positive for constipation. Negative for nausea.   Genitourinary: Negative.    Musculoskeletal: Negative.    Skin: Negative.    Neurological: Negative for dizziness.   Hematological: Negative.    Psychiatric/Behavioral: Negative.        Medications:  The current medication list was reviewed in the EMR    ALLERGIES:    Allergies   Allergen Reactions   • Latex Itching       Objective      Vitals:    09/11/19 1559   BP: 104/68   Pulse: 76   Resp: 16   Temp: 97.9 °F (36.6 °C)   TempSrc: Oral   SpO2: 96%   Weight: 83.4 kg (183 lb 12.8 oz)   Height: 182.9 cm (72.01\")   PainSc: 0-No pain     Current Status 9/11/2019   ECOG score 0       Physical Exam    Gen: pleasant well-nourished woman  HEENT: no icterus  CV: RRR  CHEST: CTA bilat  ABD: soft, no masses  SKIN: no petechiae or bruising, pallor noted    RECENT LABS:              Labs from Dr. Bee 9/4/19  Hgb = 12.2, Hct = 39.8  Ferritin = 40  Iron Sat pending today    Assessment/Plan   Recurrent iron deficiency anemia, requiring previous IV iron,, most recently receiving Injectafer in June 2019 after she was found previously to have a hemoglobin of 7.8, MCV 69, ferritin of 40 and iron saturation of 3%.  This was while attempting oral iron though at the time she was reporting nausea and constipation.    She underwent upper and lower endoscopies per Dr. Bee, GI in June 2019 with no significant findings.  No plans for small bowel capsule endoscopy at this time.    She is feeling fairly good at this time with hemoglobin of 12.2 which is improved from May 2019.  Ferritin reported at 40 from labs done with Dr. Noonan last week.  I requested the patient return to the lab today for an iron saturation to be drawn to have this information in conjunction with most recent ferritin and hemoglobin.  Will help " determine if recent Injectafer and continuation of oral iron is maintaining her iron stores.    As long as this is acceptable, we will otherwise plan for her to return in 3 months to see Dr. Harris, 1 week prior checking CBC, ferritin and iron profile.  She is agreeable with this plan.    I did spend 30 minutes with the patient today, 25 minutes in face-to-face consultation and coordination of care reviewing issues outlined in the assessment and plan above.              9/11/2019      CC:

## 2019-09-11 ENCOUNTER — LAB (OUTPATIENT)
Dept: LAB | Facility: HOSPITAL | Age: 59
End: 2019-09-11

## 2019-09-11 ENCOUNTER — OFFICE VISIT (OUTPATIENT)
Dept: ONCOLOGY | Facility: CLINIC | Age: 59
End: 2019-09-11

## 2019-09-11 VITALS
SYSTOLIC BLOOD PRESSURE: 104 MMHG | HEART RATE: 76 BPM | RESPIRATION RATE: 16 BRPM | OXYGEN SATURATION: 96 % | WEIGHT: 183.8 LBS | BODY MASS INDEX: 24.89 KG/M2 | HEIGHT: 72 IN | DIASTOLIC BLOOD PRESSURE: 68 MMHG | TEMPERATURE: 97.9 F

## 2019-09-11 DIAGNOSIS — D50.0 IRON DEFICIENCY ANEMIA DUE TO CHRONIC BLOOD LOSS: ICD-10-CM

## 2019-09-11 DIAGNOSIS — D50.9 IRON DEFICIENCY ANEMIA, UNSPECIFIED IRON DEFICIENCY ANEMIA TYPE: Primary | ICD-10-CM

## 2019-09-11 LAB
FERRITIN SERPL-MCNC: 30.9 NG/ML (ref 11–207)
IRON 24H UR-MRATE: 39 MCG/DL (ref 37–145)
IRON SATN MFR SERPL: 11 % (ref 14–48)
TIBC SERPL-MCNC: 367 MCG/DL (ref 249–505)
TRANSFERRIN SERPL-MCNC: 262 MG/DL (ref 200–360)

## 2019-09-11 PROCEDURE — 99214 OFFICE O/P EST MOD 30 MIN: CPT | Performed by: NURSE PRACTITIONER

## 2019-09-11 PROCEDURE — G0463 HOSPITAL OUTPT CLINIC VISIT: HCPCS | Performed by: NURSE PRACTITIONER

## 2019-09-11 PROCEDURE — 36415 COLL VENOUS BLD VENIPUNCTURE: CPT | Performed by: NURSE PRACTITIONER

## 2019-09-11 PROCEDURE — 84466 ASSAY OF TRANSFERRIN: CPT | Performed by: NURSE PRACTITIONER

## 2019-09-11 PROCEDURE — 83540 ASSAY OF IRON: CPT | Performed by: NURSE PRACTITIONER

## 2019-09-11 PROCEDURE — 82728 ASSAY OF FERRITIN: CPT | Performed by: NURSE PRACTITIONER

## 2019-09-11 RX ORDER — SODIUM, POTASSIUM,MAG SULFATES 17.5-3.13G
SOLUTION, RECONSTITUTED, ORAL ORAL
Refills: 0 | COMMUNITY
Start: 2019-06-10 | End: 2020-02-10 | Stop reason: SDDI

## 2019-09-11 RX ORDER — IBUPROFEN 800 MG/1
TABLET ORAL
Refills: 0 | COMMUNITY
Start: 2019-06-10 | End: 2020-08-24

## 2019-09-19 ENCOUNTER — TELEPHONE (OUTPATIENT)
Dept: GASTROENTEROLOGY | Facility: CLINIC | Age: 59
End: 2019-09-19

## 2019-09-19 NOTE — TELEPHONE ENCOUNTER
Call to pt.  Advise per DR Bee that Hgb as well as iron stores have normalized.    Continue iron supplement.    Office f/u with APRN in 4 mo's.    Advise pt that Jan schedule not yet open - instructed call back in Nov or Dec to schedule.  States will do so.

## 2019-09-19 NOTE — TELEPHONE ENCOUNTER
----- Message from Minna Bee MD sent at 9/9/2019  5:23 PM EDT -----  Hemoglobin as well as iron stores have normalized.    Continue iron supplementation.    Office follow-up with nurse practitioner in 4 months

## 2019-11-01 ENCOUNTER — TELEPHONE (OUTPATIENT)
Dept: FAMILY MEDICINE CLINIC | Facility: CLINIC | Age: 59
End: 2019-11-01

## 2019-11-01 DIAGNOSIS — Z12.31 SCREENING MAMMOGRAM, ENCOUNTER FOR: Primary | ICD-10-CM

## 2019-11-01 NOTE — TELEPHONE ENCOUNTER
Pt hasn't had a mammogram this year so would like an order put in for that. She has also found a tiny bump in her left breast.

## 2019-11-04 ENCOUNTER — TELEPHONE (OUTPATIENT)
Dept: FAMILY MEDICINE CLINIC | Facility: CLINIC | Age: 59
End: 2019-11-04

## 2019-11-04 NOTE — TELEPHONE ENCOUNTER
Pt calling for exploratory mammogram order. Pt states she has found a small lump in breast. Pt is requesting a call back to discuss her findings.

## 2019-11-14 DIAGNOSIS — N63.0 BREAST LUMP: Primary | ICD-10-CM

## 2019-11-14 DIAGNOSIS — N64.51 INDURATION OF BREAST: ICD-10-CM

## 2019-11-14 DIAGNOSIS — Z12.31 ENCOUNTER FOR SCREENING MAMMOGRAM FOR BREAST CANCER: ICD-10-CM

## 2019-11-20 ENCOUNTER — TELEPHONE (OUTPATIENT)
Dept: FAMILY MEDICINE CLINIC | Facility: CLINIC | Age: 59
End: 2019-11-20

## 2019-11-20 NOTE — TELEPHONE ENCOUNTER
Patient called and stated she is needing a routine bilateral  mammo, and unless Edgardo sends in a order her appointment will be canceled on Friday. Mr. Abbott is having a diagnostic left breast Mammo on Friday per the hospital they will not do a routine Mammo with a diagnostic left breat. They can do a bilateral diagnotic Mammo due to the patents having a left lump and a history of breast reduction. Patient has stated she does not want to do a bilateral diagnostic due to it would cost her more. Per the hospital they will not do a routine bilateral Mammo with a diagnostic, She would need to be seen on Friday for the breast she is having issues with and they will scheduled her a few weeks out for a routine Mammo to which she does not need a order for. I am not sure what else can be done about this. I have tried to call the patient back but she did not answer. I have left her a detailed VM on her cell.

## 2019-11-21 ENCOUNTER — TELEPHONE (OUTPATIENT)
Dept: FAMILY MEDICINE CLINIC | Facility: CLINIC | Age: 59
End: 2019-11-21

## 2019-11-21 ENCOUNTER — TELEPHONE (OUTPATIENT)
Dept: MAMMOGRAPHY | Facility: HOSPITAL | Age: 59
End: 2019-11-21

## 2019-11-21 DIAGNOSIS — N63.20 LUMP OF LEFT BREAST: Primary | ICD-10-CM

## 2019-11-21 NOTE — TELEPHONE ENCOUNTER
Left message for patient to return call concerning where her last mammogram was done also the notes state she is having a problem with breast need new order. patient stated with epley not doing both breast due to insurance.patient adivised to call mammo

## 2019-11-21 NOTE — TELEPHONE ENCOUNTER
I referred patient to breast surgeon Dr. Li    He can decide if she needs a diagnostic mammogram at this time as she is not actually had a clinical exam and she called for the referral     Please discussed with patient      Thanks

## 2019-11-21 NOTE — TELEPHONE ENCOUNTER
Patient to continue with a diagnostic mammogram left breast    Then we can get the mammogram screening  Afterward I have suggested    Please communicate this to the patient thank you

## 2019-11-21 NOTE — TELEPHONE ENCOUNTER
Spoke with Beena in radiology they will not do screening mammogram they will only do a diagnostic mammogram since you order diagnostic of left breast they will cancel it.

## 2019-11-22 ENCOUNTER — HOSPITAL ENCOUNTER (OUTPATIENT)
Dept: MAMMOGRAPHY | Facility: HOSPITAL | Age: 59
Discharge: HOME | End: 2019-11-22
Admitting: NURSE PRACTITIONER

## 2019-11-22 DIAGNOSIS — Z12.31 ENCOUNTER FOR SCREENING MAMMOGRAM FOR BREAST CANCER: ICD-10-CM

## 2019-11-22 DIAGNOSIS — N63.20 LUMP OF LEFT BREAST: Primary | ICD-10-CM

## 2019-11-22 DIAGNOSIS — N64.89 OTHER SPECIFIED DISORDERS OF BREAST: ICD-10-CM

## 2019-11-22 DIAGNOSIS — Z98.890 HISTORY OF BILATERAL BREAST REDUCTION SURGERY: ICD-10-CM

## 2019-11-22 PROCEDURE — 77066 DX MAMMO INCL CAD BI: CPT

## 2019-11-27 RX ORDER — CLOPIDOGREL BISULFATE 75 MG/1
TABLET ORAL
Qty: 90 TABLET | Refills: 0 | Status: SHIPPED | OUTPATIENT
Start: 2019-11-27 | End: 2020-01-13

## 2019-12-16 RX ORDER — ATORVASTATIN CALCIUM 80 MG/1
TABLET, FILM COATED ORAL
Qty: 30 TABLET | Refills: 0 | Status: SHIPPED | OUTPATIENT
Start: 2019-12-16 | End: 2020-02-24

## 2019-12-16 RX ORDER — CARVEDILOL 3.12 MG/1
TABLET ORAL
Qty: 60 TABLET | Refills: 0 | Status: SHIPPED | OUTPATIENT
Start: 2019-12-16 | End: 2020-04-09 | Stop reason: SDUPTHER

## 2020-01-13 RX ORDER — PANTOPRAZOLE SODIUM 40 MG/1
TABLET, DELAYED RELEASE ORAL
Qty: 90 TABLET | Refills: 0 | Status: SHIPPED | OUTPATIENT
Start: 2020-01-13 | End: 2020-04-15 | Stop reason: SDUPTHER

## 2020-01-13 RX ORDER — CLOPIDOGREL BISULFATE 75 MG/1
TABLET ORAL
Qty: 90 TABLET | Refills: 0 | Status: SHIPPED | OUTPATIENT
Start: 2020-01-13 | End: 2020-04-09 | Stop reason: SDUPTHER

## 2020-02-04 ENCOUNTER — TELEPHONE (OUTPATIENT)
Dept: ONCOLOGY | Facility: CLINIC | Age: 60
End: 2020-02-04

## 2020-02-04 DIAGNOSIS — D50.0 IRON DEFICIENCY ANEMIA DUE TO CHRONIC BLOOD LOSS: Primary | ICD-10-CM

## 2020-02-04 NOTE — TELEPHONE ENCOUNTER
PT FEELS LIKE HER NUMBERS ARE GETTING LOW. PT WAS LAST SEEN HERE IN SEPT AND WAS SUPPOSED TO F/U IN DEC W/ MD. PT STATES THAT APPT WAS NEVER MADE AND SHE DIDN'T KNOW SHE WAS TO DO THAT. PT REQUESTING LAB APPT FOR THIS THURS. WILL HAVE PT COME IN FOR CBC, STAT FERRITIN AND IRON PANEL W/ RN REV. PT TO THEN HAVE MD APPT MADE FOR SOME TIME NEAR FUTURE. APPT DESK TO SET UP. PT V/U.

## 2020-02-06 ENCOUNTER — TELEPHONE (OUTPATIENT)
Dept: GASTROENTEROLOGY | Facility: CLINIC | Age: 60
End: 2020-02-06

## 2020-02-06 ENCOUNTER — LAB (OUTPATIENT)
Dept: LAB | Facility: HOSPITAL | Age: 60
End: 2020-02-06

## 2020-02-06 ENCOUNTER — INFUSION (OUTPATIENT)
Dept: ONCOLOGY | Facility: HOSPITAL | Age: 60
End: 2020-02-06

## 2020-02-06 VITALS
HEART RATE: 96 BPM | RESPIRATION RATE: 18 BRPM | BODY MASS INDEX: 25.49 KG/M2 | WEIGHT: 188 LBS | SYSTOLIC BLOOD PRESSURE: 111 MMHG | TEMPERATURE: 97.9 F | DIASTOLIC BLOOD PRESSURE: 69 MMHG

## 2020-02-06 DIAGNOSIS — D50.8 OTHER IRON DEFICIENCY ANEMIA: Primary | ICD-10-CM

## 2020-02-06 DIAGNOSIS — D50.0 IRON DEFICIENCY ANEMIA DUE TO CHRONIC BLOOD LOSS: ICD-10-CM

## 2020-02-06 LAB
BASOPHILS # BLD AUTO: 0.03 10*3/MM3 (ref 0–0.2)
BASOPHILS NFR BLD AUTO: 0.4 % (ref 0–1.5)
DEPRECATED RDW RBC AUTO: 47.7 FL (ref 37–54)
EOSINOPHIL # BLD AUTO: 0.24 10*3/MM3 (ref 0–0.4)
EOSINOPHIL NFR BLD AUTO: 3.1 % (ref 0.3–6.2)
ERYTHROCYTE [DISTWIDTH] IN BLOOD BY AUTOMATED COUNT: 15.7 % (ref 12.3–15.4)
FERRITIN SERPL-MCNC: 40.5 NG/ML (ref 11–207)
HCT VFR BLD AUTO: 34.4 % (ref 34–46.6)
HGB BLD-MCNC: 10.1 G/DL (ref 12–15.9)
IMM GRANULOCYTES # BLD AUTO: 0.07 10*3/MM3 (ref 0–0.05)
IMM GRANULOCYTES NFR BLD AUTO: 0.9 % (ref 0–0.5)
IRON 24H UR-MRATE: 38 MCG/DL (ref 37–145)
IRON SATN MFR SERPL: 9 % (ref 14–48)
LYMPHOCYTES # BLD AUTO: 2.26 10*3/MM3 (ref 0.7–3.1)
LYMPHOCYTES NFR BLD AUTO: 29.5 % (ref 19.6–45.3)
MCH RBC QN AUTO: 24.9 PG (ref 26.6–33)
MCHC RBC AUTO-ENTMCNC: 29.4 G/DL (ref 31.5–35.7)
MCV RBC AUTO: 84.9 FL (ref 79–97)
MONOCYTES # BLD AUTO: 0.62 10*3/MM3 (ref 0.1–0.9)
MONOCYTES NFR BLD AUTO: 8.1 % (ref 5–12)
NEUTROPHILS # BLD AUTO: 4.44 10*3/MM3 (ref 1.7–7)
NEUTROPHILS NFR BLD AUTO: 58 % (ref 42.7–76)
NRBC BLD AUTO-RTO: 0.3 /100 WBC (ref 0–0.2)
PLATELET # BLD AUTO: 239 10*3/MM3 (ref 140–450)
PMV BLD AUTO: 10.8 FL (ref 6–12)
RBC # BLD AUTO: 4.05 10*6/MM3 (ref 3.77–5.28)
TIBC SERPL-MCNC: 409 MCG/DL (ref 249–505)
TRANSFERRIN SERPL-MCNC: 292 MG/DL (ref 200–360)
WBC NRBC COR # BLD: 7.66 10*3/MM3 (ref 3.4–10.8)

## 2020-02-06 PROCEDURE — 83540 ASSAY OF IRON: CPT

## 2020-02-06 PROCEDURE — 84466 ASSAY OF TRANSFERRIN: CPT

## 2020-02-06 PROCEDURE — 85025 COMPLETE CBC W/AUTO DIFF WBC: CPT

## 2020-02-06 PROCEDURE — 82728 ASSAY OF FERRITIN: CPT

## 2020-02-06 PROCEDURE — 36415 COLL VENOUS BLD VENIPUNCTURE: CPT

## 2020-02-06 NOTE — TELEPHONE ENCOUNTER
----- Message from Carlene Agustin sent at 2/6/2020  2:52 PM EST -----  Regarding: HEMOGLOBIN   Contact: [phone]  ASKING FOR AN CALL BACK

## 2020-02-06 NOTE — PROGRESS NOTES
Lab Results   Component Value Date    WBC 7.66 02/06/2020    HGB 10.1 (L) 02/06/2020    HCT 34.4 02/06/2020    MCV 84.9 02/06/2020     02/06/2020   labs reviewed with patient. Sees Dr Harris on Monday. Will call with Iron studies.

## 2020-02-06 NOTE — TELEPHONE ENCOUNTER
Called pt and pt reports that she just had labs done at [facility].  She reports her hgb is 10.1.  Pt reports that previously they had wanted her to have a capsule study but her hgb was too high.  Pt is asking if it is possible to have this done now.  Advised will send message to Dr Bee. Pt verb understanding.

## 2020-02-07 NOTE — TELEPHONE ENCOUNTER
Her blood count is low but her iron stores are actually good which does not suggest any significant blood losses.    Let us get a small bowel follow-through x-ray test and repeat her blood count and iron stores in 1 month    If any of these further tests are abnormal, will go ahead and proceed with a capsule study.

## 2020-02-10 ENCOUNTER — APPOINTMENT (OUTPATIENT)
Dept: LAB | Facility: HOSPITAL | Age: 60
End: 2020-02-10

## 2020-02-10 ENCOUNTER — OFFICE VISIT (OUTPATIENT)
Dept: ONCOLOGY | Facility: CLINIC | Age: 60
End: 2020-02-10

## 2020-02-10 VITALS
RESPIRATION RATE: 16 BRPM | BODY MASS INDEX: 25.33 KG/M2 | DIASTOLIC BLOOD PRESSURE: 63 MMHG | WEIGHT: 187 LBS | SYSTOLIC BLOOD PRESSURE: 98 MMHG | HEIGHT: 72 IN | OXYGEN SATURATION: 99 % | HEART RATE: 79 BPM | TEMPERATURE: 97.8 F

## 2020-02-10 DIAGNOSIS — D50.0 IRON DEFICIENCY ANEMIA DUE TO CHRONIC BLOOD LOSS: Primary | ICD-10-CM

## 2020-02-10 PROCEDURE — 99213 OFFICE O/P EST LOW 20 MIN: CPT | Performed by: INTERNAL MEDICINE

## 2020-02-10 RX ORDER — SODIUM CHLORIDE 9 MG/ML
250 INJECTION, SOLUTION INTRAVENOUS ONCE
Status: CANCELLED | OUTPATIENT
Start: 2020-02-10

## 2020-02-10 RX ORDER — PROCHLORPERAZINE MALEATE 10 MG
10 TABLET ORAL ONCE
Status: CANCELLED
Start: 2020-02-10

## 2020-02-10 RX ORDER — METHYLPREDNISOLONE SODIUM SUCCINATE 125 MG/2ML
125 INJECTION, POWDER, LYOPHILIZED, FOR SOLUTION INTRAMUSCULAR; INTRAVENOUS AS NEEDED
Status: CANCELLED | OUTPATIENT
Start: 2020-02-10

## 2020-02-10 RX ORDER — DIPHENHYDRAMINE HYDROCHLORIDE 50 MG/ML
50 INJECTION INTRAMUSCULAR; INTRAVENOUS AS NEEDED
Status: CANCELLED | OUTPATIENT
Start: 2020-02-10

## 2020-02-10 NOTE — TELEPHONE ENCOUNTER
Called pt and advised per Dr Bee that her blood count is low but her iron stores are actually good which does not suggest any significant blood losses.  She would like her to get a sm bowel follow thru and repeat her blood count and iron stores in one month.      If any of these further tests are abnormal , will go ahead and proceed with a capsule study.  Pt verb understanding and will get labs done at [facility].

## 2020-02-10 NOTE — PROGRESS NOTES
REASON FOR FOLLOW-UP: History of recurrent iron deficiency anemia requiring repeated IV iron therapy.      History of Present Illness    Mrs. Abbott is a pleasant 59-year-old woman who returns today for four-month follow-up.  When she was seen in May 2019 by Dr. Harris, the patient was significantly anemic with a hemoglobin of 7.8, MCV 69.  Iron saturation was 3%, ferritin 29.  Though she was placed on oral iron, this caused her nausea and constipation.  We elected to give her IV Injectafer which took place in June 2019.  She also underwent repeat upper and lower endoscopy per Dr. Minna Bee with evidence of nonsevere esophagitis, scattered mild inflammation with biopsies taken, pathology negative.  She had 3 polyps removed in the colon, again pathology negative.  She had internal bleeding hemorrhoids.    The patient called in recently with increasing fatigue and mild dyspnea on exertion concerned that her hemoglobin was dropping again.  She restarted oral iron but having difficulty tolerating secondary to nausea and constipation.  Labs on 2/6/2020 showed worsening anemia with hemoglobin 10.1, MCV 84.9, low iron saturation 9% with increasing TIBC and the ferritin 40.    Past Medical History, Past Surgical History, Social History, Family History have been reviewed and are without significant changes except as mentioned from my consult note on 8/25/17.    Review of Systems   Constitutional: Positive for fatigue (improved). Negative for activity change and unexpected weight change.   HENT: Negative.    Respiratory: Positive for shortness of breath.    Cardiovascular: Negative.    Gastrointestinal: Positive for constipation and nausea.   Genitourinary: Negative.    Musculoskeletal: Negative.    Skin: Negative.    Neurological: Negative for dizziness.   Hematological: Negative.    Psychiatric/Behavioral: Negative.        Medications:  The current medication list was reviewed in the EMR    ALLERGIES:    Allergies  "  Allergen Reactions   • Latex Itching       Objective      Vitals:    02/10/20 1246   BP: 98/63   Pulse: 79   Resp: 16   Temp: 97.8 °F (36.6 °C)   TempSrc: Oral   SpO2: 99%   Weight: 84.8 kg (187 lb)   Height: 182.9 cm (72.01\")   PainSc: 0-No pain     Current Status 2/10/2020   ECOG score 0       Physical Exam    Gen: pleasant well-nourished woman  HEENT: no icterus  CV: RRR  CHEST: CTA bilat  ABD: soft, no masses  SKIN: no petechiae or bruising no pallor    RECENT LABS:  Results from last 7 days   Lab Units 02/06/20  1437   WBC 10*3/mm3 7.66   NEUTROS ABS 10*3/mm3 4.44   HEMOGLOBIN g/dL 10.1*   HEMATOCRIT % 34.4   PLATELETS 10*3/mm3 239     Results from last 7 days   Lab Units 02/06/20  1437   FERRITIN ng/mL 40.50   IRON mcg/dL 38   TIBC mcg/dL 409             Assessment/Plan   Recurrent iron deficiency anemia: The patient is again anemic with low iron saturation and low normal ferritin consistent with iron deficiency.  She has difficulty tolerating oral iron secondary to nausea and constipation.  We will give her another 2 doses of Injectafer IV.  I recommended to repeat her CBC in 1 month and her CBC, ferritin and iron profile in 3 to 4 months.  GI has ordered evaluation of the small bowel.              2/10/2020      CC:          "

## 2020-02-14 ENCOUNTER — INFUSION (OUTPATIENT)
Dept: ONCOLOGY | Facility: HOSPITAL | Age: 60
End: 2020-02-14

## 2020-02-14 ENCOUNTER — APPOINTMENT (OUTPATIENT)
Dept: ONCOLOGY | Facility: HOSPITAL | Age: 60
End: 2020-02-14

## 2020-02-14 VITALS
OXYGEN SATURATION: 96 % | DIASTOLIC BLOOD PRESSURE: 71 MMHG | SYSTOLIC BLOOD PRESSURE: 139 MMHG | WEIGHT: 187 LBS | HEART RATE: 71 BPM | BODY MASS INDEX: 25.36 KG/M2 | TEMPERATURE: 98.7 F

## 2020-02-14 DIAGNOSIS — D50.0 IRON DEFICIENCY ANEMIA DUE TO CHRONIC BLOOD LOSS: Primary | ICD-10-CM

## 2020-02-14 PROCEDURE — 63710000001 PROCHLORPERAZINE MALEATE PER 5 MG: Performed by: INTERNAL MEDICINE

## 2020-02-14 PROCEDURE — 96365 THER/PROPH/DIAG IV INF INIT: CPT

## 2020-02-14 PROCEDURE — 25010000002 FERRIC CARBOXYMALTOSE 750 MG/15ML SOLUTION 15 ML VIAL: Performed by: INTERNAL MEDICINE

## 2020-02-14 RX ORDER — PROCHLORPERAZINE MALEATE 5 MG/1
10 TABLET ORAL ONCE
Status: CANCELLED
Start: 2020-02-21

## 2020-02-14 RX ORDER — DIPHENHYDRAMINE HYDROCHLORIDE 50 MG/ML
50 INJECTION INTRAMUSCULAR; INTRAVENOUS AS NEEDED
Status: CANCELLED | OUTPATIENT
Start: 2020-02-21

## 2020-02-14 RX ORDER — METHYLPREDNISOLONE SODIUM SUCCINATE 125 MG/2ML
125 INJECTION, POWDER, LYOPHILIZED, FOR SOLUTION INTRAMUSCULAR; INTRAVENOUS AS NEEDED
Status: CANCELLED | OUTPATIENT
Start: 2020-02-21

## 2020-02-14 RX ORDER — SODIUM CHLORIDE 9 MG/ML
250 INJECTION, SOLUTION INTRAVENOUS ONCE
Status: COMPLETED | OUTPATIENT
Start: 2020-02-14 | End: 2020-02-14

## 2020-02-14 RX ORDER — SODIUM CHLORIDE 9 MG/ML
250 INJECTION, SOLUTION INTRAVENOUS ONCE
Status: CANCELLED | OUTPATIENT
Start: 2020-02-21

## 2020-02-14 RX ORDER — PROCHLORPERAZINE MALEATE 5 MG/1
10 TABLET ORAL ONCE
Status: COMPLETED | OUTPATIENT
Start: 2020-02-14 | End: 2020-02-14

## 2020-02-14 RX ADMIN — SODIUM CHLORIDE 250 ML: 9 INJECTION, SOLUTION INTRAVENOUS at 10:48

## 2020-02-14 RX ADMIN — FERRIC CARBOXYMALTOSE INJECTION 750 MG: 50 INJECTION, SOLUTION INTRAVENOUS at 10:50

## 2020-02-14 RX ADMIN — PROCHLORPERAZINE MALEATE 10 MG: 5 TABLET ORAL at 10:48

## 2020-02-24 RX ORDER — ATORVASTATIN CALCIUM 80 MG/1
TABLET, FILM COATED ORAL
Qty: 30 TABLET | Refills: 3 | Status: SHIPPED | OUTPATIENT
Start: 2020-02-24 | End: 2020-04-09 | Stop reason: SDUPTHER

## 2020-02-25 ENCOUNTER — INFUSION (OUTPATIENT)
Dept: ONCOLOGY | Facility: HOSPITAL | Age: 60
End: 2020-02-25

## 2020-02-25 VITALS
BODY MASS INDEX: 25.06 KG/M2 | SYSTOLIC BLOOD PRESSURE: 102 MMHG | DIASTOLIC BLOOD PRESSURE: 63 MMHG | WEIGHT: 184.8 LBS | HEART RATE: 72 BPM | RESPIRATION RATE: 20 BRPM

## 2020-02-25 DIAGNOSIS — D50.0 IRON DEFICIENCY ANEMIA DUE TO CHRONIC BLOOD LOSS: Primary | ICD-10-CM

## 2020-02-25 PROCEDURE — 63710000001 PROCHLORPERAZINE MALEATE PER 5 MG: Performed by: INTERNAL MEDICINE

## 2020-02-25 PROCEDURE — 96374 THER/PROPH/DIAG INJ IV PUSH: CPT

## 2020-02-25 PROCEDURE — 96365 THER/PROPH/DIAG IV INF INIT: CPT

## 2020-02-25 PROCEDURE — 25010000002 FERRIC CARBOXYMALTOSE 750 MG/15ML SOLUTION 15 ML VIAL: Performed by: INTERNAL MEDICINE

## 2020-02-25 RX ORDER — METHYLPREDNISOLONE SODIUM SUCCINATE 125 MG/2ML
125 INJECTION, POWDER, LYOPHILIZED, FOR SOLUTION INTRAMUSCULAR; INTRAVENOUS AS NEEDED
Status: CANCELLED | OUTPATIENT
Start: 2020-02-28

## 2020-02-25 RX ORDER — PROCHLORPERAZINE MALEATE 5 MG/1
10 TABLET ORAL ONCE
Status: CANCELLED
Start: 2020-02-28

## 2020-02-25 RX ORDER — DIPHENHYDRAMINE HYDROCHLORIDE 50 MG/ML
50 INJECTION INTRAMUSCULAR; INTRAVENOUS AS NEEDED
Status: CANCELLED | OUTPATIENT
Start: 2020-02-28

## 2020-02-25 RX ORDER — SODIUM CHLORIDE 9 MG/ML
250 INJECTION, SOLUTION INTRAVENOUS ONCE
Status: COMPLETED | OUTPATIENT
Start: 2020-02-25 | End: 2020-02-25

## 2020-02-25 RX ORDER — PROCHLORPERAZINE MALEATE 5 MG/1
10 TABLET ORAL ONCE
Status: COMPLETED | OUTPATIENT
Start: 2020-02-25 | End: 2020-02-25

## 2020-02-25 RX ORDER — SODIUM CHLORIDE 9 MG/ML
250 INJECTION, SOLUTION INTRAVENOUS ONCE
Status: CANCELLED | OUTPATIENT
Start: 2020-02-28

## 2020-02-25 RX ADMIN — FERRIC CARBOXYMALTOSE INJECTION 750 MG: 50 INJECTION, SOLUTION INTRAVENOUS at 15:23

## 2020-02-25 RX ADMIN — SODIUM CHLORIDE 250 ML: 9 INJECTION, SOLUTION INTRAVENOUS at 15:16

## 2020-02-25 RX ADMIN — PROCHLORPERAZINE MALEATE 10 MG: 5 TABLET ORAL at 15:16

## 2020-03-02 ENCOUNTER — RESULTS ENCOUNTER (OUTPATIENT)
Dept: GASTROENTEROLOGY | Facility: CLINIC | Age: 60
End: 2020-03-02

## 2020-03-02 DIAGNOSIS — D50.8 OTHER IRON DEFICIENCY ANEMIA: ICD-10-CM

## 2020-03-24 ENCOUNTER — TELEPHONE (OUTPATIENT)
Dept: FAMILY MEDICINE CLINIC | Facility: CLINIC | Age: 60
End: 2020-03-24

## 2020-03-24 NOTE — TELEPHONE ENCOUNTER
I do not think she takes any autoimmune medications    I do not see autoimmune disease on her chart please clarify    Her greatest risk is previous history of MI and CAD otherwise    She is higher risk because of that    And is not recommended to be in higher risk areas now  During the pandemic sURGE    If there is any way she can avoid from working I would agree    Let me know if she needs a note or anything, whether she will be excused or qualify for anything assistance that I do not know  Been unwilling to give her a note to be out of work  For the next 6 weeks if need be possibly longer    Thank

## 2020-03-24 NOTE — TELEPHONE ENCOUNTER
Yes she is at risk    Please see the note I just sent I think I dressed it to Miriam  And discussed this with patient thank you

## 2020-03-24 NOTE — TELEPHONE ENCOUNTER
----- Message from Sarbjit Cano MA sent at 3/24/2020  3:25 PM EDT -----  Regarding: FW: Non-Urgent Medical Question  Contact: [phone]      ----- Message -----  From: Tiera Abbott  Sent: 3/24/2020   3:23 PM EDT  To: [facility]  Subject: Non-Urgent Medical Question                      Question whether l am at risk with working right now.

## 2020-03-24 NOTE — TELEPHONE ENCOUNTER
Patient wants to know if she should continue to work at [employer] considering her health history with low iron and her infusions due to auto immune issues. Patient is not concerned with having to work but her family is very concerned. Patient did get reset on mychart and maybe sending a message as well but she wanted to make sure her message got to her pcp     Patient can be reached at [phone]

## 2020-03-25 ENCOUNTER — TELEPHONE (OUTPATIENT)
Dept: FAMILY MEDICINE CLINIC | Facility: CLINIC | Age: 60
End: 2020-03-25

## 2020-03-25 NOTE — TELEPHONE ENCOUNTER
Pt called and stated she is worried to go to work because she is a high risk patient from a heart attack. Pt works at [employer] around people. Pt is not experiencing any symptoms currently. Pt needs to know if she should be going to work    Please advise     Pt call back  [phone]

## 2020-03-25 NOTE — TELEPHONE ENCOUNTER
Please call patient      Because of her history of CAD she is categorized as higher risk with CDC    Health wise it would be best if she was not working with the public  And she stayed home was much as possible    Patients with cardiovascular disease may be some of the highest risk of severe  Outcomes with covid  That said overall she is pretty healthy    I would recommend being off work for at least  4 to 6 weeks    To whom it may concern    Due to patient's medical history, she is considered higher risk with CDC current guidelines,  It is advisable that she does not work in the public setting at this time.  No work 6 weeks then reevaluate at that time.      Sincerely,        James Epley, APRN        I am under the understanding she is not presently sick and she is doing well make sure this is correct  She can follow-up in the office  In 2 to 3 months depending on situation make sure she has everything she need  And there is nothing else I am missing here thank you

## 2020-04-09 RX ORDER — CARVEDILOL 3.12 MG/1
3.12 TABLET ORAL 2 TIMES DAILY WITH MEALS
Qty: 180 TABLET | Refills: 0 | Status: SHIPPED | OUTPATIENT
Start: 2020-04-09 | End: 2021-01-11

## 2020-04-09 RX ORDER — ATORVASTATIN CALCIUM 80 MG/1
80 TABLET, FILM COATED ORAL
Qty: 90 TABLET | Refills: 0 | Status: SHIPPED | OUTPATIENT
Start: 2020-04-09 | End: 2020-10-26 | Stop reason: SDUPTHER

## 2020-04-09 RX ORDER — CLOPIDOGREL BISULFATE 75 MG/1
TABLET ORAL
Qty: 90 TABLET | Refills: 0 | Status: SHIPPED | OUTPATIENT
Start: 2020-04-09 | End: 2020-10-08

## 2020-04-13 ENCOUNTER — APPOINTMENT (OUTPATIENT)
Dept: CT IMAGING | Facility: HOSPITAL | Age: 60
End: 2020-04-13

## 2020-04-13 ENCOUNTER — TELEMEDICINE (OUTPATIENT)
Dept: CARDIOLOGY | Facility: CLINIC | Age: 60
End: 2020-04-13

## 2020-04-13 ENCOUNTER — APPOINTMENT (OUTPATIENT)
Dept: GENERAL RADIOLOGY | Facility: HOSPITAL | Age: 60
End: 2020-04-13

## 2020-04-13 ENCOUNTER — HOSPITAL ENCOUNTER (EMERGENCY)
Facility: HOSPITAL | Age: 60
Discharge: HOME | End: 2020-04-13
Attending: EMERGENCY MEDICINE | Admitting: EMERGENCY MEDICINE

## 2020-04-13 VITALS — WEIGHT: 182 LBS | HEIGHT: 70 IN | BODY MASS INDEX: 26.05 KG/M2

## 2020-04-13 VITALS
RESPIRATION RATE: 16 BRPM | DIASTOLIC BLOOD PRESSURE: 77 MMHG | SYSTOLIC BLOOD PRESSURE: 140 MMHG | TEMPERATURE: 97.2 F | HEART RATE: 78 BPM | OXYGEN SATURATION: 97 %

## 2020-04-13 DIAGNOSIS — S09.90XA CLOSED HEAD INJURY, INITIAL ENCOUNTER: ICD-10-CM

## 2020-04-13 DIAGNOSIS — I25.10 CORONARY ARTERY DISEASE INVOLVING NATIVE CORONARY ARTERY OF NATIVE HEART WITHOUT ANGINA PECTORIS: Primary | ICD-10-CM

## 2020-04-13 DIAGNOSIS — I25.2 OLD MI (MYOCARDIAL INFARCTION): ICD-10-CM

## 2020-04-13 DIAGNOSIS — S62.102A CLOSED FRACTURE OF LEFT WRIST, INITIAL ENCOUNTER: Primary | ICD-10-CM

## 2020-04-13 DIAGNOSIS — Z79.01 ANTICOAGULATED BY ANTICOAGULATION TREATMENT: ICD-10-CM

## 2020-04-13 DIAGNOSIS — E78.2 MIXED HYPERLIPIDEMIA: ICD-10-CM

## 2020-04-13 PROCEDURE — 73110 X-RAY EXAM OF WRIST: CPT

## 2020-04-13 PROCEDURE — 99283 EMERGENCY DEPT VISIT LOW MDM: CPT

## 2020-04-13 PROCEDURE — 99214 OFFICE O/P EST MOD 30 MIN: CPT | Performed by: INTERNAL MEDICINE

## 2020-04-13 PROCEDURE — 70450 CT HEAD/BRAIN W/O DYE: CPT

## 2020-04-13 NOTE — ED TRIAGE NOTES
Slipped and fell in basement today.  Hit head.  Is on plavix.  Hurt left wrist and tail bone.  Twisted left knee.   Denies cough, fever, gi issues, change in taste/smell.  Mask placed at triage
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
## 2020-04-13 NOTE — PROGRESS NOTES
Date of Office Visit: 20  Encounter Provider: Hollis Fontaine MD  Place of Service: [facility] CARDIOLOGY  Patient Name: Tiera Abbott  :1960  Referral Provider:No ref. provider found      No chief complaint on file.    History of Present Illness  This patient has consented to a telehealth visit via video. The visit was scheduled as a follow up visit to comply with patient safety concerns in accordance with CDC recommendations.  All vitals recorded within this visit are reported by the patient.  I spent  25 minutes in total including but not limited to the 15 minutes spent in direct conversation with this patient.     The patient is a 60-year-old female who presented in 2008 with an acute inferior myocardial infarction. She had multiple ventricular fibrillation arrests during that time. She was cardioverted a number of times. She ultimately was found to have total occlusion of the right coronary artery. Her other vessels were free of disease. She had angioplasty, clot extraction, and stent placement of that vessel. Her echocardiogram then showed a left ventricular ejection fraction of 45%. She had atrial fibrillation and was transiently on amiodarone. Then, in 2008, she had some atypical chest pain. Repeat catheterization showed normal left ventricular systolic function and insignificant disease of the left anterior descending. The stent was patent.  She then had some atypical dyspnea in 2018 had a normal perfusion stress test.  She now comes in for follow-up.  She has been doing reasonably well.  She did get anemic again and was iron deficient again and now received another iron infusion in February and following with hematology.  When her blood counts were low she does get fatigue.  But she denies chest pain or pressure.  Denies shortness of breath, orthopnea, or PND.  Denies palpitations, near-syncope or syncope.  Denies any fever chills or sweats.  Denies  any blood in her stool or black tarry stools.  No abrupt loss of vision, paralysis, paresthesia, or dysarthria.  Another brother was diagnosed with diabetes.    Coronary Artery Disease   Pertinent negatives include no dizziness, muscle weakness or weight gain. Her past medical history is significant for past myocardial infarction.         Past Medical History:   Diagnosis Date   • Acid reflux    • Alcoholism in remission (CMS/HCC)     SINCE AGE 22   • Arthritis    • Back pain    • Cardiac disease    • Chest pain    • Coronary artery disease     mi   • Depression    • Eczema    • Fibrocystic breast    • Heart attack (CMS/HCC) 2008   • Heart attack (CMS/HCC)    • Heart disease    • History of anemia    • History of bruising easily    • Hypercholesterolemia    • IBS (irritable bowel syndrome)    • Inappropriate (too hot or too cold) temperature in local application and packing     always hot or cold   • Joint pain, knee     LEFT KNEE   • Menopausal state    • Muscle pain    • Sleep apnea     DOES NOT USE MACHINE   • Stiffness in joint          Past Surgical History:   Procedure Laterality Date   • BILATERAL BREAST REDUCTION  2009   • BREAST BIOPSY     • CARDIAC CATHETERIZATION     • COLONOSCOPY  2010   • COLONOSCOPY N/A 8/28/2017    One 4 mm polyp in the ascending colon, One 6 mm polyp in the ascending colon, One 4 mm polyp in the sigmoid colon, One 6 mm polyp in the rectum, One 5 mm polyp in the rectum,Diverticulosis, IH. PATH:  Tubular adenoma, hyperplastic polyp    • COLONOSCOPY N/A 6/11/2019    Perianal skin tags found on perianal exam, One 3 mm polyp in the transverse colon, Two 3 to 4 mm polyps in the sigmoid colon, Diverticulosis, NBIH. Path: Hyperplastic polyp.   • CORONARY STENT PLACEMENT      2   • ENDOSCOPY N/A 8/25/2016    small hiatal hernia, normal stomach/duodenum, no specimens collected   • ENDOSCOPY N/A 8/28/2017    Z line irregular, 3cm hiatal hernia, erythematous mucosa in gastric body/antrum and  duodenopathy, normal 2-3 portions of duodenum   • ENDOSCOPY N/A 6/11/2019    Z-line regular, Non-severe esophagitis, Gastritis, Small paraesophageal hernia. Path: Mild chronic inflammation.   • JOINT REPLACEMENT Left     total knee   • KNEE SURGERY      2 knee sugeries on left knee   • HI TOTAL KNEE ARTHROPLASTY Left 3/14/2016    Procedure: LEFT TOTAL KNEE ARTHROPLASTY;  Surgeon: Bladimir Mehta MD;  Location: [facility];  Service: Orthopedics   • REDUCTION MAMMAPLASTY     • WISDOM TOOTH EXTRACTION     • WOUND DEBRIDEMENT Bilateral     BREAST.  AFTER REDUCTION         Current Outpatient Medications on File Prior to Visit   Medication Sig Dispense Refill   • acetaminophen (TYLENOL) 650 MG 8 hr tablet Take 650 mg by mouth Every 8 (Eight) Hours As Needed for Mild Pain .     • albuterol (PROVENTIL HFA;VENTOLIN HFA) 108 (90 Base) MCG/ACT inhaler Inhale 2 puffs Every 4 (Four) Hours As Needed for Wheezing. 1 inhaler 1   • atorvastatin (LIPITOR) 80 MG tablet Take 1 tablet by mouth every night at bedtime. 90 tablet 0   • carvedilol (COREG) 3.125 MG tablet Take 1 tablet by mouth 2 (Two) Times a Day With Meals. 180 tablet 0   • clobetasol (TEMOVATE) 0.05 % cream Apply 1 application topically to the appropriate area as directed 2 (Two) Times a Day As Needed (APPLY TO THE RIGHT HAND SPARINGLY FOR SHORT DURATION. AVOID FACE OR PROLONGED USE.).     • clopidogrel (PLAVIX) 75 MG tablet Take 1 tablet by mouth daily.  PAST DUE FOR APPT, PLEASE CALL OFFICE. 90 tablet 0   • cyclobenzaprine (FLEXERIL) 10 MG tablet Take 10 mg by mouth 3 (Three) Times a Day As Needed for Muscle Spasms.     • estradiol (ESTRACE VAGINAL) 0.1 MG/GM vaginal cream Insert 2 g into the vagina Daily. 42.5 g 12   • hydrOXYzine (ATARAX) 25 MG tablet 1-2 tablets at bedtime as needed for itching 30 tablet 3   • ibuprofen (ADVIL,MOTRIN) 800 MG tablet TK 1 T PO  TID PRF PAIN  0   • Iron-Vitamin C (VITRON-C)  MG tablet Take 1 tablet by mouth Daily. 60 tablet 0    • montelukast (SINGULAIR) 10 MG tablet Take 10 mg by mouth Daily.     • pantoprazole (PROTONIX) 40 MG EC tablet TAKE 1 TABLET BY MOUTH ONE TIME A DAY  90 tablet 0   • varenicline (CHANTIX STARTING MONTH ) 0.5 MG X 11 & 1 MG X 42 tablet Take 0.5 mg one daily on days 1-3 and and 0.5 mg twice daily on days 4-7.Then 1 mg twice daily as directed 53 tablet 0   • venlafaxine XR (EFFEXOR-XR) 37.5 MG 24 hr capsule TAKE ONE CAPSULE BY MOUTH EVERY DAY 90 capsule 2     No current facility-administered medications on file prior to visit.          Social History     Socioeconomic History   • Marital status: Single     Spouse name: Not on file   • Number of children: 1   • Years of education: College   • Highest education level: Not on file   Occupational History     Employer: [employer]   Tobacco Use   • Smoking status: Current Every Day Smoker     Types: Electronic Cigarette     Last attempt to quit:      Years since quittin.2   • Smokeless tobacco: Never Used   • Tobacco comment: caffiene use - 3 cups coffee daily   Substance and Sexual Activity   • Alcohol use: No   • Drug use: No   • Sexual activity: Not Currently     Birth control/protection: Post-menopausal       Family History   Problem Relation Age of Onset   • Alzheimer's disease Mother    • Breast cancer Mother    • Diabetes Mother    • Lung disease Father         pullmonary artery disease   • Heart disease Father    • Cancer Father 32   • Heart disease Brother    • Diabetes Brother    • Heart attack Brother    • Heart disease Brother    • Colon cancer Other    • Heart attack Other    • Diabetes Other    • Heart disease Other    • Diabetes Brother    • JOSHUA disease Brother          Review of Systems   Constitution: Positive for malaise/fatigue. Negative for decreased appetite, diaphoresis, fever, weight gain and weight loss.   HENT: Negative for congestion, hearing loss, nosebleeds and tinnitus.    Eyes: Negative for blurred vision, double vision, vision loss  in left eye, vision loss in right eye and visual disturbance.   Cardiovascular:        As noted in HPI   Respiratory:        As noted HPI   Endocrine: Negative for cold intolerance and heat intolerance.   Hematologic/Lymphatic: Negative for bleeding problem. Does not bruise/bleed easily.   Skin: Negative for color change, flushing, itching and rash.   Musculoskeletal: Negative for arthritis, back pain, joint pain, joint swelling, muscle weakness and myalgias.   Gastrointestinal: Negative for bloating, abdominal pain, constipation, diarrhea, dysphagia, heartburn, hematemesis, hematochezia, melena, nausea and vomiting.   Genitourinary: Negative for bladder incontinence, dysuria, frequency, nocturia and urgency.   Neurological: Negative for dizziness, focal weakness, headaches, light-headedness, loss of balance, numbness, paresthesias, vertigo and weakness.   Psychiatric/Behavioral: Negative for depression, memory loss and substance abuse.       Procedures    Procedures        Objective:    There were no vitals taken for this visit.       Physical Exam  Physical Exam  Video CAll      Assessment:   1. This is a 60-year-old female with history of previous inferior myocardial infarction in 01/2008 with primary angioplasty, clot extraction, and stent placement of the right coronary artery. Follow-up cardiac catheterization in 02/2008 showed normal left ventricular function, no evidence of stent stenosis, and insignificant disease of the left anterior descending. Follow-up perfusion stress test in October 2018 for atypical dyspnea was normal.  Stable.  She will continue the same and see us in follow-up in 1 year.  2. Hyperlipidemia.  Now on target dose atorvastatin.    When she sees her PCP would recommend they check lipid profile if she has not had a goal of LDL 70 or less would consider adding PSK 9 inhibitor.  3.  Esophageal reflux.    Appears stable.  4.  Iron deficiency receiving periodic iron infusion.  Following with  hematology.  5.  Strong family history of diabetes mellitus now third brother was diagnosed consider hemoglobin A1c measurement when she sees her PCP.         Plan:

## 2020-04-15 RX ORDER — PANTOPRAZOLE SODIUM 40 MG/1
40 TABLET, DELAYED RELEASE ORAL DAILY
Qty: 90 TABLET | Refills: 1 | Status: SHIPPED | OUTPATIENT
Start: 2020-04-15 | End: 2020-12-14

## 2020-04-15 RX ORDER — MONTELUKAST SODIUM 10 MG/1
10 TABLET ORAL NIGHTLY
Qty: 30 TABLET | Refills: 3 | Status: SHIPPED | OUTPATIENT
Start: 2020-04-15 | End: 2021-04-02

## 2020-04-20 ENCOUNTER — TELEPHONE (OUTPATIENT)
Dept: FAMILY MEDICINE CLINIC | Facility: CLINIC | Age: 60
End: 2020-04-20

## 2020-04-20 NOTE — TELEPHONE ENCOUNTER
Patient is calling to check the status of a letter for her employer that she is high risk to work in the public.  Her employer is [employer].    She is requesting a letter to remain off from work until May 24, 2020.    Please mail to patient home.    Patient call back [phone]

## 2020-04-21 ENCOUNTER — TELEMEDICINE (OUTPATIENT)
Dept: FAMILY MEDICINE CLINIC | Facility: CLINIC | Age: 60
End: 2020-04-21

## 2020-04-21 DIAGNOSIS — R53.83 FATIGUE, UNSPECIFIED TYPE: ICD-10-CM

## 2020-04-21 DIAGNOSIS — I25.10 CORONARY ARTERY DISEASE INVOLVING NATIVE CORONARY ARTERY OF NATIVE HEART WITHOUT ANGINA PECTORIS: Primary | ICD-10-CM

## 2020-04-21 DIAGNOSIS — D64.9 ANEMIA, UNSPECIFIED TYPE: ICD-10-CM

## 2020-04-21 DIAGNOSIS — F41.9 ANXIETY: ICD-10-CM

## 2020-04-21 PROCEDURE — 99213 OFFICE O/P EST LOW 20 MIN: CPT | Performed by: NURSE PRACTITIONER

## 2020-04-29 ENCOUNTER — TELEPHONE (OUTPATIENT)
Dept: FAMILY MEDICINE CLINIC | Facility: CLINIC | Age: 60
End: 2020-04-29

## 2020-04-29 NOTE — TELEPHONE ENCOUNTER
PT NEEDS A DOCTORS NOTE REGARDING HER LEAVE DUE TO HER BEING HIGH RISK AND IT NEEDS TO HAVE A RETURN DATE. 04/29-06/01    HOME [employer] FAX NUMBER [phone]    THE DEADLINE IS TOMORROW THAT THIS HAS TO BE FAXED TO THEM.

## 2020-04-29 NOTE — TELEPHONE ENCOUNTER
To whom it may concern,      Tiera Abbott is high risk of severe COVID-19 complications per CDC guidelines 2020.  Consistent with CDC guidelines, I have recommend she avoid social situations and continue to social distance.  If her present employer does not have a position for her to meet this qualification then I advised her not to work at this time and social distance at home.  Tiera Abbott is unable to work in any situation having to interact within 6 feet of customers or other employees beginning 4/29/2020 until 6/1/2020  She may return to work full duty without restriction 6/2/2020.      Sincerely,        James Epley APRN, FNP  04/29/2020

## 2020-05-05 ENCOUNTER — PATIENT OUTREACH (OUTPATIENT)
Dept: CASE MANAGEMENT | Facility: OTHER | Age: 60
End: 2020-05-05

## 2020-05-05 NOTE — OUTREACH NOTE
Care Plan Note      Responses   Lifestyle Goals  Routine follow-up with doctor(s), Fewer ER/urgent care visits, Medication management, Decrease falls risk   Barriers  Pain, Disease education   Self Management  Other (See Comment) [continue to wear splint]   Suggested Appointments  Other (See Comment) [call ortho for follow up regarding wrist and call Epley APRN for AWV]   Annual Wellness Visit:   Patient Will Schedule   Care Gaps Addressed  -- [Reviewed with patient all gaps are not completed. Encouraged patient to schedule an AWV to create a wellness plan. ]   Specific Disease Process Teaching  -- [Continue to wear split and follow up with ortho regarding continued pain with movement. ]   Does patient have depression diagnosis?  No   Advanced Directives:  Not Interested At This Time   Ed Visits past 12 months:  1   Hospitalizations past 12 months  1   Discharge destination:  Home   Medication Adherence  N/A   Readiness Scale  7   Confidence Scale  7   Health Literacy  Good        The main concerns and/or symptoms the patient would like to address are: Spoke with patient regarding ED visit with wrist fracture. Patient states her wrist is small and the splint does not seem to stabilize enough at times. Patient concerned about the wrist healing in the splint. Patient is unable to do simple activities such as wiping dog paws after they have been outside due to only having the use of one hand.    Education/instruction provided by Care Coordinator: Introduced self, explained ACM RN role and provided contact information. Reviewed the need to follow up with ortho regarding the wrist splint and pain. Patient has follow up scheduled 5/19 but can inquire if an early appointment is needed. Reviewed open medicare gaps. Patient has not previously scheduled an AWV due to always having an illness to follow up with when visiting Epley APRN. Reviewed the video visit AWV option with patient and recommended she schedule the co-pay  free visit to create a wellness plan. Patient agreed to schedule both follow up appointments. Patient declines need for further AWV outreach but was appreciative of the call.     Follow Up Outreach Due: as needed    Shelbi Elias RN  Ambulatory     5/5/2020, 12:14

## 2020-05-11 ENCOUNTER — LAB (OUTPATIENT)
Dept: LAB | Facility: HOSPITAL | Age: 60
End: 2020-05-11

## 2020-05-11 ENCOUNTER — TELEPHONE (OUTPATIENT)
Dept: ONCOLOGY | Facility: CLINIC | Age: 60
End: 2020-05-11

## 2020-05-11 ENCOUNTER — OFFICE VISIT (OUTPATIENT)
Dept: ONCOLOGY | Facility: CLINIC | Age: 60
End: 2020-05-11

## 2020-05-11 VITALS
DIASTOLIC BLOOD PRESSURE: 78 MMHG | OXYGEN SATURATION: 98 % | WEIGHT: 191.8 LBS | RESPIRATION RATE: 20 BRPM | SYSTOLIC BLOOD PRESSURE: 135 MMHG | HEIGHT: 70 IN | TEMPERATURE: 97.7 F | BODY MASS INDEX: 27.46 KG/M2 | HEART RATE: 77 BPM

## 2020-05-11 DIAGNOSIS — D50.0 IRON DEFICIENCY ANEMIA DUE TO CHRONIC BLOOD LOSS: ICD-10-CM

## 2020-05-11 DIAGNOSIS — D64.9 ANEMIA, UNSPECIFIED TYPE: Primary | ICD-10-CM

## 2020-05-11 LAB
BASOPHILS # BLD AUTO: 0.04 10*3/MM3 (ref 0–0.2)
BASOPHILS NFR BLD AUTO: 0.5 % (ref 0–1.5)
DEPRECATED RDW RBC AUTO: 52.3 FL (ref 37–54)
EOSINOPHIL # BLD AUTO: 0.23 10*3/MM3 (ref 0–0.4)
EOSINOPHIL NFR BLD AUTO: 3 % (ref 0.3–6.2)
ERYTHROCYTE [DISTWIDTH] IN BLOOD BY AUTOMATED COUNT: 15.7 % (ref 12.3–15.4)
FERRITIN SERPL-MCNC: 64 NG/ML (ref 13–150)
HCT VFR BLD AUTO: 41.3 % (ref 34–46.6)
HGB BLD-MCNC: 12.9 G/DL (ref 12–15.9)
IMM GRANULOCYTES # BLD AUTO: 0.02 10*3/MM3 (ref 0–0.05)
IMM GRANULOCYTES NFR BLD AUTO: 0.3 % (ref 0–0.5)
IRON 24H UR-MRATE: 60 MCG/DL (ref 37–145)
IRON SATN MFR SERPL: 19 % (ref 14–48)
LYMPHOCYTES # BLD AUTO: 2.44 10*3/MM3 (ref 0.7–3.1)
LYMPHOCYTES NFR BLD AUTO: 32.3 % (ref 19.6–45.3)
MCH RBC QN AUTO: 28.6 PG (ref 26.6–33)
MCHC RBC AUTO-ENTMCNC: 31.2 G/DL (ref 31.5–35.7)
MCV RBC AUTO: 91.6 FL (ref 79–97)
MONOCYTES # BLD AUTO: 0.54 10*3/MM3 (ref 0.1–0.9)
MONOCYTES NFR BLD AUTO: 7.2 % (ref 5–12)
NEUTROPHILS # BLD AUTO: 4.28 10*3/MM3 (ref 1.7–7)
NEUTROPHILS NFR BLD AUTO: 56.7 % (ref 42.7–76)
NRBC BLD AUTO-RTO: 0 /100 WBC (ref 0–0.2)
PLATELET # BLD AUTO: 203 10*3/MM3 (ref 140–450)
PMV BLD AUTO: 11 FL (ref 6–12)
RBC # BLD AUTO: 4.51 10*6/MM3 (ref 3.77–5.28)
TIBC SERPL-MCNC: 311 MCG/DL (ref 249–505)
TRANSFERRIN SERPL-MCNC: 222 MG/DL (ref 200–360)
WBC NRBC COR # BLD: 7.55 10*3/MM3 (ref 3.4–10.8)

## 2020-05-11 PROCEDURE — 83540 ASSAY OF IRON: CPT

## 2020-05-11 PROCEDURE — 84466 ASSAY OF TRANSFERRIN: CPT

## 2020-05-11 PROCEDURE — 85025 COMPLETE CBC W/AUTO DIFF WBC: CPT

## 2020-05-11 PROCEDURE — 36415 COLL VENOUS BLD VENIPUNCTURE: CPT

## 2020-05-11 PROCEDURE — 99213 OFFICE O/P EST LOW 20 MIN: CPT | Performed by: NURSE PRACTITIONER

## 2020-05-11 PROCEDURE — 82728 ASSAY OF FERRITIN: CPT

## 2020-05-11 NOTE — PROGRESS NOTES
REASON FOR FOLLOW-UP: History of recurrent iron deficiency anemia requiring repeated IV iron therapy.      History of Present Illness    Mrs. Abbott is a pleasant 59-year-old woman who returns today for four-month follow-up.  When she was seen in May 2019 by Dr. Harris, the patient was significantly anemic with a hemoglobin of 7.8, MCV 69.  Iron saturation was 3%, ferritin 29.  Though she was placed on oral iron, this caused her nausea and constipation.  We elected to give her IV Injectafer which took place in June 2019.  She also underwent repeat upper and lower endoscopy per Dr. Minna Bee with evidence of nonsevere esophagitis, scattered mild inflammation with biopsies taken, pathology negative.  She had 3 polyps removed in the colon, again pathology negative.  She had internal bleeding hemorrhoids.  Patient once again was symptomatic with labs showing worsening anemia and therefore received 2 doses of Injectafer in February 2020.    The patient called in recently with increasing fatigue and mild dyspnea on exertion concerned that her hemoglobin was dropping again.  She restarted oral iron but having difficulty tolerating secondary to nausea and constipation.  Labs on 2/6/2020 showed worsening anemia with hemoglobin 10.1, MCV 84.9, low iron saturation 9% with increasing TIBC and the ferritin 40.    She returns today reporting 2 falls, 1 of which sent her to the emergency room due to hitting her head.  She was found to have no signs of an intracranial bleed.  At that point in time, she did tripped on a yoga mat in her basement and hit her head on the concrete.  Short time thereafter she was at work at [employer] and tripped over a box and fell but did not hit her head.  She denies lightheadedness or dizziness.  She denies changes in shortness of breath and reports this is ongoing for her.  She denies changes in her fatigue level though it is constant.  She has not had any signs of bleeding.  She did not undergo  "the small bowel studies that were originally planned with Dr. Bee due to her falls as well as COVID thereafter.    Past Medical History, Past Surgical History, Social History, Family History have been reviewed and are without significant changes except as mentioned from my consult note on 8/25/17.    Review of Systems   Constitutional: Positive for fatigue (ongoing). Negative for activity change and unexpected weight change.   HENT: Negative.    Respiratory: Positive for shortness of breath (unchanged).    Cardiovascular: Negative.    Gastrointestinal: Positive for constipation. Negative for anal bleeding.   Genitourinary: Negative.    Musculoskeletal: Negative.    Skin: Negative.    Neurological: Negative for dizziness.   Hematological: Negative.    Psychiatric/Behavioral: Negative.        Medications:  The current medication list was reviewed in the EMR    ALLERGIES:    Allergies   Allergen Reactions   • Latex Itching       Objective      Vitals:    05/11/20 1300   BP: 135/78   Pulse: 77   Resp: 20   Temp: 97.7 °F (36.5 °C)   TempSrc: Oral   SpO2: 98%   Weight: 87 kg (191 lb 12.8 oz)   Height: 177.8 cm (70\")   PainSc: 0-No pain     Current Status 5/11/2020   ECOG score 0       Physical Exam   Constitutional: She is oriented to person, place, and time. She appears well-developed and well-nourished. No distress.   HENT:   Head: Normocephalic and atraumatic.   Eyes: EOM are normal. No scleral icterus.   Neck: Normal range of motion. No JVD present.   Pulmonary/Chest: Effort normal. No respiratory distress.   Musculoskeletal: Normal range of motion. She exhibits no edema.   Neurological: She is alert and oriented to person, place, and time.   Skin: Skin is dry. No pallor.   Psychiatric: She has a normal mood and affect. Her behavior is normal.   Vitals reviewed.          RECENT LABS:  Results from last 7 days   Lab Units 05/11/20  1229   WBC 10*3/mm3 7.55   NEUTROS ABS 10*3/mm3 4.28   HEMOGLOBIN g/dL 12.9 "   HEMATOCRIT % 41.3   PLATELETS 10*3/mm3 203   Iron studies from today show a ferritin 64, iron saturation 19%, TIBC 311.              Assessment/Plan   Recurrent iron deficiency anemia: The patient's iron studies show no need for additional iron currently.  To note she does have difficulty tolerating oral iron secondary to nausea and constipation and therefore if iron is needed she receives Injectafer IV.  We will have her repeat iron studies, CBC, and follow-up with Dr. Harris in 3 months.  In the interim she can contact GI once again to see if they want to set up small bowel studies as originally planned, but delayed secondary to patient's fall and thereafter COVID.  Patient will call our office for any signs or symptoms of further anemia at which time we will repeat labs sooner than planned.              5/11/2020      CC:

## 2020-05-11 NOTE — TELEPHONE ENCOUNTER
Attempted to return patient's call.  If I am unavailable when she calls back can be sent to triage as information is in my note.

## 2020-05-22 ENCOUNTER — TELEPHONE (OUTPATIENT)
Dept: FAMILY MEDICINE CLINIC | Facility: CLINIC | Age: 60
End: 2020-05-22

## 2020-05-22 NOTE — TELEPHONE ENCOUNTER
PATIENT CALLED AND STATED DUE TO THE FACT SHE IS HIGH RISK FOR COVID-19 SHE HAS BEEN OFF OF WORK. THE ORIGINAL WORK LETTER STATED SHE WAS OFF UNTIL June 1ST. THE PATIENT WOULD LIKE TO KNOW JAMES EPLEYS OPINION ON IF IT IS TIME FOR HER TO GO BACK TO WORK OR IF SHE SHOULD CONTINUE TO STAY AT HOME. THE PATIENT STATES SHE HAD AN APPOINTMENT NO LONG AGO THROUGH AN E-MAIL WHERE THE PROVIDER CALLED HER AND STATES THAT IF AN APPOINTMENT IS NEEDED SHE WOULD LIKE TO DO THAT AS WELL. PLEASE ADVISE.     PATIENT CALL BACK [phone]

## 2020-05-22 NOTE — TELEPHONE ENCOUNTER
Please schedule patient to follow-up visit next week to discuss  The situation on the ground likely would not change for some time  We can discuss with best for her    Thank you!!

## 2020-05-27 ENCOUNTER — TELEPHONE (OUTPATIENT)
Dept: ONCOLOGY | Facility: CLINIC | Age: 60
End: 2020-05-27

## 2020-05-27 ENCOUNTER — TELEMEDICINE (OUTPATIENT)
Dept: FAMILY MEDICINE CLINIC | Facility: CLINIC | Age: 60
End: 2020-05-27

## 2020-05-27 DIAGNOSIS — I25.10 CORONARY ARTERY DISEASE DUE TO LIPID RICH PLAQUE: ICD-10-CM

## 2020-05-27 DIAGNOSIS — F41.8 ANXIETY ABOUT HEALTH: Primary | ICD-10-CM

## 2020-05-27 DIAGNOSIS — F41.9 ANXIETY: ICD-10-CM

## 2020-05-27 DIAGNOSIS — I25.83 CORONARY ARTERY DISEASE DUE TO LIPID RICH PLAQUE: ICD-10-CM

## 2020-05-27 PROCEDURE — 99213 OFFICE O/P EST LOW 20 MIN: CPT | Performed by: NURSE PRACTITIONER

## 2020-05-27 NOTE — TELEPHONE ENCOUNTER
Patient seen nisha cummings last week and needs to talk to her about a couple of things       Call patient back at [phone]

## 2020-05-27 NOTE — TELEPHONE ENCOUNTER
Pt states she saw Jenae BOWER and wanted to f/u on a few things. Pt states she didn't understand the message. Reviewed office note, iron labs were adequate and no IV iron needed at this time, pt to f/u in 3 months w/ labs and MD visit. Pt v/u and denies any other questions.

## 2020-05-29 RX ORDER — VENLAFAXINE HYDROCHLORIDE 37.5 MG/1
37.5 CAPSULE, EXTENDED RELEASE ORAL DAILY
Qty: 90 CAPSULE | Refills: 1 | Status: SHIPPED | OUTPATIENT
Start: 2020-05-29 | End: 2020-06-04 | Stop reason: SDUPTHER

## 2020-06-04 ENCOUNTER — TELEPHONE (OUTPATIENT)
Dept: FAMILY MEDICINE CLINIC | Facility: CLINIC | Age: 60
End: 2020-06-04

## 2020-06-04 RX ORDER — VENLAFAXINE HYDROCHLORIDE 37.5 MG/1
37.5 CAPSULE, EXTENDED RELEASE ORAL DAILY
Qty: 90 CAPSULE | Refills: 1 | Status: SHIPPED | OUTPATIENT
Start: 2020-06-04 | End: 2021-04-16

## 2020-07-06 ENCOUNTER — TELEPHONE (OUTPATIENT)
Dept: FAMILY MEDICINE CLINIC | Facility: CLINIC | Age: 60
End: 2020-07-06

## 2020-07-06 NOTE — TELEPHONE ENCOUNTER
Upon talking to Pt she is stating sore throat, headache, muscle aches, congestions. Pt is going to [facility] to get covid testing        She would like a prescription of  Clobetasol for her eczema.     Verified pharmacy-  [address] [facility] - [phone] [facility] [phone] FX

## 2020-07-06 NOTE — TELEPHONE ENCOUNTER
Patient calling and is having symptoms since she went back to work 3 weeks ago. Symptoms- sore throat, cough, muscle aches, congestion and headache for the last 4 days.  Please advise on what to do next.    She would like a prescription of  Clobetasol for her eczema.    Verified pharmacy-  [address] - [address] AT [facility] - [phone]  - [phone]      Patient call back [phone]

## 2020-07-16 ENCOUNTER — TELEPHONE (OUTPATIENT)
Dept: FAMILY MEDICINE CLINIC | Facility: CLINIC | Age: 60
End: 2020-07-16

## 2020-07-16 NOTE — TELEPHONE ENCOUNTER
DELETE AFTER REVIEWING: Telephone encounter to be sent to the clinical pool     Caller: Tiera Abbott    Relationship: Self    Best call back number: [phone]  What medication are you requesting: unknown     What are your current symptoms: COUGH, PHLEGM, HEADACHE     How long have you been experiencing symptoms: OVER A WEEK     Have you had these symptoms before:    [] Yes  [x] No    Have you been treated for these symptoms before:   [] Yes  [x] No    If a prescription is needed, what is your preferred pharmacy and phone number: [facility] [address] [facility] AT [facility] - [phone]  - [phone] FX     Additional notes: PT HAD NEGATIVE COVID TEST

## 2020-07-16 NOTE — TELEPHONE ENCOUNTER
Quite confusing note?    Please make sure patient was taking care of    If she is reaching out to be seen, please assist    [facility] for [facility] of cough    Thank you!

## 2020-07-17 ENCOUNTER — TELEPHONE (OUTPATIENT)
Dept: FAMILY MEDICINE CLINIC | Facility: CLINIC | Age: 60
End: 2020-07-17

## 2020-07-17 NOTE — TELEPHONE ENCOUNTER
PATIENT STATES SHE GOT HER COVID-19 TEST RESULTS BACK YESTERDAY STATING SHE WAS NEGATIVE FOR COVID-19     SHE STATES SHE STILL HAS A COUGH, YELLOW MUCOUS AND HEADACHES.    REQUESTING AN ANTIBIOTIC AND AN OINTMENT FOR ECZEMA THAT SHE USE TO USE (SHE COULD NOT REMEMBER THE NAME OF IT)      GOOD CONTACT NUMBER   [phone] (H)      VERIFIED PHARMACY   [address] AT [facility] - [phone] [facility] [phone] FX

## 2020-07-21 NOTE — TELEPHONE ENCOUNTER
Please tell patient I been out of the office for several days I just got her message  Apologize for her delay hopefully she is feeling better    If she still feels ill  Schedule appointment with telehealth video ASAP  Or urgent care to see if she needs something different  Thank you

## 2020-07-28 NOTE — TELEPHONE ENCOUNTER
The last time I see it prescribed was 8/22/2016.  I explained to her that since it had been almost 4 years an appt is needed.  She said that she is positive that this had been prescribed sooner than four years.

## 2020-07-28 NOTE — TELEPHONE ENCOUNTER
Patient called me back from the message I left last week in regards to initial message about the antibiotic and eczema cream.  I apologized for the delay and made sure I told her that you were out of the office that day and a call should have been made to her. I offered a telehealth visit, but she is going to the  today to hopefully get an antibiotic.  Patient also again asked about the eczema cream again.  She states that one tube usually last for a long time and would like to know if you would send that to the [facility] on file.  She said that you have prescribed this medication to her for several years. Please advise in regards to the cream.

## 2020-07-28 NOTE — TELEPHONE ENCOUNTER
I do not see any creams on her med list   Do you?  I checked the last 4 years      Have her schedule a telehealth visit  If she needs a prescription cream    Unless I prescribed this within the last 2 to 3 years    Thank you

## 2020-07-29 RX ORDER — CLOBETASOL PROPIONATE 0.5 MG/G
CREAM TOPICAL 2 TIMES DAILY
Qty: 30 G | Refills: 1 | Status: SHIPPED | OUTPATIENT
Start: 2020-07-29

## 2020-08-03 ENCOUNTER — APPOINTMENT (OUTPATIENT)
Dept: LAB | Facility: HOSPITAL | Age: 60
End: 2020-08-03

## 2020-08-03 ENCOUNTER — TELEPHONE (OUTPATIENT)
Dept: ONCOLOGY | Facility: CLINIC | Age: 60
End: 2020-08-03

## 2020-08-03 NOTE — TELEPHONE ENCOUNTER
Caller: Tiera Abbott    Relationship to patient: Self    Best call back number: [phone]    Chief complaint: Pt has bronchitis and didn't realize she had an appt today. She asked to reschedule    Type of visit: F/U and Lab    Requested date: Whenever available    If rescheduling, when is the original appointment: 8.3.20 at

## 2020-08-05 ENCOUNTER — TELEPHONE (OUTPATIENT)
Dept: FAMILY MEDICINE CLINIC | Facility: CLINIC | Age: 60
End: 2020-08-05

## 2020-08-06 ENCOUNTER — OFFICE VISIT (OUTPATIENT)
Dept: FAMILY MEDICINE CLINIC | Facility: CLINIC | Age: 60
End: 2020-08-06

## 2020-08-06 ENCOUNTER — HOSPITAL ENCOUNTER (OUTPATIENT)
Dept: GENERAL RADIOLOGY | Facility: HOSPITAL | Age: 60
Discharge: HOME | End: 2020-08-06
Admitting: FAMILY MEDICINE

## 2020-08-06 DIAGNOSIS — R05.3 PERSISTENT COUGH FOR 3 WEEKS OR LONGER: ICD-10-CM

## 2020-08-06 DIAGNOSIS — R05.3 PERSISTENT COUGH FOR 3 WEEKS OR LONGER: Primary | ICD-10-CM

## 2020-08-06 PROCEDURE — 99212 OFFICE O/P EST SF 10 MIN: CPT | Performed by: FAMILY MEDICINE

## 2020-08-06 PROCEDURE — 71046 X-RAY EXAM CHEST 2 VIEWS: CPT

## 2020-08-06 NOTE — PROGRESS NOTES
Subjective   Tiera Abbott is a 60 y.o. female.     Chief Complaint   Patient presents with   • Cough       History of Present Illness   Tiera consented for telephone visit today.   She complains of a cough which began about 4 weeks ago. Not-productive but she felt congested.   She was tested for covid which was negative.   Cough has persisted. Last Tuesday she went to an [facility]- was diagnosed with bronchitis, and was given Z-pack, cough syrup- promethazine, and benzonatate, which she completed last week.  Denies fever. Denies SOB. She is a smoker but has cut back and has not smoked over the past week.   She says her symptoms have persisted, maybe a little bit better, but she is coughing up phlegm and at times it is thick and brownish in color, although scant.   Past Medical History:   Diagnosis Date   • Acid reflux    • Alcoholism in remission (CMS/[facility])     SINCE AGE 22   • Arthritis    • Back pain    • Cardiac disease    • Chest pain    • Coronary artery disease     mi   • Depression    • Eczema    • Fibrocystic breast    • Heart attack (CMS/[facility]) 2008   • Heart attack (CMS/[facility])    • Heart disease    • History of anemia    • History of bruising easily    • Hypercholesterolemia    • IBS (irritable bowel syndrome)    • Inappropriate (too hot or too cold) temperature in local application and packing     always hot or cold   • Joint pain, knee     LEFT KNEE   • Low iron    • Menopausal state    • Muscle pain    • Sleep apnea     DOES NOT USE MACHINE   • Stiffness in joint      Past Surgical History:   Procedure Laterality Date   • BILATERAL BREAST REDUCTION  2009   • BREAST BIOPSY     • CARDIAC CATHETERIZATION     • COLONOSCOPY  2010   • COLONOSCOPY N/A 8/28/2017    One 4 mm polyp in the ascending colon, One 6 mm polyp in the ascending colon, One 4 mm polyp in the sigmoid colon, One 6 mm polyp in the rectum, One 5 mm polyp in the rectum,Diverticulosis, IH. PATH:  Tubular adenoma, hyperplastic polyp    •  COLONOSCOPY N/A 2019    Perianal skin tags found on perianal exam, One 3 mm polyp in the transverse colon, Two 3 to 4 mm polyps in the sigmoid colon, Diverticulosis, NBIH. Path: Hyperplastic polyp.   • CORONARY STENT PLACEMENT      2   • ENDOSCOPY N/A 2016    small hiatal hernia, normal stomach/duodenum, no specimens collected   • ENDOSCOPY N/A 2017    Z line irregular, 3cm hiatal hernia, erythematous mucosa in gastric body/antrum and duodenopathy, normal 2-3 portions of duodenum   • ENDOSCOPY N/A 2019    Z-line regular, Non-severe esophagitis, Gastritis, Small paraesophageal hernia. Path: Mild chronic inflammation.   • JOINT REPLACEMENT Left     total knee   • KNEE SURGERY      2 knee sugeries on left knee   • [place] TOTAL KNEE ARTHROPLASTY Left 3/14/2016    Procedure: LEFT TOTAL KNEE ARTHROPLASTY;  Surgeon: Bladimir Mehta MD;  Location: [facility];  Service: Orthopedics   • REDUCTION MAMMAPLASTY     • WISDOM TOOTH EXTRACTION     • WOUND DEBRIDEMENT Bilateral     BREAST.  AFTER REDUCTION     Social History     Tobacco Use   • Smoking status: Current Every Day Smoker     Types: Electronic Cigarette, Cigarettes     Last attempt to quit:      Years since quittin.6   • Smokeless tobacco: Never Used   • Tobacco comment: 10-12 cigarettes daily   Substance Use Topics   • Alcohol use: No     Comment: caffeine use- coffee   • Drug use: No     Family History   Problem Relation Age of Onset   • Alzheimer's disease Mother    • Breast cancer Mother    • Diabetes Mother    • Lung disease Father         pullmonary artery disease   • Heart disease Father    • Cancer Father 32   • Heart disease Brother    • Diabetes Brother    • Heart attack Brother    • Heart disease Brother    • Diabetes Brother    • Colon cancer Other    • Heart attack Other    • Diabetes Other    • Heart disease Other    • Diabetes Brother    • JOSHUA disease Brother        Review of Systems   Constitutional: Negative for activity  change, appetite change, chills, fatigue and fever.   HENT: Negative for congestion, postnasal drip, rhinorrhea, sinus pressure and sore throat.    Respiratory: Positive for cough. Negative for chest tightness and shortness of breath.    Cardiovascular: Negative for chest pain.   Gastrointestinal: Negative for abdominal pain, diarrhea, nausea and vomiting.   Musculoskeletal: Negative for arthralgias.   Skin: Negative for rash.   Neurological: Negative for dizziness and headache.       Objective   There were no vitals taken for this visit.    Physical Exam   Constitutional:   Patient interviewed over telephone. No physical exam.       Procedures    Assessment/Plan   Tiera was seen today for cough.    Diagnoses and all orders for this visit:    Persistent cough for 3 weeks or longer  -     XR Chest PA & Lateral; Future  -     COVID-19,[facility] ROUTINE, NP/OP SWAB IN TRANSPORT MEDIA OR ESWAB 72 HR TAT - Swab, Nasopharynx; Future       I spent 10 minutes on the phone with patient today.  Due to her persistent cough, I will order CXR and covid testing.  Advised her to continue with smoking cessation.  Will contact her with results.  She will follow up with her PCP as needed.       Patient Instructions    CXR ordered and covid test ordered. Will contact you with results.

## 2020-08-24 ENCOUNTER — LAB (OUTPATIENT)
Dept: LAB | Facility: HOSPITAL | Age: 60
End: 2020-08-24

## 2020-08-24 ENCOUNTER — OFFICE VISIT (OUTPATIENT)
Dept: ONCOLOGY | Facility: CLINIC | Age: 60
End: 2020-08-24

## 2020-08-24 VITALS
HEART RATE: 71 BPM | SYSTOLIC BLOOD PRESSURE: 124 MMHG | TEMPERATURE: 97.8 F | OXYGEN SATURATION: 99 % | DIASTOLIC BLOOD PRESSURE: 62 MMHG | WEIGHT: 192.1 LBS | RESPIRATION RATE: 12 BRPM | BODY MASS INDEX: 27.5 KG/M2 | HEIGHT: 70 IN

## 2020-08-24 DIAGNOSIS — D50.9 IRON DEFICIENCY ANEMIA, UNSPECIFIED IRON DEFICIENCY ANEMIA TYPE: Primary | ICD-10-CM

## 2020-08-24 DIAGNOSIS — D50.0 IRON DEFICIENCY ANEMIA DUE TO CHRONIC BLOOD LOSS: ICD-10-CM

## 2020-08-24 DIAGNOSIS — D64.9 ANEMIA, UNSPECIFIED TYPE: ICD-10-CM

## 2020-08-24 LAB
BASOPHILS # BLD AUTO: 0.03 10*3/MM3 (ref 0–0.2)
BASOPHILS NFR BLD AUTO: 0.5 % (ref 0–1.5)
DEPRECATED RDW RBC AUTO: 43.2 FL (ref 37–54)
EOSINOPHIL # BLD AUTO: 0.23 10*3/MM3 (ref 0–0.4)
EOSINOPHIL NFR BLD AUTO: 3.5 % (ref 0.3–6.2)
ERYTHROCYTE [DISTWIDTH] IN BLOOD BY AUTOMATED COUNT: 13.6 % (ref 12.3–15.4)
FERRITIN SERPL-MCNC: 10.1 NG/ML (ref 13–150)
HCT VFR BLD AUTO: 37.7 % (ref 34–46.6)
HGB BLD-MCNC: 11.8 G/DL (ref 12–15.9)
IMM GRANULOCYTES # BLD AUTO: 0.01 10*3/MM3 (ref 0–0.05)
IMM GRANULOCYTES NFR BLD AUTO: 0.2 % (ref 0–0.5)
IRON 24H UR-MRATE: 38 MCG/DL (ref 37–145)
IRON SATN MFR SERPL: 10 % (ref 14–48)
LYMPHOCYTES # BLD AUTO: 2.47 10*3/MM3 (ref 0.7–3.1)
LYMPHOCYTES NFR BLD AUTO: 37.7 % (ref 19.6–45.3)
MCH RBC QN AUTO: 27.2 PG (ref 26.6–33)
MCHC RBC AUTO-ENTMCNC: 31.3 G/DL (ref 31.5–35.7)
MCV RBC AUTO: 86.9 FL (ref 79–97)
MONOCYTES # BLD AUTO: 0.39 10*3/MM3 (ref 0.1–0.9)
MONOCYTES NFR BLD AUTO: 5.9 % (ref 5–12)
NEUTROPHILS NFR BLD AUTO: 3.43 10*3/MM3 (ref 1.7–7)
NEUTROPHILS NFR BLD AUTO: 52.2 % (ref 42.7–76)
NRBC BLD AUTO-RTO: 0 /100 WBC (ref 0–0.2)
PLATELET # BLD AUTO: 223 10*3/MM3 (ref 140–450)
PMV BLD AUTO: 10.7 FL (ref 6–12)
RBC # BLD AUTO: 4.34 10*6/MM3 (ref 3.77–5.28)
TIBC SERPL-MCNC: 388 MCG/DL (ref 249–505)
TRANSFERRIN SERPL-MCNC: 277 MG/DL (ref 200–360)
WBC # BLD AUTO: 6.56 10*3/MM3 (ref 3.4–10.8)

## 2020-08-24 PROCEDURE — 99214 OFFICE O/P EST MOD 30 MIN: CPT | Performed by: INTERNAL MEDICINE

## 2020-08-24 PROCEDURE — 36415 COLL VENOUS BLD VENIPUNCTURE: CPT

## 2020-08-24 PROCEDURE — 84466 ASSAY OF TRANSFERRIN: CPT

## 2020-08-24 PROCEDURE — 85025 COMPLETE CBC W/AUTO DIFF WBC: CPT

## 2020-08-24 PROCEDURE — 83540 ASSAY OF IRON: CPT

## 2020-08-24 PROCEDURE — 82728 ASSAY OF FERRITIN: CPT

## 2020-08-24 RX ORDER — SODIUM CHLORIDE 9 MG/ML
250 INJECTION, SOLUTION INTRAVENOUS ONCE
Status: CANCELLED | OUTPATIENT
Start: 2020-08-24

## 2020-08-24 RX ORDER — DIPHENHYDRAMINE HYDROCHLORIDE 50 MG/ML
50 INJECTION INTRAMUSCULAR; INTRAVENOUS AS NEEDED
Status: CANCELLED | OUTPATIENT
Start: 2020-08-24

## 2020-08-24 RX ORDER — METHYLPREDNISOLONE SODIUM SUCCINATE 125 MG/2ML
125 INJECTION, POWDER, LYOPHILIZED, FOR SOLUTION INTRAMUSCULAR; INTRAVENOUS AS NEEDED
Status: CANCELLED | OUTPATIENT
Start: 2020-08-24

## 2020-08-24 RX ORDER — PROCHLORPERAZINE MALEATE 10 MG
10 TABLET ORAL ONCE
Status: CANCELLED
Start: 2020-08-24

## 2020-08-24 NOTE — PROGRESS NOTES
REASON FOR FOLLOW-UP: History of recurrent iron deficiency anemia requiring repeated IV iron therapy.      History of Present Illness    Mrs. Abbott is a pleasant 59-year-old woman who returns today for four-month follow-up.  When she was seen in May 2019 by Dr. Harris, the patient was significantly anemic with a hemoglobin of 7.8, MCV 69.  Iron saturation was 3%, ferritin 29.  Though she was placed on oral iron, this caused her nausea and constipation.  We elected to give her IV Injectafer which took place in June 2019.  She also underwent repeat upper and lower endoscopy per Dr. Minna Bee with evidence of nonsevere esophagitis, scattered mild inflammation with biopsies taken, pathology negative.  She had 3 polyps removed in the colon, again pathology negative.  She had internal bleeding hemorrhoids.    The patient returned today for follow-up.  She denies bright red blood per rectum or melanotic stool.  She denies significant lightheadedness, shortness of breath or other anemia related symptoms.  She does have mild ice craving.    Past Medical History, Past Surgical History, Social History, Family History have been reviewed and are without significant changes except as mentioned from my consult note on 8/25/17.    Review of Systems   Constitutional: Positive for fatigue (improved). Negative for activity change and unexpected weight change.   HENT: Negative.    Respiratory: Negative for shortness of breath.    Cardiovascular: Negative.    Gastrointestinal: Positive for constipation. Negative for nausea.   Genitourinary: Negative.    Musculoskeletal: Negative.    Skin: Negative.    Neurological: Negative for dizziness.   Hematological: Negative.    Psychiatric/Behavioral: Negative.        Medications:  The current medication list was reviewed in the EMR    ALLERGIES:    Allergies   Allergen Reactions   • Latex Itching       Objective      Vitals:    08/24/20 1601   BP: 124/62   Pulse: 71   Resp: 12   Temp: 97.8  "°F (36.6 °C)   TempSrc: Tympanic   SpO2: 99%   Weight: 87.1 kg (192 lb 1.6 oz)   Height: 177.8 cm (70\")   PainSc: 0-No pain     Current Status 8/24/2020   ECOG score 0       Physical Exam    CON: pleasant well-developed adult woman  HEENT: no icterus, no thrush, moist membranes  NECK: no jvd  LYMPH: no cervical or supraclavicular lad  CV: RRR, S1S2, no murmur  RESP: cta bilat, no wheezing, no rales  GI: soft, non-tender, no splenomegaly, +bs  MUSC: no edema, normal gait  NEURO: alert and oriented x3, normal strength  PSYCH: normal mood    RECENT LABS:  Results from last 7 days   Lab Units 08/24/20  1546   WBC 10*3/mm3 6.56   NEUTROS ABS 10*3/mm3 3.43   HEMOGLOBIN g/dL 11.8*   HEMATOCRIT % 37.7   PLATELETS 10*3/mm3 223     Results from last 7 days   Lab Units 08/24/20  1546   FERRITIN ng/mL 10.10*   IRON mcg/dL 38   TIBC mcg/dL 388             Assessment/Plan    1.  Anemia worse  2. Iron def worse    Recurrent iron deficiency anemia: The patient is mildly anemic today with hemoglobin 11.8.  I therefore repeated her iron studies and she is again significantly iron deficient with a ferritin 10.1 and iron profile showing iron saturation very low at 10%.  She is intolerant of oral iron so we will set her up to receive 2 doses of IV Injectafer.  We will repeat her CBC, ferritin and iron profile 4 months.  I will recommend she follow-up again with GI for recurrent iron deficiency anemia as they had previously mentioned performing a small bowel follow-through.              8/24/2020      CC:          "

## 2020-09-01 ENCOUNTER — TELEPHONE (OUTPATIENT)
Dept: FAMILY MEDICINE CLINIC | Facility: CLINIC | Age: 60
End: 2020-09-01

## 2020-09-01 NOTE — TELEPHONE ENCOUNTER
PT CALLED REQUESTING HE MOST RECENT DR NOTE FOR WORK MAILED TO HER     ADDRESS   [address]  [place] [address]    PLEASE ADVISE     PT CALL BACK   [phone]

## 2020-09-09 ENCOUNTER — TELEPHONE (OUTPATIENT)
Dept: FAMILY MEDICINE CLINIC | Facility: CLINIC | Age: 60
End: 2020-09-09

## 2020-09-09 NOTE — TELEPHONE ENCOUNTER
PATIENT CALLED INDICATED SHE HAD REQUESTED A EXCUSE FOR WORK WHILE SHE HAS BEEN OUT SICK , AND STATES BASED ON ATTEMPTS TO GET SOME TYPE OF VISIT SHE NEEDS THE NOTE FOR DATES OF 8/3/-8/8/2020 , PATIENT STATES SHE WAS SEEN BY DR JANG 8/6/20 BUT HAD BEEN REALLY SICK THAT WHOLE WEEK AND WASN'T ABLE TO GO TO WORK AND BEEN TRYING SINCE Monday 9/3/20 TO SEE SOMEONE  AND INDICATED SHE DID JUST RECEIVE A NOTE BUT ONLY FOR DATES 8/6-8/13/20 SO SHE NOTE DOESN'T COVER THE ENTIRE TIME SHE WAS SICK. PATIENT INDICATED THIS CAN BE SENT VIA Placed ONCE IS UPDATED.    PT CALL BACK # [phone]

## 2020-09-10 ENCOUNTER — APPOINTMENT (OUTPATIENT)
Dept: ONCOLOGY | Facility: HOSPITAL | Age: 60
End: 2020-09-10

## 2020-09-11 ENCOUNTER — INFUSION (OUTPATIENT)
Dept: ONCOLOGY | Facility: HOSPITAL | Age: 60
End: 2020-09-11

## 2020-09-11 VITALS
SYSTOLIC BLOOD PRESSURE: 119 MMHG | HEART RATE: 61 BPM | DIASTOLIC BLOOD PRESSURE: 72 MMHG | OXYGEN SATURATION: 96 % | BODY MASS INDEX: 27.95 KG/M2 | RESPIRATION RATE: 16 BRPM | WEIGHT: 194.8 LBS | TEMPERATURE: 96.9 F

## 2020-09-11 DIAGNOSIS — D50.0 IRON DEFICIENCY ANEMIA DUE TO CHRONIC BLOOD LOSS: Primary | ICD-10-CM

## 2020-09-11 PROCEDURE — 63710000001 PROCHLORPERAZINE MALEATE PER 5 MG: Performed by: INTERNAL MEDICINE

## 2020-09-11 PROCEDURE — 96374 THER/PROPH/DIAG INJ IV PUSH: CPT

## 2020-09-11 PROCEDURE — 25010000002 FERRIC CARBOXYMALTOSE 750 MG/15ML SOLUTION 15 ML VIAL: Performed by: INTERNAL MEDICINE

## 2020-09-11 RX ORDER — METHYLPREDNISOLONE SODIUM SUCCINATE 125 MG/2ML
125 INJECTION, POWDER, LYOPHILIZED, FOR SOLUTION INTRAMUSCULAR; INTRAVENOUS AS NEEDED
Status: CANCELLED | OUTPATIENT
Start: 2020-09-18

## 2020-09-11 RX ORDER — SODIUM CHLORIDE 9 MG/ML
250 INJECTION, SOLUTION INTRAVENOUS ONCE
Status: COMPLETED | OUTPATIENT
Start: 2020-09-11 | End: 2020-09-11

## 2020-09-11 RX ORDER — DIPHENHYDRAMINE HYDROCHLORIDE 50 MG/ML
50 INJECTION INTRAMUSCULAR; INTRAVENOUS AS NEEDED
Status: CANCELLED | OUTPATIENT
Start: 2020-09-18

## 2020-09-11 RX ORDER — SODIUM CHLORIDE 9 MG/ML
250 INJECTION, SOLUTION INTRAVENOUS ONCE
Status: CANCELLED | OUTPATIENT
Start: 2020-09-18

## 2020-09-11 RX ORDER — PROCHLORPERAZINE MALEATE 5 MG/1
10 TABLET ORAL ONCE
Status: COMPLETED | OUTPATIENT
Start: 2020-09-11 | End: 2020-09-11

## 2020-09-11 RX ORDER — PROCHLORPERAZINE MALEATE 10 MG
10 TABLET ORAL ONCE
Status: CANCELLED
Start: 2020-09-18

## 2020-09-11 RX ADMIN — PROCHLORPERAZINE MALEATE 10 MG: 5 TABLET ORAL at 16:18

## 2020-09-11 RX ADMIN — SODIUM CHLORIDE 250 ML: 9 INJECTION, SOLUTION INTRAVENOUS at 16:18

## 2020-09-11 RX ADMIN — FERRIC CARBOXYMALTOSE INJECTION 750 MG: 50 INJECTION, SOLUTION INTRAVENOUS at 16:19

## 2020-09-15 NOTE — TELEPHONE ENCOUNTER
Pt returned missed call. She states the letter needs to be from August 3rd-August 8th. She returns to work tonight and will like to have this ready. Please advise

## 2020-09-22 ENCOUNTER — TELEPHONE (OUTPATIENT)
Dept: ONCOLOGY | Facility: CLINIC | Age: 60
End: 2020-09-22

## 2020-09-22 NOTE — TELEPHONE ENCOUNTER
PATIENT RECEIVED A VOICEMAIL SAYING THAT SHE MISSED HER INFUSION APPT, SHE SAYS SHE THIS IS THE SECOND TIME SHE HAS BEEN SCHEDULED FOR AN INFUSION THAT SHE WAS NOT MADE AWARE OF, SHE NEEDS TO RESCHEDULE FOR THE INFUSION AND NEEDS TO BE GIVEN A CALL NOTIFYING HER MORE THAN A DAY BEFOREHAND, SHE WOULD PREFER FOR SOMEONE TO CALL AND SCHEDULED WITH HER WHILE SHE IS ON THE PHONE.    PT CALL BACK #[phone]

## 2020-09-25 ENCOUNTER — INFUSION (OUTPATIENT)
Dept: ONCOLOGY | Facility: HOSPITAL | Age: 60
End: 2020-09-25

## 2020-09-25 VITALS — SYSTOLIC BLOOD PRESSURE: 117 MMHG | DIASTOLIC BLOOD PRESSURE: 71 MMHG

## 2020-09-25 DIAGNOSIS — D50.0 IRON DEFICIENCY ANEMIA DUE TO CHRONIC BLOOD LOSS: Primary | ICD-10-CM

## 2020-09-25 PROCEDURE — 25010000002 FERRIC CARBOXYMALTOSE 750 MG/15ML SOLUTION 15 ML VIAL: Performed by: INTERNAL MEDICINE

## 2020-09-25 PROCEDURE — 96374 THER/PROPH/DIAG INJ IV PUSH: CPT

## 2020-09-25 PROCEDURE — 63710000001 PROCHLORPERAZINE MALEATE PER 10 MG: Performed by: INTERNAL MEDICINE

## 2020-09-25 PROCEDURE — 96375 TX/PRO/DX INJ NEW DRUG ADDON: CPT

## 2020-09-25 RX ORDER — SODIUM CHLORIDE 9 MG/ML
250 INJECTION, SOLUTION INTRAVENOUS ONCE
Status: CANCELLED | OUTPATIENT
Start: 2020-10-02

## 2020-09-25 RX ORDER — SODIUM CHLORIDE 9 MG/ML
250 INJECTION, SOLUTION INTRAVENOUS ONCE
Status: COMPLETED | OUTPATIENT
Start: 2020-09-25 | End: 2020-09-25

## 2020-09-25 RX ORDER — METHYLPREDNISOLONE SODIUM SUCCINATE 125 MG/2ML
125 INJECTION, POWDER, LYOPHILIZED, FOR SOLUTION INTRAMUSCULAR; INTRAVENOUS AS NEEDED
Status: CANCELLED | OUTPATIENT
Start: 2020-10-02

## 2020-09-25 RX ORDER — DIPHENHYDRAMINE HYDROCHLORIDE 50 MG/ML
50 INJECTION INTRAMUSCULAR; INTRAVENOUS AS NEEDED
Status: CANCELLED | OUTPATIENT
Start: 2020-10-02

## 2020-09-25 RX ORDER — PROCHLORPERAZINE MALEATE 10 MG
10 TABLET ORAL ONCE
Status: CANCELLED
Start: 2020-10-02

## 2020-09-25 RX ORDER — PROCHLORPERAZINE MALEATE 10 MG
10 TABLET ORAL ONCE
Status: COMPLETED | OUTPATIENT
Start: 2020-09-25 | End: 2020-09-25

## 2020-09-25 RX ADMIN — PROCHLORPERAZINE MALEATE 10 MG: 10 TABLET ORAL at 16:07

## 2020-09-25 RX ADMIN — SODIUM CHLORIDE 250 ML: 9 INJECTION, SOLUTION INTRAVENOUS at 16:09

## 2020-09-25 RX ADMIN — FERRIC CARBOXYMALTOSE INJECTION 750 MG: 50 INJECTION, SOLUTION INTRAVENOUS at 16:09

## 2020-10-08 RX ORDER — CLOPIDOGREL BISULFATE 75 MG/1
TABLET ORAL
Qty: 90 TABLET | Refills: 0 | Status: SHIPPED | OUTPATIENT
Start: 2020-10-08 | End: 2021-01-11

## 2020-10-26 ENCOUNTER — TELEPHONE (OUTPATIENT)
Dept: CARDIOLOGY | Facility: CLINIC | Age: 60
End: 2020-10-26

## 2020-10-26 RX ORDER — ATORVASTATIN CALCIUM 80 MG/1
80 TABLET, FILM COATED ORAL
Qty: 90 TABLET | Refills: 2 | Status: SHIPPED | OUTPATIENT
Start: 2020-10-26 | End: 2021-08-16

## 2020-10-26 NOTE — TELEPHONE ENCOUNTER
We received a fax refill for Ms. Abbott's Atorvastatin 80 mg daily.  # 90 day supply to [facility] pharmacy([facility]) [phone].  Thanks/teodorof    Pt had a telehealth visit with Dr. Fontaine on 4/13/20./bere

## 2020-12-14 ENCOUNTER — APPOINTMENT (OUTPATIENT)
Dept: LAB | Facility: HOSPITAL | Age: 60
End: 2020-12-14

## 2020-12-14 RX ORDER — PANTOPRAZOLE SODIUM 40 MG/1
TABLET, DELAYED RELEASE ORAL
Qty: 90 TABLET | Refills: 0 | Status: SHIPPED | OUTPATIENT
Start: 2020-12-14 | End: 2021-03-15

## 2021-01-04 ENCOUNTER — TELEPHONE (OUTPATIENT)
Dept: ONCOLOGY | Facility: CLINIC | Age: 61
End: 2021-01-04

## 2021-01-04 ENCOUNTER — APPOINTMENT (OUTPATIENT)
Dept: LAB | Facility: HOSPITAL | Age: 61
End: 2021-01-04

## 2021-01-04 NOTE — TELEPHONE ENCOUNTER
Caller: Tiera    Relationship to patient: Pt    Best call back number: [phone]    Type of visit: Lab and follow up     Requested date: Asap    If rescheduling, when is the original appointment: 01/04    Additional notes: Pt's been feeling lightheaded and like her equilibrium is off since this past weekend, thinks she needs hemoglobin checked.

## 2021-01-04 NOTE — TELEPHONE ENCOUNTER
Returning pt's call. Pt stated she cancelled her appt today and shouldn't have because she is having symptoms of low hgb and wants to get that rescheduled asap. Told pt I would message the scheduling desk to give her a call to reschedule. Pt v/u.

## 2021-01-05 ENCOUNTER — TELEPHONE (OUTPATIENT)
Dept: GENERAL RADIOLOGY | Facility: HOSPITAL | Age: 61
End: 2021-01-05

## 2021-01-06 ENCOUNTER — TELEPHONE (OUTPATIENT)
Dept: ONCOLOGY | Facility: CLINIC | Age: 61
End: 2021-01-06

## 2021-01-06 NOTE — TELEPHONE ENCOUNTER
Provider: SHELLY  Caller: LILIAN PINON   Relationship to Patient: SELF    Phone Number: [phone]  Reason for Call: PT SAYS HER HEMOGLOBIN IS LOW, AND SHE THINKS SHE NEEDS TO GET AN IRON INFUSION AT HER APT TOMORROW.      When did it start: Saturday     PT IS WANTING SOMEONE TO CALL HER AND LET HER KNOW IF SHE IS GOING TO GET IRON  INFUSION

## 2021-01-07 ENCOUNTER — OFFICE VISIT (OUTPATIENT)
Dept: ONCOLOGY | Facility: CLINIC | Age: 61
End: 2021-01-07

## 2021-01-07 ENCOUNTER — LAB (OUTPATIENT)
Dept: LAB | Facility: HOSPITAL | Age: 61
End: 2021-01-07

## 2021-01-07 VITALS
WEIGHT: 191 LBS | TEMPERATURE: 97.7 F | HEART RATE: 95 BPM | HEIGHT: 70 IN | BODY MASS INDEX: 27.35 KG/M2 | SYSTOLIC BLOOD PRESSURE: 108 MMHG | OXYGEN SATURATION: 96 % | DIASTOLIC BLOOD PRESSURE: 68 MMHG | RESPIRATION RATE: 16 BRPM

## 2021-01-07 DIAGNOSIS — R42 LIGHTHEADEDNESS: ICD-10-CM

## 2021-01-07 DIAGNOSIS — R53.83 FATIGUE, UNSPECIFIED TYPE: ICD-10-CM

## 2021-01-07 DIAGNOSIS — D50.0 IRON DEFICIENCY ANEMIA DUE TO CHRONIC BLOOD LOSS: Primary | ICD-10-CM

## 2021-01-07 DIAGNOSIS — D50.0 IRON DEFICIENCY ANEMIA DUE TO CHRONIC BLOOD LOSS: ICD-10-CM

## 2021-01-07 DIAGNOSIS — D64.9 ANEMIA, UNSPECIFIED TYPE: ICD-10-CM

## 2021-01-07 DIAGNOSIS — D50.9 IRON DEFICIENCY ANEMIA, UNSPECIFIED IRON DEFICIENCY ANEMIA TYPE: ICD-10-CM

## 2021-01-07 DIAGNOSIS — I25.2 HISTORY OF MI (MYOCARDIAL INFARCTION): ICD-10-CM

## 2021-01-07 LAB
BASOPHILS # BLD AUTO: 0.05 10*3/MM3 (ref 0–0.2)
BASOPHILS NFR BLD AUTO: 0.8 % (ref 0–1.5)
DEPRECATED RDW RBC AUTO: 43.8 FL (ref 37–54)
EOSINOPHIL # BLD AUTO: 0.27 10*3/MM3 (ref 0–0.4)
EOSINOPHIL NFR BLD AUTO: 4.1 % (ref 0.3–6.2)
ERYTHROCYTE [DISTWIDTH] IN BLOOD BY AUTOMATED COUNT: 13.7 % (ref 12.3–15.4)
FERRITIN SERPL-MCNC: 53.9 NG/ML (ref 13–150)
HCT VFR BLD AUTO: 40.1 % (ref 34–46.6)
HGB BLD-MCNC: 13.1 G/DL (ref 12–15.9)
IMM GRANULOCYTES # BLD AUTO: 0.01 10*3/MM3 (ref 0–0.05)
IMM GRANULOCYTES NFR BLD AUTO: 0.2 % (ref 0–0.5)
IRON 24H UR-MRATE: 76 MCG/DL (ref 37–145)
IRON SATN MFR SERPL: 21 % (ref 14–48)
LYMPHOCYTES # BLD AUTO: 2.4 10*3/MM3 (ref 0.7–3.1)
LYMPHOCYTES NFR BLD AUTO: 36.6 % (ref 19.6–45.3)
MCH RBC QN AUTO: 29.1 PG (ref 26.6–33)
MCHC RBC AUTO-ENTMCNC: 32.7 G/DL (ref 31.5–35.7)
MCV RBC AUTO: 89.1 FL (ref 79–97)
MONOCYTES # BLD AUTO: 0.45 10*3/MM3 (ref 0.1–0.9)
MONOCYTES NFR BLD AUTO: 6.9 % (ref 5–12)
NEUTROPHILS NFR BLD AUTO: 3.38 10*3/MM3 (ref 1.7–7)
NEUTROPHILS NFR BLD AUTO: 51.4 % (ref 42.7–76)
NRBC BLD AUTO-RTO: 0 /100 WBC (ref 0–0.2)
PLATELET # BLD AUTO: 201 10*3/MM3 (ref 140–450)
PMV BLD AUTO: 11 FL (ref 6–12)
RBC # BLD AUTO: 4.5 10*6/MM3 (ref 3.77–5.28)
TIBC SERPL-MCNC: 360 MCG/DL (ref 249–505)
TRANSFERRIN SERPL-MCNC: 257 MG/DL (ref 200–360)
WBC # BLD AUTO: 6.56 10*3/MM3 (ref 3.4–10.8)

## 2021-01-07 PROCEDURE — 82728 ASSAY OF FERRITIN: CPT

## 2021-01-07 PROCEDURE — 99214 OFFICE O/P EST MOD 30 MIN: CPT | Performed by: NURSE PRACTITIONER

## 2021-01-07 PROCEDURE — 84466 ASSAY OF TRANSFERRIN: CPT

## 2021-01-07 PROCEDURE — 85025 COMPLETE CBC W/AUTO DIFF WBC: CPT

## 2021-01-07 PROCEDURE — 36415 COLL VENOUS BLD VENIPUNCTURE: CPT

## 2021-01-07 PROCEDURE — 83540 ASSAY OF IRON: CPT

## 2021-01-07 RX ORDER — AZITHROMYCIN 250 MG/1
250 TABLET, FILM COATED ORAL
COMMUNITY
Start: 2020-07-28 | End: 2021-01-07

## 2021-01-07 NOTE — PROGRESS NOTES
REASON FOR FOLLOW-UP: History of recurrent iron deficiency anemia requiring repeated IV iron therapy.      History of Present Illness    Mrs. Abbott is a pleasant 60 y.o. woman who returns today for four-month follow-up.  Patient last required IV Injectafer in September 2020 after hemoglobin dropped to 11.8, ferritin was down to 10 and iron saturation 10% as well.    As patient presents back today she states she has had decreased energy and was sure that she was iron deficient again.  She notes laying on the couch a few nights ago and then getting up to walk to the kitchen and feeling lightheaded like she was going to fall over.  She has not had this feeling since.    We reviewed that her blood work actually looks good today with hemoglobin of 13.1, ferritin of 53, iron saturation of 21%.  Therefore she is not in need of IV iron at this time.    Upon further discussion it is noted that the patient has been out of her Coreg for a few weeks now, prescribed per Dr. Fontaine and something she has been on since her prior MI in 2008.  She notes being overdue to see Dr. Fontaine as well.  Heart rate is notably higher today in the 90s.  She does historically run lower on her blood pressure as well.  We discussed she could be experiencing side effects of being off her Coreg or perhaps orthostasis with her noted dizziness upon position change.  She was encouraged to get back in to see Dr. Fontaine.  She does have a Coreg prescription to  reportedly.    She otherwise denies further concerns today.    Past Medical History, Past Surgical History, Social History, Family History have been reviewed and are without significant changes except as mentioned from my consult note on 8/25/17.    Review of Systems   Constitutional: Positive for fatigue (worse recently - see HPI). Negative for activity change and unexpected weight change.   HENT: Negative.    Respiratory: Negative for shortness of breath.    Cardiovascular: Negative.  "   Gastrointestinal: Negative.  Negative for constipation and nausea.   Genitourinary: Negative.    Musculoskeletal: Negative.    Skin: Negative.    Neurological: Positive for dizziness (see HPI).   Hematological: Negative.    Psychiatric/Behavioral: Negative.        Medications:  The current medication list was reviewed in the EMR    ALLERGIES:    Allergies   Allergen Reactions   • Latex Itching       Objective      Vitals:    01/07/21 1331   BP: 108/68   Pulse: 95   Resp: 16   Temp: 97.7 °F (36.5 °C)   TempSrc: Temporal   SpO2: 96%   Weight: 86.6 kg (191 lb)   Height: 177.8 cm (70\")   PainSc: 0-No pain     Current Status 1/7/2021   ECOG score 1       Physical Exam    CON: pleasant well-developed adult woman  HEENT: no icterus, no thrush, moist membranes  NECK: no jvd  LYMPH: no cervical or supraclavicular lad  CV: RRR, S1S2, no murmur  RESP: cta bilat, no wheezing, no rales  GI: soft, non-tender, no splenomegaly, +bs  MUSC: no edema, normal gait  NEURO: alert and oriented x3, normal strength  PSYCH: normal mood  I have reexamined the patient and the results are consistent with the previously documented exam. Alejandra Delvalle, SUMMER       RECENT LABS:  Results from last 7 days   Lab Units 01/07/21  1319   WBC 10*3/mm3 6.56   NEUTROS ABS 10*3/mm3 3.38   HEMOGLOBIN g/dL 13.1   HEMATOCRIT % 40.1   PLATELETS 10*3/mm3 201     Results from last 7 days   Lab Units 01/07/21  1319   FERRITIN ng/mL 53.90   IRON mcg/dL 76   TIBC mcg/dL 360             Assessment/Plan    1.  History of iron deficiency anemia.  Patient requires intermittent IV iron.  She received IV Injectafer twice last year, most recently September 2020.  As she is reviewed today, we discussed that her hemoglobin and iron studies are normal.  Therefore we will plan to review her back in about 3 months with repeat CBC, ferritin, and iron profile at that time.    2.  Recent feelings of worsening fatigue and some dizziness with position change.  Patient does " have a history of myocardial infarction in 2008, followed by Dr. Fontaine.  Patient has been on Coreg 3.125 mg twice daily since her MI.  She has been out of this medication for a few weeks.  She does traditionally run lower on her blood pressure as well and we discussed she could be experiencing orthostatic hypotension.  She notes that her Coreg prescription is ready to be picked up and I encouraged her to get that picked up today and restart.  She is overdue for follow-up with Dr. Fontaine and was instructed to make follow-up with him at once.  Though heart rate slightly higher for her in the office today, normal rhythm appreciated on physical exam with no arrhythmia appreciated.    PLAN:  1.  Work reviewed with patient today showing no evidence of recurrent iron deficiency anemia at this time.  2.  Patient will return in 3 months with ferritin, iron profile, CBC and reviewed by Dr. Harris.  Continue IV iron in the future as warranted.  3.  Patient to start back on her Coreg immediately and make follow-up with Dr. Fontaine soon as detailed above.          1/7/2021      CC:

## 2021-01-11 RX ORDER — CARVEDILOL 3.12 MG/1
TABLET ORAL
Qty: 180 TABLET | Refills: 2 | Status: SHIPPED | OUTPATIENT
Start: 2021-01-11

## 2021-01-11 RX ORDER — CLOPIDOGREL BISULFATE 75 MG/1
TABLET ORAL
Qty: 90 TABLET | Refills: 1 | Status: SHIPPED | OUTPATIENT
Start: 2021-01-11 | End: 2021-07-13

## 2021-01-28 NOTE — TELEPHONE ENCOUNTER
----- Message from Clara Andrade sent at 7/8/2019  3:13 PM EDT -----  Regarding: Concern  Contact: [phone]  Patient wanted me to send you this stating that she doesn't feel like her Brain is functioning correctly ? Patient stated she would like you to call her back.   Judith Hugo MD  You 56 minutes ago (12:59 PM)     yes increase to 5 mg daily    Message text       Natalya Marinelli MD 4 hours ago (9:20 AM)     Please advise    Routing comment      TC to pt, ID verified. Advised pt per Dr. Alexus Brady he can increase his Norvasc to 5mg daily. Pt understands and will increase dosage. Advised him to bring in his home monitor to check it against a manual reading when he comes in for a stress test. Pt is agreeable to this.

## 2021-03-15 RX ORDER — PANTOPRAZOLE SODIUM 40 MG/1
TABLET, DELAYED RELEASE ORAL
Qty: 90 TABLET | Refills: 0 | Status: SHIPPED | OUTPATIENT
Start: 2021-03-15 | End: 2021-04-28 | Stop reason: SDUPTHER

## 2021-03-16 ENCOUNTER — BULK ORDERING (OUTPATIENT)
Dept: CASE MANAGEMENT | Facility: OTHER | Age: 61
End: 2021-03-16

## 2021-03-16 DIAGNOSIS — Z23 IMMUNIZATION DUE: ICD-10-CM

## 2021-03-18 ENCOUNTER — IMMUNIZATION (OUTPATIENT)
Dept: VACCINE CLINIC | Facility: HOSPITAL | Age: 61
End: 2021-03-18

## 2021-03-18 DIAGNOSIS — Z23 IMMUNIZATION DUE: ICD-10-CM

## 2021-03-18 PROCEDURE — 91300 HC SARSCOV02 VAC 30MCG/0.3ML IM: CPT | Performed by: INTERNAL MEDICINE

## 2021-03-18 PROCEDURE — 0001A: CPT | Performed by: INTERNAL MEDICINE

## 2021-04-02 ENCOUNTER — LAB (OUTPATIENT)
Dept: LAB | Facility: HOSPITAL | Age: 61
End: 2021-04-02

## 2021-04-02 ENCOUNTER — OFFICE VISIT (OUTPATIENT)
Dept: ONCOLOGY | Facility: CLINIC | Age: 61
End: 2021-04-02

## 2021-04-02 VITALS
WEIGHT: 195.3 LBS | HEART RATE: 72 BPM | HEIGHT: 70 IN | TEMPERATURE: 97.7 F | DIASTOLIC BLOOD PRESSURE: 73 MMHG | OXYGEN SATURATION: 98 % | BODY MASS INDEX: 27.96 KG/M2 | RESPIRATION RATE: 16 BRPM | SYSTOLIC BLOOD PRESSURE: 127 MMHG

## 2021-04-02 DIAGNOSIS — E53.8 B12 DEFICIENCY: Primary | ICD-10-CM

## 2021-04-02 DIAGNOSIS — D50.0 IRON DEFICIENCY ANEMIA DUE TO CHRONIC BLOOD LOSS: ICD-10-CM

## 2021-04-02 LAB
BASOPHILS # BLD AUTO: 0.04 10*3/MM3 (ref 0–0.2)
BASOPHILS NFR BLD AUTO: 0.6 % (ref 0–1.5)
DEPRECATED RDW RBC AUTO: 44.6 FL (ref 37–54)
EOSINOPHIL # BLD AUTO: 0.26 10*3/MM3 (ref 0–0.4)
EOSINOPHIL NFR BLD AUTO: 3.9 % (ref 0.3–6.2)
ERYTHROCYTE [DISTWIDTH] IN BLOOD BY AUTOMATED COUNT: 14.1 % (ref 12.3–15.4)
FERRITIN SERPL-MCNC: 13.8 NG/ML (ref 13–150)
HCT VFR BLD AUTO: 40.9 % (ref 34–46.6)
HGB BLD-MCNC: 12.8 G/DL (ref 12–15.9)
IMM GRANULOCYTES # BLD AUTO: 0.03 10*3/MM3 (ref 0–0.05)
IMM GRANULOCYTES NFR BLD AUTO: 0.4 % (ref 0–0.5)
IRON 24H UR-MRATE: 47 MCG/DL (ref 37–145)
IRON SATN MFR SERPL: 11 % (ref 14–48)
LYMPHOCYTES # BLD AUTO: 2.09 10*3/MM3 (ref 0.7–3.1)
LYMPHOCYTES NFR BLD AUTO: 31.3 % (ref 19.6–45.3)
MCH RBC QN AUTO: 27.5 PG (ref 26.6–33)
MCHC RBC AUTO-ENTMCNC: 31.3 G/DL (ref 31.5–35.7)
MCV RBC AUTO: 87.8 FL (ref 79–97)
MONOCYTES # BLD AUTO: 0.47 10*3/MM3 (ref 0.1–0.9)
MONOCYTES NFR BLD AUTO: 7 % (ref 5–12)
NEUTROPHILS NFR BLD AUTO: 3.79 10*3/MM3 (ref 1.7–7)
NEUTROPHILS NFR BLD AUTO: 56.8 % (ref 42.7–76)
NRBC BLD AUTO-RTO: 0 /100 WBC (ref 0–0.2)
PLATELET # BLD AUTO: 213 10*3/MM3 (ref 140–450)
PMV BLD AUTO: 10.7 FL (ref 6–12)
RBC # BLD AUTO: 4.66 10*6/MM3 (ref 3.77–5.28)
TIBC SERPL-MCNC: 412 MCG/DL (ref 249–505)
TRANSFERRIN SERPL-MCNC: 294 MG/DL (ref 200–360)
WBC # BLD AUTO: 6.68 10*3/MM3 (ref 3.4–10.8)

## 2021-04-02 PROCEDURE — 36415 COLL VENOUS BLD VENIPUNCTURE: CPT

## 2021-04-02 PROCEDURE — 83540 ASSAY OF IRON: CPT

## 2021-04-02 PROCEDURE — 99212 OFFICE O/P EST SF 10 MIN: CPT | Performed by: INTERNAL MEDICINE

## 2021-04-02 PROCEDURE — 82728 ASSAY OF FERRITIN: CPT

## 2021-04-02 PROCEDURE — 85025 COMPLETE CBC W/AUTO DIFF WBC: CPT

## 2021-04-02 PROCEDURE — 84466 ASSAY OF TRANSFERRIN: CPT

## 2021-04-02 NOTE — PROGRESS NOTES
REASON FOR FOLLOW-UP: History of recurrent iron deficiency anemia requiring repeated IV iron therapy.      History of Present Illness    Mrs. Abbott is a pleasant 59-year-old woman who returns today for four-month follow-up.  When she was seen in May 2019 by Dr. Harris, the patient was significantly anemic with a hemoglobin of 7.8, MCV 69.  Iron saturation was 3%, ferritin 29.  Though she was placed on oral iron, this caused her nausea and constipation.  We elected to give her IV Injectafer which took place in June 2019.  She also underwent repeat upper and lower endoscopy per Dr. Minna Bee with evidence of nonsevere esophagitis, scattered mild inflammation with biopsies taken, pathology negative.  She had 3 polyps removed in the colon, again pathology negative.  She had internal bleeding hemorrhoids.    The patient returned today for follow-up.  She denies bright red blood per rectum or melanotic stool.  She denies significant lightheadedness, shortness of breath or other anemia related symptoms.  She is not craving ice.  She required IV iron most recently in September 2020.    Past Medical History, Past Surgical History, Social History, Family History have been reviewed and are without significant changes except as mentioned from my consult note on 8/25/17.    Review of Systems   Constitutional: Positive for fatigue (improved). Negative for activity change and unexpected weight change.   HENT: Negative.    Respiratory: Negative for shortness of breath.    Cardiovascular: Negative.    Gastrointestinal: Positive for constipation. Negative for nausea.   Genitourinary: Negative.    Musculoskeletal: Negative.    Skin: Negative.    Neurological: Negative for dizziness.   Hematological: Negative.    Psychiatric/Behavioral: Negative.    ROS unchanged-4/2/2021  Medications:  The current medication list was reviewed in the EMR    ALLERGIES:    Allergies   Allergen Reactions   • Latex Itching       Objective      Vitals:  "   04/02/21 1454   BP: 127/73   Pulse: 72   Resp: 16   Temp: 97.7 °F (36.5 °C)   TempSrc: Temporal   SpO2: 98%   Weight: 88.6 kg (195 lb 4.8 oz)   Height: 177.8 cm (70\")   PainSc: 0-No pain     Current Status 4/2/2021   ECOG score 0       Physical Exam    CON: pleasant well-developed adult woman  HEENT: no icterus, no thrush, moist membranes  NECK: no jvd  LYMPH: no cervical or supraclavicular lad  CV: RRR, S1S2, no murmur  RESP: cta bilat, no wheezing, no rales  GI: soft, non-tender, no splenomegaly, +bs  MUSC: no edema, normal gait  NEURO: alert and oriented x3, normal strength  PSYCH: normal mood and affect    RECENT LABS:  Results from last 7 days   Lab Units 04/02/21  1455   WBC 10*3/mm3 6.68   NEUTROS ABS 10*3/mm3 3.79   HEMOGLOBIN g/dL 12.8   HEMATOCRIT % 40.9   PLATELETS 10*3/mm3 213               Iron studies pending    Assessment/Plan    1.  Anemia   2. Iron def, recurrent    Recurrent iron deficiency anemia: Her hemoglobin is normal today.  We will check her ferritin and iron profile to make sure not subclinically iron deficient.  We will arrange her follow-up based on iron results.              4/2/2021      CC:          "

## 2021-04-05 ENCOUNTER — TELEPHONE (OUTPATIENT)
Dept: ONCOLOGY | Facility: CLINIC | Age: 61
End: 2021-04-05

## 2021-04-05 DIAGNOSIS — D50.0 IRON DEFICIENCY ANEMIA DUE TO CHRONIC BLOOD LOSS: Primary | ICD-10-CM

## 2021-04-05 PROBLEM — D50.9 IRON DEFICIENCY ANEMIA, UNSPECIFIED: Status: ACTIVE | Noted: 2021-04-05

## 2021-04-05 PROBLEM — T45.4X5A IRON ADVERSE REACTION: Status: ACTIVE | Noted: 2021-04-05

## 2021-04-05 NOTE — TELEPHONE ENCOUNTER
Called pt. Informed pt of this and she V/U.   ----- Message from SUMMER Loredo sent at 4/5/2021  1:56 PM EDT -----  Dr. Harris saw this patient last week with iron studies that were pending.  They resulted to me.  She is iron deficient again and per these numbers would qualify for IV iron again.  I would schedule her for IV Injectafer x2 and then make her a 3-month follow-up with NP in [place] with CBC, ferritin and iron profile at that time.  Thank you!  ----- Message -----  From: Lab, Background User  Sent: 4/2/2021   3:05 PM EDT  To: SUMMER Loredo

## 2021-04-08 ENCOUNTER — IMMUNIZATION (OUTPATIENT)
Dept: VACCINE CLINIC | Facility: HOSPITAL | Age: 61
End: 2021-04-08

## 2021-04-08 PROCEDURE — 91300 HC SARSCOV02 VAC 30MCG/0.3ML IM: CPT | Performed by: INTERNAL MEDICINE

## 2021-04-08 PROCEDURE — 0002A: CPT | Performed by: INTERNAL MEDICINE

## 2021-04-12 ENCOUNTER — TELEPHONE (OUTPATIENT)
Dept: GASTROENTEROLOGY | Facility: CLINIC | Age: 61
End: 2021-04-12

## 2021-04-12 NOTE — TELEPHONE ENCOUNTER
Pt last seen 9/4/19.  Following anemia with [facility].     Call to pt.  States is scheduled for iron infusion tomorrow at [facility].  Understood from DR Bee that may need to have capsule study if anemia persists.  Had labs done 4/2 (see lab tab).  Asking if should now proceed with capsule study.     Advise will address this question to Dr Bee - she may need o/v first.  Verb understanding.

## 2021-04-12 NOTE — TELEPHONE ENCOUNTER
----- Message from Dima Ferro sent at 4/12/2021  3:12 PM EDT -----  Regarding: Questions  Contact: [phone]  Pt has some questions in regards to her having a iron infusion tomorrow

## 2021-04-13 ENCOUNTER — INFUSION (OUTPATIENT)
Dept: ONCOLOGY | Facility: HOSPITAL | Age: 61
End: 2021-04-13

## 2021-04-13 VITALS
RESPIRATION RATE: 16 BRPM | OXYGEN SATURATION: 97 % | SYSTOLIC BLOOD PRESSURE: 117 MMHG | BODY MASS INDEX: 28.09 KG/M2 | TEMPERATURE: 96.6 F | WEIGHT: 195.8 LBS | HEART RATE: 66 BPM | DIASTOLIC BLOOD PRESSURE: 73 MMHG

## 2021-04-13 DIAGNOSIS — T45.4X5A ADVERSE EFFECT OF IRON, INITIAL ENCOUNTER: Primary | ICD-10-CM

## 2021-04-13 DIAGNOSIS — D50.9 IRON DEFICIENCY ANEMIA, UNSPECIFIED IRON DEFICIENCY ANEMIA TYPE: ICD-10-CM

## 2021-04-13 DIAGNOSIS — D50.0 IRON DEFICIENCY ANEMIA DUE TO CHRONIC BLOOD LOSS: ICD-10-CM

## 2021-04-13 PROCEDURE — 96374 THER/PROPH/DIAG INJ IV PUSH: CPT

## 2021-04-13 PROCEDURE — 25010000002 FERRIC CARBOXYMALTOSE 750 MG/15ML SOLUTION 15 ML VIAL: Performed by: INTERNAL MEDICINE

## 2021-04-13 PROCEDURE — G0463 HOSPITAL OUTPT CLINIC VISIT: HCPCS

## 2021-04-13 PROCEDURE — 63710000001 PROCHLORPERAZINE MALEATE PER 10 MG: Performed by: INTERNAL MEDICINE

## 2021-04-13 RX ORDER — PROCHLORPERAZINE MALEATE 10 MG
10 TABLET ORAL ONCE
Status: COMPLETED | OUTPATIENT
Start: 2021-04-13 | End: 2021-04-13

## 2021-04-13 RX ORDER — SODIUM CHLORIDE 9 MG/ML
250 INJECTION, SOLUTION INTRAVENOUS ONCE
Status: COMPLETED | OUTPATIENT
Start: 2021-04-13 | End: 2021-04-13

## 2021-04-13 RX ADMIN — SODIUM CHLORIDE 250 ML: 9 INJECTION, SOLUTION INTRAVENOUS at 16:25

## 2021-04-13 RX ADMIN — FERRIC CARBOXYMALTOSE INJECTION 750 MG: 50 INJECTION, SOLUTION INTRAVENOUS at 16:28

## 2021-04-13 RX ADMIN — PROCHLORPERAZINE MALEATE 10 MG: 10 TABLET ORAL at 16:25

## 2021-04-14 ENCOUNTER — TELEPHONE (OUTPATIENT)
Dept: ONCOLOGY | Facility: CLINIC | Age: 61
End: 2021-04-14

## 2021-04-14 ENCOUNTER — TELEPHONE (OUTPATIENT)
Dept: ONCOLOGY | Facility: HOSPITAL | Age: 61
End: 2021-04-14

## 2021-04-14 DIAGNOSIS — M54.50 BILATERAL LOW BACK PAIN WITHOUT SCIATICA, UNSPECIFIED CHRONICITY: Primary | ICD-10-CM

## 2021-04-14 DIAGNOSIS — R39.89 OTHER SYMPTOMS AND SIGNS INVOLVING THE GENITOURINARY SYSTEM: ICD-10-CM

## 2021-04-14 NOTE — TELEPHONE ENCOUNTER
----- Message from Alejandra Ramirez RN sent at 4/14/2021  4:56 PM EDT -----  Pt needs lab appt tomorrow for Urinalysis & culture for potential UTI.  Please note on appt to have Lab RN to review with NP if abnormal.  Please contact pt with appt details.    Thanks,  Soila

## 2021-04-14 NOTE — TELEPHONE ENCOUNTER
Pt called c/o cramping in lower abdomen with urination and increased frequency.  Pt denies burning with urination or incomplete voids.  D/W NP Ambika and ok to bring in for UA and culture for potential UTI.  Request to scheduling for lab appt tomorrow to have Lab RN to review with NP if abnormal. Orders placed.

## 2021-04-15 ENCOUNTER — CLINICAL SUPPORT (OUTPATIENT)
Dept: ONCOLOGY | Facility: HOSPITAL | Age: 61
End: 2021-04-15

## 2021-04-15 ENCOUNTER — LAB (OUTPATIENT)
Dept: LAB | Facility: HOSPITAL | Age: 61
End: 2021-04-15

## 2021-04-15 DIAGNOSIS — M54.50 BILATERAL LOW BACK PAIN WITHOUT SCIATICA, UNSPECIFIED CHRONICITY: ICD-10-CM

## 2021-04-15 DIAGNOSIS — R39.89 OTHER SYMPTOMS AND SIGNS INVOLVING THE GENITOURINARY SYSTEM: ICD-10-CM

## 2021-04-15 LAB
BILIRUB UR QL STRIP: ABNORMAL
CLARITY UR: CLEAR
COLOR UR: ABNORMAL
GLUCOSE UR STRIP-MCNC: NEGATIVE MG/DL
HGB UR QL STRIP.AUTO: ABNORMAL
KETONES UR QL STRIP: NEGATIVE
LEUKOCYTE ESTERASE UR QL STRIP.AUTO: NEGATIVE
NITRITE UR QL STRIP: POSITIVE
PH UR STRIP.AUTO: 5.5 [PH] (ref 4.5–8)
PROT UR QL STRIP: ABNORMAL
SP GR UR STRIP: >=1.03 (ref 1–1.03)
UROBILINOGEN UR QL STRIP: ABNORMAL

## 2021-04-15 PROCEDURE — 81003 URINALYSIS AUTO W/O SCOPE: CPT

## 2021-04-15 PROCEDURE — G0463 HOSPITAL OUTPT CLINIC VISIT: HCPCS

## 2021-04-15 PROCEDURE — 87086 URINE CULTURE/COLONY COUNT: CPT | Performed by: NURSE PRACTITIONER

## 2021-04-15 RX ORDER — SULFAMETHOXAZOLE AND TRIMETHOPRIM 800; 160 MG/1; MG/1
1 TABLET ORAL 2 TIMES DAILY
Qty: 6 TABLET | Refills: 0 | Status: SHIPPED | OUTPATIENT
Start: 2021-04-15 | End: 2021-11-03

## 2021-04-15 NOTE — PROGRESS NOTES
U/A R/W PT AND WP NP. NO MICRO DONE BEC NOT ENOUGH SAMPLE TO RUN, REST WAS SENT FOR CULTURE. U/A SHOWING + NITRATES. PT C/O LOWER ABDOMINAL CRAMPING ALONG W/ INC'D FREQ/URG W/ URINATION. DENIES FEVER. PER WP NP PT TO TAKE BACTRIM DS 1 BID X 3 D #6. RX ROUTED TO HER TO SIGN. PT ENCOURAGED TO INC FLUIDS AND TO EMPTY BLADDER OFTEN. PT V/U TO ALL.

## 2021-04-16 LAB — BACTERIA SPEC AEROBE CULT: NO GROWTH

## 2021-04-16 RX ORDER — VENLAFAXINE HYDROCHLORIDE 37.5 MG/1
CAPSULE, EXTENDED RELEASE ORAL
Qty: 30 CAPSULE | Refills: 0 | Status: SHIPPED | OUTPATIENT
Start: 2021-04-16 | End: 2021-04-28 | Stop reason: SDUPTHER

## 2021-04-19 RX ORDER — PROCHLORPERAZINE MALEATE 10 MG
10 TABLET ORAL ONCE
Status: CANCELLED | OUTPATIENT
Start: 2021-04-20 | End: 2021-04-20

## 2021-04-19 RX ORDER — SODIUM CHLORIDE 9 MG/ML
250 INJECTION, SOLUTION INTRAVENOUS ONCE
Status: CANCELLED | OUTPATIENT
Start: 2021-04-20

## 2021-04-20 ENCOUNTER — APPOINTMENT (OUTPATIENT)
Dept: ONCOLOGY | Facility: HOSPITAL | Age: 61
End: 2021-04-20

## 2021-04-20 ENCOUNTER — TELEPHONE (OUTPATIENT)
Dept: ONCOLOGY | Facility: CLINIC | Age: 61
End: 2021-04-20

## 2021-04-20 NOTE — TELEPHONE ENCOUNTER
PT CALLING UNABLE TO MAKE APPT TODAY AS SHE HAS A PLUMBING EMERGENCY. SHE CAN COME TOMORROW AFTERNOON. NEFTALI HURD NOTIFIED. APPT DESK MESSAGED TO SWITCH DAYS. PT V/U.

## 2021-04-21 ENCOUNTER — INFUSION (OUTPATIENT)
Dept: ONCOLOGY | Facility: HOSPITAL | Age: 61
End: 2021-04-21

## 2021-04-21 VITALS
OXYGEN SATURATION: 97 % | SYSTOLIC BLOOD PRESSURE: 125 MMHG | RESPIRATION RATE: 16 BRPM | TEMPERATURE: 97.1 F | DIASTOLIC BLOOD PRESSURE: 81 MMHG | WEIGHT: 198.2 LBS | BODY MASS INDEX: 28.44 KG/M2 | HEART RATE: 73 BPM

## 2021-04-21 DIAGNOSIS — D50.0 IRON DEFICIENCY ANEMIA DUE TO CHRONIC BLOOD LOSS: ICD-10-CM

## 2021-04-21 DIAGNOSIS — T45.4X5A ADVERSE EFFECT OF IRON, INITIAL ENCOUNTER: Primary | ICD-10-CM

## 2021-04-21 DIAGNOSIS — D50.9 IRON DEFICIENCY ANEMIA, UNSPECIFIED IRON DEFICIENCY ANEMIA TYPE: ICD-10-CM

## 2021-04-21 PROCEDURE — 96374 THER/PROPH/DIAG INJ IV PUSH: CPT

## 2021-04-21 PROCEDURE — 63710000001 PROCHLORPERAZINE MALEATE PER 5 MG: Performed by: INTERNAL MEDICINE

## 2021-04-21 PROCEDURE — 25010000002 FERRIC CARBOXYMALTOSE 750 MG/15ML SOLUTION 15 ML VIAL: Performed by: INTERNAL MEDICINE

## 2021-04-21 RX ORDER — SODIUM CHLORIDE 9 MG/ML
250 INJECTION, SOLUTION INTRAVENOUS ONCE
Status: COMPLETED | OUTPATIENT
Start: 2021-04-21 | End: 2021-04-21

## 2021-04-21 RX ORDER — PROCHLORPERAZINE MALEATE 5 MG/1
10 TABLET ORAL ONCE
Status: COMPLETED | OUTPATIENT
Start: 2021-04-21 | End: 2021-04-21

## 2021-04-21 RX ADMIN — PROCHLORPERAZINE MALEATE 10 MG: 5 TABLET ORAL at 15:50

## 2021-04-21 RX ADMIN — FERRIC CARBOXYMALTOSE INJECTION 750 MG: 50 INJECTION, SOLUTION INTRAVENOUS at 15:57

## 2021-04-21 RX ADMIN — SODIUM CHLORIDE 250 ML: 9 INJECTION, SOLUTION INTRAVENOUS at 15:57

## 2021-04-28 ENCOUNTER — TELEMEDICINE (OUTPATIENT)
Dept: FAMILY MEDICINE CLINIC | Facility: CLINIC | Age: 61
End: 2021-04-28

## 2021-04-28 DIAGNOSIS — Z12.31 ENCOUNTER FOR SCREENING MAMMOGRAM FOR BREAST CANCER: ICD-10-CM

## 2021-04-28 DIAGNOSIS — M79.672 FOOT PAIN, LEFT: Primary | ICD-10-CM

## 2021-04-28 DIAGNOSIS — R10.2 PELVIC PAIN: ICD-10-CM

## 2021-04-28 DIAGNOSIS — Z78.0 POSTMENOPAUSAL: ICD-10-CM

## 2021-04-28 PROCEDURE — 99214 OFFICE O/P EST MOD 30 MIN: CPT | Performed by: NURSE PRACTITIONER

## 2021-04-28 RX ORDER — VENLAFAXINE HYDROCHLORIDE 37.5 MG/1
37.5 CAPSULE, EXTENDED RELEASE ORAL DAILY
Qty: 90 CAPSULE | Refills: 1 | Status: SHIPPED | OUTPATIENT
Start: 2021-04-28 | End: 2021-12-15

## 2021-04-28 RX ORDER — METHYLPREDNISOLONE 4 MG/1
TABLET ORAL
Qty: 21 TABLET | Refills: 0 | Status: SHIPPED | OUTPATIENT
Start: 2021-04-28 | End: 2021-11-03

## 2021-04-28 RX ORDER — PANTOPRAZOLE SODIUM 40 MG/1
40 TABLET, DELAYED RELEASE ORAL DAILY
Qty: 90 TABLET | Refills: 3 | Status: SHIPPED | OUTPATIENT
Start: 2021-04-28 | End: 2022-07-07

## 2021-04-28 NOTE — PROGRESS NOTES
Chief Complaint  No chief complaint on file.    Subjective          Tiera Abbott presents to [facility]  Pleasant patient complains of foot pain x1 week or so the top of her foot tenderness, moderate over the metatarsal joint no injury increases when walking, still able to bear weight no ankle pain no calf pain or other complaints she has another foot problem on the right, bump on the bottom of her foot she has appointment with dermatology  Anxiety, takes Effexor low-dose 37.5 mg once to continue doing well  Iron deficient anemia, likely due to poor absorption she is up-to-date with hematology and she is watching her CBCs every 3 months    CAD stable she is up-to-date with Plavix lipids and her blood pressure medication  No chest pain shortness of breath or other complaints  Up-to-date with her colonoscopy    She had recent UTI feels better after taking antibiotics but she has been having some lower pelvic discomfort the last month or so no fever no chills mild  She would like to see a GYN she has not seen her in some time    She not had a mammogram or DEXA scan in some time as well everything else is going well she is had both vaccines      Objective   Vital Signs:   There were no vitals taken for this visit.    Physical Exam  Constitutional:       Appearance: Normal appearance.   Pulmonary:      Effort: Pulmonary effort is normal.   Musculoskeletal:      Comments: Tenderness to touch per patient without redness   Skin:     General: Skin is warm and dry.   Neurological:      Mental Status: She is alert.   Psychiatric:         Mood and Affect: Mood normal.         Behavior: Behavior normal.        Result Review :                 Assessment and Plan    Diagnoses and all orders for this visit:    1. Foot pain, left (Primary)    2. Pelvic pain  -     Ambulatory Referral to Gynecology    Other orders  -     methylPREDNISolone (MEDROL) 4 MG dose pack; Take as directed on package  instructions.  Dispense: 21 tablet; Refill: 0  -     venlafaxine XR (EFFEXOR-XR) 37.5 MG 24 hr capsule; Take 1 capsule by mouth Daily.  Dispense: 90 capsule; Refill: 1  -     pantoprazole (PROTONIX) 40 MG EC tablet; Take 1 tablet by mouth Daily.  Dispense: 90 tablet; Refill: 3        Follow Up   No follow-ups on file.  Patient was given instructions and counseling regarding her condition or for health maintenance advice. Please see specific information pulled into the AVS if appropriate.     Patient has acute foot pain without severe pain fever chills she denies any hot red foot or fever  Likely osteomellitus of the first metatarsal  Diclofenac gel she already has some she can try this 3 times a day for couple weeks Medrol Dosepak might be more effective she should avoid this for the next week  If still having pain okay to take this next week that would be 3 weeks after her last vaccine for Covid  Risk-benefit Medrol Dosepak  Refills Effexor she is doing well for anxiety  Pantoprazole for GERD  She will follow up with her heart doctor healthy diet regular exercise mammogram for severe pelvic pain fever emergency room otherwise I will refer her back to her GYN and set her up for mammogram and DEXA scan she will follow-up in 6 months labs prior to next visit please  If her foot pain is not better in 2 to 3 weeks she will call for referral to podiatry  Telephone visit 25 minutes  With patient permission

## 2021-07-08 ENCOUNTER — TELEPHONE (OUTPATIENT)
Dept: ONCOLOGY | Facility: CLINIC | Age: 61
End: 2021-07-08

## 2021-07-08 DIAGNOSIS — D50.0 IRON DEFICIENCY ANEMIA DUE TO CHRONIC BLOOD LOSS: Primary | ICD-10-CM

## 2021-07-08 NOTE — TELEPHONE ENCOUNTER
Returning call to pt. Pt wondering if she can come in to have her labs checked. Pt stated she has been a little off balance, dizzy, and fatigued, feels like her hgb could be low. Asked pt if these are typical symptoms she experiences when her hgb/iron are low and pt confirmed. Told pt I would review with NP, but we would likely bring pt in for lab and RN review. Pt v/u.       Reviewed with Ambika BOWER. Okay to bring pt in for CBC, Iron profile, Ferritin tomorrow.     Called pt to let her know the above and that scheduling would call her to set up appt time. Pt v/u.

## 2021-07-09 ENCOUNTER — LAB (OUTPATIENT)
Dept: OTHER | Facility: HOSPITAL | Age: 61
End: 2021-07-09

## 2021-07-09 ENCOUNTER — OFFICE VISIT (OUTPATIENT)
Dept: ONCOLOGY | Facility: CLINIC | Age: 61
End: 2021-07-09

## 2021-07-09 VITALS
HEIGHT: 70 IN | SYSTOLIC BLOOD PRESSURE: 124 MMHG | WEIGHT: 196.7 LBS | HEART RATE: 77 BPM | TEMPERATURE: 96.9 F | BODY MASS INDEX: 28.16 KG/M2 | OXYGEN SATURATION: 97 % | DIASTOLIC BLOOD PRESSURE: 67 MMHG | RESPIRATION RATE: 18 BRPM

## 2021-07-09 DIAGNOSIS — D50.0 IRON DEFICIENCY ANEMIA DUE TO CHRONIC BLOOD LOSS: ICD-10-CM

## 2021-07-09 DIAGNOSIS — D50.8 OTHER IRON DEFICIENCY ANEMIA: Primary | ICD-10-CM

## 2021-07-09 LAB
BASOPHILS # BLD AUTO: 0.03 10*3/MM3 (ref 0–0.2)
BASOPHILS NFR BLD AUTO: 0.4 % (ref 0–1.5)
DEPRECATED RDW RBC AUTO: 49.2 FL (ref 37–54)
EOSINOPHIL # BLD AUTO: 0.22 10*3/MM3 (ref 0–0.4)
EOSINOPHIL NFR BLD AUTO: 2.8 % (ref 0.3–6.2)
ERYTHROCYTE [DISTWIDTH] IN BLOOD BY AUTOMATED COUNT: 14.6 % (ref 12.3–15.4)
FERRITIN SERPL-MCNC: 102.6 NG/ML (ref 13–150)
HCT VFR BLD AUTO: 40.8 % (ref 34–46.6)
HGB BLD-MCNC: 12.8 G/DL (ref 12–15.9)
IMM GRANULOCYTES # BLD AUTO: 0.04 10*3/MM3 (ref 0–0.05)
IMM GRANULOCYTES NFR BLD AUTO: 0.5 % (ref 0–0.5)
IRON 24H UR-MRATE: 53 MCG/DL (ref 37–145)
IRON SATN MFR SERPL: 16 % (ref 20–50)
LYMPHOCYTES # BLD AUTO: 2.12 10*3/MM3 (ref 0.7–3.1)
LYMPHOCYTES NFR BLD AUTO: 26.8 % (ref 19.6–45.3)
MCH RBC QN AUTO: 28.9 PG (ref 26.6–33)
MCHC RBC AUTO-ENTMCNC: 31.4 G/DL (ref 31.5–35.7)
MCV RBC AUTO: 92.1 FL (ref 79–97)
MONOCYTES # BLD AUTO: 0.59 10*3/MM3 (ref 0.1–0.9)
MONOCYTES NFR BLD AUTO: 7.5 % (ref 5–12)
NEUTROPHILS NFR BLD AUTO: 4.91 10*3/MM3 (ref 1.7–7)
NEUTROPHILS NFR BLD AUTO: 62 % (ref 42.7–76)
NRBC BLD AUTO-RTO: 0 /100 WBC (ref 0–0.2)
PLATELET # BLD AUTO: 233 10*3/MM3 (ref 140–450)
PMV BLD AUTO: 11.2 FL (ref 6–12)
RBC # BLD AUTO: 4.43 10*6/MM3 (ref 3.77–5.28)
TIBC SERPL-MCNC: 325 MCG/DL (ref 298–536)
TRANSFERRIN SERPL-MCNC: 218 MG/DL (ref 200–360)
WBC # BLD AUTO: 7.91 10*3/MM3 (ref 3.4–10.8)

## 2021-07-09 PROCEDURE — 84466 ASSAY OF TRANSFERRIN: CPT | Performed by: NURSE PRACTITIONER

## 2021-07-09 PROCEDURE — 36415 COLL VENOUS BLD VENIPUNCTURE: CPT

## 2021-07-09 PROCEDURE — 83540 ASSAY OF IRON: CPT | Performed by: NURSE PRACTITIONER

## 2021-07-09 PROCEDURE — 85025 COMPLETE CBC W/AUTO DIFF WBC: CPT | Performed by: NURSE PRACTITIONER

## 2021-07-09 PROCEDURE — 82728 ASSAY OF FERRITIN: CPT | Performed by: NURSE PRACTITIONER

## 2021-07-09 RX ORDER — CYCLOBENZAPRINE HCL 10 MG
10 TABLET ORAL 3 TIMES DAILY PRN
COMMUNITY

## 2021-07-13 RX ORDER — CLOPIDOGREL BISULFATE 75 MG/1
TABLET ORAL
Qty: 90 TABLET | Refills: 0 | Status: SHIPPED | OUTPATIENT
Start: 2021-07-13

## 2021-08-16 RX ORDER — ATORVASTATIN CALCIUM 80 MG/1
TABLET, FILM COATED ORAL
Qty: 90 TABLET | Refills: 2 | Status: SHIPPED | OUTPATIENT
Start: 2021-08-16 | End: 2022-05-16

## 2021-09-14 ENCOUNTER — TELEPHONE (OUTPATIENT)
Dept: FAMILY MEDICINE CLINIC | Facility: CLINIC | Age: 61
End: 2021-09-14

## 2021-09-14 NOTE — TELEPHONE ENCOUNTER
If she has chronic low back pain without fever or new symptoms  I can give her a referral be happy to do so    If this is new back pain however she needs to be evaluated either here or elsewhere please advise thank you

## 2021-09-14 NOTE — TELEPHONE ENCOUNTER
Caller: Tiera Abbott    Relationship: Self    Best call back number: [phone]    What is the medical concern/diagnosis: LOWER BACK PAIN     What specialty or service is being requested: PHYSICAL THERAPY   What is the provider, practice or medical service name: [facility]     What is the office location: [facility]     What is the FAX NUMBER: [phone]    PATIENT IS GOING TO DO SELF PAY FOR THERAPY

## 2021-09-14 NOTE — TELEPHONE ENCOUNTER
Called this patient back and left detailed VM asking her to call this nurse back with clarification on her pain.

## 2021-09-28 ENCOUNTER — TELEPHONE (OUTPATIENT)
Dept: ONCOLOGY | Facility: CLINIC | Age: 61
End: 2021-09-28

## 2021-09-28 NOTE — TELEPHONE ENCOUNTER
----- Message from Tiera Feliz sent at 9/28/2021  2:23 PM EDT -----  Needs to ally her 10-1 appt and she prefers [facility].  I think she said she could do Thursday.

## 2021-09-28 NOTE — TELEPHONE ENCOUNTER
Hub to Read    I left a message for pt to call to r/s the 10/1/21 appt she cancelled. Have pt call [phone]

## 2021-10-01 ENCOUNTER — APPOINTMENT (OUTPATIENT)
Dept: OTHER | Facility: HOSPITAL | Age: 61
End: 2021-10-01

## 2021-10-15 RX ORDER — CLOPIDOGREL BISULFATE 75 MG/1
TABLET ORAL
Qty: 90 TABLET | Refills: 0 | OUTPATIENT
Start: 2021-10-15

## 2021-11-02 ENCOUNTER — TELEPHONE (OUTPATIENT)
Dept: FAMILY MEDICINE CLINIC | Facility: CLINIC | Age: 61
End: 2021-11-02

## 2021-11-02 NOTE — TELEPHONE ENCOUNTER
Caller: Tiera Abbott    Relationship: Self    Best call back number: [phone]    What was the call regarding: PATIENT WOULD LIKE TO SPEAK TO MARCO COLLIER. SHE HAS A SORE THROAT AND FEVER AND WANTS TO KNOW IF SAFIA THINKS SHE SHOULD GET COVID TESTED. I OFFERED HER A VIRTUAL APPOINTMENT FOR TOMORROW, SHE DECLINED. SHE STATED SHE WANTS A RAPID COVID TEST, I EXPLAINED WE DO NOT DO THOSE AT THE OFFICE, ONLY PCR. PLEASE CALL PATIENT BACK TO ADVISE IF SHE SHOULD MAKE AN APPOINTMENT OR GET A COVID TEST.     Do you require a callback: YES

## 2021-11-03 ENCOUNTER — OFFICE VISIT (OUTPATIENT)
Dept: FAMILY MEDICINE CLINIC | Facility: CLINIC | Age: 61
End: 2021-11-03

## 2021-11-03 VITALS
HEIGHT: 70 IN | RESPIRATION RATE: 12 BRPM | HEART RATE: 84 BPM | BODY MASS INDEX: 27.63 KG/M2 | TEMPERATURE: 97.1 F | OXYGEN SATURATION: 98 % | DIASTOLIC BLOOD PRESSURE: 82 MMHG | WEIGHT: 193 LBS | SYSTOLIC BLOOD PRESSURE: 128 MMHG

## 2021-11-03 DIAGNOSIS — U07.1 COVID-19 VIRUS INFECTION: Primary | ICD-10-CM

## 2021-11-03 DIAGNOSIS — Z20.822 EXPOSURE TO COVID-19 VIRUS: ICD-10-CM

## 2021-11-03 PROCEDURE — 99214 OFFICE O/P EST MOD 30 MIN: CPT | Performed by: NURSE PRACTITIONER

## 2021-11-03 RX ORDER — AZITHROMYCIN 250 MG/1
TABLET, FILM COATED ORAL
Qty: 6 TABLET | Refills: 0 | Status: SHIPPED | OUTPATIENT
Start: 2021-11-03 | End: 2022-07-19

## 2021-11-03 NOTE — PATIENT INSTRUCTIONS
Discharge instructions  Push fluids  Ibuprofen 800  3 times a day for body aches or fever or pain    Tomorrow morning antibodies for Covid outpatient IV important  Start Zithromax now  Cough congestion if developing Robitussin-DM etc.  If severe shortness of breath severe fatigue weakness lethargy emergency room  Or if unable to swallow severe headache etc. low threshold for ER visit evaluation  Recheck in a week if not improving  Send me a message Friday for update I want to follow you into your completely well  Follow-up routine care in a couple weeks after you are improved  For fasting labs and office visit    Quarantine 10 days from onset of symptoms yesterday  At least symptom-free without fever for 3 days

## 2021-11-04 ENCOUNTER — TELEPHONE (OUTPATIENT)
Dept: FAMILY MEDICINE CLINIC | Facility: CLINIC | Age: 61
End: 2021-11-04

## 2021-11-04 LAB
LABCORP SARS-COV-2, NAA 2 DAY TAT: NORMAL
SARS-COV-2 RNA RESP QL NAA+PROBE: NOT DETECTED

## 2021-11-04 NOTE — PROGRESS NOTES
"Chief Complaint  No chief complaint on file.   Cough congestion    Subjective          Tiera Abbott presents to [facility]  Pleasant patient complains of cough congestion sore throat body aches  Sore throat and some fatigue she has no chest pain shortness of breath no high fever  She does have chills no facial pain no abdominal complaints no loss of taste or smell  She was exposed to Covid she had close contact with her friend Friday spent the evening together a local pub  Several friends her friend is positive for Covid now became sick several days later  Patient has been sick for at least 2 days  She has high risk for Covid complications she has been fully vaccinated  With at least 2 Covid immunizations I believe Pfizer      Objective   Vital Signs:   /82   Pulse 84   Temp 97.1 °F (36.2 °C) (Infrared)   Resp 12   Ht 177.8 cm (70\")   Wt 87.5 kg (193 lb)   SpO2 98%   BMI 27.69 kg/m²     Physical Exam  Vitals reviewed.   Constitutional:       General: She is not in acute distress.     Appearance: Normal appearance. She is well-developed. She is not ill-appearing, toxic-appearing or diaphoretic.   HENT:      Head: Normocephalic.      Comments: Turbinates congested     Nose: Nose normal.   Eyes:      General: No scleral icterus.     Conjunctiva/sclera: Conjunctivae normal.      Pupils: Pupils are equal, round, and reactive to light.   Neck:      Thyroid: No thyromegaly.      Vascular: No JVD.   Cardiovascular:      Rate and Rhythm: Normal rate and regular rhythm.      Heart sounds: Normal heart sounds. No murmur heard.  No friction rub. No gallop.    Pulmonary:      Effort: Pulmonary effort is normal. No respiratory distress.      Breath sounds: Normal breath sounds. No stridor. No wheezing or rales.      Comments: Some coughing but no distress respirations less than 20 and chest is clear unlabored able to speak full sentences without dyspnea  Abdominal:      General: Bowel " sounds are normal. There is no distension.      Palpations: Abdomen is soft.      Tenderness: There is no abdominal tenderness.      Comments: No hepatosplenomegaly, no ascites,   Musculoskeletal:         General: No tenderness.      Cervical back: Neck supple.   Lymphadenopathy:      Cervical: No cervical adenopathy.   Skin:     General: Skin is warm and dry.      Findings: No erythema or rash.   Neurological:      Mental Status: She is alert and oriented to person, place, and time.      Deep Tendon Reflexes: Reflexes are normal and symmetric.   Psychiatric:         Behavior: Behavior normal.         Thought Content: Thought content normal.         Judgment: Judgment normal.        Result Review :                 Assessment and Plan    Diagnoses and all orders for this visit:    1. COVID-19 virus infection (Primary)  Comments:  Presumptive Covid positive exposure with signs and symptoms of Covid    2. Exposure to COVID-19 virus  -     COVID-19,[facility] ROUTINE, NP/OP SWAB IN TRANSPORT MEDIA OR ESWAB 72 HR TAT - Swab, Nasopharynx    Other orders  -     azithromycin (Zithromax Z-Júnior) 250 MG tablet; Take 2 tablets the first day, then 1 tablet daily for 4 days.  Dispense: 6 tablet; Refill: 0  -     SARS-CoV-2, JENNYFER 2 DAY TAT - ,        Follow Up   No follow-ups on file.  Patient was given instructions and counseling regarding her condition or for health maintenance advice. Please see specific information pulled into the AVS if appropriate.     Covid test is pending  Signs and symptoms consistent with  Infection, suspicious for Covid as she has had a pretty strong close contact with a positive Covid  Cannot rule out strep we discussed strep testing  She declined strep testing prefers coverage with an antibiotic we will do this as she has no definite cough  With signs or symptoms consistent with a febrile illness.  Suspect Covid however  Discussed antibody treatment  Risk-benefit alternatives as well emergency  authorization use  Risk of treatment failure  Risk of her not having Covid  As we have a window of opportunity for utility with antibodies  Will proceed increased chest pain shortness of breath fever lethargy weakness emergency room I would like a phone call for her every several days  Her email to update me I will follow her until she is completely well she should quarantine for minimum of 10 days from the onset of symptoms  And symptom-free for 3 days before returning to the community

## 2021-11-04 NOTE — TELEPHONE ENCOUNTER
Caller: Tiera Abbott    Relationship: Self    Best call back number: [phone]    What is the best time to reach you: ANY    Who are you requesting to speak with (clinical staff, provider,  specific staff member): CLINICAL    What was the call regarding: INFUSION FOR THE ANTIBODIES AT THE HOSPITAL INFORMATION.    Do you require a callback: YES

## 2021-12-15 RX ORDER — VENLAFAXINE HYDROCHLORIDE 37.5 MG/1
CAPSULE, EXTENDED RELEASE ORAL
Qty: 90 CAPSULE | Refills: 1 | Status: SHIPPED | OUTPATIENT
Start: 2021-12-15 | End: 2022-06-17

## 2021-12-15 NOTE — TELEPHONE ENCOUNTER
Rx Refill Note  Requested Prescriptions     Pending Prescriptions Disp Refills   • venlafaxine XR (EFFEXOR-XR) 37.5 MG 24 hr capsule [Pharmacy Med Name: VENLAFAXINE HCL ER 37.5 MG CAP] 30 capsule      Sig: TAKE ONE CAPSULE BY MOUTH DAILY      Last office visit with prescribing clinician: 11/3/2021      Next office visit with prescribing clinician: Visit date not found            Miriam Cruz MA  12/15/21, 09:00 EST

## 2022-03-21 ENCOUNTER — TELEPHONE (OUTPATIENT)
Dept: FAMILY MEDICINE CLINIC | Facility: CLINIC | Age: 62
End: 2022-03-21

## 2022-03-21 NOTE — TELEPHONE ENCOUNTER
Pt is having residual side effects from a workplace injury a year ago. The workman's comp has been completed, however, the side effects are ongoing including ow back pain, the  pain in her neck gets more and more intense in her vertebrae.    Please give the patient a call back to discuss next steps.    Please advise

## 2022-03-22 NOTE — TELEPHONE ENCOUNTER
Called spoke to pt in detail pt is scheduled to come into the office on Monday for further evaluation

## 2022-03-28 ENCOUNTER — OFFICE VISIT (OUTPATIENT)
Dept: FAMILY MEDICINE CLINIC | Facility: CLINIC | Age: 62
End: 2022-03-28

## 2022-03-28 VITALS
WEIGHT: 194.4 LBS | HEIGHT: 70 IN | RESPIRATION RATE: 14 BRPM | HEART RATE: 93 BPM | OXYGEN SATURATION: 98 % | TEMPERATURE: 96.8 F | BODY MASS INDEX: 27.83 KG/M2 | SYSTOLIC BLOOD PRESSURE: 124 MMHG | DIASTOLIC BLOOD PRESSURE: 68 MMHG

## 2022-03-28 DIAGNOSIS — I25.10 CORONARY ARTERY DISEASE DUE TO LIPID RICH PLAQUE: ICD-10-CM

## 2022-03-28 DIAGNOSIS — G89.29 CHRONIC MIDLINE LOW BACK PAIN WITHOUT SCIATICA: ICD-10-CM

## 2022-03-28 DIAGNOSIS — D50.8 OTHER IRON DEFICIENCY ANEMIA: ICD-10-CM

## 2022-03-28 DIAGNOSIS — E53.8 B12 DEFICIENCY: ICD-10-CM

## 2022-03-28 DIAGNOSIS — M54.12 LEFT CERVICAL RADICULOPATHY: Primary | ICD-10-CM

## 2022-03-28 DIAGNOSIS — I25.83 CORONARY ARTERY DISEASE DUE TO LIPID RICH PLAQUE: ICD-10-CM

## 2022-03-28 DIAGNOSIS — M54.50 CHRONIC MIDLINE LOW BACK PAIN WITHOUT SCIATICA: ICD-10-CM

## 2022-03-28 DIAGNOSIS — R53.83 OTHER FATIGUE: ICD-10-CM

## 2022-03-28 PROCEDURE — 99214 OFFICE O/P EST MOD 30 MIN: CPT | Performed by: NURSE PRACTITIONER

## 2022-03-28 NOTE — PATIENT INSTRUCTIONS
Discharge instructions continue exercises continue physical therapy follow-up in 6 to 8 weeks if not improving will need MRI of your low back as well as your neck should you have weakness incontinence bowel bladder saddlebag paresthesias emergency room    At any time if you would like an extensive psychiatric evaluation [facility] [place] neuropsychiatric evaluation or if you like to see neurology      For any acute confusion emergency room slurred speech weakness  Recheck sooner for any other outstanding issues we may have to

## 2022-03-28 NOTE — PROGRESS NOTES
"Chief Complaint  Back Pain and Neck Pain    Subjective          Tiera Abbott presents to [facility]  Patient complains of left-sided neck pain over the last year initially Workmen's Comp. injury but they signed off continue to have neck pain did not get an MRI has no weakness, it is positional, frequent   neck and back pain low back pain as well mid low back pain radiates down the left sometimes to the knee worst in the days feels like back is always going out, no weakness no saddlebag paresthesias no fevers no chills  Feels like back going nout    Left neck pain  After trying to catch something  Pt over last year helping a lot  Never returned to baseline      no nightsweats unexplained wt loss  No hx cancer    History of anemia and decreased difficult T absorbing iron  Requested FMLA completed should this return          Back Pain    Neck Pain         Objective   Vital Signs:   /68   Pulse 93   Temp 96.8 °F (36 °C) (Infrared)   Resp 14   Ht 177.8 cm (70\")   Wt 88.2 kg (194 lb 6.4 oz)   SpO2 98%   BMI 27.89 kg/m²            Physical Exam  Vitals reviewed.   Constitutional:       Appearance: Normal appearance. She is well-developed. She is not ill-appearing or diaphoretic.   HENT:      Head: Normocephalic.      Nose: Nose normal.   Eyes:      General: No scleral icterus.     Conjunctiva/sclera: Conjunctivae normal.      Pupils: Pupils are equal, round, and reactive to light.   Neck:      Thyroid: No thyromegaly.      Vascular: No JVD.   Cardiovascular:      Rate and Rhythm: Normal rate and regular rhythm.      Heart sounds: Normal heart sounds. No murmur heard.    No friction rub. No gallop.   Pulmonary:      Effort: Pulmonary effort is normal. No respiratory distress.      Breath sounds: Normal breath sounds. No stridor. No wheezing or rales.   Abdominal:      General: Bowel sounds are normal. There is no distension.      Palpations: Abdomen is soft.      Tenderness: There " is no abdominal tenderness.      Comments: No hepatosplenomegaly, no ascites,   Musculoskeletal:         General: No tenderness.      Cervical back: Neck supple.      Comments: Decreased range of motion limited in all directions moderately  No cervical point tenderness upper  strength normal neurovascular intact negative straight leg raise plantar flexion dorsiflexion   Lymphadenopathy:      Cervical: No cervical adenopathy.   Skin:     General: Skin is warm and dry.      Findings: No erythema or rash.   Neurological:      Mental Status: She is alert and oriented to person, place, and time.      Deep Tendon Reflexes: Reflexes are normal and symmetric.   Psychiatric:         Behavior: Behavior normal.         Thought Content: Thought content normal.         Judgment: Judgment normal.        Result Review :                 Assessment and Plan    Diagnoses and all orders for this visit:    1. Left cervical radiculopathy (Primary)  -     CBC & Differential  -     Comprehensive Metabolic Panel  -     Lipid Panel With LDL / HDL Ratio  -     TSH Rfx On Abnormal To Free T4  -     Urinalysis With Microscopic If Indicated (No Culture) - Urine, Clean Catch  -     Sedimentation Rate  -     Uric Acid  -     C-reactive protein  -     Rheumatoid Factor  -     Cyclic Citrul Peptide Antibody, IgG / IgA  -     Vitamin B12 & Folate  -     Ferritin  -     Iron Profile  -     Ambulatory Referral to Physical Therapy Evaluate and treat    2. Chronic midline low back pain without sciatica  -     CBC & Differential  -     Comprehensive Metabolic Panel  -     Lipid Panel With LDL / HDL Ratio  -     TSH Rfx On Abnormal To Free T4  -     Urinalysis With Microscopic If Indicated (No Culture) - Urine, Clean Catch  -     Sedimentation Rate  -     Uric Acid  -     C-reactive protein  -     Rheumatoid Factor  -     Cyclic Citrul Peptide Antibody, IgG / IgA  -     Vitamin B12 & Folate  -     Ferritin  -     Iron Profile  -     Ambulatory  Referral to Physical Therapy Evaluate and treat    3. Coronary artery disease due to lipid rich plaque  -     CBC & Differential  -     Comprehensive Metabolic Panel  -     Lipid Panel With LDL / HDL Ratio  -     TSH Rfx On Abnormal To Free T4  -     Urinalysis With Microscopic If Indicated (No Culture) - Urine, Clean Catch  -     Sedimentation Rate  -     Uric Acid  -     C-reactive protein  -     Rheumatoid Factor  -     Cyclic Citrul Peptide Antibody, IgG / IgA  -     Vitamin B12 & Folate  -     Ferritin  -     Iron Profile    4. B12 deficiency  -     CBC & Differential  -     Comprehensive Metabolic Panel  -     Lipid Panel With LDL / HDL Ratio  -     TSH Rfx On Abnormal To Free T4  -     Urinalysis With Microscopic If Indicated (No Culture) - Urine, Clean Catch  -     Sedimentation Rate  -     Uric Acid  -     C-reactive protein  -     Rheumatoid Factor  -     Cyclic Citrul Peptide Antibody, IgG / IgA  -     Vitamin B12 & Folate  -     Ferritin  -     Iron Profile    5. Other fatigue  -     CBC & Differential  -     Comprehensive Metabolic Panel  -     Lipid Panel With LDL / HDL Ratio  -     TSH Rfx On Abnormal To Free T4  -     Urinalysis With Microscopic If Indicated (No Culture) - Urine, Clean Catch  -     Sedimentation Rate  -     Uric Acid  -     C-reactive protein  -     Rheumatoid Factor  -     Cyclic Citrul Peptide Antibody, IgG / IgA  -     Vitamin B12 & Folate  -     Ferritin  -     Iron Profile    6. Other iron deficiency anemia  -     CBC & Differential  -     Comprehensive Metabolic Panel  -     Lipid Panel With LDL / HDL Ratio  -     TSH Rfx On Abnormal To Free T4  -     Urinalysis With Microscopic If Indicated (No Culture) - Urine, Clean Catch  -     Sedimentation Rate  -     Uric Acid  -     C-reactive protein  -     Rheumatoid Factor  -     Cyclic Citrul Peptide Antibody, IgG / IgA  -     Vitamin B12 & Folate  -     Ferritin  -     Iron Profile      I spent 30  minutes caring for Tiera on this  date of service. This time includes time spent by me in the following activities:preparing for the visit, obtaining and/or reviewing a separately obtained history, performing a medically appropriate examination and/or evaluation , counseling and educating the patient/family/caregiver, ordering medications, tests, or procedures, referring and communicating with other health care professionals , documenting information in the medical record and care coordination  Follow Up   No follow-ups on file.  Patient was given instructions and counseling regarding her condition or for health maintenance advice. Please see specific information pulled into the AVS if appropriate.     Discharge instructions continue exercises continue physical therapy follow-up in 6 to 8 weeks if not improving will need MRI of your low back as well as your neck should you have weakness incontinence bowel bladder saddlebag paresthesias emergency room    At any time if you would like an extensive psychiatric evaluation [facility] in [place] neuropsychiatric evaluation or if you like to see neurology      For any acute confusion emergency room slurred speech weakness  Recheck sooner for any other outstanding issues we may have to

## 2022-03-30 LAB
ALBUMIN SERPL-MCNC: 4.2 G/DL (ref 3.8–4.8)
ALBUMIN/GLOB SERPL: 1.5 {RATIO} (ref 1.2–2.2)
ALP SERPL-CCNC: 127 IU/L (ref 44–121)
ALT SERPL-CCNC: 6 IU/L (ref 0–32)
APPEARANCE UR: CLEAR
AST SERPL-CCNC: 17 IU/L (ref 0–40)
BASOPHILS # BLD AUTO: 0 X10E3/UL (ref 0–0.2)
BASOPHILS NFR BLD AUTO: 1 %
BILIRUB SERPL-MCNC: <0.2 MG/DL (ref 0–1.2)
BILIRUB UR QL STRIP: NEGATIVE
BUN SERPL-MCNC: 8 MG/DL (ref 8–27)
BUN/CREAT SERPL: 12 (ref 12–28)
CALCIUM SERPL-MCNC: 9.5 MG/DL (ref 8.7–10.3)
CCP IGA+IGG SERPL IA-ACNC: 8 UNITS (ref 0–19)
CHLORIDE SERPL-SCNC: 108 MMOL/L (ref 96–106)
CHOLEST SERPL-MCNC: 173 MG/DL (ref 100–199)
CO2 SERPL-SCNC: 24 MMOL/L (ref 20–29)
COLOR UR: YELLOW
CREAT SERPL-MCNC: 0.65 MG/DL (ref 0.57–1)
CRP SERPL-MCNC: <1 MG/L (ref 0–10)
EGFRCR SERPLBLD CKD-EPI 2021: 99 ML/MIN/1.73
EOSINOPHIL # BLD AUTO: 0.2 X10E3/UL (ref 0–0.4)
EOSINOPHIL NFR BLD AUTO: 3 %
ERYTHROCYTE [DISTWIDTH] IN BLOOD BY AUTOMATED COUNT: 15.5 % (ref 11.7–15.4)
ERYTHROCYTE [SEDIMENTATION RATE] IN BLOOD BY WESTERGREN METHOD: 11 MM/HR (ref 0–40)
FERRITIN SERPL-MCNC: 10 NG/ML (ref 15–150)
FOLATE SERPL-MCNC: 6 NG/ML
GLOBULIN SER CALC-MCNC: 2.8 G/DL (ref 1.5–4.5)
GLUCOSE SERPL-MCNC: 60 MG/DL (ref 65–99)
GLUCOSE UR QL STRIP: NEGATIVE
HCT VFR BLD AUTO: 41.4 % (ref 34–46.6)
HDLC SERPL-MCNC: 48 MG/DL
HGB BLD-MCNC: 12.6 G/DL (ref 11.1–15.9)
HGB UR QL STRIP: NEGATIVE
IMM GRANULOCYTES # BLD AUTO: 0 X10E3/UL (ref 0–0.1)
IMM GRANULOCYTES NFR BLD AUTO: 0 %
IRON SATN MFR SERPL: 7 % (ref 15–55)
IRON SERPL-MCNC: 28 UG/DL (ref 27–139)
KETONES UR QL STRIP: NEGATIVE
LDLC SERPL CALC-MCNC: 86 MG/DL (ref 0–99)
LDLC/HDLC SERPL: 1.8 RATIO (ref 0–3.2)
LEUKOCYTE ESTERASE UR QL STRIP: NEGATIVE
LYMPHOCYTES # BLD AUTO: 2.2 X10E3/UL (ref 0.7–3.1)
LYMPHOCYTES NFR BLD AUTO: 30 %
MCH RBC QN AUTO: 24.8 PG (ref 26.6–33)
MCHC RBC AUTO-ENTMCNC: 30.4 G/DL (ref 31.5–35.7)
MCV RBC AUTO: 81 FL (ref 79–97)
MICRO URNS: NORMAL
MONOCYTES # BLD AUTO: 0.6 X10E3/UL (ref 0.1–0.9)
MONOCYTES NFR BLD AUTO: 8 %
NEUTROPHILS # BLD AUTO: 4.2 X10E3/UL (ref 1.4–7)
NEUTROPHILS NFR BLD AUTO: 58 %
NITRITE UR QL STRIP: NEGATIVE
PH UR STRIP: 6 [PH] (ref 5–7.5)
PLATELET # BLD AUTO: 268 X10E3/UL (ref 150–450)
POTASSIUM SERPL-SCNC: 4.6 MMOL/L (ref 3.5–5.2)
PROT SERPL-MCNC: 7 G/DL (ref 6–8.5)
PROT UR QL STRIP: NEGATIVE
RBC # BLD AUTO: 5.09 X10E6/UL (ref 3.77–5.28)
RHEUMATOID FACT SERPL-ACNC: <10 IU/ML
SODIUM SERPL-SCNC: 144 MMOL/L (ref 134–144)
SP GR UR STRIP: 1.02 (ref 1–1.03)
TIBC SERPL-MCNC: 405 UG/DL (ref 250–450)
TRIGL SERPL-MCNC: 234 MG/DL (ref 0–149)
TSH SERPL DL<=0.005 MIU/L-ACNC: 0.74 UIU/ML (ref 0.45–4.5)
UIBC SERPL-MCNC: 377 UG/DL (ref 118–369)
URATE SERPL-MCNC: 4.3 MG/DL (ref 3–7.2)
UROBILINOGEN UR STRIP-MCNC: 0.2 MG/DL (ref 0.2–1)
VIT B12 SERPL-MCNC: 410 PG/ML (ref 232–1245)
VLDLC SERPL CALC-MCNC: 39 MG/DL (ref 5–40)
WBC # BLD AUTO: 7.3 X10E3/UL (ref 3.4–10.8)

## 2022-04-22 ENCOUNTER — TELEPHONE (OUTPATIENT)
Dept: FAMILY MEDICINE CLINIC | Facility: CLINIC | Age: 62
End: 2022-04-22

## 2022-04-22 NOTE — TELEPHONE ENCOUNTER
PATIENT CALLED. RETURNING A CALL FROM JULIETA TO DISCUSSED LAB RESULTS. I CALLED THE OFFICE AND WAS HUNG UP ON AS SOON AS THEY PICKED UP.  ADVISED PATIENT WAS NOT ABLE TO REACH THE OFFICE AND WILL HAVE SOMEONE TO GIVE HER A CALL BACK    PLEASE ADVISE    [phone]

## 2022-05-16 RX ORDER — ATORVASTATIN CALCIUM 80 MG/1
TABLET, FILM COATED ORAL
Qty: 90 TABLET | Refills: 2 | Status: SHIPPED | OUTPATIENT
Start: 2022-05-16

## 2022-05-23 ENCOUNTER — OFFICE VISIT (OUTPATIENT)
Dept: FAMILY MEDICINE CLINIC | Facility: CLINIC | Age: 62
End: 2022-05-23

## 2022-05-23 VITALS
DIASTOLIC BLOOD PRESSURE: 80 MMHG | HEART RATE: 75 BPM | TEMPERATURE: 97.4 F | SYSTOLIC BLOOD PRESSURE: 128 MMHG | OXYGEN SATURATION: 98 % | BODY MASS INDEX: 28.33 KG/M2 | WEIGHT: 197.9 LBS | HEIGHT: 70 IN | RESPIRATION RATE: 12 BRPM

## 2022-05-23 DIAGNOSIS — M54.16 LUMBAR RADICULOPATHY: ICD-10-CM

## 2022-05-23 DIAGNOSIS — Z78.0 POST-MENOPAUSAL: ICD-10-CM

## 2022-05-23 DIAGNOSIS — M54.12 CERVICAL RADICULOPATHY: Primary | ICD-10-CM

## 2022-05-23 DIAGNOSIS — Z12.31 ENCOUNTER FOR SCREENING MAMMOGRAM FOR BREAST CANCER: ICD-10-CM

## 2022-05-23 PROCEDURE — 99214 OFFICE O/P EST MOD 30 MIN: CPT | Performed by: NURSE PRACTITIONER

## 2022-05-23 NOTE — PATIENT INSTRUCTIONS
Discharge instructions    Continue physical therapy exercises at home, ergonomics, watch the way you get up sit and walk carefully avoid the pain work around the pain but continued increased the flexibility of your spinal ligaments, and strengthen your muscles as well.    You very well may have some scoliosis as discussed I agree otherwise this may be simply from muscle spasm due to chronic pain and chronic muscle flexion.  Continue stretching here as well.  If severe pain uncontrolled weakness bowel or bladder incontinence or retention [facility] paresthesia emergency room    Call me for results of your MRIs to discuss the next step.

## 2022-06-17 RX ORDER — VENLAFAXINE HYDROCHLORIDE 37.5 MG/1
CAPSULE, EXTENDED RELEASE ORAL
Qty: 90 CAPSULE | Refills: 1 | Status: SHIPPED | OUTPATIENT
Start: 2022-06-17 | End: 2022-12-27

## 2022-06-17 NOTE — TELEPHONE ENCOUNTER
Rx Refill Note  Requested Prescriptions     Pending Prescriptions Disp Refills   • venlafaxine XR (EFFEXOR-XR) 37.5 MG 24 hr capsule [Pharmacy Med Name: VENLAFAXINE HCL ER 37.5 MG CAP] 90 capsule 1     Sig: TAKE ONE CAPSULE BY MOUTH DAILY      Last office visit with prescribing clinician: 5/23/2022      Next office visit with prescribing clinician: Visit date not found       {TIP  Please add Last Relevant Lab Date if appropriate: 03/28/22    Dasha Kraft MA  06/17/22, 13:05 EDT

## 2022-07-07 RX ORDER — PANTOPRAZOLE SODIUM 40 MG/1
TABLET, DELAYED RELEASE ORAL
Qty: 90 TABLET | Refills: 3 | Status: SHIPPED | OUTPATIENT
Start: 2022-07-07

## 2022-07-07 NOTE — TELEPHONE ENCOUNTER
Rx Refill Note  Requested Prescriptions     Pending Prescriptions Disp Refills   • pantoprazole (PROTONIX) 40 MG EC tablet [Pharmacy Med Name: PANTOPRAZOLE SOD DR 40 MG TAB] 90 tablet 3     Sig: TAKE ONE TABLET BY MOUTH DAILY      Last office visit with prescribing clinician: 5/23/2022      Next office visit with prescribing clinician: Visit date not found            Miriam Cruz MA  07/07/22, 13:52 EDT

## 2022-07-13 ENCOUNTER — TELEPHONE (OUTPATIENT)
Dept: GASTROENTEROLOGY | Facility: CLINIC | Age: 62
End: 2022-07-13

## 2022-07-13 ENCOUNTER — TELEPHONE (OUTPATIENT)
Dept: FAMILY MEDICINE CLINIC | Facility: CLINIC | Age: 62
End: 2022-07-13

## 2022-07-13 NOTE — TELEPHONE ENCOUNTER
Caller: Tiera Abbott    Relationship to patient: Self    Best call back number: [phone]    Chief complaint: PT IS HAVING AN EPISODE OF LOW IRON. SHE SAID DR. MURGUIA WANTED TO DO THIS PROCEDURE WHEN THIS WAS HAPPENING THE LAST TIME SHE WAS SEEN. PT WOULD LIKE TO SCHEDULE THE CAPSULE PROCEDURE.     Type of visit: CAPSULE    Requested date:FIRST AVAILABLE

## 2022-07-14 NOTE — TELEPHONE ENCOUNTER
Pt last seen 9/4/19.  See also note of 4/12/21.      Needs appt.     VM to pt with request to contact office.

## 2022-07-18 ENCOUNTER — TELEPHONE (OUTPATIENT)
Dept: ONCOLOGY | Facility: CLINIC | Age: 62
End: 2022-07-18

## 2022-07-18 ENCOUNTER — TELEPHONE (OUTPATIENT)
Dept: FAMILY MEDICINE CLINIC | Facility: CLINIC | Age: 62
End: 2022-07-18

## 2022-07-18 DIAGNOSIS — D50.8 OTHER IRON DEFICIENCY ANEMIA: Primary | ICD-10-CM

## 2022-07-18 NOTE — TELEPHONE ENCOUNTER
Attempted to reach pt at request of scheduling department as pt told them she had questions re: her iron. Left voicemail that she may call back to speak with a triage nurse.

## 2022-07-18 NOTE — TELEPHONE ENCOUNTER
Please refer her to pain management Dr. Esteban  Cervical radiculopathy  Lumbar radiculopathy    But tell patient to go ahead and get set up for the scans of her C-spine and low back  Pain management will require this to be done before they can evaluate her    And make sure she follows up with me on the results a couple days later  Thank you

## 2022-07-18 NOTE — TELEPHONE ENCOUNTER
----- Message from Mary Gutierrez sent at 7/18/2022 11:57 AM EDT -----  Pt calling with medical questions regarding her iron. She is requesting a nurse to call her today. Please call the pt #[phone]

## 2022-07-18 NOTE — TELEPHONE ENCOUNTER
Pt called wanting a referral to see Dr Maurer for pain management. Pt states PT told her that she is going to need another look at back due to physical therapy not helping.

## 2022-07-18 NOTE — TELEPHONE ENCOUNTER
Pt called in stating that she feels like her iron levels are getting low and is scheduled for capsule endoscopy at GI office this week and would like to come in today for labs. Advised her that it has been over a year since she was seen in our office and she cancelled her October 2021 visit and never rescheduled. Will send a message to scheduling to arrange for visit with NP or Dr. Harris this week along with iron studies. Orders placed

## 2022-07-19 ENCOUNTER — OFFICE VISIT (OUTPATIENT)
Dept: GASTROENTEROLOGY | Facility: CLINIC | Age: 62
End: 2022-07-19

## 2022-07-19 VITALS
WEIGHT: 204 LBS | DIASTOLIC BLOOD PRESSURE: 67 MMHG | SYSTOLIC BLOOD PRESSURE: 113 MMHG | TEMPERATURE: 97.5 F | BODY MASS INDEX: 27.63 KG/M2 | HEART RATE: 77 BPM | HEIGHT: 72 IN

## 2022-07-19 DIAGNOSIS — D50.9 IRON DEFICIENCY ANEMIA, UNSPECIFIED IRON DEFICIENCY ANEMIA TYPE: Primary | Chronic | ICD-10-CM

## 2022-07-19 DIAGNOSIS — Z86.010 PERSONAL HISTORY OF COLONIC POLYPS: ICD-10-CM

## 2022-07-19 DIAGNOSIS — M54.12 CERVICAL RADICULOPATHY: Primary | ICD-10-CM

## 2022-07-19 DIAGNOSIS — M54.16 LUMBAR RADICULOPATHY: ICD-10-CM

## 2022-07-19 PROCEDURE — 99214 OFFICE O/P EST MOD 30 MIN: CPT | Performed by: INTERNAL MEDICINE

## 2022-07-19 RX ORDER — SODIUM CHLORIDE, SODIUM LACTATE, POTASSIUM CHLORIDE, CALCIUM CHLORIDE 600; 310; 30; 20 MG/100ML; MG/100ML; MG/100ML; MG/100ML
30 INJECTION, SOLUTION INTRAVENOUS CONTINUOUS
Status: CANCELLED | OUTPATIENT
Start: 2022-07-19

## 2022-07-19 NOTE — PROGRESS NOTES
Chief Complaint   Patient presents with   • Anemia   • Black or Bloody Stool       Subjective     HPI    Tiera Abbott is a 62 y.o. female with a past medical history noted below who presents for iron deficiency anemia.  She has been seen in the past, last in the office September 2019 for chronic anemia issues.  EGD and colonoscopy failed to identify any bleeding source.  She had improved with iron supplementation so capsule was not pursued; however she did not follow-up.  She is on Plavix for coronary artery disease.    She has been feeling more fatigued and short of breath.  Does have an active job and notices that all her activities wear her out.  She feels as she has in the past with anemia.  She is not taking any oral iron.  She has followed [facility] with [facility] but missed an appointment last year    Recent labs in March with her PCP shows a normal hemoglobin but iron deficient deficiency based on low ferritin and low iron saturation    No overt bleeding.  She did notice tarry dark stool but only once today.    She sees [facility] later this week.    She is on plavix.  She has been having headaches and has been taking aspirin and ibuprofen 800mg about 5 times per week.      Today's visit was in the office.  Both the patient and I were wearing face masks and proper hand hygiene was performed before and after the physical exam.           Current Outpatient Medications:   •  acetaminophen (TYLENOL) 650 MG 8 hr tablet, Take 650 mg by mouth Every 8 (Eight) Hours As Needed for Mild Pain ., Disp: , Rfl:   •  atorvastatin (LIPITOR) 80 MG tablet, TAKE ONE TABLET BY MOUTH EVERY NIGHT AT BEDTIME, Disp: 90 tablet, Rfl: 2  •  clobetasol (TEMOVATE) 0.05 % cream, Apply  topically to the appropriate area as directed 2 (Two) Times a Day. Sparingly, avoid face, short-term use only, Disp: 30 g, Rfl: 1  •  clopidogrel (PLAVIX) 75 MG tablet, TAKE ONE TABLET BY MOUTH DAILY, Disp: 90 tablet, Rfl: 0  •  Diclofenac Sodium (VOLTAREN) 1 %  gel gel, As Needed., Disp: , Rfl:   •  pantoprazole (PROTONIX) 40 MG EC tablet, TAKE ONE TABLET BY MOUTH DAILY, Disp: 90 tablet, Rfl: 3  •  venlafaxine XR (EFFEXOR-XR) 37.5 MG 24 hr capsule, TAKE ONE CAPSULE BY MOUTH DAILY, Disp: 90 capsule, Rfl: 1  •  carvedilol (COREG) 3.125 MG tablet, TAKE ONE TABLET BY MOUTH TWICE A DAY WITH MEALS, Disp: 180 tablet, Rfl: 2  •  cyclobenzaprine (FLEXERIL) 10 MG tablet, Take 10 mg by mouth 3 (Three) Times a Day As Needed for Muscle Spasms., Disp: , Rfl:   •  Iron-Vitamin C  MG tablet, Take 1 tablet by mouth Daily., Disp: 100 tablet, Rfl: 2      Objective     Vitals:    07/19/22 1533   BP: 113/67   Pulse: 77   Temp: 97.5 °F (36.4 °C)         07/19/22  1533   Weight: 92.5 kg (204 lb)     Body mass index is 27.67 kg/m².    Physical Exam  Constitutional:       General: She is not in acute distress.  Pulmonary:      Effort: Pulmonary effort is normal.   Neurological:      Mental Status: She is alert and oriented to person, place, and time.   Psychiatric:         Mood and Affect: Mood normal.         Behavior: Behavior normal.         Thought Content: Thought content normal.         Judgment: Judgment normal.             WBC   Date Value Ref Range Status   03/28/2022 7.3 3.4 - 10.8 x10E3/uL Final     RBC   Date Value Ref Range Status   03/28/2022 5.09 3.77 - 5.28 x10E6/uL Final     Hemoglobin   Date Value Ref Range Status   03/28/2022 12.6 11.1 - 15.9 g/dL Final   07/09/2021 12.8 12.0 - 15.9 g/dL Final     Hematocrit   Date Value Ref Range Status   03/28/2022 41.4 34.0 - 46.6 % Final   07/09/2021 40.8 34.0 - 46.6 % Final     MCV   Date Value Ref Range Status   03/28/2022 81 79 - 97 fL Final   07/09/2021 92.1 79.0 - 97.0 fL Final     MCH   Date Value Ref Range Status   03/28/2022 24.8 (L) 26.6 - 33.0 pg Final   07/09/2021 28.9 26.6 - 33.0 pg Final     MCHC   Date Value Ref Range Status   03/28/2022 30.4 (L) 31.5 - 35.7 g/dL Final   07/09/2021 31.4 (L) 31.5 - 35.7 g/dL Final     RDW    Date Value Ref Range Status   03/28/2022 15.5 (H) 11.7 - 15.4 % Final   07/09/2021 14.6 12.3 - 15.4 % Final     RDW-SD   Date Value Ref Range Status   07/09/2021 49.2 37.0 - 54.0 fl Final     MPV   Date Value Ref Range Status   07/09/2021 11.2 6.0 - 12.0 fL Final     Platelets   Date Value Ref Range Status   03/28/2022 268 150 - 450 x10E3/uL Final   07/09/2021 233 140 - 450 10*3/mm3 Final     Neutrophil Rel %   Date Value Ref Range Status   03/28/2022 58 Not Estab. % Final     Neutrophil %   Date Value Ref Range Status   07/09/2021 62.0 42.7 - 76.0 % Final     Lymphocyte Rel %   Date Value Ref Range Status   03/28/2022 30 Not Estab. % Final     Lymphocyte %   Date Value Ref Range Status   07/09/2021 26.8 19.6 - 45.3 % Final     Monocyte Rel %   Date Value Ref Range Status   03/28/2022 8 Not Estab. % Final     Monocyte %   Date Value Ref Range Status   07/09/2021 7.5 5.0 - 12.0 % Final     Eosinophil Rel %   Date Value Ref Range Status   03/28/2022 3 Not Estab. % Final     Eosinophil %   Date Value Ref Range Status   07/09/2021 2.8 0.3 - 6.2 % Final     Basophil Rel %   Date Value Ref Range Status   03/28/2022 1 Not Estab. % Final     Basophil %   Date Value Ref Range Status   07/09/2021 0.4 0.0 - 1.5 % Final     Immature Grans %   Date Value Ref Range Status   07/09/2021 0.5 0.0 - 0.5 % Final     Neutrophils Absolute   Date Value Ref Range Status   03/28/2022 4.2 1.4 - 7.0 x10E3/uL Final     Neutrophils, Absolute   Date Value Ref Range Status   07/09/2021 4.91 1.70 - 7.00 10*3/mm3 Final     Lymphocytes Absolute   Date Value Ref Range Status   03/28/2022 2.2 0.7 - 3.1 x10E3/uL Final     Lymphocytes, Absolute   Date Value Ref Range Status   07/09/2021 2.12 0.70 - 3.10 10*3/mm3 Final     Monocytes Absolute   Date Value Ref Range Status   03/28/2022 0.6 0.1 - 0.9 x10E3/uL Final     Monocytes, Absolute   Date Value Ref Range Status   07/09/2021 0.59 0.10 - 0.90 10*3/mm3 Final     Eosinophils Absolute   Date Value Ref  Range Status   03/28/2022 0.2 0.0 - 0.4 x10E3/uL Final     Eosinophils, Absolute   Date Value Ref Range Status   07/09/2021 0.22 0.00 - 0.40 10*3/mm3 Final     Basophils Absolute   Date Value Ref Range Status   03/28/2022 0.0 0.0 - 0.2 x10E3/uL Final     Basophils, Absolute   Date Value Ref Range Status   07/09/2021 0.03 0.00 - 0.20 10*3/mm3 Final     Immature Grans, Absolute   Date Value Ref Range Status   07/09/2021 0.04 0.00 - 0.05 10*3/mm3 Final     nRBC   Date Value Ref Range Status   07/09/2021 0.0 0.0 - 0.2 /100 WBC Final       Lab Results   Component Value Date    GLUCOSE 60 (L) 03/28/2022    BUN 8 03/28/2022    CREATININE 0.65 03/28/2022    EGFRIFNONA 95 05/23/2019    EGFRIFAFRI 115 05/23/2019    BCR 12 03/28/2022    CO2 24 03/28/2022    CALCIUM 9.5 03/28/2022    PROTENTOTREF 7.0 03/28/2022    ALBUMIN 4.2 03/28/2022    LABIL2 1.5 03/28/2022    AST 17 03/28/2022    ALT 6 03/28/2022         Imaging Results (Last 7 Days)     ** No results found for the last 168 hours. **          I personally reviewed data as detailed below:     The labs listed above.    The radiology studies listed above.    Office notes from: 7/9/21 hematology note    Endoscopy procedures and pathology from: 6/11/2019 EGD and colonoscopy    Assessment and Plan  1.  Iron deficiency anemia: Chronic issue.  Lost to follow-up nearly 3 years ago.  No findings on her EGD and colonoscopy; she did not have a small bowel capsule    2.  Personal history of colon polyps    Plan  CBC, CMP and iron studies today  I have encouraged her to get on oral iron if tolerated  Minimize NSAID use  We will plan for EGD and colonoscopy; will reach out to Dr. Ibrahim regarding holding the Plavix 5 days prior  Continue to monitor stools for overt bleeding and she will let the office know if she sees anything like this         Diagnoses and all orders for this visit:    1. Iron deficiency anemia, unspecified iron deficiency anemia type (Primary)  -     CBC &  Differential  -     Comprehensive Metabolic Panel  -     Iron Profile  -     Ferritin  -     Case Request; Standing  -     COVID PRE-OP / PRE-PROCEDURE SCREENING ORDER (NO ISOLATION) - Swab, Nasopharynx; Future  -     Follow Anesthesia Guidelines / Standing Orders; Future  -     Obtain Informed Consent; Future  -     Case Request    2. Personal history of colonic polyps  -     Case Request; Standing  -     COVID PRE-OP / PRE-PROCEDURE SCREENING ORDER (NO ISOLATION) - Swab, Nasopharynx; Future  -     Follow Anesthesia Guidelines / Standing Orders; Future  -     Obtain Informed Consent; Future  -     Case Request    Other orders  -     Iron-Vitamin C  MG tablet; Take 1 tablet by mouth Daily.  Dispense: 100 tablet; Refill: 2          I have discussed the above plan with the patient.  They verbalize understanding and are in agreement with the plan.  They have been advised to contact the office for any questions, concerns, or changes related to their health.    Dictated utilizing Dragon dictation

## 2022-07-20 ENCOUNTER — TELEPHONE (OUTPATIENT)
Dept: GASTROENTEROLOGY | Facility: CLINIC | Age: 62
End: 2022-07-20

## 2022-07-20 LAB
ALBUMIN SERPL-MCNC: 3.8 G/DL (ref 3.8–4.8)
ALBUMIN/GLOB SERPL: 1.5 {RATIO} (ref 1.2–2.2)
ALP SERPL-CCNC: 115 IU/L (ref 44–121)
ALT SERPL-CCNC: 10 IU/L (ref 0–32)
AST SERPL-CCNC: 19 IU/L (ref 0–40)
BASOPHILS # BLD AUTO: 0 X10E3/UL (ref 0–0.2)
BASOPHILS NFR BLD AUTO: 0 %
BILIRUB SERPL-MCNC: <0.2 MG/DL (ref 0–1.2)
BUN SERPL-MCNC: 13 MG/DL (ref 8–27)
BUN/CREAT SERPL: 18 (ref 12–28)
CALCIUM SERPL-MCNC: 8.8 MG/DL (ref 8.7–10.3)
CHLORIDE SERPL-SCNC: 107 MMOL/L (ref 96–106)
CO2 SERPL-SCNC: 23 MMOL/L (ref 20–29)
CREAT SERPL-MCNC: 0.74 MG/DL (ref 0.57–1)
EGFRCR SERPLBLD CKD-EPI 2021: 91 ML/MIN/1.73
EOSINOPHIL # BLD AUTO: 0.2 X10E3/UL (ref 0–0.4)
EOSINOPHIL NFR BLD AUTO: 3 %
ERYTHROCYTE [DISTWIDTH] IN BLOOD BY AUTOMATED COUNT: 16.1 % (ref 11.7–15.4)
FERRITIN SERPL-MCNC: 6 NG/ML (ref 15–150)
GLOBULIN SER CALC-MCNC: 2.5 G/DL (ref 1.5–4.5)
GLUCOSE SERPL-MCNC: 101 MG/DL (ref 65–99)
HCT VFR BLD AUTO: 27.2 % (ref 34–46.6)
HGB BLD-MCNC: 8.3 G/DL (ref 11.1–15.9)
IMM GRANULOCYTES # BLD AUTO: 0 X10E3/UL (ref 0–0.1)
IMM GRANULOCYTES NFR BLD AUTO: 0 %
IRON SATN MFR SERPL: 7 % (ref 15–55)
IRON SERPL-MCNC: 29 UG/DL (ref 27–139)
LYMPHOCYTES # BLD AUTO: 2.5 X10E3/UL (ref 0.7–3.1)
LYMPHOCYTES NFR BLD AUTO: 32 %
MCH RBC QN AUTO: 22.9 PG (ref 26.6–33)
MCHC RBC AUTO-ENTMCNC: 30.5 G/DL (ref 31.5–35.7)
MCV RBC AUTO: 75 FL (ref 79–97)
MONOCYTES # BLD AUTO: 0.5 X10E3/UL (ref 0.1–0.9)
MONOCYTES NFR BLD AUTO: 6 %
NEUTROPHILS # BLD AUTO: 4.4 X10E3/UL (ref 1.4–7)
NEUTROPHILS NFR BLD AUTO: 59 %
PLATELET # BLD AUTO: 268 X10E3/UL (ref 150–450)
POTASSIUM SERPL-SCNC: 3.9 MMOL/L (ref 3.5–5.2)
PROT SERPL-MCNC: 6.3 G/DL (ref 6–8.5)
RBC # BLD AUTO: 3.62 X10E6/UL (ref 3.77–5.28)
SODIUM SERPL-SCNC: 144 MMOL/L (ref 134–144)
TIBC SERPL-MCNC: 388 UG/DL (ref 250–450)
UIBC SERPL-MCNC: 359 UG/DL (ref 118–369)
WBC # BLD AUTO: 7.6 X10E3/UL (ref 3.4–10.8)

## 2022-07-20 NOTE — TELEPHONE ENCOUNTER
----- Message from Minna Bee MD sent at 7/19/2022  5:32 PM EDT -----  Can you please reach out to her cardiologist, Dr. Fontaine regarding holding Plavix 5 days before her prescription test and let the patient know, thank you

## 2022-07-21 ENCOUNTER — TELEPHONE (OUTPATIENT)
Dept: FAMILY MEDICINE CLINIC | Facility: CLINIC | Age: 62
End: 2022-07-21

## 2022-07-21 ENCOUNTER — TELEPHONE (OUTPATIENT)
Dept: GASTROENTEROLOGY | Facility: CLINIC | Age: 62
End: 2022-07-21

## 2022-07-21 ENCOUNTER — TELEPHONE (OUTPATIENT)
Dept: ONCOLOGY | Facility: CLINIC | Age: 62
End: 2022-07-21

## 2022-07-21 DIAGNOSIS — D50.9 IRON DEFICIENCY ANEMIA, UNSPECIFIED IRON DEFICIENCY ANEMIA TYPE: Primary | ICD-10-CM

## 2022-07-21 NOTE — PROGRESS NOTES
Her hemoglobin is significantly lower at 8.3.  Iron stores are low also    Continue oral iron for now    We will see what [facility] is planning    Plan for repeat H&H (I ordered) next week; if her hemoglobin drops below 8 will need to consider transfusion

## 2022-07-21 NOTE — PROGRESS NOTES
REASON FOR FOLLOW-UP: History of recurrent iron deficiency anemia requiring repeated IV iron therapy.      History of Present Illness    Mrs. Abbott is a pleasant 59-year-old woman who returns today for four-month follow-up.  When she was seen in May 2019 by Dr. Harris, the patient was significantly anemic with a hemoglobin of 7.8, MCV 69.  Iron saturation was 3%, ferritin 29.  Though she was placed on oral iron, this caused her nausea and constipation.  We elected to give her IV Injectafer which took place in June 2019.  She also underwent repeat upper and lower endoscopy per Dr. Minna Bee with evidence of nonsevere esophagitis, scattered mild inflammation with biopsies taken, pathology negative.  She had 3 polyps removed in the colon, again pathology negative.  She had internal bleeding hemorrhoids.      Interval follow up   Patient returns the office today in follow-up.  Patient was seen by gastroenterologist on 7/19/2022 and lab results revealed worsening anemia.  Patient contacted our office about need for iron infusions.  Patient unable to tolerate oral iron due to nausea and constipation. She states she has been craving ice and feeling cold. Additionally, she reports increase in fatigue. Patient denies signs or symptoms of bleeding.       Past Medical History, Past Surgical History, Social History, Family History have been reviewed and are without significant changes except as mentioned from my consult note on 8/25/17.    Review of Systems   Constitutional: Positive for fatigue (improved). Negative for activity change and unexpected weight change.   HENT: Negative.    Respiratory: Negative for shortness of breath.    Cardiovascular: Negative.    Gastrointestinal: Positive for constipation. Negative for nausea.   Genitourinary: Negative.    Musculoskeletal: Negative.    Skin: Negative.    Neurological: Negative for dizziness.   Hematological: Negative.    Psychiatric/Behavioral: Negative.    ROS  unchanged-7/22/2021  Medications:  The current medication list was reviewed in the EMR    ALLERGIES:    No Known Allergies    Objective      There were no vitals filed for this visit.  Current Status 7/9/2021   ECOG score 0       Physical Exam    CON: pleasant well-developed adult woman  HEENT: no icterus, no thrush, moist membranes  NECK: no jvd  LYMPH: no cervical or supraclavicular lad  CV: RRR, S1S2, no murmur  RESP: cta bilat, no wheezing, no rales  GI: soft, non-tender, no splenomegaly, +bs  MUSC: no edema, normal gait  NEURO: alert and oriented x3, normal strength  PSYCH: normal mood and affect    RECENT LABS:  Results from last 7 days   Lab Units 07/19/22  1612   WBC x10E3/uL 7.6   NEUTROS ABS x10E3/uL 4.4   HEMOGLOBIN g/dL 8.3*   HEMATOCRIT % 27.2*   PLATELETS x10E3/uL 268     Results from last 7 days   Lab Units 07/19/22  1612   SODIUM mmol/L 144   POTASSIUM mmol/L 3.9   CHLORIDE mmol/L 107*   TOTAL CO2 mmol/L 23   BUN mg/dL 13   CREATININE mg/dL 0.74   CALCIUM mg/dL 8.8   ALBUMIN g/dL 3.8   BILIRUBIN mg/dL <0.2   ALK PHOS IU/L 115   ALT (SGPT) IU/L 10   AST (SGOT) IU/L 19   GLUCOSE mg/dL 101*   FERRITIN ng/mL 6*   TIBC ug/dL 388           Iron studies pending    Assessment & Plan    1.  Anemia   · 7/19/2022: Ferritin 6, iron saturation 7, hemoglobin 8.23.  Patient unable to tolerate oral iron due to constipation and nausea  2. Iron deficiency anemia, recurrent    Plan:   1.  Injectafer x2- First dose to be given today   2. MD follow up in 3 months with repeat iron labs                   7/21/2022      CC:

## 2022-07-21 NOTE — TELEPHONE ENCOUNTER
----- Message from Minna Bee MD sent at 7/21/2022 12:07 PM EDT -----  Her hemoglobin is significantly lower at 8.3.  Iron stores are low also    Continue oral iron for now    We will see what [facility] is planning    Plan for repeat H&H (I ordered) next week; if her hemoglobin drops below 8 will need to consider transfusion

## 2022-07-21 NOTE — TELEPHONE ENCOUNTER
Caller: Tiera Abbott    Relationship: Self    Best call back number: [phone]    What is the best time to reach you: ANYTIME    Who are you requesting to speak with (clinical staff, provider,  specific staff member): MA OR SAFIA        What was the call regarding: PATIENT IS CALLING IN SHE IS ASKING TO HAVE SAFIA CALL HER BACK ASAP SHE HAS SOME SERIOUS CONCERNS ABOUT HER HEMOGLOBIN, IT IS VERY LOW AND SHE HAS SOME THINGS SHE WOULD LIKE TO DISCUSS WITH SAFIA LOCKHART.    Do you require a callback: YES

## 2022-07-21 NOTE — TELEPHONE ENCOUNTER
Call from pt.  Advise per DR Bee note.  Verb understanding.     Declines to make f/u lab appt.  [facility] has appt with [facility] tomorrow and this will be followed there.  Update to DR Bee.

## 2022-07-21 NOTE — TELEPHONE ENCOUNTER
Attempted calling pt, no answer unable to [facility].     [facility] please inform patient she will have to be scheduled for a visit either in person or telehealth visit to discuss with Sharon Escamilla to schedule as well.

## 2022-07-21 NOTE — NURSING NOTE
Pt iron olivia is 7 and ferritin 6. D/w Dr. Harris. Per Dr. Harris pt will receive Injectafer X 2 doses. Pt will see Dr. Harris or NP with first dose. TX plan placed and message sen to scheduling. Pt informed and V/U.

## 2022-07-22 ENCOUNTER — LAB (OUTPATIENT)
Dept: LAB | Facility: HOSPITAL | Age: 62
End: 2022-07-22

## 2022-07-22 ENCOUNTER — APPOINTMENT (OUTPATIENT)
Dept: OTHER | Facility: HOSPITAL | Age: 62
End: 2022-07-22

## 2022-07-22 ENCOUNTER — INFUSION (OUTPATIENT)
Dept: ONCOLOGY | Facility: HOSPITAL | Age: 62
End: 2022-07-22

## 2022-07-22 ENCOUNTER — OFFICE VISIT (OUTPATIENT)
Dept: ONCOLOGY | Facility: CLINIC | Age: 62
End: 2022-07-22

## 2022-07-22 VITALS
RESPIRATION RATE: 16 BRPM | TEMPERATURE: 97.6 F | SYSTOLIC BLOOD PRESSURE: 117 MMHG | WEIGHT: 202.5 LBS | HEIGHT: 70 IN | BODY MASS INDEX: 28.99 KG/M2 | DIASTOLIC BLOOD PRESSURE: 71 MMHG | OXYGEN SATURATION: 98 % | HEART RATE: 90 BPM

## 2022-07-22 DIAGNOSIS — T45.4X5A ADVERSE EFFECT OF IRON, INITIAL ENCOUNTER: Primary | ICD-10-CM

## 2022-07-22 DIAGNOSIS — D50.8 OTHER IRON DEFICIENCY ANEMIA: ICD-10-CM

## 2022-07-22 DIAGNOSIS — D50.9 IRON DEFICIENCY ANEMIA, UNSPECIFIED IRON DEFICIENCY ANEMIA TYPE: ICD-10-CM

## 2022-07-22 DIAGNOSIS — D50.0 IRON DEFICIENCY ANEMIA DUE TO CHRONIC BLOOD LOSS: ICD-10-CM

## 2022-07-22 DIAGNOSIS — D50.0 IRON DEFICIENCY ANEMIA DUE TO CHRONIC BLOOD LOSS: Primary | ICD-10-CM

## 2022-07-22 DIAGNOSIS — T45.4X5A ADVERSE EFFECT OF IRON, INITIAL ENCOUNTER: ICD-10-CM

## 2022-07-22 LAB
BASOPHILS # BLD AUTO: 0.05 10*3/MM3 (ref 0–0.2)
BASOPHILS NFR BLD AUTO: 0.6 % (ref 0–1.5)
DEPRECATED RDW RBC AUTO: 47.3 FL (ref 37–54)
EOSINOPHIL # BLD AUTO: 0.28 10*3/MM3 (ref 0–0.4)
EOSINOPHIL NFR BLD AUTO: 3.1 % (ref 0.3–6.2)
ERYTHROCYTE [DISTWIDTH] IN BLOOD BY AUTOMATED COUNT: 17.3 % (ref 12.3–15.4)
HCT VFR BLD AUTO: 26.9 % (ref 34–46.6)
HGB BLD-MCNC: 8.2 G/DL (ref 12–15.9)
IMM GRANULOCYTES # BLD AUTO: 0.04 10*3/MM3 (ref 0–0.05)
IMM GRANULOCYTES NFR BLD AUTO: 0.4 % (ref 0–0.5)
LYMPHOCYTES # BLD AUTO: 2.52 10*3/MM3 (ref 0.7–3.1)
LYMPHOCYTES NFR BLD AUTO: 27.8 % (ref 19.6–45.3)
MCH RBC QN AUTO: 23.2 PG (ref 26.6–33)
MCHC RBC AUTO-ENTMCNC: 30.5 G/DL (ref 31.5–35.7)
MCV RBC AUTO: 76 FL (ref 79–97)
MONOCYTES # BLD AUTO: 0.63 10*3/MM3 (ref 0.1–0.9)
MONOCYTES NFR BLD AUTO: 7 % (ref 5–12)
NEUTROPHILS NFR BLD AUTO: 5.53 10*3/MM3 (ref 1.7–7)
NEUTROPHILS NFR BLD AUTO: 61.1 % (ref 42.7–76)
NRBC BLD AUTO-RTO: 0 /100 WBC (ref 0–0.2)
PLATELET # BLD AUTO: 277 10*3/MM3 (ref 140–450)
PMV BLD AUTO: 11.1 FL (ref 6–12)
RBC # BLD AUTO: 3.54 10*6/MM3 (ref 3.77–5.28)
WBC NRBC COR # BLD: 9.05 10*3/MM3 (ref 3.4–10.8)

## 2022-07-22 PROCEDURE — 36415 COLL VENOUS BLD VENIPUNCTURE: CPT

## 2022-07-22 PROCEDURE — 25010000002 FERRIC CARBOXYMALTOSE 750 MG/15ML SOLUTION

## 2022-07-22 PROCEDURE — 99213 OFFICE O/P EST LOW 20 MIN: CPT

## 2022-07-22 PROCEDURE — 96374 THER/PROPH/DIAG INJ IV PUSH: CPT

## 2022-07-22 PROCEDURE — 63710000001 PROCHLORPERAZINE MALEATE PER 10 MG

## 2022-07-22 PROCEDURE — 85025 COMPLETE CBC W/AUTO DIFF WBC: CPT

## 2022-07-22 RX ORDER — SODIUM CHLORIDE 9 MG/ML
250 INJECTION, SOLUTION INTRAVENOUS ONCE
Status: COMPLETED | OUTPATIENT
Start: 2022-07-22 | End: 2022-07-22

## 2022-07-22 RX ORDER — PROCHLORPERAZINE MALEATE 10 MG
10 TABLET ORAL ONCE
Status: COMPLETED | OUTPATIENT
Start: 2022-07-22 | End: 2022-07-22

## 2022-07-22 RX ADMIN — PROCHLORPERAZINE MALEATE 10 MG: 10 TABLET ORAL at 12:17

## 2022-07-22 RX ADMIN — FERRIC CARBOXYMALTOSE INJECTION 750 MG: 50 INJECTION, SOLUTION INTRAVENOUS at 12:27

## 2022-07-22 RX ADMIN — SODIUM CHLORIDE 250 ML: 9 INJECTION, SOLUTION INTRAVENOUS at 12:26

## 2022-07-22 NOTE — TELEPHONE ENCOUNTER
Call to Dr Fontaine's office and spoke with Tiera.  States pt authorized to hold plavix for 3 days prior to scopes to be scheduled.  Will also fax auth.     Message to DR Bee.

## 2022-07-25 NOTE — TELEPHONE ENCOUNTER
Minna Bee MD  to Me          8:26 AM  5 days is what is recommended but if her cardiologist is worried about her being off that long I will have to work with 3.    **VM to pt with request to contact office. Clara Begum RN.

## 2022-07-26 ENCOUNTER — TELEPHONE (OUTPATIENT)
Dept: GASTROENTEROLOGY | Facility: CLINIC | Age: 62
End: 2022-07-26

## 2022-07-26 NOTE — TELEPHONE ENCOUNTER
Call to pt.  Advise that DR Fontaine has authorized to hold plavix for 3 days prior to egd to be scheduled.  Count day of procedure as day zero, and then count back 3.  Verb understanding.

## 2022-07-26 NOTE — TELEPHONE ENCOUNTER
Call to pt.  Review notes of 7/21.     States seen at [facility] 7/22 (see lab).  Received iron infusion, and will have repeat iron infusion 729.     Update to Dr Bee.

## 2022-07-26 NOTE — TELEPHONE ENCOUNTER
Caller: Tiera Abbott    Relationship: Self    Best call back number: [phone]    What is the best time to reach you: ANYTIME    Who are you requesting to speak with (clinical staff, provider,  specific staff member): CLINICAL STAFF    Do you know the name of the person who called:RON    What was the call regarding: PATIENT WANTS SOME CLARIFICATION OF RECENT MESSAGES SHE RECEIVED.

## 2022-07-29 ENCOUNTER — INFUSION (OUTPATIENT)
Dept: ONCOLOGY | Facility: HOSPITAL | Age: 62
End: 2022-07-29

## 2022-07-29 VITALS
WEIGHT: 201.2 LBS | HEART RATE: 84 BPM | BODY MASS INDEX: 28.87 KG/M2 | OXYGEN SATURATION: 95 % | DIASTOLIC BLOOD PRESSURE: 74 MMHG | SYSTOLIC BLOOD PRESSURE: 131 MMHG | TEMPERATURE: 97 F | RESPIRATION RATE: 18 BRPM

## 2022-07-29 DIAGNOSIS — D50.0 IRON DEFICIENCY ANEMIA DUE TO CHRONIC BLOOD LOSS: ICD-10-CM

## 2022-07-29 DIAGNOSIS — D50.9 IRON DEFICIENCY ANEMIA, UNSPECIFIED IRON DEFICIENCY ANEMIA TYPE: ICD-10-CM

## 2022-07-29 DIAGNOSIS — T45.4X5A ADVERSE EFFECT OF IRON, INITIAL ENCOUNTER: Primary | ICD-10-CM

## 2022-07-29 PROCEDURE — 96374 THER/PROPH/DIAG INJ IV PUSH: CPT

## 2022-07-29 PROCEDURE — 63710000001 PROCHLORPERAZINE MALEATE PER 10 MG: Performed by: INTERNAL MEDICINE

## 2022-07-29 PROCEDURE — 25010000002 FERRIC CARBOXYMALTOSE 750 MG/15ML SOLUTION 15 ML VIAL: Performed by: INTERNAL MEDICINE

## 2022-07-29 RX ORDER — PROCHLORPERAZINE MALEATE 10 MG
10 TABLET ORAL ONCE
Status: COMPLETED | OUTPATIENT
Start: 2022-07-29 | End: 2022-07-29

## 2022-07-29 RX ORDER — SODIUM CHLORIDE 9 MG/ML
250 INJECTION, SOLUTION INTRAVENOUS ONCE
Status: COMPLETED | OUTPATIENT
Start: 2022-07-29 | End: 2022-07-29

## 2022-07-29 RX ADMIN — PROCHLORPERAZINE MALEATE 10 MG: 10 TABLET ORAL at 13:50

## 2022-07-29 RX ADMIN — SODIUM CHLORIDE 250 ML: 9 INJECTION, SOLUTION INTRAVENOUS at 13:49

## 2022-07-29 RX ADMIN — FERRIC CARBOXYMALTOSE INJECTION 750 MG: 50 INJECTION, SOLUTION INTRAVENOUS at 13:52

## 2022-08-09 ENCOUNTER — TELEPHONE (OUTPATIENT)
Dept: FAMILY MEDICINE CLINIC | Facility: CLINIC | Age: 62
End: 2022-08-09

## 2022-08-09 ENCOUNTER — TELEPHONE (OUTPATIENT)
Dept: ONCOLOGY | Facility: CLINIC | Age: 62
End: 2022-08-09

## 2022-08-09 DIAGNOSIS — D50.9 IRON DEFICIENCY ANEMIA, UNSPECIFIED IRON DEFICIENCY ANEMIA TYPE: Primary | ICD-10-CM

## 2022-08-09 NOTE — TELEPHONE ENCOUNTER
Returned call to patient who is reporting that she feels no better since having her two Injectafer infusions. She is extremely fatigued and wants to know if her hemoglobin has improved since the infusions, since she feels no better. She wants to know if she could have her labs checked sooner or at least have her hemoglobin rechecked as soon as possible.  Please advise.

## 2022-08-09 NOTE — TELEPHONE ENCOUNTER
Caller: Tiera Abbott    Relationship to patient: Self    Best call back number: [phone]    Patient is needing: PATIENT WOULD LIKE TO HAVE A B12 SHOT DONE, PLEASE ADVISE.

## 2022-08-09 NOTE — TELEPHONE ENCOUNTER
Looks like hematology placed a CBC or her make sure she gets this also with a prompt follow-up    If increased weakness though chest pain shortness of breath do not wait go to the emergency room  Make sure she is hydrating well

## 2022-08-09 NOTE — TELEPHONE ENCOUNTER
Caller: Tiera Abbott    Relationship: Self    Best call back number: [phone]    What is the best time to reach you: ANY    Who are you requesting to speak with (clinical staff, provider,  specific staff member): SAFIA EPLEY    What was the call regarding: PATIENT STATES THAT SHE HAS HAD HEMOGLOBIN IN HER BLOOD, AND SHE HAS HAD TWO HEART INFUSIONS. SHE HAD HER SECOND  ONE ON 8/5 AND IS STILL NOT FEELING BETTER. SHE CALLED THE [facility] FOR HER HEMOGLOBIN NUMBERS AND IS WAITING ON A CALL BACK FROM THEM. SHE STATES THAT SHE IS EXHAUSTED. SHE WOULD LIKE TO KNOW IF JAMES EPLEY COULD CALL HER IN BLOOD WORK.     Do you require a callback: YES    PLEASE ADVISE PATIENT            PLEASE RECHECK PATIENT'S BLOOD PRESSURE  LOW SALT DIET AND FOLLOW UP WITH PCP FOR FURTHER MONITORING AND MANAGEMENT OF BLOOD PRESSURE    Supportive care and monitoring, and Follow up with your PCp or Go to ER if it is needed as discussed.    Patient Education     Low-Salt Diet  This diet removes foods that are high in salt. It also limits the amount of salt you use when cooking. It is most often used for people with high blood pressure, edema (fluid retention), and kidney, liver, or heart disease.  Table salt contains the mineral sodium. Your body needs sodium to work normally. But too much sodium can make your health problems worse. Your healthcare provider is recommending a low-salt (also called low-sodium) diet for you. Your total daily allowance of salt is 1,500 to 2,300 milligrams (mg). It is less than 1 teaspoon of table salt. This means you can have only about 500 to 700 mg of sodium at each meal. People with certain health problems should limit salt intake to the lower end of the recommended range.    When you cook, don’t add much salt. If you can cook without using salt, even better. Don’t add salt to your food at the table.  When shopping, read food labels. Salt is often called sodium on the label. Choose foods that are salt-free, low salt, or very low salt. Note that foods with reduced salt may not lower your salt intake enough.    Beans, potatoes, and pasta  Ok: Dry beans, split peas, lentils, potatoes, rice, macaroni, pasta, spaghetti without added salt  Avoid: Potato chips, tortilla chips, and similar products  Breads and cereals  Ok: Low-sodium breads, rolls, cereals, and cakes; low-salt crackers, matzo crackers  Avoid: Salted crackers, pretzels, popcorn, Greek toast, pancakes, muffins  Dairy  Ok: Milk, chocolate milk, hot chocolate mix, low-salt cheeses, and yogurt  Avoid: Processed cheese and cheese spreads; Roquefort, Camembert, and cottage cheese; buttermilk, instant breakfast  drink  Desserts  Ok: Ice cream, frozen yogurt, juice bars, gelatin, cookies and pies, sugar, honey, jelly, hard candy  Avoid: Most pies, cakes and cookies prepared or processed with salt; instant pudding  Drinks  Ok: Tea, coffee, fizzy (carbonated) drinks, juices  Avoid: Flavored coffees, electrolyte replacement drinks, sports drinks  Meats  Ok: All fresh meat, fish, poultry, low-salt tuna, eggs, egg substitute  Avoid: Smoked, pickled, brine-cured, or salted meats and fish. This includes grady, chipped beef, corned beef, hot dogs, deli meats, ham, kosher meats, salt pork, sausage, canned tuna, salted codfish, smoked salmon, herring, sardines, or anchovies.  Seasonings and spices  Ok: Most seasonings are okay. Good substitutes for salt include: fresh herb blends, hot sauce, lemon, garlic, cruz, vinegar, dry mustard, parsley, cilantro, horseradish, tomato paste, regular margarine, mayonnaise, unsalted butter, cream cheese, vegetable oil, cream, low-salt salad dressing and gravy.  Avoid: Regular ketchup, relishes, pickles, soy sauce, teriyaki sauce, Worcestershire sauce, BBQ sauce, tartar sauce, meat tenderizer, chili sauce, regular gravy, regular salad dressing, salted butter  Soups  Ok: Low-salt soups and broths made with allowed foods  Avoid: Bouillon cubes, soups with smoked or salted meats, regular soup and broth  Vegetables  Ok: Most vegetables are okay; also low-salt tomato and vegetable juices  Avoid: Sauerkraut and other brine-soaked vegetables; pickles and other pickled vegetables; tomato juice, olives  Date Last Reviewed: 8/1/2016  © 5641-9855 [employer]. [address] [place] [address]. All rights reserved. This information is not intended as a substitute for professional medical care. Always follow your healthcare professional's instructions.           Patient Education     High Blood Pressure, To Be Confirmed, No Treatment  Your blood pressure today was higher than normal.  Sometimes anxiety or pain can cause a temporary rise in blood pressure. It later returns to normal. Blood pressure that is high only one time doesn’t mean that you have high blood pressure (hypertension). High blood pressure is a chronic illness. But you should have your blood pressure measured again within the next few days to find out if it’s still high.    Blood pressure measurements are given as 2 numbers. Systolic blood pressure is the upper number. This is the pressure when the heart contracts. Diastolic blood pressure is the lower number. This is the pressure when the heart relaxes between beats. You will see your blood pressure readings written together. For example, a person with a systolic pressure of 118 and a diastolic pressure of 78 will have 118/78 written in the medical record.  Blood pressure is categorized as normal, elevated, or stage 1 or stage 2 high blood pressure:  · Normal blood pressure is systolic of less than 120 and diastolic of less than 80 (120/80)  · Elevated blood pressure is systolic of 120 to 129 and diastolic less than 80  · Stage 1 high blood pressure is systolic is 130 to 139 or diastolic between 80 to 89  · Stage 2 high blood pressure is when systolic is 140 or higher or the diastolic is 90 or higher  Lifestyle changes such as weight loss, exercise, and quitting smoking, can help manage your blood pressure. Have your blood pressure checked regularly to be sure it is under control.  Home care  To track your blood pressure, your provider may ask you to come into the office at different times and on different days. If your healthcare provider asks you to check your readings at home, ask him or her what times of the day to test and for how many days. Before you leave the office, ask your provider to show you how to take your blood pressure and be sure to ask questions if you don't understand something.  Consider buying an automatic blood pressure monitor. Ask your provider for a  recommendation as well as the proper size cuff to fit your arm. You can buy blood pressure monitors at most pharmacies.  The American Heart Association recommends the following guidelines for home blood pressure monitoring:  · Don't smoke or drink coffee or other caffeinated drinks for 30 minutes before taking your blood pressure.  · Go to the bathroom before the test.  · Relax for 5 minutes before taking the measurement.  · Sit with your back supported (don't sit on a couch or soft chair); keep your feet on the floor uncrossed. Place your arm on a solid flat surface (like a table) with the upper part of the arm at heart level. Place the middle of the cuff directly above the bend of the elbow. Check the monitor's instruction manual for an illustration.  · Take multiple readings. When you measure, take 2 to 3 readings one minute apart and record all of the results.  · Take your blood pressure at the same time every day, or as your healthcare provider recommends.  · Record the date, time, and blood pressure reading.  · Take the record with you to your next medical appointment. If your blood pressure monitor has a built-in memory, simply take the monitor with you to your next appointment.  · Call your provider if you have several high readings. Don't be frightened by a single high blood pressure reading, but if you get several high readings, check in with your healthcare provider.  · Note: When blood pressure reaches a systolic (top number) of 180 or higher OR diastolic (bottom number) of 110 or higher, seek emergency medical treatment.  Follow-up care  Keep all of your follow up appointments. If your blood pressure is more than 120 over 80 on 2 out of 3 days, you will need to follow up with your healthcare provider for more evaluation and treatment.  Don’t put this off! High blood pressure can be treated. High blood pressure that’s not treated raises your risk for heart attack, heart failure, and stroke.  When to seek  medical advice  Call your healthcare provider right away if any of these occur:  · Blood pressure reaches a systolic (top number) of 180 or higher, OR diastolic (bottom number) of 110 or higher  · Chest pain or shortness of breath  · Severe headache  · Throbbing or rushing sound in the ears  · Nosebleed  · Sudden severe pain in your belly (abdomen)  · Extreme drowsiness, confusion, or fainting  · Dizziness or dizziness with spinning sensation (vertigo)  · Weakness of an arm or leg or one side of the face  · You have problems speaking or seeing   Date Last Reviewed: 12/1/2016  © 6449-0636 [employer]. [address] [place] [address], [place] [address]. All rights reserved. This information is not intended as a substitute for professional medical care. Always follow your healthcare professional's instructions.

## 2022-08-10 ENCOUNTER — LAB (OUTPATIENT)
Dept: LAB | Facility: HOSPITAL | Age: 62
End: 2022-08-10

## 2022-08-10 ENCOUNTER — CLINICAL SUPPORT (OUTPATIENT)
Dept: ONCOLOGY | Facility: HOSPITAL | Age: 62
End: 2022-08-10

## 2022-08-10 DIAGNOSIS — D50.9 IRON DEFICIENCY ANEMIA, UNSPECIFIED IRON DEFICIENCY ANEMIA TYPE: ICD-10-CM

## 2022-08-10 DIAGNOSIS — D50.8 OTHER IRON DEFICIENCY ANEMIA: ICD-10-CM

## 2022-08-10 LAB
BASOPHILS # BLD AUTO: 0.04 10*3/MM3 (ref 0–0.2)
BASOPHILS NFR BLD AUTO: 0.6 % (ref 0–1.5)
DEPRECATED RDW RBC AUTO: 83.8 FL (ref 37–54)
EOSINOPHIL # BLD AUTO: 0.18 10*3/MM3 (ref 0–0.4)
EOSINOPHIL NFR BLD AUTO: 2.6 % (ref 0.3–6.2)
ERYTHROCYTE [DISTWIDTH] IN BLOOD BY AUTOMATED COUNT: 26.3 % (ref 12.3–15.4)
FERRITIN SERPL-MCNC: 187.2 NG/ML (ref 13–150)
HCT VFR BLD AUTO: 37.1 % (ref 34–46.6)
HGB BLD-MCNC: 11.1 G/DL (ref 12–15.9)
IMM GRANULOCYTES # BLD AUTO: 0.03 10*3/MM3 (ref 0–0.05)
IMM GRANULOCYTES NFR BLD AUTO: 0.4 % (ref 0–0.5)
IRON 24H UR-MRATE: 55 MCG/DL (ref 37–145)
IRON SATN MFR SERPL: 16 % (ref 14–48)
LYMPHOCYTES # BLD AUTO: 1.8 10*3/MM3 (ref 0.7–3.1)
LYMPHOCYTES NFR BLD AUTO: 26.3 % (ref 19.6–45.3)
MCH RBC QN AUTO: 27.2 PG (ref 26.6–33)
MCHC RBC AUTO-ENTMCNC: 29.9 G/DL (ref 31.5–35.7)
MCV RBC AUTO: 90.9 FL (ref 79–97)
MONOCYTES # BLD AUTO: 0.44 10*3/MM3 (ref 0.1–0.9)
MONOCYTES NFR BLD AUTO: 6.4 % (ref 5–12)
NEUTROPHILS NFR BLD AUTO: 4.35 10*3/MM3 (ref 1.7–7)
NEUTROPHILS NFR BLD AUTO: 63.7 % (ref 42.7–76)
NRBC BLD AUTO-RTO: 0 /100 WBC (ref 0–0.2)
PLATELET # BLD AUTO: 217 10*3/MM3 (ref 140–450)
PMV BLD AUTO: 10.1 FL (ref 6–12)
RBC # BLD AUTO: 4.08 10*6/MM3 (ref 3.77–5.28)
TIBC SERPL-MCNC: 336 MCG/DL (ref 249–505)
TRANSFERRIN SERPL-MCNC: 240 MG/DL (ref 200–360)
WBC NRBC COR # BLD: 6.84 10*3/MM3 (ref 3.4–10.8)

## 2022-08-10 PROCEDURE — 85025 COMPLETE CBC W/AUTO DIFF WBC: CPT

## 2022-08-10 PROCEDURE — 82728 ASSAY OF FERRITIN: CPT

## 2022-08-10 PROCEDURE — G0463 HOSPITAL OUTPT CLINIC VISIT: HCPCS

## 2022-08-10 PROCEDURE — 36415 COLL VENOUS BLD VENIPUNCTURE: CPT

## 2022-08-10 PROCEDURE — 83540 ASSAY OF IRON: CPT

## 2022-08-10 PROCEDURE — 84466 ASSAY OF TRANSFERRIN: CPT

## 2022-08-10 NOTE — PROGRESS NOTES
Pt present for visit. Pt c/o feeling fatigue and states she hasn't got her energy back since having 2 iron infusions. Hgb improved. Iron studies pending. Pt does not tolerate oral iron. Educated pt that iron infusions can take 5-6 weeks before noticing a difference in how she feels. Pt also c/o her right thumb being swollen, hot, red, and painful. Denies recent injury. States she woke up with it like that a week ago. D/w Sherron BOWER. Per Sherron the pt should f/u with her PCP or go to an urgent care. Pt v/u and given copy of her schedule.     Lab Results   Component Value Date    WBC 6.84 08/10/2022    HGB 11.1 (L) 08/10/2022    HCT 37.1 08/10/2022    MCV 90.9 08/10/2022     08/10/2022     Called pt to review iron results. Pt is not iron deficient today.

## 2022-08-10 NOTE — TELEPHONE ENCOUNTER
Reached back out to patient.    Hub please inform patient if call back:    Edgardo says that your B12 was in the normal range so a shot is not needed at this time. She can take B12 over the counter (1000ui) 2-3 times per week to help keep her levels stable.

## 2022-08-15 ENCOUNTER — HOSPITAL ENCOUNTER (OUTPATIENT)
Dept: MRI IMAGING | Facility: HOSPITAL | Age: 62
Discharge: HOME | End: 2022-08-15

## 2022-08-15 PROCEDURE — 72148 MRI LUMBAR SPINE W/O DYE: CPT

## 2022-08-15 PROCEDURE — 72141 MRI NECK SPINE W/O DYE: CPT

## 2022-08-24 ENCOUNTER — TELEPHONE (OUTPATIENT)
Dept: FAMILY MEDICINE CLINIC | Facility: CLINIC | Age: 62
End: 2022-08-24

## 2022-08-31 ENCOUNTER — TELEPHONE (OUTPATIENT)
Dept: FAMILY MEDICINE CLINIC | Facility: CLINIC | Age: 62
End: 2022-08-31

## 2022-09-12 ENCOUNTER — TELEPHONE (OUTPATIENT)
Dept: GASTROENTEROLOGY | Facility: CLINIC | Age: 62
End: 2022-09-12

## 2022-09-12 NOTE — TELEPHONE ENCOUNTER
Caller: Tiera Abbott    Relationship: Self    Best call back number: [phone]    What is the best time to reach you: ANYTIME    Who are you requesting to speak with (clinical staff, provider,  specific staff member): CLINICAL STAFF    What was the call regarding: PATIENT WOULD LIKE FOR SOMEONE IN OFFICE TO GIVE HER A CALL AND ASSIST WITH SCHEDULING PROCEDURE.    Do you require a callback: YES

## 2022-09-25 NOTE — TELEPHONE ENCOUNTER
I can see her  
LM for pt to return our call  
NEW PATIENT: does not like her old gyn, did not have all questions answered, is post menopausal had some btb, did bx was advised they would keep an eye on and the day she had a fu appt with them her mother passed away and did not make that appt. Has not any prob with btb since then, has Medicare. Please advise  
Scheduled 12-3-18  
no...

## 2022-10-17 ENCOUNTER — OFFICE VISIT (OUTPATIENT)
Dept: ONCOLOGY | Facility: CLINIC | Age: 62
End: 2022-10-17

## 2022-10-17 ENCOUNTER — LAB (OUTPATIENT)
Dept: LAB | Facility: HOSPITAL | Age: 62
End: 2022-10-17

## 2022-10-17 VITALS
WEIGHT: 198.8 LBS | OXYGEN SATURATION: 97 % | TEMPERATURE: 97.5 F | HEART RATE: 77 BPM | HEIGHT: 70 IN | BODY MASS INDEX: 28.46 KG/M2 | DIASTOLIC BLOOD PRESSURE: 70 MMHG | RESPIRATION RATE: 14 BRPM | SYSTOLIC BLOOD PRESSURE: 115 MMHG

## 2022-10-17 DIAGNOSIS — D50.9 IRON DEFICIENCY ANEMIA, UNSPECIFIED IRON DEFICIENCY ANEMIA TYPE: ICD-10-CM

## 2022-10-17 DIAGNOSIS — T45.4X5A ADVERSE EFFECT OF IRON, INITIAL ENCOUNTER: ICD-10-CM

## 2022-10-17 DIAGNOSIS — D50.9 IRON DEFICIENCY ANEMIA, UNSPECIFIED IRON DEFICIENCY ANEMIA TYPE: Primary | ICD-10-CM

## 2022-10-17 LAB
BASOPHILS # BLD AUTO: 0.03 10*3/MM3 (ref 0–0.2)
BASOPHILS NFR BLD AUTO: 0.4 % (ref 0–1.5)
DEPRECATED RDW RBC AUTO: 58.7 FL (ref 37–54)
EOSINOPHIL # BLD AUTO: 0.21 10*3/MM3 (ref 0–0.4)
EOSINOPHIL NFR BLD AUTO: 3.1 % (ref 0.3–6.2)
ERYTHROCYTE [DISTWIDTH] IN BLOOD BY AUTOMATED COUNT: 17.8 % (ref 12.3–15.4)
FERRITIN SERPL-MCNC: 23.3 NG/ML (ref 13–150)
HCT VFR BLD AUTO: 39.7 % (ref 34–46.6)
HGB BLD-MCNC: 12.6 G/DL (ref 12–15.9)
IMM GRANULOCYTES # BLD AUTO: 0.02 10*3/MM3 (ref 0–0.05)
IMM GRANULOCYTES NFR BLD AUTO: 0.3 % (ref 0–0.5)
IRON 24H UR-MRATE: 47 MCG/DL (ref 37–145)
IRON SATN MFR SERPL: 12 % (ref 14–48)
LYMPHOCYTES # BLD AUTO: 1.73 10*3/MM3 (ref 0.7–3.1)
LYMPHOCYTES NFR BLD AUTO: 25.4 % (ref 19.6–45.3)
MCH RBC QN AUTO: 28.4 PG (ref 26.6–33)
MCHC RBC AUTO-ENTMCNC: 31.7 G/DL (ref 31.5–35.7)
MCV RBC AUTO: 89.6 FL (ref 79–97)
MONOCYTES # BLD AUTO: 0.55 10*3/MM3 (ref 0.1–0.9)
MONOCYTES NFR BLD AUTO: 8.1 % (ref 5–12)
NEUTROPHILS NFR BLD AUTO: 4.27 10*3/MM3 (ref 1.7–7)
NEUTROPHILS NFR BLD AUTO: 62.7 % (ref 42.7–76)
NRBC BLD AUTO-RTO: 0 /100 WBC (ref 0–0.2)
PLATELET # BLD AUTO: 227 10*3/MM3 (ref 140–450)
PMV BLD AUTO: 11 FL (ref 6–12)
RBC # BLD AUTO: 4.43 10*6/MM3 (ref 3.77–5.28)
TIBC SERPL-MCNC: 388 MCG/DL (ref 249–505)
TRANSFERRIN SERPL-MCNC: 277 MG/DL (ref 200–360)
WBC NRBC COR # BLD: 6.81 10*3/MM3 (ref 3.4–10.8)

## 2022-10-17 PROCEDURE — 99213 OFFICE O/P EST LOW 20 MIN: CPT | Performed by: INTERNAL MEDICINE

## 2022-10-17 PROCEDURE — 82728 ASSAY OF FERRITIN: CPT

## 2022-10-17 PROCEDURE — 36415 COLL VENOUS BLD VENIPUNCTURE: CPT

## 2022-10-17 PROCEDURE — 85025 COMPLETE CBC W/AUTO DIFF WBC: CPT

## 2022-10-17 PROCEDURE — 83540 ASSAY OF IRON: CPT

## 2022-10-17 PROCEDURE — 84466 ASSAY OF TRANSFERRIN: CPT

## 2022-10-17 RX ORDER — GABAPENTIN 300 MG/1
300 CAPSULE ORAL
COMMUNITY
End: 2023-03-03

## 2022-10-17 NOTE — PROGRESS NOTES
"  REASON FOR FOLLOW-UP: History of recurrent iron deficiency anemia requiring repeated IV iron therapy.      History of Present Illness    Mrs. Abbott is a pleasant 62-year-old woman who returned today for follow-up of recurrent iron deficiency anemia.  She was seen in July after a hiatus with significant anemia hemoglobin 8.3 and iron deficiency with ferritin 6.  She was treated with 2 doses of Injectafer.  She had improved fatigue and concentration after receiving IV iron.    Past Medical History, Past Surgical History, Social History, Family History have been reviewed and are without significant changes except as mentioned from my consult note on 8/25/17.    Review of Systems   Constitutional: Negative for activity change, fatigue and unexpected weight change.   HENT: Negative.    Respiratory: Negative for shortness of breath.    Cardiovascular: Negative.    Gastrointestinal: Negative for blood in stool, constipation and nausea.   Genitourinary: Negative.    Musculoskeletal: Negative.    Skin: Negative.    Neurological: Negative for dizziness.   Hematological: Negative.    Psychiatric/Behavioral: Negative.      Medications:  The current medication list was reviewed in the EMR    ALLERGIES:    No Known Allergies    Objective      Vitals:    10/17/22 1505   BP: 115/70   Pulse: 77   Resp: 14   Temp: 97.5 °F (36.4 °C)   TempSrc: Infrared   SpO2: 97%   Weight: 90.2 kg (198 lb 12.8 oz)   Height: 177.8 cm (70\")   PainSc:   8   PainLoc: Neck     Current Status 10/17/2022   ECOG score 0       Physical Exam    CON: pleasant well-developed adult woman  HEENT: no icterus, no thrush, moist membranes  CV: RRR, S1S2, no murmur  RESP: cta bilat, no wheezing, no rales  GI: soft, non-tender, no splenomegaly, +bs  MUSC: no edema, normal gait  NEURO: alert and oriented x3, normal strength  PSYCH: normal mood and affect    RECENT LABS:  Results from last 7 days   Lab Units 10/17/22  1459   WBC 10*3/mm3 6.81   NEUTROS ABS 10*3/mm3 4.27 "   HEMOGLOBIN g/dL 12.6   HEMATOCRIT % 39.7   PLATELETS 10*3/mm3 227                   Assessment & Plan    1.  Recurrent iron deficiency anemia suspected to be secondary to GI blood loss (established with GI).  She received intravenous iron with Injectafer x2 doses completed 7/29/2022.  Her hemoglobin has normalized today 12.6.  2.  Intolerance to oral iron secondary to constipation    Hematology plan:  1. Recheck ferritin/iron profile today to make sure adequate stores after hemoglobin has normalized  2. Follow-up 6 months CBC ferritin iron profile  3. Follow-up with GI as directed per them for recurrent iron deficiency anemia              10/17/2022      CC:

## 2022-10-18 ENCOUNTER — TELEPHONE (OUTPATIENT)
Dept: ONCOLOGY | Facility: CLINIC | Age: 62
End: 2022-10-18

## 2022-10-24 ENCOUNTER — INFUSION (OUTPATIENT)
Dept: ONCOLOGY | Facility: HOSPITAL | Age: 62
End: 2022-10-24

## 2022-10-24 VITALS
HEART RATE: 72 BPM | RESPIRATION RATE: 16 BRPM | BODY MASS INDEX: 28.87 KG/M2 | DIASTOLIC BLOOD PRESSURE: 74 MMHG | WEIGHT: 201.2 LBS | SYSTOLIC BLOOD PRESSURE: 124 MMHG | TEMPERATURE: 97.7 F | OXYGEN SATURATION: 95 %

## 2022-10-24 DIAGNOSIS — D50.0 IRON DEFICIENCY ANEMIA DUE TO CHRONIC BLOOD LOSS: ICD-10-CM

## 2022-10-24 DIAGNOSIS — T45.4X5A ADVERSE EFFECT OF IRON, INITIAL ENCOUNTER: Primary | ICD-10-CM

## 2022-10-24 DIAGNOSIS — D50.9 IRON DEFICIENCY ANEMIA, UNSPECIFIED IRON DEFICIENCY ANEMIA TYPE: ICD-10-CM

## 2022-10-24 PROCEDURE — 25010000002 FERRIC CARBOXYMALTOSE 750 MG/15ML SOLUTION: Performed by: INTERNAL MEDICINE

## 2022-10-24 PROCEDURE — 96374 THER/PROPH/DIAG INJ IV PUSH: CPT

## 2022-10-24 RX ORDER — SODIUM CHLORIDE 9 MG/ML
250 INJECTION, SOLUTION INTRAVENOUS ONCE
Status: COMPLETED | OUTPATIENT
Start: 2022-10-24 | End: 2022-10-24

## 2022-10-24 RX ADMIN — SODIUM CHLORIDE 250 ML: 9 INJECTION, SOLUTION INTRAVENOUS at 15:15

## 2022-10-24 RX ADMIN — FERRIC CARBOXYMALTOSE INJECTION 750 MG: 50 INJECTION, SOLUTION INTRAVENOUS at 15:16

## 2022-10-31 ENCOUNTER — INFUSION (OUTPATIENT)
Dept: ONCOLOGY | Facility: HOSPITAL | Age: 62
End: 2022-10-31

## 2022-10-31 VITALS
HEART RATE: 85 BPM | BODY MASS INDEX: 28.67 KG/M2 | DIASTOLIC BLOOD PRESSURE: 75 MMHG | RESPIRATION RATE: 16 BRPM | WEIGHT: 199.8 LBS | TEMPERATURE: 97.7 F | OXYGEN SATURATION: 96 % | SYSTOLIC BLOOD PRESSURE: 125 MMHG

## 2022-10-31 DIAGNOSIS — D50.0 IRON DEFICIENCY ANEMIA DUE TO CHRONIC BLOOD LOSS: ICD-10-CM

## 2022-10-31 DIAGNOSIS — T45.4X5A ADVERSE EFFECT OF IRON, INITIAL ENCOUNTER: Primary | ICD-10-CM

## 2022-10-31 DIAGNOSIS — D50.9 IRON DEFICIENCY ANEMIA, UNSPECIFIED IRON DEFICIENCY ANEMIA TYPE: ICD-10-CM

## 2022-10-31 PROCEDURE — 96374 THER/PROPH/DIAG INJ IV PUSH: CPT

## 2022-10-31 PROCEDURE — 63710000001 PROCHLORPERAZINE MALEATE PER 10 MG: Performed by: INTERNAL MEDICINE

## 2022-10-31 PROCEDURE — 25010000002 FERRIC CARBOXYMALTOSE 750 MG/15ML SOLUTION 15 ML VIAL: Performed by: INTERNAL MEDICINE

## 2022-10-31 RX ORDER — SODIUM CHLORIDE 9 MG/ML
250 INJECTION, SOLUTION INTRAVENOUS ONCE
Status: COMPLETED | OUTPATIENT
Start: 2022-10-31 | End: 2022-10-31

## 2022-10-31 RX ORDER — PROCHLORPERAZINE MALEATE 10 MG
10 TABLET ORAL ONCE
Status: COMPLETED | OUTPATIENT
Start: 2022-10-31 | End: 2022-10-31

## 2022-10-31 RX ADMIN — FERRIC CARBOXYMALTOSE INJECTION 750 MG: 50 INJECTION, SOLUTION INTRAVENOUS at 15:26

## 2022-10-31 RX ADMIN — SODIUM CHLORIDE 250 ML: 9 INJECTION, SOLUTION INTRAVENOUS at 15:25

## 2022-10-31 RX ADMIN — PROCHLORPERAZINE MALEATE 10 MG: 10 TABLET ORAL at 15:13

## 2022-11-21 ENCOUNTER — TELEPHONE (OUTPATIENT)
Dept: FAMILY MEDICINE CLINIC | Facility: CLINIC | Age: 62
End: 2022-11-21

## 2022-11-21 NOTE — TELEPHONE ENCOUNTER
Caller: Tiera Abbott    Relationship: Self    Best call back number: [phone]    What is the best time to reach you: ANY     Who are you requesting to speak with (clinical staff, provider,  specific staff member): JAMES EPLEY'S MA    Do you know the name of the person who called:     What was the call regarding: PAPERWORK FOR WORK RESTRICTIONS    Do you require a callback: YES

## 2022-11-28 NOTE — TELEPHONE ENCOUNTER
Caller: Tiera Abbott    Relationship: Self    Best call back number: [phone]    Who are you requesting to speak with (clinical staff, provider,  specific staff member): NURSE AND MANAGER    Do you know the name of the person who called: JULIETA     What was the call regarding: PAPERWORK FROM EMPLOYER ON DISABILITY STATUS    Do you require a callback: YES    PATIENT WOULD ALSO LIKE A CALL FROM MANAGER TO DISCUSS THE DELAY IN RESPONSE-ATTEMPTED TO WARM TRANSFER

## 2022-12-05 ENCOUNTER — TELEPHONE (OUTPATIENT)
Dept: FAMILY MEDICINE CLINIC | Facility: CLINIC | Age: 62
End: 2022-12-05

## 2022-12-05 NOTE — TELEPHONE ENCOUNTER
Hub staff attempted to follow warm transfer process and was unsuccessful     Caller: Tiera Abbott    Relationship to patient: Self    Best call back number: [phone]    Patient is needing: PATIENT STATES SHE IS RETURNING JAYA'S CALL.

## 2022-12-07 ENCOUNTER — HOSPITAL ENCOUNTER (OUTPATIENT)
Dept: GENERAL RADIOLOGY | Facility: HOSPITAL | Age: 62
Discharge: HOME | End: 2022-12-07
Admitting: NURSE PRACTITIONER

## 2022-12-07 ENCOUNTER — OFFICE VISIT (OUTPATIENT)
Dept: FAMILY MEDICINE CLINIC | Facility: CLINIC | Age: 62
End: 2022-12-07

## 2022-12-07 VITALS
BODY MASS INDEX: 29.09 KG/M2 | DIASTOLIC BLOOD PRESSURE: 77 MMHG | RESPIRATION RATE: 14 BRPM | SYSTOLIC BLOOD PRESSURE: 142 MMHG | OXYGEN SATURATION: 97 % | HEART RATE: 78 BPM | TEMPERATURE: 97.5 F | WEIGHT: 203.2 LBS | HEIGHT: 70 IN

## 2022-12-07 DIAGNOSIS — I25.10 CORONARY ARTERY DISEASE DUE TO LIPID RICH PLAQUE: ICD-10-CM

## 2022-12-07 DIAGNOSIS — R53.1 WEAKNESS: ICD-10-CM

## 2022-12-07 DIAGNOSIS — M50.30 DEGENERATIVE DISC DISEASE, CERVICAL: ICD-10-CM

## 2022-12-07 DIAGNOSIS — R05.9 COUGH, UNSPECIFIED TYPE: ICD-10-CM

## 2022-12-07 DIAGNOSIS — I25.83 CORONARY ARTERY DISEASE DUE TO LIPID RICH PLAQUE: ICD-10-CM

## 2022-12-07 DIAGNOSIS — D64.9 ANEMIA, UNSPECIFIED TYPE: Primary | ICD-10-CM

## 2022-12-07 PROCEDURE — 99214 OFFICE O/P EST MOD 30 MIN: CPT | Performed by: NURSE PRACTITIONER

## 2022-12-07 PROCEDURE — 71046 X-RAY EXAM CHEST 2 VIEWS: CPT

## 2022-12-07 NOTE — PATIENT INSTRUCTIONS
Discharge instructions strongly encourage you to stop smoking,  Continue plenty of fluids healthy diet exercise recheck blood pressure should average less than 130/80,    Follow-up in January or February for fasting labs as well as a routine checkup to make sure you are on top of your primary care needs keep your appointment with hematology as well as follow-up with gastro.  Any increased chest pain fever weakness dizziness syncope emergency room.  Stay on topic COVID vaccines  Antiviral for COVID if positive at any time.    Due to your chronic disability,  Work restrictions accommodations recommend no more than 20 hours/week and 77 hours/month to prevent overexertion fatigue and poor quality life.

## 2022-12-07 NOTE — PROGRESS NOTES
"Chief Complaint  FMLA    Subjective        Tiera Abbott presents to [facility]  History of Present Illness  Pleasant patient here today for follow-up patient's had severe recurrent anemia likely due to an absorption issue she is required transfusions blood,  Several as well as recurrent iron infusions, she has had EGD and colonoscopy no lesion was found or highly suspected nonetheless I did discuss about possibly doing a camera,.  But at this time it is thought that this is likely an absorption problem.  She continues to see hematology and this has greatly affected her quality of life, she is presently taking iron supplementation    She has chronic degenerative disc disease cervical as well as radiculopathy in the past,  And she may be getting an ablation with pain management upcoming due to chronic pain and decreasing quality life.  She has bilateral osteoarthritis as well which is called her gait instability in the past when she has a flare.  She has history of CAD, and previous heart attack, she has quite a bit of stress, with the health issues she has had and she does not have the endurance stamina especially since she frequently is up and down with her blood, and she is not able to work long hours and she is requesting a work restriction note for her chronic disability.    No drug abuse alcohol abuse presently smoking trying to quit.  Dry cough after bronchitis 6 weeks no chest pain short but fever presently no history of malignancy            Objective   Vital Signs:  /77   Pulse 78   Temp 97.5 °F (36.4 °C) (Infrared)   Resp 14   Ht 177.8 cm (70\")   Wt 92.2 kg (203 lb 3.2 oz)   SpO2 97%   BMI 29.16 kg/m²   Estimated body mass index is 29.16 kg/m² as calculated from the following:    Height as of this encounter: 177.8 cm (70\").    Weight as of this encounter: 92.2 kg (203 lb 3.2 oz).    BMI is >= 25 and <30. (Overweight) The following options were offered after " discussion;: exercise counseling/recommendations and nutrition counseling/recommendations      Physical Exam  Constitutional:       Appearance: Normal appearance. She is not ill-appearing or diaphoretic.      Comments: Pleasant appears well   Cardiovascular:      Rate and Rhythm: Normal rate and regular rhythm.   Pulmonary:      Effort: Pulmonary effort is normal.      Breath sounds: Normal breath sounds. No stridor.   Skin:     General: Skin is warm and dry.   Neurological:      General: No focal deficit present.      Mental Status: She is alert and oriented to person, place, and time.   Psychiatric:         Mood and Affect: Mood normal.         Behavior: Behavior normal.         Thought Content: Thought content normal.         Judgment: Judgment normal.        Result Review :                Assessment and Plan   Diagnoses and all orders for this visit:    1. Anemia, unspecified type (Primary)    2. Coronary artery disease due to lipid rich plaque    3. Degenerative disc disease, cervical    4. Cough, unspecified type  -     XR Chest PA & Lateral           I spent 30  minutes caring for Tiera on this date of service. This time includes time spent by me in the following activities:preparing for the visit, reviewing tests, obtaining and/or reviewing a separately obtained history, performing a medically appropriate examination and/or evaluation , counseling and educating the patient/family/caregiver, ordering medications, tests, or procedures, documenting information in the medical record and care coordination  Follow Up   Return in about 3 months (around 3/7/2023), or lab and n  f/u echo.  Patient was given instructions and counseling regarding her condition or for health maintenance advice. Please see specific information pulled into the AVS if appropriate.     Patient Instructions   Discharge instructions strongly encourage you to stop smoking,  Continue plenty of fluids healthy diet exercise recheck blood pressure  should average less than 130/80,    Follow-up in January or February for fasting labs as well as a routine checkup to make sure you are on top of your primary care needs keep your appointment with hematology as well as follow-up with gastro.  Any increased chest pain fever weakness dizziness syncope emergency room.  Stay on topic COVID vaccines  Antiviral for COVID if positive at any time.    Due to your chronic disability,  Work restrictions accommodations recommend no more than 20 hours/week and 77 hours/month to prevent overexertion fatigue and poor quality life.           Discharge instructions strongly encourage you to stop smoking,  Continue plenty of fluids healthy diet exercise recheck blood pressure should average less than 130/80,    Follow-up in January or February for fasting labs as well as a routine checkup to make sure you are on top of your primary care needs keep your appointment with hematology as well as follow-up with gastro.  Any increased chest pain fever weakness dizziness syncope emergency room.  Stay on topic COVID vaccines  Antiviral for COVID if positive at any time.    Due to your chronic disability,  Work restrictions accommodations recommend no more than 20 hours/week and 77 hours/month to prevent overexertion fatigue and poor quality life.

## 2022-12-27 RX ORDER — VENLAFAXINE HYDROCHLORIDE 37.5 MG/1
CAPSULE, EXTENDED RELEASE ORAL
Qty: 90 CAPSULE | Refills: 1 | Status: SHIPPED | OUTPATIENT
Start: 2022-12-27 | End: 2023-03-03

## 2022-12-27 NOTE — TELEPHONE ENCOUNTER
Rx Refill Note  Requested Prescriptions     Pending Prescriptions Disp Refills   • venlafaxine XR (EFFEXOR-XR) 37.5 MG 24 hr capsule [Pharmacy Med Name: VENLAFAXINE HCL ER 37.5 MG CAP] 90 capsule 1     Sig: TAKE ONE CAPSULE BY MOUTH DAILY      Last office visit with prescribing clinician: 12/7/2022   Last telemedicine visit with prescribing clinician: Visit date not found   Next office visit with prescribing clinician: Visit date not found                         Would you like a call back once the refill request has been completed: [] Yes [] No    If the office needs to give you a call back, can they leave a voicemail: [] Yes [] No    Dima Lui Rep  12/27/22, 12:03 EST

## 2023-01-25 ENCOUNTER — TELEPHONE (OUTPATIENT)
Dept: ONCOLOGY | Facility: CLINIC | Age: 63
End: 2023-01-25

## 2023-01-25 DIAGNOSIS — D50.0 IRON DEFICIENCY ANEMIA DUE TO CHRONIC BLOOD LOSS: Primary | ICD-10-CM

## 2023-01-25 NOTE — TELEPHONE ENCOUNTER
Caller: Tiera Abbott    Relationship to patient: Self    Best call back number: [phone]    Chief complaint: PT IS FEELING VERY FATIGUED OUT OF BREATHE, FEELS LIKE SHE NEEDS TO SEE DR. BRAVO AND GET AN INFUSION    Type of visit: F/U 1 & INFUSION     Requested date: NEEDS TO BE SEEN, CALL TO [facility]     If rescheduling, when is the original appointment: N/A

## 2023-01-30 ENCOUNTER — LAB (OUTPATIENT)
Dept: LAB | Facility: HOSPITAL | Age: 63
End: 2023-01-30
Payer: MEDICARE

## 2023-01-30 ENCOUNTER — HOSPITAL ENCOUNTER (OUTPATIENT)
Dept: MAMMOGRAPHY | Facility: HOSPITAL | Age: 63
Discharge: HOME | End: 2023-01-30
Payer: MEDICARE

## 2023-01-30 ENCOUNTER — HOSPITAL ENCOUNTER (OUTPATIENT)
Dept: BONE DENSITY | Facility: HOSPITAL | Age: 63
Discharge: HOME | End: 2023-01-30
Payer: MEDICARE

## 2023-01-30 ENCOUNTER — OFFICE VISIT (OUTPATIENT)
Dept: ONCOLOGY | Facility: CLINIC | Age: 63
End: 2023-01-30
Payer: MEDICARE

## 2023-01-30 ENCOUNTER — HOSPITAL ENCOUNTER (OUTPATIENT)
Dept: MAMMOGRAPHY | Facility: HOSPITAL | Age: 63
End: 2023-01-30
Payer: MEDICARE

## 2023-01-30 VITALS
OXYGEN SATURATION: 98 % | RESPIRATION RATE: 16 BRPM | HEART RATE: 69 BPM | WEIGHT: 198.2 LBS | BODY MASS INDEX: 28.37 KG/M2 | DIASTOLIC BLOOD PRESSURE: 70 MMHG | SYSTOLIC BLOOD PRESSURE: 109 MMHG | TEMPERATURE: 96.4 F | HEIGHT: 70 IN

## 2023-01-30 DIAGNOSIS — D50.0 IRON DEFICIENCY ANEMIA DUE TO CHRONIC BLOOD LOSS: Primary | ICD-10-CM

## 2023-01-30 DIAGNOSIS — T45.4X5D ADVERSE EFFECT OF IRON, SUBSEQUENT ENCOUNTER: ICD-10-CM

## 2023-01-30 DIAGNOSIS — D50.0 IRON DEFICIENCY ANEMIA DUE TO CHRONIC BLOOD LOSS: ICD-10-CM

## 2023-01-30 LAB
BASOPHILS # BLD AUTO: 0.05 10*3/MM3 (ref 0–0.2)
BASOPHILS NFR BLD AUTO: 0.6 % (ref 0–1.5)
DEPRECATED RDW RBC AUTO: 49 FL (ref 37–54)
EOSINOPHIL # BLD AUTO: 0.25 10*3/MM3 (ref 0–0.4)
EOSINOPHIL NFR BLD AUTO: 3 % (ref 0.3–6.2)
ERYTHROCYTE [DISTWIDTH] IN BLOOD BY AUTOMATED COUNT: 14.6 % (ref 12.3–15.4)
FERRITIN SERPL-MCNC: 34.6 NG/ML (ref 13–150)
HCT VFR BLD AUTO: 37.5 % (ref 34–46.6)
HGB BLD-MCNC: 11.9 G/DL (ref 12–15.9)
IMM GRANULOCYTES # BLD AUTO: 0.04 10*3/MM3 (ref 0–0.05)
IMM GRANULOCYTES NFR BLD AUTO: 0.5 % (ref 0–0.5)
IRON 24H UR-MRATE: 57 MCG/DL (ref 37–145)
IRON SATN MFR SERPL: 16 % (ref 14–48)
LYMPHOCYTES # BLD AUTO: 2.15 10*3/MM3 (ref 0.7–3.1)
LYMPHOCYTES NFR BLD AUTO: 26 % (ref 19.6–45.3)
MCH RBC QN AUTO: 29.5 PG (ref 26.6–33)
MCHC RBC AUTO-ENTMCNC: 31.7 G/DL (ref 31.5–35.7)
MCV RBC AUTO: 92.8 FL (ref 79–97)
MONOCYTES # BLD AUTO: 0.54 10*3/MM3 (ref 0.1–0.9)
MONOCYTES NFR BLD AUTO: 6.5 % (ref 5–12)
NEUTROPHILS NFR BLD AUTO: 5.25 10*3/MM3 (ref 1.7–7)
NEUTROPHILS NFR BLD AUTO: 63.4 % (ref 42.7–76)
NRBC BLD AUTO-RTO: 0 /100 WBC (ref 0–0.2)
PLATELET # BLD AUTO: 258 10*3/MM3 (ref 140–450)
PMV BLD AUTO: 11 FL (ref 6–12)
RBC # BLD AUTO: 4.04 10*6/MM3 (ref 3.77–5.28)
TIBC SERPL-MCNC: 358 MCG/DL (ref 249–505)
TRANSFERRIN SERPL-MCNC: 256 MG/DL (ref 200–360)
WBC NRBC COR # BLD: 8.28 10*3/MM3 (ref 3.4–10.8)

## 2023-01-30 PROCEDURE — 99213 OFFICE O/P EST LOW 20 MIN: CPT | Performed by: NURSE PRACTITIONER

## 2023-01-30 PROCEDURE — 82728 ASSAY OF FERRITIN: CPT

## 2023-01-30 PROCEDURE — 77063 BREAST TOMOSYNTHESIS BI: CPT

## 2023-01-30 PROCEDURE — 77067 SCR MAMMO BI INCL CAD: CPT

## 2023-01-30 PROCEDURE — 85025 COMPLETE CBC W/AUTO DIFF WBC: CPT

## 2023-01-30 PROCEDURE — 36415 COLL VENOUS BLD VENIPUNCTURE: CPT

## 2023-01-30 PROCEDURE — 77080 DXA BONE DENSITY AXIAL: CPT

## 2023-01-30 PROCEDURE — 83540 ASSAY OF IRON: CPT

## 2023-01-30 PROCEDURE — 84466 ASSAY OF TRANSFERRIN: CPT

## 2023-01-30 NOTE — PROGRESS NOTES
"  REASON FOR FOLLOW-UP: History of recurrent iron deficiency anemia requiring repeated IV iron therapy.      History of Present Illness    Patient is a 62-year-old female with the above-mentioned history who is here today with complaints of feeling weak, fatigued, and off balance.  She feels like she notices the symptoms more at work.  She works at [employer].  She particularly feels this way if she has to walk from the front to the back of the store.  She denies any issues with bleeding, melena, or hematochezia.  Has difficulty tolerating oral iron due to constipation.  She has required IV iron about every 3 months, and her last dose was given in October 2022.    Past Medical History, Past Surgical History, Social History, Family History have been reviewed and are without significant changes except as mentioned from my consult note on 8/25/17.    Review of Systems   ROS as per HPI    Medications:  The current medication list was reviewed in the EMR    ALLERGIES:    No Known Allergies    Objective      Vitals:    01/30/23 1434   BP: 109/70   Pulse: 69   Resp: 16   Temp: 96.4 °F (35.8 °C)   TempSrc: Temporal   SpO2: 98%   Weight: 89.9 kg (198 lb 3.2 oz)   Height: 177.8 cm (70\")   PainSc: 0-No pain     Current Status 1/30/2023   ECOG score 0       Physical Exam   Constitutional: She is oriented to person, place, and time. She appears well-developed. No distress.   HENT:   Head: Normocephalic and atraumatic.   Eyes: Pupils are equal, round, and reactive to light.   Cardiovascular: Normal rate, regular rhythm and normal heart sounds.   No murmur heard.  Pulmonary/Chest: Effort normal and breath sounds normal. No respiratory distress. She has no wheezes. She has no rhonchi. She has no rales.   Abdominal: Soft. Normal appearance and bowel sounds are normal. She exhibits no distension.   Musculoskeletal: Normal range of motion.   Neurological: She is alert and oriented to person, place, and time.   Skin: Skin is warm and " dry. No rash noted.   Vitals reviewed.        RECENT LABS:  Results from last 7 days   Lab Units 01/30/23  1423   WBC 10*3/mm3 8.28   NEUTROS ABS 10*3/mm3 5.25   HEMOGLOBIN g/dL 11.9*   HEMATOCRIT % 37.5   PLATELETS 10*3/mm3 258     Results from last 7 days   Lab Units 01/30/23  1423   FERRITIN ng/mL 34.60   IRON mcg/dL 57   TIBC mcg/dL 358               Assessment & Plan    1.  Recurrent iron deficiency anemia suspected to be secondary to GI blood loss (established with GI).  She received intravenous iron with Injectafer x2 doses completed 7/29/2022.  Her hemoglobin has normalized 10/17/2022 at 12.6.  · Seen 1/31/2023 complaining of worsening weakness, fatigue hemoglobin 11.9, ferritin 34.6, iron saturation 16.  2.  Intolerance to oral iron secondary to constipation    Hematology plan:  1. Schedule patient for IV Injectafer x2 doses.  2. Return follow-up with Dr. Harris in 3 months as scheduled with repeat CBC, ferritin, iron profile.    3. Call/ return sooner should he develop any new concerns or problems.              Sherron Quezada, APRN    1/31/2023      CC:

## 2023-02-06 ENCOUNTER — INFUSION (OUTPATIENT)
Dept: ONCOLOGY | Facility: HOSPITAL | Age: 63
End: 2023-02-06
Payer: MEDICARE

## 2023-02-06 VITALS
SYSTOLIC BLOOD PRESSURE: 122 MMHG | WEIGHT: 202.8 LBS | DIASTOLIC BLOOD PRESSURE: 67 MMHG | TEMPERATURE: 97.1 F | HEART RATE: 89 BPM | BODY MASS INDEX: 29.1 KG/M2 | RESPIRATION RATE: 16 BRPM | OXYGEN SATURATION: 99 %

## 2023-02-06 DIAGNOSIS — D50.9 IRON DEFICIENCY ANEMIA, UNSPECIFIED IRON DEFICIENCY ANEMIA TYPE: ICD-10-CM

## 2023-02-06 DIAGNOSIS — T45.4X5A ADVERSE EFFECT OF IRON, INITIAL ENCOUNTER: Primary | ICD-10-CM

## 2023-02-06 DIAGNOSIS — D50.0 IRON DEFICIENCY ANEMIA DUE TO CHRONIC BLOOD LOSS: ICD-10-CM

## 2023-02-06 PROCEDURE — A9270 NON-COVERED ITEM OR SERVICE: HCPCS | Performed by: INTERNAL MEDICINE

## 2023-02-06 PROCEDURE — 63710000001 PROCHLORPERAZINE MALEATE PER 10 MG: Performed by: INTERNAL MEDICINE

## 2023-02-06 PROCEDURE — 25010000002 FERRIC CARBOXYMALTOSE 750 MG/15ML SOLUTION: Performed by: INTERNAL MEDICINE

## 2023-02-06 PROCEDURE — 96374 THER/PROPH/DIAG INJ IV PUSH: CPT

## 2023-02-06 RX ORDER — PROCHLORPERAZINE MALEATE 10 MG
10 TABLET ORAL ONCE
Status: COMPLETED | OUTPATIENT
Start: 2023-02-06 | End: 2023-02-06

## 2023-02-06 RX ORDER — SODIUM CHLORIDE 9 MG/ML
250 INJECTION, SOLUTION INTRAVENOUS ONCE
Status: COMPLETED | OUTPATIENT
Start: 2023-02-06 | End: 2023-02-06

## 2023-02-06 RX ADMIN — FERRIC CARBOXYMALTOSE INJECTION 750 MG: 50 INJECTION, SOLUTION INTRAVENOUS at 15:21

## 2023-02-06 RX ADMIN — SODIUM CHLORIDE 250 ML: 9 INJECTION, SOLUTION INTRAVENOUS at 15:21

## 2023-02-06 RX ADMIN — PROCHLORPERAZINE MALEATE 10 MG: 10 TABLET ORAL at 15:17

## 2023-02-17 ENCOUNTER — TELEPHONE (OUTPATIENT)
Dept: ONCOLOGY | Facility: CLINIC | Age: 63
End: 2023-02-17
Payer: MEDICARE

## 2023-02-17 NOTE — TELEPHONE ENCOUNTER
Caller: Tiera Abbott    Relationship to patient: Self    Best call back number: [phone]    Chief complaint: PT CALLED TO RESCHEDULE    Type of visit: INFUSION     Requested date: NEXT WEEK ONLY Thursday,        If rescheduling, when is the original appointment: 2-13-23     Additional notes:PLEASE CALL PATIENT TO ADVISE

## 2023-02-23 ENCOUNTER — INFUSION (OUTPATIENT)
Dept: ONCOLOGY | Facility: HOSPITAL | Age: 63
End: 2023-02-23
Payer: MEDICARE

## 2023-02-23 VITALS
SYSTOLIC BLOOD PRESSURE: 108 MMHG | HEART RATE: 92 BPM | RESPIRATION RATE: 16 BRPM | BODY MASS INDEX: 28.61 KG/M2 | OXYGEN SATURATION: 96 % | DIASTOLIC BLOOD PRESSURE: 64 MMHG | TEMPERATURE: 97.5 F | WEIGHT: 199.4 LBS

## 2023-02-23 DIAGNOSIS — D50.0 IRON DEFICIENCY ANEMIA DUE TO CHRONIC BLOOD LOSS: ICD-10-CM

## 2023-02-23 DIAGNOSIS — D50.9 IRON DEFICIENCY ANEMIA, UNSPECIFIED IRON DEFICIENCY ANEMIA TYPE: ICD-10-CM

## 2023-02-23 DIAGNOSIS — T45.4X5A ADVERSE EFFECT OF IRON, INITIAL ENCOUNTER: Primary | ICD-10-CM

## 2023-02-23 PROCEDURE — 25010000002 FERRIC CARBOXYMALTOSE 750 MG/15ML SOLUTION 15 ML VIAL: Performed by: INTERNAL MEDICINE

## 2023-02-23 PROCEDURE — 63710000001 PROCHLORPERAZINE MALEATE PER 10 MG: Performed by: INTERNAL MEDICINE

## 2023-02-23 PROCEDURE — A9270 NON-COVERED ITEM OR SERVICE: HCPCS | Performed by: INTERNAL MEDICINE

## 2023-02-23 PROCEDURE — 96374 THER/PROPH/DIAG INJ IV PUSH: CPT

## 2023-02-23 RX ORDER — SODIUM CHLORIDE 9 MG/ML
250 INJECTION, SOLUTION INTRAVENOUS ONCE
Status: COMPLETED | OUTPATIENT
Start: 2023-02-23 | End: 2023-02-23

## 2023-02-23 RX ORDER — PROCHLORPERAZINE MALEATE 10 MG
10 TABLET ORAL ONCE
Status: COMPLETED | OUTPATIENT
Start: 2023-02-23 | End: 2023-02-23

## 2023-02-23 RX ADMIN — PROCHLORPERAZINE MALEATE 10 MG: 10 TABLET ORAL at 15:22

## 2023-02-23 RX ADMIN — SODIUM CHLORIDE 250 ML: 9 INJECTION, SOLUTION INTRAVENOUS at 15:30

## 2023-02-23 RX ADMIN — FERRIC CARBOXYMALTOSE INJECTION 750 MG: 50 INJECTION, SOLUTION INTRAVENOUS at 15:36

## 2023-03-03 ENCOUNTER — TELEMEDICINE (OUTPATIENT)
Dept: FAMILY MEDICINE CLINIC | Facility: CLINIC | Age: 63
End: 2023-03-03
Payer: MEDICARE

## 2023-03-03 DIAGNOSIS — F32.1 MAJOR DEPRESSIVE DISORDER, SINGLE EPISODE, MODERATE: Primary | ICD-10-CM

## 2023-03-03 PROCEDURE — 99212 OFFICE O/P EST SF 10 MIN: CPT | Performed by: NURSE PRACTITIONER

## 2023-03-03 RX ORDER — VENLAFAXINE HYDROCHLORIDE 37.5 MG/1
112.5 CAPSULE, EXTENDED RELEASE ORAL DAILY
Qty: 90 CAPSULE | Refills: 2 | Status: SHIPPED | OUTPATIENT
Start: 2023-03-03

## 2023-03-03 RX ORDER — LIDOCAINE 50 MG/G
PATCH TOPICAL
COMMUNITY

## 2023-03-03 RX ORDER — EZETIMIBE 10 MG/1
TABLET ORAL EVERY 24 HOURS
COMMUNITY

## 2023-03-03 NOTE — PROGRESS NOTES
"Chief Complaint  Depression (Life changes for over the past 6 months. Pt would like to change her medication )    Subjective        Tiera Abbott presents to [facility]  History of Present Illness  Pleasant patient known well to me, struggling with some depression situational increased depression, she has a multitude of chronic medical problems has a difficult time trying to work more than a few hours a week, is on disability unfortunately she lost her disability, its been distressing for her she lost her insurance, cannot work enough to have an apartment.  He can barely afford her medications or even office visit.  No suicidal ideation, no history of severe mental illness or hospitalizations.  Taking a low-dose Effexor she would like to increase this she is working [employer] 2 been sober since age 22 but continues to be involved in AA as well as counsels others    No drug or alcohol abuse  Depression      Objective   Vital Signs:  There were no vitals taken for this visit.  Estimated body mass index is 28.61 kg/m² as calculated from the following:    Height as of 1/30/23: 177.8 cm (70\").    Weight as of 2/23/23: 90.4 kg (199 lb 6.4 oz).             Physical Exam  Constitutional:       General: She is not in acute distress.     Appearance: Normal appearance. She is not toxic-appearing or diaphoretic.   Eyes:      Conjunctiva/sclera: Conjunctivae normal.      Pupils: Pupils are equal, round, and reactive to light.   Pulmonary:      Effort: Pulmonary effort is normal. No respiratory distress.      Breath sounds: No stridor.   Skin:     General: Skin is warm and dry.   Neurological:      General: No focal deficit present.      Mental Status: She is alert.   Psychiatric:         Mood and Affect: Mood normal.         Thought Content: Thought content normal.         Judgment: Judgment normal.        Result Review :                   Assessment and Plan   Diagnoses and all orders for this " visit:    1. Major depressive disorder, single episode, moderate (HCC) (Primary)    Other orders  -     venlafaxine XR (Effexor XR) 37.5 MG 24 hr capsule; Take 3 capsules by mouth Daily.  Dispense: 90 capsule; Refill: 2      Major major depression with situational exacerbation,  Counseled patient encourage cognitive behavior therapy specific steps to instructed increased sunshine walking pleasant mood music aromas light therapy  Increase venlafaxine to 112.5 mg  Follow-up 1 month severe depression agitation suicidal ideation emergency room    Doximity,  10-minute the patient permission patient's at home I am in the office  10 minutes greater 50% counseling  Follow-up 1 month in the office or by telehealth  Update me by [employer] communication any difficulties you may have         Follow Up   No follow-ups on file.  Patient was given instructions and counseling regarding her condition or for health maintenance advice. Please see specific information pulled into the AVS if appropriate.

## 2023-04-13 ENCOUNTER — TELEPHONE (OUTPATIENT)
Dept: ONCOLOGY | Facility: CLINIC | Age: 63
End: 2023-04-13
Payer: MEDICARE

## 2023-04-18 NOTE — PROGRESS NOTES
"  REASON FOR FOLLOW-UP: History of recurrent iron deficiency anemia requiring repeated IV iron therapy.      History of Present Illness    Mrs. Abbott is a pleasant 62-year-old woman who returned today for follow-up of recurrent iron deficiency anemia.  She required intravenous iron most recently February 2023.  She currently has fatigue no lightheadedness dizziness shortness of breath above baseline.  She denies bright red blood per rectum or melanotic stool.    Past Medical History, Past Surgical History, Social History, Family History have been reviewed and are without significant changes except as mentioned from my consult note on 8/25/17.    Review of Systems   Constitutional: Positive for fatigue. Negative for activity change and unexpected weight change.   HENT: Negative.    Respiratory: Negative for shortness of breath.    Cardiovascular: Negative.    Gastrointestinal: Negative for blood in stool, constipation and nausea.   Genitourinary: Negative.    Musculoskeletal: Negative.    Skin: Negative.    Neurological: Negative for dizziness.   Hematological: Negative.    Psychiatric/Behavioral: Negative.      Medications:  The current medication list was reviewed in the EMR    ALLERGIES:    No Known Allergies    Objective      Vitals:    04/24/23 1335   BP: 107/70   Pulse: 74   Resp: 16   Temp: 96.9 °F (36.1 °C)   TempSrc: Infrared   SpO2: 96%   Weight: 89.6 kg (197 lb 8 oz)   Height: 177.8 cm (70\")   PainSc:   6   PainLoc: Neck         4/24/2023     1:36 PM   Current Status   ECOG score 0       Physical Exam    CON: pleasant well-developed adult woman  HEENT: no icterus, no thrush, moist membranes  CV: RRR, S1S2, no murmur  RESP: cta bilat, no wheezing, no rales  GI: soft, non-tender, no splenomegaly, +bs  MUSC: no edema, normal gait  NEURO: alert and oriented x3, normal strength  PSYCH: normal mood and affect  Exam unchanged-4/24/2023  RECENT LABS:  Results from last 7 days   Lab Units 04/24/23  1327   WBC " 10*3/mm3 6.95   NEUTROS ABS 10*3/mm3 4.41   HEMOGLOBIN g/dL 12.8   HEMATOCRIT % 41.4   PLATELETS 10*3/mm3 264                   Assessment & Plan    1.  Recurrent iron deficiency anemia suspected to be secondary to GI blood loss (established with GI).  Hemoglobin is normal today 12.8 after receiving IV iron February of this year  2.  Intolerance to oral iron secondary to constipation    Hematology plan:  1. Recheck ferritin/iron profile today to make sure adequate stores after hemoglobin has normalized  2. Follow-up 4months CBC ferritin iron profile  3. Follow-up with GI as directed per them for recurrent iron deficiency anemia              4/24/2023      CC:

## 2023-04-24 ENCOUNTER — TELEPHONE (OUTPATIENT)
Dept: ONCOLOGY | Facility: CLINIC | Age: 63
End: 2023-04-24
Payer: MEDICARE

## 2023-04-24 ENCOUNTER — LAB (OUTPATIENT)
Dept: LAB | Facility: HOSPITAL | Age: 63
End: 2023-04-24
Payer: MEDICARE

## 2023-04-24 ENCOUNTER — OFFICE VISIT (OUTPATIENT)
Dept: ONCOLOGY | Facility: CLINIC | Age: 63
End: 2023-04-24
Payer: MEDICARE

## 2023-04-24 VITALS
DIASTOLIC BLOOD PRESSURE: 70 MMHG | OXYGEN SATURATION: 96 % | RESPIRATION RATE: 16 BRPM | SYSTOLIC BLOOD PRESSURE: 107 MMHG | HEIGHT: 70 IN | TEMPERATURE: 96.9 F | HEART RATE: 74 BPM | BODY MASS INDEX: 28.27 KG/M2 | WEIGHT: 197.5 LBS

## 2023-04-24 DIAGNOSIS — T45.4X5A ADVERSE EFFECT OF IRON, INITIAL ENCOUNTER: ICD-10-CM

## 2023-04-24 DIAGNOSIS — D50.8 OTHER IRON DEFICIENCY ANEMIA: ICD-10-CM

## 2023-04-24 DIAGNOSIS — T45.4X5D ADVERSE EFFECT OF IRON, SUBSEQUENT ENCOUNTER: Primary | ICD-10-CM

## 2023-04-24 DIAGNOSIS — D50.9 IRON DEFICIENCY ANEMIA, UNSPECIFIED IRON DEFICIENCY ANEMIA TYPE: ICD-10-CM

## 2023-04-24 LAB
BASOPHILS # BLD AUTO: 0.02 10*3/MM3 (ref 0–0.2)
BASOPHILS NFR BLD AUTO: 0.3 % (ref 0–1.5)
DEPRECATED RDW RBC AUTO: 50.9 FL (ref 37–54)
EOSINOPHIL # BLD AUTO: 0.22 10*3/MM3 (ref 0–0.4)
EOSINOPHIL NFR BLD AUTO: 3.2 % (ref 0.3–6.2)
ERYTHROCYTE [DISTWIDTH] IN BLOOD BY AUTOMATED COUNT: 14.7 % (ref 12.3–15.4)
FERRITIN SERPL-MCNC: 73.8 NG/ML (ref 13–150)
HCT VFR BLD AUTO: 41.4 % (ref 34–46.6)
HGB BLD-MCNC: 12.8 G/DL (ref 12–15.9)
IMM GRANULOCYTES # BLD AUTO: 0.02 10*3/MM3 (ref 0–0.05)
IMM GRANULOCYTES NFR BLD AUTO: 0.3 % (ref 0–0.5)
IRON 24H UR-MRATE: 50 MCG/DL (ref 37–145)
IRON SATN MFR SERPL: 14 % (ref 14–48)
LYMPHOCYTES # BLD AUTO: 1.82 10*3/MM3 (ref 0.7–3.1)
LYMPHOCYTES NFR BLD AUTO: 26.2 % (ref 19.6–45.3)
MCH RBC QN AUTO: 29.2 PG (ref 26.6–33)
MCHC RBC AUTO-ENTMCNC: 30.9 G/DL (ref 31.5–35.7)
MCV RBC AUTO: 94.3 FL (ref 79–97)
MONOCYTES # BLD AUTO: 0.46 10*3/MM3 (ref 0.1–0.9)
MONOCYTES NFR BLD AUTO: 6.6 % (ref 5–12)
NEUTROPHILS NFR BLD AUTO: 4.41 10*3/MM3 (ref 1.7–7)
NEUTROPHILS NFR BLD AUTO: 63.4 % (ref 42.7–76)
NRBC BLD AUTO-RTO: 0 /100 WBC (ref 0–0.2)
PLATELET # BLD AUTO: 264 10*3/MM3 (ref 140–450)
PMV BLD AUTO: 10.8 FL (ref 6–12)
RBC # BLD AUTO: 4.39 10*6/MM3 (ref 3.77–5.28)
TIBC SERPL-MCNC: 353 MCG/DL (ref 249–505)
TRANSFERRIN SERPL-MCNC: 252 MG/DL (ref 200–360)
WBC NRBC COR # BLD: 6.95 10*3/MM3 (ref 3.4–10.8)

## 2023-04-24 PROCEDURE — 83540 ASSAY OF IRON: CPT

## 2023-04-24 PROCEDURE — 82728 ASSAY OF FERRITIN: CPT

## 2023-04-24 PROCEDURE — 84466 ASSAY OF TRANSFERRIN: CPT

## 2023-04-24 PROCEDURE — 85025 COMPLETE CBC W/AUTO DIFF WBC: CPT

## 2023-04-24 NOTE — TELEPHONE ENCOUNTER
----- Message from Edgardo Harris MD sent at 4/24/2023  2:54 PM EDT -----  PIP iron levels look okay at this time

## 2023-06-16 RX ORDER — VENLAFAXINE HYDROCHLORIDE 37.5 MG/1
CAPSULE, EXTENDED RELEASE ORAL
Qty: 90 CAPSULE | Refills: 2 | Status: SHIPPED | OUTPATIENT
Start: 2023-06-16

## 2023-08-15 NOTE — PROGRESS NOTES
"  REASON FOR FOLLOW-UP: History of recurrent iron deficiency anemia requiring repeated IV iron therapy.      History of Present Illness    Mrs. Abbott is a pleasant 63-year-old woman who returned today for follow-up of recurrent iron deficiency anemia.  She required intravenous iron most recently February 2023.  She has recently been feeling more fatigued and having dyspnea on exertion compared to normal.  She reports dark sticky stool more often than not.  No hematochezia.    Past Medical History, Past Surgical History, Social History, Family History have been reviewed and are without significant changes except as mentioned from my consult note on 8/25/17.    Review of Systems   Constitutional:  Positive for fatigue. Negative for activity change and unexpected weight change.   HENT: Negative.     Respiratory:  Negative for shortness of breath (Dyspnea on exertion).    Cardiovascular: Negative.    Gastrointestinal:  Negative for blood in stool, constipation and nausea.        Probable melena   Genitourinary: Negative.    Musculoskeletal: Negative.    Skin: Negative.    Neurological:  Negative for dizziness.   Hematological: Negative.    Psychiatric/Behavioral: Negative.     Medications:  The current medication list was reviewed in the EMR    ALLERGIES:    No Known Allergies    Objective      Vitals:    08/21/23 1351   BP: 101/68   Pulse: 84   Resp: 16   Temp: 98 øF (36.7 øC)   TempSrc: Infrared   SpO2: 98%   Weight: 91.2 kg (201 lb)   Height: 177.8 cm (70\")   PainSc: 0-No pain         8/21/2023     2:01 PM   Current Status   ECOG score 0       Physical Exam    CON: pleasant well-developed adult woman  HEENT: no icterus, no thrush, moist membranes  CV: RRR, S1S2, no murmur  RESP: cta bilat, no wheezing, no rales  GI: soft, non-tender, no splenomegaly, +bs  MUSC: no edema, normal gait  NEURO: alert and oriented x3, normal strength  PSYCH: normal mood and affect  Exam unchanged-8/21/2023  RECENT LABS:  Results from last " 7 days   Lab Units 08/21/23  1350   WBC 10*3/mm3 7.86   NEUTROS ABS 10*3/mm3 5.08   HEMOGLOBIN g/dL 8.6*   HEMATOCRIT % 31.0*   PLATELETS 10*3/mm3 351     Results from last 7 days   Lab Units 08/21/23  1350   FERRITIN ng/mL 9.60*   IRON mcg/dL 14*   TIBC mcg/dL 417               Assessment & Plan    1.  Recurrent iron deficiency anemia suspected to be secondary to GI blood loss (established with GI).  Hemoglobin has dropped 4 g to 8.6 today with ferritin 9/iron sat 3% consistent with iron deficiency  2.  Intolerance to oral iron secondary to constipation    Hematology plan:  Replacement iron with Venofer 300 mg weekly x4 doses  Repeat CBC dose 2 and 4 of Venofer  We will discuss with GI repeat evaluation for recurrent iron deficiency anemia and patient description of melanotic stool              8/21/2023      CC:

## 2023-08-16 ENCOUNTER — TELEPHONE (OUTPATIENT)
Dept: ONCOLOGY | Facility: CLINIC | Age: 63
End: 2023-08-16

## 2023-08-16 DIAGNOSIS — D50.0 IRON DEFICIENCY ANEMIA DUE TO CHRONIC BLOOD LOSS: Primary | ICD-10-CM

## 2023-08-16 NOTE — TELEPHONE ENCOUNTER
Caller: Scar Tiera ELBERT    Relationship: Self    Best call back number: [phone]    What is the best time to reach you: ANY    Who are you requesting to speak with (clinical staff, provider,  specific staff member): CLINICAL     What was the call regarding: TIERA IS CALLING STATES THAT DR BRAVO HAD MENTIONED A WHILE BACK ABOUT A HARD SHIP PROGRAM,   TIERA IS WANTING MORE INFORMATION ON THE PROGRAM, SHE IS NEEDING HELP WITH HER BILLS      PLEASE ADVISE     Is it okay if the provider responds through MyChart: NO

## 2023-08-16 NOTE — TELEPHONE ENCOUNTER
Called patient back to let her know that I entered the social work referral for financial help. Patient did not answer but a v/m was left.

## 2023-08-21 ENCOUNTER — OFFICE VISIT (OUTPATIENT)
Dept: ONCOLOGY | Facility: CLINIC | Age: 63
End: 2023-08-21

## 2023-08-21 ENCOUNTER — LAB (OUTPATIENT)
Dept: LAB | Facility: HOSPITAL | Age: 63
End: 2023-08-21
Payer: MEDICARE

## 2023-08-21 VITALS
DIASTOLIC BLOOD PRESSURE: 68 MMHG | WEIGHT: 201 LBS | SYSTOLIC BLOOD PRESSURE: 101 MMHG | TEMPERATURE: 98 F | OXYGEN SATURATION: 98 % | RESPIRATION RATE: 16 BRPM | HEIGHT: 70 IN | HEART RATE: 84 BPM | BODY MASS INDEX: 28.77 KG/M2

## 2023-08-21 DIAGNOSIS — D50.0 IRON DEFICIENCY ANEMIA DUE TO CHRONIC BLOOD LOSS: Primary | ICD-10-CM

## 2023-08-21 DIAGNOSIS — T45.4X5D ADVERSE EFFECT OF IRON, SUBSEQUENT ENCOUNTER: ICD-10-CM

## 2023-08-21 DIAGNOSIS — D50.8 OTHER IRON DEFICIENCY ANEMIA: ICD-10-CM

## 2023-08-21 LAB
BASOPHILS # BLD AUTO: 0.02 10*3/MM3 (ref 0–0.2)
BASOPHILS NFR BLD AUTO: 0.3 % (ref 0–1.5)
DEPRECATED RDW RBC AUTO: 49.7 FL (ref 37–54)
EOSINOPHIL # BLD AUTO: 0.29 10*3/MM3 (ref 0–0.4)
EOSINOPHIL NFR BLD AUTO: 3.7 % (ref 0.3–6.2)
ERYTHROCYTE [DISTWIDTH] IN BLOOD BY AUTOMATED COUNT: 17.8 % (ref 12.3–15.4)
FERRITIN SERPL-MCNC: 9.6 NG/ML (ref 13–150)
HCT VFR BLD AUTO: 31 % (ref 34–46.6)
HGB BLD-MCNC: 8.6 G/DL (ref 12–15.9)
IMM GRANULOCYTES # BLD AUTO: 0.02 10*3/MM3 (ref 0–0.05)
IMM GRANULOCYTES NFR BLD AUTO: 0.3 % (ref 0–0.5)
IRON 24H UR-MRATE: 14 MCG/DL (ref 37–145)
IRON SATN MFR SERPL: 3 % (ref 20–50)
LYMPHOCYTES # BLD AUTO: 1.99 10*3/MM3 (ref 0.7–3.1)
LYMPHOCYTES NFR BLD AUTO: 25.3 % (ref 19.6–45.3)
MCH RBC QN AUTO: 21.4 PG (ref 26.6–33)
MCHC RBC AUTO-ENTMCNC: 27.7 G/DL (ref 31.5–35.7)
MCV RBC AUTO: 77.3 FL (ref 79–97)
MONOCYTES # BLD AUTO: 0.46 10*3/MM3 (ref 0.1–0.9)
MONOCYTES NFR BLD AUTO: 5.9 % (ref 5–12)
NEUTROPHILS NFR BLD AUTO: 5.08 10*3/MM3 (ref 1.7–7)
NEUTROPHILS NFR BLD AUTO: 64.5 % (ref 42.7–76)
NRBC BLD AUTO-RTO: 0 /100 WBC (ref 0–0.2)
PLATELET # BLD AUTO: 351 10*3/MM3 (ref 140–450)
PMV BLD AUTO: 10 FL (ref 6–12)
RBC # BLD AUTO: 4.01 10*6/MM3 (ref 3.77–5.28)
TIBC SERPL-MCNC: 417 MCG/DL (ref 298–536)
TRANSFERRIN SERPL-MCNC: 298 MG/DL (ref 200–360)
WBC NRBC COR # BLD: 7.86 10*3/MM3 (ref 3.4–10.8)

## 2023-08-21 PROCEDURE — 82728 ASSAY OF FERRITIN: CPT

## 2023-08-21 PROCEDURE — 99214 OFFICE O/P EST MOD 30 MIN: CPT | Performed by: INTERNAL MEDICINE

## 2023-08-21 PROCEDURE — 83540 ASSAY OF IRON: CPT

## 2023-08-21 PROCEDURE — 85025 COMPLETE CBC W/AUTO DIFF WBC: CPT

## 2023-08-21 PROCEDURE — 36415 COLL VENOUS BLD VENIPUNCTURE: CPT

## 2023-08-21 PROCEDURE — 84466 ASSAY OF TRANSFERRIN: CPT

## 2023-08-24 ENCOUNTER — INFUSION (OUTPATIENT)
Dept: ONCOLOGY | Facility: HOSPITAL | Age: 63
End: 2023-08-24
Payer: MEDICARE

## 2023-08-24 VITALS
HEART RATE: 114 BPM | DIASTOLIC BLOOD PRESSURE: 72 MMHG | SYSTOLIC BLOOD PRESSURE: 117 MMHG | TEMPERATURE: 97.2 F | BODY MASS INDEX: 29.64 KG/M2 | WEIGHT: 206.6 LBS | RESPIRATION RATE: 16 BRPM | OXYGEN SATURATION: 98 %

## 2023-08-24 DIAGNOSIS — T45.4X5D ADVERSE EFFECT OF IRON, SUBSEQUENT ENCOUNTER: ICD-10-CM

## 2023-08-24 DIAGNOSIS — D50.0 IRON DEFICIENCY ANEMIA DUE TO CHRONIC BLOOD LOSS: Primary | ICD-10-CM

## 2023-08-24 PROCEDURE — 25010000002 IRON SUCROSE PER 1 MG: Performed by: INTERNAL MEDICINE

## 2023-08-24 PROCEDURE — 63710000001 ACETAMINOPHEN 325 MG TABLET: Performed by: INTERNAL MEDICINE

## 2023-08-24 PROCEDURE — A9270 NON-COVERED ITEM OR SERVICE: HCPCS | Performed by: INTERNAL MEDICINE

## 2023-08-24 PROCEDURE — 63710000001 DIPHENHYDRAMINE PER 50 MG: Performed by: INTERNAL MEDICINE

## 2023-08-24 PROCEDURE — 96365 THER/PROPH/DIAG IV INF INIT: CPT

## 2023-08-24 RX ORDER — ACETAMINOPHEN 325 MG/1
650 TABLET ORAL ONCE
Status: COMPLETED | OUTPATIENT
Start: 2023-08-24 | End: 2023-08-24

## 2023-08-24 RX ORDER — SODIUM CHLORIDE 9 MG/ML
250 INJECTION, SOLUTION INTRAVENOUS ONCE
Status: COMPLETED | OUTPATIENT
Start: 2023-08-24 | End: 2023-08-24

## 2023-08-24 RX ORDER — DIPHENHYDRAMINE HCL 25 MG
25 CAPSULE ORAL ONCE
Status: COMPLETED | OUTPATIENT
Start: 2023-08-24 | End: 2023-08-24

## 2023-08-24 RX ADMIN — SODIUM CHLORIDE 250 ML: 9 INJECTION, SOLUTION INTRAVENOUS at 14:24

## 2023-08-24 RX ADMIN — SODIUM CHLORIDE 300 MG: 900 INJECTION, SOLUTION INTRAVENOUS at 14:52

## 2023-08-24 RX ADMIN — DIPHENHYDRAMINE HYDROCHLORIDE 25 MG: 25 CAPSULE ORAL at 14:24

## 2023-08-24 RX ADMIN — ACETAMINOPHEN 650 MG: 325 TABLET ORAL at 14:24

## 2023-08-25 ENCOUNTER — TELEPHONE (OUTPATIENT)
Dept: OTHER | Facility: HOSPITAL | Age: 63
End: 2023-08-25

## 2023-08-25 NOTE — TELEPHONE ENCOUNTER
Oncology Social Work    Referral received due to elevated distress. OSW has called patient and left a voicemail.  Will attempt to see on 8/28 before her appt in Medical Oncology to assess for needs/ resources.    Minna Antonio, ASHW, LCSW

## 2023-08-28 ENCOUNTER — LAB (OUTPATIENT)
Dept: LAB | Facility: HOSPITAL | Age: 63
End: 2023-08-28
Payer: MEDICARE

## 2023-08-28 ENCOUNTER — INFUSION (OUTPATIENT)
Dept: ONCOLOGY | Facility: HOSPITAL | Age: 63
End: 2023-08-28
Payer: MEDICARE

## 2023-08-28 ENCOUNTER — TELEPHONE (OUTPATIENT)
Dept: GASTROENTEROLOGY | Facility: CLINIC | Age: 63
End: 2023-08-28

## 2023-08-28 VITALS
SYSTOLIC BLOOD PRESSURE: 138 MMHG | OXYGEN SATURATION: 98 % | RESPIRATION RATE: 16 BRPM | HEART RATE: 106 BPM | DIASTOLIC BLOOD PRESSURE: 74 MMHG | WEIGHT: 207.6 LBS | TEMPERATURE: 97.4 F | BODY MASS INDEX: 29.79 KG/M2

## 2023-08-28 DIAGNOSIS — T45.4X5D ADVERSE EFFECT OF IRON, SUBSEQUENT ENCOUNTER: ICD-10-CM

## 2023-08-28 DIAGNOSIS — D50.0 IRON DEFICIENCY ANEMIA DUE TO CHRONIC BLOOD LOSS: Primary | ICD-10-CM

## 2023-08-28 DIAGNOSIS — D50.0 IRON DEFICIENCY ANEMIA DUE TO CHRONIC BLOOD LOSS: ICD-10-CM

## 2023-08-28 LAB
BASOPHILS # BLD AUTO: 0.04 10*3/MM3 (ref 0–0.2)
BASOPHILS NFR BLD AUTO: 0.4 % (ref 0–1.5)
DEPRECATED RDW RBC AUTO: 48.9 FL (ref 37–54)
EOSINOPHIL # BLD AUTO: 0.3 10*3/MM3 (ref 0–0.4)
EOSINOPHIL NFR BLD AUTO: 3.4 % (ref 0.3–6.2)
ERYTHROCYTE [DISTWIDTH] IN BLOOD BY AUTOMATED COUNT: 21 % (ref 12.3–15.4)
HCT VFR BLD AUTO: 29.5 % (ref 34–46.6)
HGB BLD-MCNC: 8.4 G/DL (ref 12–15.9)
IMM GRANULOCYTES # BLD AUTO: 0.16 10*3/MM3 (ref 0–0.05)
IMM GRANULOCYTES NFR BLD AUTO: 1.8 % (ref 0–0.5)
LYMPHOCYTES # BLD AUTO: 2.25 10*3/MM3 (ref 0.7–3.1)
LYMPHOCYTES NFR BLD AUTO: 25.1 % (ref 19.6–45.3)
MCH RBC QN AUTO: 21.6 PG (ref 26.6–33)
MCHC RBC AUTO-ENTMCNC: 28.5 G/DL (ref 31.5–35.7)
MCV RBC AUTO: 76 FL (ref 79–97)
MONOCYTES # BLD AUTO: 0.66 10*3/MM3 (ref 0.1–0.9)
MONOCYTES NFR BLD AUTO: 7.4 % (ref 5–12)
NEUTROPHILS NFR BLD AUTO: 5.54 10*3/MM3 (ref 1.7–7)
NEUTROPHILS NFR BLD AUTO: 61.9 % (ref 42.7–76)
NRBC BLD AUTO-RTO: 0.9 /100 WBC (ref 0–0.2)
PLATELET # BLD AUTO: 354 10*3/MM3 (ref 140–450)
PMV BLD AUTO: 10.9 FL (ref 6–12)
RBC # BLD AUTO: 3.88 10*6/MM3 (ref 3.77–5.28)
WBC NRBC COR # BLD: 8.95 10*3/MM3 (ref 3.4–10.8)

## 2023-08-28 PROCEDURE — 63710000001 ACETAMINOPHEN 325 MG TABLET: Performed by: INTERNAL MEDICINE

## 2023-08-28 PROCEDURE — 96365 THER/PROPH/DIAG IV INF INIT: CPT

## 2023-08-28 PROCEDURE — 85025 COMPLETE CBC W/AUTO DIFF WBC: CPT

## 2023-08-28 PROCEDURE — 63710000001 DIPHENHYDRAMINE PER 50 MG: Performed by: INTERNAL MEDICINE

## 2023-08-28 PROCEDURE — 25010000002 IRON SUCROSE PER 1 MG: Performed by: INTERNAL MEDICINE

## 2023-08-28 PROCEDURE — A9270 NON-COVERED ITEM OR SERVICE: HCPCS | Performed by: INTERNAL MEDICINE

## 2023-08-28 PROCEDURE — 36415 COLL VENOUS BLD VENIPUNCTURE: CPT

## 2023-08-28 RX ORDER — SODIUM CHLORIDE 9 MG/ML
250 INJECTION, SOLUTION INTRAVENOUS ONCE
Status: COMPLETED | OUTPATIENT
Start: 2023-08-28 | End: 2023-08-28

## 2023-08-28 RX ORDER — DIPHENHYDRAMINE HCL 25 MG
25 CAPSULE ORAL ONCE
Status: COMPLETED | OUTPATIENT
Start: 2023-08-28 | End: 2023-08-28

## 2023-08-28 RX ORDER — ACETAMINOPHEN 325 MG/1
650 TABLET ORAL ONCE
Status: COMPLETED | OUTPATIENT
Start: 2023-08-28 | End: 2023-08-28

## 2023-08-28 RX ADMIN — ACETAMINOPHEN 650 MG: 325 TABLET ORAL at 14:29

## 2023-08-28 RX ADMIN — SODIUM CHLORIDE 300 MG: 900 INJECTION, SOLUTION INTRAVENOUS at 14:56

## 2023-08-28 RX ADMIN — DIPHENHYDRAMINE HYDROCHLORIDE 25 MG: 25 CAPSULE ORAL at 14:29

## 2023-08-28 RX ADMIN — SODIUM CHLORIDE 250 ML: 9 INJECTION, SOLUTION INTRAVENOUS at 14:29

## 2023-08-28 NOTE — TELEPHONE ENCOUNTER
----- Message from Ottoniel Alonso MD sent at 8/21/2023  3:16 PM EDT -----  Ambika or Effie please work this patient in one of the [facility], within the next 1 to 2 weeks would be ideal, thx bd  ----- Message -----  From: Edgardo Harris MD  Sent: 8/21/2023   2:27 PM EDT  To: Carley Chaidez MD, Ottoniel Alonso MD    Could she get in to see someone soon-pt of [facility] with recurrent SUNITA/melena dropped hgb 4 g over 3-4 months

## 2023-08-28 NOTE — TELEPHONE ENCOUNTER
I worked this patient in on August 30 at 815 with myself.  Effie did not have anything sooner than September 19.  Please call patient and see if she can make this appointment.

## 2023-08-29 NOTE — TELEPHONE ENCOUNTER
Called patient and left vm asking if she could come in August 30 at 8:15. Will call again if not heard anything by late morning early afternoon.

## 2023-08-31 ENCOUNTER — INFUSION (OUTPATIENT)
Dept: ONCOLOGY | Facility: HOSPITAL | Age: 63
End: 2023-08-31
Payer: MEDICARE

## 2023-08-31 VITALS
HEART RATE: 69 BPM | TEMPERATURE: 97.8 F | SYSTOLIC BLOOD PRESSURE: 116 MMHG | OXYGEN SATURATION: 100 % | RESPIRATION RATE: 16 BRPM | WEIGHT: 210.4 LBS | DIASTOLIC BLOOD PRESSURE: 72 MMHG | BODY MASS INDEX: 30.19 KG/M2

## 2023-08-31 DIAGNOSIS — T45.4X5D ADVERSE EFFECT OF IRON, SUBSEQUENT ENCOUNTER: ICD-10-CM

## 2023-08-31 DIAGNOSIS — D50.0 IRON DEFICIENCY ANEMIA DUE TO CHRONIC BLOOD LOSS: Primary | ICD-10-CM

## 2023-08-31 PROCEDURE — A9270 NON-COVERED ITEM OR SERVICE: HCPCS | Performed by: INTERNAL MEDICINE

## 2023-08-31 PROCEDURE — 63710000001 DIPHENHYDRAMINE PER 50 MG: Performed by: INTERNAL MEDICINE

## 2023-08-31 PROCEDURE — 63710000001 ACETAMINOPHEN 325 MG TABLET: Performed by: INTERNAL MEDICINE

## 2023-08-31 PROCEDURE — 25010000002 IRON SUCROSE PER 1 MG: Performed by: INTERNAL MEDICINE

## 2023-08-31 PROCEDURE — 96365 THER/PROPH/DIAG IV INF INIT: CPT

## 2023-08-31 RX ORDER — SODIUM CHLORIDE 9 MG/ML
250 INJECTION, SOLUTION INTRAVENOUS ONCE
Status: COMPLETED | OUTPATIENT
Start: 2023-08-31 | End: 2023-08-31

## 2023-08-31 RX ORDER — DIPHENHYDRAMINE HCL 25 MG
25 CAPSULE ORAL ONCE
Status: COMPLETED | OUTPATIENT
Start: 2023-08-31 | End: 2023-08-31

## 2023-08-31 RX ORDER — ACETAMINOPHEN 325 MG/1
650 TABLET ORAL ONCE
Status: COMPLETED | OUTPATIENT
Start: 2023-08-31 | End: 2023-08-31

## 2023-08-31 RX ADMIN — DIPHENHYDRAMINE HYDROCHLORIDE 25 MG: 25 CAPSULE ORAL at 14:47

## 2023-08-31 RX ADMIN — ACETAMINOPHEN 650 MG: 325 TABLET ORAL at 14:47

## 2023-08-31 RX ADMIN — SODIUM CHLORIDE 300 MG: 900 INJECTION, SOLUTION INTRAVENOUS at 15:02

## 2023-08-31 RX ADMIN — SODIUM CHLORIDE 250 ML: 9 INJECTION, SOLUTION INTRAVENOUS at 14:46

## 2023-09-07 ENCOUNTER — LAB (OUTPATIENT)
Dept: LAB | Facility: HOSPITAL | Age: 63
End: 2023-09-07
Payer: MEDICARE

## 2023-09-07 ENCOUNTER — INFUSION (OUTPATIENT)
Dept: ONCOLOGY | Facility: HOSPITAL | Age: 63
End: 2023-09-07
Payer: MEDICARE

## 2023-09-07 VITALS
BODY MASS INDEX: 29.64 KG/M2 | TEMPERATURE: 97.7 F | HEART RATE: 98 BPM | RESPIRATION RATE: 16 BRPM | SYSTOLIC BLOOD PRESSURE: 106 MMHG | DIASTOLIC BLOOD PRESSURE: 64 MMHG | WEIGHT: 206.6 LBS | OXYGEN SATURATION: 98 %

## 2023-09-07 DIAGNOSIS — T45.4X5D ADVERSE EFFECT OF IRON, SUBSEQUENT ENCOUNTER: ICD-10-CM

## 2023-09-07 DIAGNOSIS — D50.0 IRON DEFICIENCY ANEMIA DUE TO CHRONIC BLOOD LOSS: Primary | ICD-10-CM

## 2023-09-07 DIAGNOSIS — D50.0 IRON DEFICIENCY ANEMIA DUE TO CHRONIC BLOOD LOSS: ICD-10-CM

## 2023-09-07 LAB
BASOPHILS # BLD AUTO: 0.03 10*3/MM3 (ref 0–0.2)
BASOPHILS NFR BLD AUTO: 0.5 % (ref 0–1.5)
DEPRECATED RDW RBC AUTO: 80 FL (ref 37–54)
EOSINOPHIL # BLD AUTO: 0.25 10*3/MM3 (ref 0–0.4)
EOSINOPHIL NFR BLD AUTO: 4.1 % (ref 0.3–6.2)
ERYTHROCYTE [DISTWIDTH] IN BLOOD BY AUTOMATED COUNT: 27.3 % (ref 12.3–15.4)
HCT VFR BLD AUTO: 34.6 % (ref 34–46.6)
HGB BLD-MCNC: 9.9 G/DL (ref 12–15.9)
IMM GRANULOCYTES # BLD AUTO: 0.03 10*3/MM3 (ref 0–0.05)
IMM GRANULOCYTES NFR BLD AUTO: 0.5 % (ref 0–0.5)
LYMPHOCYTES # BLD AUTO: 1.41 10*3/MM3 (ref 0.7–3.1)
LYMPHOCYTES NFR BLD AUTO: 23.3 % (ref 19.6–45.3)
MCH RBC QN AUTO: 24.1 PG (ref 26.6–33)
MCHC RBC AUTO-ENTMCNC: 28.6 G/DL (ref 31.5–35.7)
MCV RBC AUTO: 84.4 FL (ref 79–97)
MONOCYTES # BLD AUTO: 0.53 10*3/MM3 (ref 0.1–0.9)
MONOCYTES NFR BLD AUTO: 8.7 % (ref 5–12)
NEUTROPHILS NFR BLD AUTO: 3.81 10*3/MM3 (ref 1.7–7)
NEUTROPHILS NFR BLD AUTO: 62.9 % (ref 42.7–76)
NRBC BLD AUTO-RTO: 0 /100 WBC (ref 0–0.2)
PLATELET # BLD AUTO: 250 10*3/MM3 (ref 140–450)
PMV BLD AUTO: 10.6 FL (ref 6–12)
RBC # BLD AUTO: 4.1 10*6/MM3 (ref 3.77–5.28)
WBC NRBC COR # BLD: 6.06 10*3/MM3 (ref 3.4–10.8)

## 2023-09-07 PROCEDURE — A9270 NON-COVERED ITEM OR SERVICE: HCPCS | Performed by: INTERNAL MEDICINE

## 2023-09-07 PROCEDURE — 96365 THER/PROPH/DIAG IV INF INIT: CPT

## 2023-09-07 PROCEDURE — 85025 COMPLETE CBC W/AUTO DIFF WBC: CPT

## 2023-09-07 PROCEDURE — 96366 THER/PROPH/DIAG IV INF ADDON: CPT

## 2023-09-07 PROCEDURE — 36415 COLL VENOUS BLD VENIPUNCTURE: CPT

## 2023-09-07 PROCEDURE — 63710000001 DIPHENHYDRAMINE PER 50 MG: Performed by: INTERNAL MEDICINE

## 2023-09-07 PROCEDURE — 25010000002 IRON SUCROSE PER 1 MG: Performed by: INTERNAL MEDICINE

## 2023-09-07 PROCEDURE — 63710000001 ACETAMINOPHEN 325 MG TABLET: Performed by: INTERNAL MEDICINE

## 2023-09-07 RX ORDER — ACETAMINOPHEN 325 MG/1
650 TABLET ORAL ONCE
Status: COMPLETED | OUTPATIENT
Start: 2023-09-07 | End: 2023-09-07

## 2023-09-07 RX ORDER — SODIUM CHLORIDE 9 MG/ML
250 INJECTION, SOLUTION INTRAVENOUS ONCE
Status: COMPLETED | OUTPATIENT
Start: 2023-09-07 | End: 2023-09-07

## 2023-09-07 RX ORDER — DIPHENHYDRAMINE HCL 25 MG
25 CAPSULE ORAL ONCE
Status: COMPLETED | OUTPATIENT
Start: 2023-09-07 | End: 2023-09-07

## 2023-09-07 RX ADMIN — DIPHENHYDRAMINE HYDROCHLORIDE 25 MG: 25 CAPSULE ORAL at 14:26

## 2023-09-07 RX ADMIN — SODIUM CHLORIDE 300 MG: 900 INJECTION, SOLUTION INTRAVENOUS at 14:40

## 2023-09-07 RX ADMIN — ACETAMINOPHEN 650 MG: 325 TABLET ORAL at 14:26

## 2023-09-07 RX ADMIN — SODIUM CHLORIDE 250 ML: 9 INJECTION, SOLUTION INTRAVENOUS at 14:40

## 2023-09-15 RX ORDER — VENLAFAXINE HYDROCHLORIDE 37.5 MG/1
CAPSULE, EXTENDED RELEASE ORAL
Qty: 90 CAPSULE | Refills: 0 | Status: SHIPPED | OUTPATIENT
Start: 2023-09-15

## 2023-09-15 NOTE — TELEPHONE ENCOUNTER
Rx Refill Note  Requested Prescriptions     Pending Prescriptions Disp Refills    venlafaxine XR (EFFEXOR-XR) 37.5 MG 24 hr capsule [Pharmacy Med Name: VENLAFAXINE HCL ER 37.5 MG CAP] 90 capsule 2     Sig: TAKE THREE CAPSULES BY MOUTH DAILY      Last office visit with prescribing clinician: 12/7/2022   Last telemedicine visit with prescribing clinician: Visit date not found   Next office visit with prescribing clinician: 9/21/2023                         Would you like a call back once the refill request has been completed: [] Yes [] No    If the office needs to give you a call back, can they leave a voicemail: [] Yes [] No    Dima Lui Rep  09/15/23, 11:41 EDT

## 2023-09-21 ENCOUNTER — OFFICE VISIT (OUTPATIENT)
Dept: FAMILY MEDICINE CLINIC | Facility: CLINIC | Age: 63
End: 2023-09-21

## 2023-09-21 VITALS
WEIGHT: 208.3 LBS | DIASTOLIC BLOOD PRESSURE: 60 MMHG | OXYGEN SATURATION: 97 % | RESPIRATION RATE: 14 BRPM | BODY MASS INDEX: 29.82 KG/M2 | HEART RATE: 77 BPM | SYSTOLIC BLOOD PRESSURE: 140 MMHG | HEIGHT: 70 IN | TEMPERATURE: 97.5 F

## 2023-09-21 DIAGNOSIS — Z23 FLU VACCINE NEED: ICD-10-CM

## 2023-09-21 DIAGNOSIS — D64.9 ANEMIA, UNSPECIFIED TYPE: Primary | ICD-10-CM

## 2023-09-21 PROCEDURE — 99213 OFFICE O/P EST LOW 20 MIN: CPT | Performed by: NURSE PRACTITIONER

## 2023-09-21 PROCEDURE — 90686 IIV4 VACC NO PRSV 0.5 ML IM: CPT | Performed by: NURSE PRACTITIONER

## 2023-09-21 PROCEDURE — G0008 ADMIN INFLUENZA VIRUS VAC: HCPCS | Performed by: NURSE PRACTITIONER

## 2023-09-21 RX ORDER — PANTOPRAZOLE SODIUM 40 MG/1
40 TABLET, DELAYED RELEASE ORAL DAILY
Qty: 90 TABLET | Refills: 3 | Status: SHIPPED | OUTPATIENT
Start: 2023-09-21

## 2023-09-21 RX ORDER — VENLAFAXINE HYDROCHLORIDE 37.5 MG/1
112.5 CAPSULE, EXTENDED RELEASE ORAL DAILY
Qty: 270 CAPSULE | Refills: 1 | Status: SHIPPED | OUTPATIENT
Start: 2023-09-21

## 2023-09-21 NOTE — PROGRESS NOTES
"Chief Complaint  Follow-up    Subjective        Tiera Abbott presents to [facility]  History of Present Illness  Very pleasant patient here today follow-up severe iron deficiency, requiring iron infusions, she was without insurance for a little bit, and she returned as soon as she got her insurance her CBC is quite low a little over 8 she received a couple transfusions,  She still feels weak, really does not have the strength she normally does that she would like her blood checked more frequently she has appointment with hematology in November, would like to go ahead and do this as well she needs [facility] for job security at work.  She has no chest pain or increased shortness of breath presently.  No active bleeding as well and she plans to follow-up with gastro she has had EGD and colonoscopy without a source of  Bleeding discovered, but she was told nonetheless to go back by hematology to make sure everything is okay.  Quit smoking  Several months ago    Objective   Vital Signs:  /60   Pulse 77   Temp 97.5 °F (36.4 °C) (Infrared)   Resp 14   Ht 177.8 cm (70\")   Wt 94.5 kg (208 lb 4.8 oz)   SpO2 97%   BMI 29.89 kg/m²   Estimated body mass index is 29.89 kg/m² as calculated from the following:    Height as of this encounter: 177.8 cm (70\").    Weight as of this encounter: 94.5 kg (208 lb 4.8 oz).            Physical Exam  Vitals reviewed.   Constitutional:       General: She is not in acute distress.     Appearance: She is well-developed. She is not ill-appearing, toxic-appearing or diaphoretic.   HENT:      Head: Normocephalic.      Nose: Nose normal.   Eyes:      General: No scleral icterus.     Conjunctiva/sclera: Conjunctivae normal.      Pupils: Pupils are equal, round, and reactive to light.   Neck:      Thyroid: No thyromegaly.      Vascular: No JVD.   Cardiovascular:      Rate and Rhythm: Normal rate and regular rhythm.      Heart sounds: Normal heart sounds. No murmur " heard.    No friction rub. No gallop.   Pulmonary:      Effort: Pulmonary effort is normal. No respiratory distress.      Breath sounds: Normal breath sounds. No stridor. No wheezing or rales.   Abdominal:      General: Bowel sounds are normal. There is no distension.      Palpations: Abdomen is soft.      Tenderness: There is no abdominal tenderness.      Comments: No hepatosplenomegaly, no ascites,   Musculoskeletal:         General: No tenderness.      Cervical back: Neck supple.   Lymphadenopathy:      Cervical: No cervical adenopathy.   Skin:     General: Skin is warm and dry.      Findings: No erythema or rash.   Neurological:      Mental Status: She is alert and oriented to person, place, and time.      Deep Tendon Reflexes: Reflexes are normal and symmetric.   Psychiatric:         Mood and Affect: Mood normal.         Behavior: Behavior normal.         Thought Content: Thought content normal.         Judgment: Judgment normal.      Result Review :                Assessment and Plan   Diagnoses and all orders for this visit:    1. Anemia, unspecified type (Primary)  -     Iron and TIBC  -     CBC & Differential  -     Ferritin    2. Flu vaccine need  -     Fluzone >6 Months ([id number])    Other orders  -     venlafaxine XR (EFFEXOR-XR) 37.5 MG 24 hr capsule; Take 3 capsules by mouth Daily.  Dispense: 270 capsule; Refill: 1  -     pantoprazole (PROTONIX) 40 MG EC tablet; Take 1 tablet by mouth Daily.  Dispense: 90 tablet; Refill: 3             Follow Up   Return in about 1 month (around 10/21/2023).  Patient was given instructions and counseling regarding her condition or for health maintenance advice. Please see specific information pulled into the AVS if appropriate.     There are no Patient Instructions on file for this visit.       Labs today,  She will need to maintain restrictions at work  Maintain estriction due to chronic anemia and weakness CAD stable presently  No change for depression anxiety  medications  We will check a CBC monthly she will follow-up with hematology as well

## 2023-09-22 LAB
BASOPHILS # BLD AUTO: 0.05 10*3/MM3 (ref 0–0.2)
BASOPHILS NFR BLD AUTO: 0.9 % (ref 0–1.5)
EOSINOPHIL # BLD AUTO: 0.22 10*3/MM3 (ref 0–0.4)
EOSINOPHIL NFR BLD AUTO: 4 % (ref 0.3–6.2)
ERYTHROCYTE [DISTWIDTH] IN BLOOD BY AUTOMATED COUNT: 23.7 % (ref 12.3–15.4)
FERRITIN SERPL-MCNC: 101 NG/ML (ref 13–150)
HCT VFR BLD AUTO: 35.3 % (ref 34–46.6)
HGB BLD-MCNC: 10.7 G/DL (ref 12–15.9)
IMM GRANULOCYTES # BLD AUTO: 0.01 10*3/MM3 (ref 0–0.05)
IMM GRANULOCYTES NFR BLD AUTO: 0.2 % (ref 0–0.5)
IRON SATN MFR SERPL: 10 % (ref 20–50)
IRON SERPL-MCNC: 39 MCG/DL (ref 37–145)
LYMPHOCYTES # BLD AUTO: 1.63 10*3/MM3 (ref 0.7–3.1)
LYMPHOCYTES NFR BLD AUTO: 29.4 % (ref 19.6–45.3)
MCH RBC QN AUTO: 23.9 PG (ref 26.6–33)
MCHC RBC AUTO-ENTMCNC: 30.3 G/DL (ref 31.5–35.7)
MCV RBC AUTO: 79 FL (ref 79–97)
MONOCYTES # BLD AUTO: 0.46 10*3/MM3 (ref 0.1–0.9)
MONOCYTES NFR BLD AUTO: 8.3 % (ref 5–12)
NEUTROPHILS # BLD AUTO: 3.17 10*3/MM3 (ref 1.7–7)
NEUTROPHILS NFR BLD AUTO: 57.2 % (ref 42.7–76)
NRBC BLD AUTO-RTO: 0 /100 WBC (ref 0–0.2)
PLATELET # BLD AUTO: 288 10*3/MM3 (ref 140–450)
RBC # BLD AUTO: 4.47 10*6/MM3 (ref 3.77–5.28)
TIBC SERPL-MCNC: 408 MCG/DL
UIBC SERPL-MCNC: 369 MCG/DL (ref 112–346)
WBC # BLD AUTO: 5.54 10*3/MM3 (ref 3.4–10.8)

## 2023-10-25 ENCOUNTER — TELEPHONE (OUTPATIENT)
Dept: GASTROENTEROLOGY | Facility: CLINIC | Age: 63
End: 2023-10-25
Payer: MEDICARE

## 2023-10-25 ENCOUNTER — OFFICE VISIT (OUTPATIENT)
Dept: GASTROENTEROLOGY | Facility: CLINIC | Age: 63
End: 2023-10-25
Payer: MEDICARE

## 2023-10-25 VITALS
DIASTOLIC BLOOD PRESSURE: 69 MMHG | OXYGEN SATURATION: 97 % | HEIGHT: 72 IN | WEIGHT: 207.7 LBS | TEMPERATURE: 96.8 F | HEART RATE: 85 BPM | BODY MASS INDEX: 28.13 KG/M2 | SYSTOLIC BLOOD PRESSURE: 110 MMHG

## 2023-10-25 DIAGNOSIS — R11.2 NAUSEA AND VOMITING, UNSPECIFIED VOMITING TYPE: ICD-10-CM

## 2023-10-25 DIAGNOSIS — R15.2 FECAL URGENCY: ICD-10-CM

## 2023-10-25 DIAGNOSIS — K92.1 MELENA: ICD-10-CM

## 2023-10-25 DIAGNOSIS — D50.9 IRON DEFICIENCY ANEMIA, UNSPECIFIED IRON DEFICIENCY ANEMIA TYPE: Primary | ICD-10-CM

## 2023-10-25 RX ORDER — IBUPROFEN 800 MG/1
TABLET ORAL
COMMUNITY

## 2023-10-25 RX ORDER — ALBUTEROL SULFATE 90 UG/1
AEROSOL, METERED RESPIRATORY (INHALATION)
COMMUNITY

## 2023-10-25 NOTE — TELEPHONE ENCOUNTER
Called patient and left  regarding message and asked her to call back with any questions at [phone] option 1.           ----- Message from Effie Portillo PA-C sent at 10/25/2023  3:01 PM EDT -----  Can you pleae call the patient and remind her she needs to hold her plavix 5 days more for her scopes?    (I told her but I wanted to ensure as she was talking a lot during the visit)

## 2023-10-25 NOTE — PROGRESS NOTES
"Chief Complaint  Anemia    Subjective          History Of Present Illness:    Tiera Abbott is a  63 y.o. female prior patient of Dr. Bee who has a history of iron deficiency anemia.  Had bidirectional endoscopy for evaluation of anemia in 2019.  No course of bleeding was found at that time and a capsule endoscopy was recommended.  Unfortunately patient did not go forward with this and is currently still experiencing ongoing iron deficiency anemia.     Patient currently is seeing a hematologist and is receiving IV iron transfusions.  She still has evidence of iron deficiency anemia on most recent labs that I reviewed.    Patient complains of occasional foul smelling stools and intermittent black tarry stools. Reports occasional fecal urgency. Denies abdominal pain and hematochezia. Appetite is good and weight is stable.     Patient complains of occasional nocturnal nausea and vomiting. Patient eats close to bed time, often sweets. Patient reports occasional reflux. Patient has been taking pantoprazole daily for many years.    Additional data reviewed:  EGD 6/11/19 - esophagitis, gastritis, normal duodenum, small paraesophageal hernia  C-scope 6/11/2019 -multiple hyperplastic polyps in the transverse and sigmoid colon, diverticulosis, nonbleeding internal hemorrhoids.    Objective   Vital Signs:   /69   Pulse 85   Temp 96.8 °F (36 °C)   Ht 182.9 cm (72\")   Wt 94.2 kg (207 lb 11.2 oz)   SpO2 97%   BMI 28.17 kg/m²       Physical Exam  Vitals reviewed.   Constitutional:       General: She is not in acute distress.     Appearance: Normal appearance. She is not ill-appearing.   HENT:      Head: Normocephalic and atraumatic.      Nose: Nose normal.      Mouth/Throat:      Pharynx: Oropharynx is clear.   Eyes:      Extraocular Movements: Extraocular movements intact.      Conjunctiva/sclera: Conjunctivae normal.      Pupils: Pupils are equal, round, and reactive to light.   Pulmonary:      Effort: Pulmonary " effort is normal.   Abdominal:      General: There is no distension.      Palpations: There is no mass.      Tenderness: There is no abdominal tenderness.   Musculoskeletal:         General: No swelling. Normal range of motion.      Cervical back: Normal range of motion.   Skin:     General: Skin is warm and dry.      Findings: No bruising or rash.   Neurological:      General: No focal deficit present.      Mental Status: She is alert and oriented to person, place, and time.      Motor: No weakness.      Gait: Gait normal.   Psychiatric:         Mood and Affect: Mood normal.          Result Review :   The following data was reviewed by: Effie Ray PA-C on 10/25/2023:    CBC          8/28/2023    14:16 9/7/2023    14:11 9/21/2023    15:47   CBC   WBC 8.95  6.06  5.54    RBC 3.88  4.10  4.47    Hemoglobin 8.4  9.9  10.7    Hematocrit 29.5  34.6  35.3    MCV 76.0  84.4  79.0    MCH 21.6  24.1  23.9    MCHC 28.5  28.6  30.3    RDW 21.0  27.3  23.7    Platelets 354  250  288            Assessment and Plan    Diagnoses and all orders for this visit:    1. Iron deficiency anemia, unspecified iron deficiency anemia type (Primary)  -     Case Request; Standing  -     Implement Anesthesia orders day of procedure.; Standing  -     Obtain informed consent; Standing  -     Verify bowel prep was successful; Standing  -     Give tap water enema if bowel prep was insufficient; Standing  -     Case Request    2. Melena    3. Fecal urgency  -     Celiac Comprehensive Panel  -     TSH  -     Comprehensive Metabolic Panel    4. Nausea and vomiting, unspecified vomiting type       Patient with a multitude of GI symptoms including melena, urgency, occasional nausea and vomiting with a history of iron deficiency anemia requiring IV iron infusions.  We will proceed with bidirectional endoscopy with Dr. Mcgee.  Patient will likely benefit from a VCE if her endoscopic evaluation is negative.  Patient advised to hold her Plavix 5  days prior to her procedure.  Recommend she continue taking her pantoprazole.  Patient was given a antireflux diet and encouraged to not eat 3 to 4 hours before bedtime as she is often having her episodes of vomiting nocturnally.  We will check celiac panel, thyroid function in the setting of her anemia and fecal urgency.  Patient with updated CBC with significant movement of hemoglobin to 10.    Follow Up   Return for EGD/Colonoscopy.    Dragon dictation used throughout this note.            Effie Portillo PA-C   [facility]  [address] [place] [address]  Office: [phone]

## 2023-10-25 NOTE — H&P (VIEW-ONLY)
"Chief Complaint  Anemia    Subjective          History Of Present Illness:    Tiera Abbott is a  63 y.o. female prior patient of Dr. Bee who has a history of iron deficiency anemia.  Had bidirectional endoscopy for evaluation of anemia in 2019.  No course of bleeding was found at that time and a capsule endoscopy was recommended.  Unfortunately patient did not go forward with this and is currently still experiencing ongoing iron deficiency anemia.     Patient currently is seeing a hematologist and is receiving IV iron transfusions.  She still has evidence of iron deficiency anemia on most recent labs that I reviewed.    Patient complains of occasional foul smelling stools and intermittent black tarry stools. Reports occasional fecal urgency. Denies abdominal pain and hematochezia. Appetite is good and weight is stable.     Patient complains of occasional nocturnal nausea and vomiting. Patient eats close to bed time, often sweets. Patient reports occasional reflux. Patient has been taking pantoprazole daily for many years.    Additional data reviewed:  EGD 6/11/19 - esophagitis, gastritis, normal duodenum, small paraesophageal hernia  C-scope 6/11/2019 -multiple hyperplastic polyps in the transverse and sigmoid colon, diverticulosis, nonbleeding internal hemorrhoids.    Objective   Vital Signs:   /69   Pulse 85   Temp 96.8 °F (36 °C)   Ht 182.9 cm (72\")   Wt 94.2 kg (207 lb 11.2 oz)   SpO2 97%   BMI 28.17 kg/m²       Physical Exam  Vitals reviewed.   Constitutional:       General: She is not in acute distress.     Appearance: Normal appearance. She is not ill-appearing.   HENT:      Head: Normocephalic and atraumatic.      Nose: Nose normal.      Mouth/Throat:      Pharynx: Oropharynx is clear.   Eyes:      Extraocular Movements: Extraocular movements intact.      Conjunctiva/sclera: Conjunctivae normal.      Pupils: Pupils are equal, round, and reactive to light.   Pulmonary:      Effort: Pulmonary " effort is normal.   Abdominal:      General: There is no distension.      Palpations: There is no mass.      Tenderness: There is no abdominal tenderness.   Musculoskeletal:         General: No swelling. Normal range of motion.      Cervical back: Normal range of motion.   Skin:     General: Skin is warm and dry.      Findings: No bruising or rash.   Neurological:      General: No focal deficit present.      Mental Status: She is alert and oriented to person, place, and time.      Motor: No weakness.      Gait: Gait normal.   Psychiatric:         Mood and Affect: Mood normal.          Result Review :   The following data was reviewed by: Effie Ray PA-C on 10/25/2023:    CBC          8/28/2023    14:16 9/7/2023    14:11 9/21/2023    15:47   CBC   WBC 8.95  6.06  5.54    RBC 3.88  4.10  4.47    Hemoglobin 8.4  9.9  10.7    Hematocrit 29.5  34.6  35.3    MCV 76.0  84.4  79.0    MCH 21.6  24.1  23.9    MCHC 28.5  28.6  30.3    RDW 21.0  27.3  23.7    Platelets 354  250  288            Assessment and Plan    Diagnoses and all orders for this visit:    1. Iron deficiency anemia, unspecified iron deficiency anemia type (Primary)  -     Case Request; Standing  -     Implement Anesthesia orders day of procedure.; Standing  -     Obtain informed consent; Standing  -     Verify bowel prep was successful; Standing  -     Give tap water enema if bowel prep was insufficient; Standing  -     Case Request    2. Melena    3. Fecal urgency  -     Celiac Comprehensive Panel  -     TSH  -     Comprehensive Metabolic Panel    4. Nausea and vomiting, unspecified vomiting type       Patient with a multitude of GI symptoms including melena, urgency, occasional nausea and vomiting with a history of iron deficiency anemia requiring IV iron infusions.  We will proceed with bidirectional endoscopy with Dr. Mcgee.  Patient will likely benefit from a VCE if her endoscopic evaluation is negative.  Patient advised to hold her Plavix 5  days prior to her procedure.  Recommend she continue taking her pantoprazole.  Patient was given a antireflux diet and encouraged to not eat 3 to 4 hours before bedtime as she is often having her episodes of vomiting nocturnally.  We will check celiac panel, thyroid function in the setting of her anemia and fecal urgency.  Patient with updated CBC with significant movement of hemoglobin to 10.    Follow Up   Return for EGD/Colonoscopy.    Dragon dictation used throughout this note.            Effie Portillo PA-C   [facility]  [address] [place] [address]  Office: [phone]

## 2023-10-25 NOTE — Clinical Note
Can you pleae call the patient and remind her she needs to hold her plavix 5 days more for her scopes?  (I told her but I wanted to ensure as she was talking a lot during the visit)

## 2023-10-26 LAB
ALBUMIN SERPL-MCNC: 4.2 G/DL (ref 3.5–5.2)
ALBUMIN/GLOB SERPL: 1.7 G/DL
ALP SERPL-CCNC: 123 U/L (ref 39–117)
ALT SERPL-CCNC: 8 U/L (ref 1–33)
AST SERPL-CCNC: 16 U/L (ref 1–32)
BILIRUB SERPL-MCNC: <0.2 MG/DL (ref 0–1.2)
BUN SERPL-MCNC: 10 MG/DL (ref 8–23)
BUN/CREAT SERPL: 12.7 (ref 7–25)
CALCIUM SERPL-MCNC: 9.5 MG/DL (ref 8.6–10.5)
CHLORIDE SERPL-SCNC: 105 MMOL/L (ref 98–107)
CO2 SERPL-SCNC: 27.2 MMOL/L (ref 22–29)
CREAT SERPL-MCNC: 0.79 MG/DL (ref 0.57–1)
EGFRCR SERPLBLD CKD-EPI 2021: 84.2 ML/MIN/1.73
ENDOMYSIUM IGA SER QL: NEGATIVE
GLIADIN PEPTIDE IGA SER-ACNC: 3 UNITS (ref 0–19)
GLIADIN PEPTIDE IGG SER-ACNC: 1 UNITS (ref 0–19)
GLOBULIN SER CALC-MCNC: 2.5 GM/DL
GLUCOSE SERPL-MCNC: 120 MG/DL (ref 65–99)
IGA SERPL-MCNC: 278 MG/DL (ref 87–352)
POTASSIUM SERPL-SCNC: 4.3 MMOL/L (ref 3.5–5.2)
PROT SERPL-MCNC: 6.7 G/DL (ref 6–8.5)
SODIUM SERPL-SCNC: 138 MMOL/L (ref 136–145)
TSH SERPL DL<=0.005 MIU/L-ACNC: 0.92 UIU/ML (ref 0.27–4.2)
TTG IGA SER-ACNC: <2 U/ML (ref 0–3)
TTG IGG SER-ACNC: <2 U/ML (ref 0–5)

## 2023-10-27 ENCOUNTER — TELEPHONE (OUTPATIENT)
Dept: GASTROENTEROLOGY | Facility: CLINIC | Age: 63
End: 2023-10-27
Payer: MEDICARE

## 2023-10-27 NOTE — TELEPHONE ENCOUNTER
----- Message from Effie Portillo PA-C sent at 10/27/2023 10:01 AM EDT -----  Please let the patient kidney function is normal.  She has slightly elevated glucose but she was likely not fasting at the time of her labs.  Her celiac panel and thyroid function was normal.  She had a slightly elevated alkaline phosphatase which is one of her liver enzymes.  We will recheck this on her next visit.

## 2023-10-27 NOTE — PROGRESS NOTES
Please let the patient kidney function is normal.  She has slightly elevated glucose but she was likely not fasting at the time of her labs.  Her celiac panel and thyroid function was normal.  She had a slightly elevated alkaline phosphatase which is one of her liver enzymes.  We will recheck this on her next visit.

## 2023-10-30 NOTE — TELEPHONE ENCOUNTER
Patient called. Reminded she needs to hold her Plavix 5 days prior to her procedure. She verb understanding and is currently holding the medication.

## 2023-11-02 ENCOUNTER — TELEPHONE (OUTPATIENT)
Dept: GASTROENTEROLOGY | Facility: CLINIC | Age: 63
End: 2023-11-02
Payer: MEDICARE

## 2023-11-02 DIAGNOSIS — D50.9 IRON DEFICIENCY ANEMIA, UNSPECIFIED IRON DEFICIENCY ANEMIA TYPE: Primary | ICD-10-CM

## 2023-11-02 NOTE — TELEPHONE ENCOUNTER
CALLED TO LET PT KNOW ITS IMPERATIVE THAT I REACH HER ABOUT HER PROCEDURE SCHEDULED FOR 11/02/2024,NO ANSWER LVM.OK FOR HUB TO READ

## 2023-11-03 ENCOUNTER — HOSPITAL ENCOUNTER (OUTPATIENT)
Facility: HOSPITAL | Age: 63
Setting detail: HOSPITAL OUTPATIENT SURGERY
Discharge: HOME | End: 2023-11-03
Attending: INTERNAL MEDICINE | Admitting: INTERNAL MEDICINE
Payer: MEDICARE

## 2023-11-03 ENCOUNTER — ANESTHESIA EVENT (OUTPATIENT)
Dept: GASTROENTEROLOGY | Facility: HOSPITAL | Age: 63
End: 2023-11-03
Payer: MEDICARE

## 2023-11-03 ENCOUNTER — ANESTHESIA (OUTPATIENT)
Dept: GASTROENTEROLOGY | Facility: HOSPITAL | Age: 63
End: 2023-11-03
Payer: MEDICARE

## 2023-11-03 VITALS
DIASTOLIC BLOOD PRESSURE: 76 MMHG | WEIGHT: 202.6 LBS | BODY MASS INDEX: 27.44 KG/M2 | OXYGEN SATURATION: 100 % | HEIGHT: 72 IN | SYSTOLIC BLOOD PRESSURE: 115 MMHG | HEART RATE: 66 BPM | RESPIRATION RATE: 16 BRPM

## 2023-11-03 DIAGNOSIS — D50.9 IRON DEFICIENCY ANEMIA, UNSPECIFIED IRON DEFICIENCY ANEMIA TYPE: ICD-10-CM

## 2023-11-03 PROCEDURE — 45380 COLONOSCOPY AND BIOPSY: CPT | Performed by: INTERNAL MEDICINE

## 2023-11-03 PROCEDURE — 43239 EGD BIOPSY SINGLE/MULTIPLE: CPT | Performed by: INTERNAL MEDICINE

## 2023-11-03 PROCEDURE — 25010000002 PROPOFOL 10 MG/ML EMULSION: Performed by: ANESTHESIOLOGY

## 2023-11-03 PROCEDURE — 88305 TISSUE EXAM BY PATHOLOGIST: CPT | Performed by: INTERNAL MEDICINE

## 2023-11-03 PROCEDURE — 25810000003 LACTATED RINGERS PER 1000 ML: Performed by: INTERNAL MEDICINE

## 2023-11-03 RX ORDER — SODIUM CHLORIDE 0.9 % (FLUSH) 0.9 %
10 SYRINGE (ML) INJECTION AS NEEDED
Status: DISCONTINUED | OUTPATIENT
Start: 2023-11-03 | End: 2023-11-06 | Stop reason: HOSPADM

## 2023-11-03 RX ORDER — SODIUM CHLORIDE, SODIUM LACTATE, POTASSIUM CHLORIDE, CALCIUM CHLORIDE 600; 310; 30; 20 MG/100ML; MG/100ML; MG/100ML; MG/100ML
30 INJECTION, SOLUTION INTRAVENOUS CONTINUOUS PRN
Status: DISCONTINUED | OUTPATIENT
Start: 2023-11-03 | End: 2023-11-06 | Stop reason: HOSPADM

## 2023-11-03 RX ORDER — SODIUM CHLORIDE 9 MG/ML
40 INJECTION, SOLUTION INTRAVENOUS AS NEEDED
Status: DISCONTINUED | OUTPATIENT
Start: 2023-11-03 | End: 2023-11-06 | Stop reason: HOSPADM

## 2023-11-03 RX ORDER — EPHEDRINE SULFATE 50 MG/ML
INJECTION, SOLUTION INTRAVENOUS AS NEEDED
Status: DISCONTINUED | OUTPATIENT
Start: 2023-11-03 | End: 2023-11-03 | Stop reason: SURG

## 2023-11-03 RX ORDER — ASPIRIN 325 MG
325 TABLET, DELAYED RELEASE (ENTERIC COATED) ORAL ONCE
COMMUNITY

## 2023-11-03 RX ORDER — PROPOFOL 10 MG/ML
VIAL (ML) INTRAVENOUS AS NEEDED
Status: DISCONTINUED | OUTPATIENT
Start: 2023-11-03 | End: 2023-11-03 | Stop reason: SURG

## 2023-11-03 RX ORDER — LIDOCAINE HYDROCHLORIDE 20 MG/ML
INJECTION, SOLUTION INFILTRATION; PERINEURAL AS NEEDED
Status: DISCONTINUED | OUTPATIENT
Start: 2023-11-03 | End: 2023-11-03 | Stop reason: SURG

## 2023-11-03 RX ORDER — SODIUM CHLORIDE 0.9 % (FLUSH) 0.9 %
10 SYRINGE (ML) INJECTION EVERY 12 HOURS SCHEDULED
Status: DISCONTINUED | OUTPATIENT
Start: 2023-11-03 | End: 2023-11-06 | Stop reason: HOSPADM

## 2023-11-03 RX ADMIN — SODIUM CHLORIDE, POTASSIUM CHLORIDE, SODIUM LACTATE AND CALCIUM CHLORIDE 30 ML/HR: 600; 310; 30; 20 INJECTION, SOLUTION INTRAVENOUS at 14:44

## 2023-11-03 RX ADMIN — PROPOFOL 200 MCG/KG/MIN: 10 INJECTION, EMULSION INTRAVENOUS at 15:54

## 2023-11-03 RX ADMIN — LIDOCAINE HYDROCHLORIDE 100 MG: 20 INJECTION, SOLUTION INFILTRATION; PERINEURAL at 15:51

## 2023-11-03 RX ADMIN — PROPOFOL 150 MG: 10 INJECTION, EMULSION INTRAVENOUS at 15:52

## 2023-11-03 RX ADMIN — EPHEDRINE SULFATE 15 MG: 50 INJECTION INTRAVENOUS at 16:19

## 2023-11-03 NOTE — ANESTHESIA PREPROCEDURE EVALUATION
Anesthesia Evaluation     Patient summary reviewed and Nursing notes reviewed   NPO Solid Status: > 8 hours  NPO Liquid Status: > 8 hours           Airway   Mallampati: II  Neck ROM: full  No difficulty expected  Dental - normal exam     Pulmonary     breath sounds clear to auscultation  (+) a smoker Former,sleep apnea  Cardiovascular     Rhythm: regular    (+) past MI , CAD, cardiac stents , hyperlipidemia      Neuro/Psych  (+) psychiatric history Depression  GI/Hepatic/Renal/Endo    (+) GERD    Musculoskeletal     (+) back pain  Abdominal   (+) obese   Substance History   (+) alcohol use     OB/GYN          Other   arthritis,                 Anesthesia Plan    ASA 3     MAC     intravenous induction     Anesthetic plan, risks, benefits, and alternatives have been provided, discussed and informed consent has been obtained with: patient.    CODE STATUS:

## 2023-11-03 NOTE — ANESTHESIA POSTPROCEDURE EVALUATION
Patient: Tiera Abbott    Procedure Summary       Date: 11/03/23 Room / Location: [facility] ENDOSCOPY 8 / [facility] ENDOSCOPY    Anesthesia Start: 1549 Anesthesia Stop: 1615    Procedures:       ESOPHAGOGASTRODUODENOSCOPY with cold biopsies (Esophagus)      COLONOSCOPY to cecum and TI with cold biopsy polypectomy Diagnosis:       Iron deficiency anemia, unspecified iron deficiency anemia type      (Iron deficiency anemia, unspecified iron deficiency anemia type [D50.9])    Surgeons: Ottoniel Alonso MD Provider: Abhay Cervantes MD    Anesthesia Type: MAC ASA Status: 3            Anesthesia Type: MAC    Vitals  Vitals Value Taken Time   /76 11/03/23 1635   Temp     Pulse 66 11/03/23 1636   Resp 16 11/03/23 1635   SpO2 97 % 11/03/23 1636   Vitals shown include unfiled device data.        Anesthesia Post Evaluation

## 2023-11-03 NOTE — DISCHARGE INSTRUCTIONS

## 2023-11-06 LAB
LAB AP CASE REPORT: NORMAL
PATH REPORT.FINAL DX SPEC: NORMAL
PATH REPORT.GROSS SPEC: NORMAL

## 2023-11-08 ENCOUNTER — TELEPHONE (OUTPATIENT)
Dept: GASTROENTEROLOGY | Facility: CLINIC | Age: 63
End: 2023-11-08
Payer: MEDICARE

## 2023-11-09 ENCOUNTER — TELEPHONE (OUTPATIENT)
Dept: GASTROENTEROLOGY | Facility: CLINIC | Age: 63
End: 2023-11-09
Payer: MEDICARE

## 2023-11-09 NOTE — TELEPHONE ENCOUNTER
Patient called. No answer. Left message on an identified VM Advised as per Dr. Alonso's note. Advised to call back with questions.   Order placed for capsule study and update sent to [facility]

## 2023-11-09 NOTE — PROGRESS NOTES
No celiac  No H. pylori  Hyperplastic polyps  Colonoscopy recall 3 years  Schedule wireless capsule endoscopy of the small intestine for obscure occult GI bleeding, iron deficiency anemia  Office visit [facility] 2 weeks after capsule scheduled

## 2023-11-09 NOTE — TELEPHONE ENCOUNTER
----- Message from Ottoniel Alonso MD sent at 11/9/2023  7:51 AM EST -----  No celiac  No H. pylori  Hyperplastic polyps  Colonoscopy recall 3 years  Schedule wireless capsule endoscopy of the small intestine for obscure occult GI bleeding, iron deficiency anemia  Office visit [facility] 2 weeks after capsule scheduled

## 2023-11-20 ENCOUNTER — OFFICE VISIT (OUTPATIENT)
Dept: ONCOLOGY | Facility: CLINIC | Age: 63
End: 2023-11-20
Payer: MEDICARE

## 2023-11-20 ENCOUNTER — LAB (OUTPATIENT)
Dept: LAB | Facility: HOSPITAL | Age: 63
End: 2023-11-20
Payer: MEDICARE

## 2023-11-20 VITALS
RESPIRATION RATE: 18 BRPM | DIASTOLIC BLOOD PRESSURE: 79 MMHG | BODY MASS INDEX: 27.47 KG/M2 | HEART RATE: 84 BPM | SYSTOLIC BLOOD PRESSURE: 114 MMHG | OXYGEN SATURATION: 98 % | TEMPERATURE: 98 F | HEIGHT: 72 IN

## 2023-11-20 DIAGNOSIS — T45.4X5A ADVERSE EFFECT OF IRON, INITIAL ENCOUNTER: ICD-10-CM

## 2023-11-20 DIAGNOSIS — D50.0 IRON DEFICIENCY ANEMIA DUE TO CHRONIC BLOOD LOSS: Primary | ICD-10-CM

## 2023-11-20 DIAGNOSIS — D50.0 IRON DEFICIENCY ANEMIA DUE TO CHRONIC BLOOD LOSS: ICD-10-CM

## 2023-11-20 LAB
BASOPHILS # BLD AUTO: 0.04 10*3/MM3 (ref 0–0.2)
BASOPHILS NFR BLD AUTO: 0.5 % (ref 0–1.5)
DEPRECATED RDW RBC AUTO: 54.7 FL (ref 37–54)
EOSINOPHIL # BLD AUTO: 0.32 10*3/MM3 (ref 0–0.4)
EOSINOPHIL NFR BLD AUTO: 4.2 % (ref 0.3–6.2)
ERYTHROCYTE [DISTWIDTH] IN BLOOD BY AUTOMATED COUNT: 19.3 % (ref 12.3–15.4)
FERRITIN SERPL-MCNC: 16.3 NG/ML (ref 13–150)
HCT VFR BLD AUTO: 30.5 % (ref 34–46.6)
HGB BLD-MCNC: 8.9 G/DL (ref 12–15.9)
IMM GRANULOCYTES # BLD AUTO: 0.09 10*3/MM3 (ref 0–0.05)
IMM GRANULOCYTES NFR BLD AUTO: 1.2 % (ref 0–0.5)
IRON 24H UR-MRATE: 21 MCG/DL (ref 37–145)
IRON SATN MFR SERPL: 5 % (ref 20–50)
LYMPHOCYTES # BLD AUTO: 2.01 10*3/MM3 (ref 0.7–3.1)
LYMPHOCYTES NFR BLD AUTO: 26.1 % (ref 19.6–45.3)
MCH RBC QN AUTO: 23.2 PG (ref 26.6–33)
MCHC RBC AUTO-ENTMCNC: 29.2 G/DL (ref 31.5–35.7)
MCV RBC AUTO: 79.4 FL (ref 79–97)
MONOCYTES # BLD AUTO: 0.6 10*3/MM3 (ref 0.1–0.9)
MONOCYTES NFR BLD AUTO: 7.8 % (ref 5–12)
NEUTROPHILS NFR BLD AUTO: 4.65 10*3/MM3 (ref 1.7–7)
NEUTROPHILS NFR BLD AUTO: 60.2 % (ref 42.7–76)
NRBC BLD AUTO-RTO: 0.3 /100 WBC (ref 0–0.2)
PLATELET # BLD AUTO: 363 10*3/MM3 (ref 140–450)
PMV BLD AUTO: 9.9 FL (ref 6–12)
RBC # BLD AUTO: 3.84 10*6/MM3 (ref 3.77–5.28)
TIBC SERPL-MCNC: 402 MCG/DL (ref 298–536)
TRANSFERRIN SERPL-MCNC: 287 MG/DL (ref 200–360)
WBC NRBC COR # BLD AUTO: 7.71 10*3/MM3 (ref 3.4–10.8)

## 2023-11-20 PROCEDURE — 84466 ASSAY OF TRANSFERRIN: CPT

## 2023-11-20 PROCEDURE — 83540 ASSAY OF IRON: CPT

## 2023-11-20 PROCEDURE — 85025 COMPLETE CBC W/AUTO DIFF WBC: CPT

## 2023-11-20 PROCEDURE — 82728 ASSAY OF FERRITIN: CPT

## 2023-11-20 PROCEDURE — 36415 COLL VENOUS BLD VENIPUNCTURE: CPT

## 2023-11-20 NOTE — PROGRESS NOTES
"  REASON FOR FOLLOW-UP: History of recurrent iron deficiency anemia requiring repeated IV iron therapy.      History of Present Illness    Mrs. Abbott is a pleasant 63-year-old woman who returned today for follow-up of recurrent iron deficiency anemia.  She required intravenous iron most recently September 2023.  Patient has since undergone colonoscopy showing hyperplastic polyps and 3-year follow-up planned.  She is scheduled for capsule endoscopy upcoming.  Continues with fatigue, worsened over last few weeks.  Increased shortness of breath with exertion over last few weeks.  Has been craving ice.  Denies dizziness, lightheadedness, or obvious signs of bleeding.     Past Medical History, Past Surgical History, Social History, Family History have been reviewed and are without significant changes.    Review of Systems   Constitutional:  Positive for fatigue. Negative for activity change and unexpected weight change.   HENT: Negative.     Respiratory:  Negative for shortness of breath (Dyspnea on exertion).    Cardiovascular: Negative.    Gastrointestinal:  Negative for blood in stool, constipation and nausea.   Genitourinary: Negative.    Musculoskeletal: Negative.    Skin: Negative.    Neurological:  Negative for dizziness.   Hematological: Negative.    Psychiatric/Behavioral: Negative.       Medications:  The current medication list was reviewed in the EMR    ALLERGIES:    No Known Allergies    Objective      Vitals:    11/20/23 1554   BP: 114/79   Pulse: 84   Resp: 18   Temp: 98 °F (36.7 °C)   TempSrc: Temporal   SpO2: 98%   Weight: Comment: pt doesn't want wt done   Height: 182.9 cm (72.01\")  Comment: pt confirmed ht   PainSc: 0-No pain           11/20/2023     4:00 PM   Current Status   ECOG score 0       Physical Exam    CON: pleasant well-developed adult woman  HEENT: no icterus, no thrush, moist membranes  CV: RRR, S1S2, no murmur  RESP: cta bilat, no wheezing, no rales  GI: soft, non-tender, no splenomegaly, " +bs  MUSC: no edema, normal gait  NEURO: alert and oriented x3, normal strength  PSYCH: normal mood and affect  Exam unchanged- 11/20/2023      RECENT LABS:  Results from last 7 days   Lab Units 11/20/23  1517   WBC 10*3/mm3 7.71   NEUTROS ABS 10*3/mm3 4.65   HEMOGLOBIN g/dL 8.9*   HEMATOCRIT % 30.5*   PLATELETS 10*3/mm3 363       Results from last 7 days   Lab Units 11/20/23  1517   FERRITIN ng/mL 16.30   IRON mcg/dL 21*   TIBC mcg/dL 402                 Assessment & Plan    1.  Recurrent iron deficiency anemia suspected to be secondary to GI blood loss (established with GI).  Hemoglobin has dropped 4 g to 8.6 today with ferritin 9/iron sat 3% consistent with iron deficiency  Received Venofer 300 mg x 4 from 8/24/2023 - 9/7/2023.  EGD and colonoscopy 11/3/2023-nonbleeding internal hemorrhoids and 2 polyps removed for biopsy.  Recommended repeat colonoscopy in 3 years.  11/20/2023 hemoglobin 8.9, ferritin 16.3, iron saturation 5%.  Patient will require IV iron, pending insurance approval.  2.  Intolerance to oral iron secondary to constipation    Hematology plan:  Replacement iron with Venofer 300 mg weekly x4 doses   Repeat CBC dose 2 and 4 of Venofer   Return in 3 months for repeat iron labs and MD follow-up.  Continue follow-up with GI as recommended.        CC:

## 2023-11-27 ENCOUNTER — APPOINTMENT (OUTPATIENT)
Dept: GENERAL RADIOLOGY | Facility: HOSPITAL | Age: 63
DRG: 812 | End: 2023-11-27
Payer: MEDICARE

## 2023-11-27 ENCOUNTER — HOSPITAL ENCOUNTER (INPATIENT)
Facility: HOSPITAL | Age: 63
LOS: 1 days | Discharge: HOME | DRG: 812 | End: 2023-11-29
Attending: EMERGENCY MEDICINE | Admitting: INTERNAL MEDICINE
Payer: MEDICARE

## 2023-11-27 DIAGNOSIS — R07.9 CHEST PAIN, UNSPECIFIED TYPE: ICD-10-CM

## 2023-11-27 DIAGNOSIS — R06.09 EXERTIONAL DYSPNEA: ICD-10-CM

## 2023-11-27 DIAGNOSIS — D50.0 IRON DEFICIENCY ANEMIA DUE TO CHRONIC BLOOD LOSS: Primary | ICD-10-CM

## 2023-11-27 DIAGNOSIS — R53.1 GENERALIZED WEAKNESS: ICD-10-CM

## 2023-11-27 PROBLEM — D64.9 PROFOUND ANEMIA: Status: ACTIVE | Noted: 2023-11-27

## 2023-11-27 LAB
ABO GROUP BLD: NORMAL
ALBUMIN SERPL-MCNC: 3.7 G/DL (ref 3.5–5.2)
ALBUMIN/GLOB SERPL: 1.2 G/DL
ALP SERPL-CCNC: 119 U/L (ref 39–117)
ALT SERPL W P-5'-P-CCNC: <5 U/L (ref 1–33)
ANION GAP SERPL CALCULATED.3IONS-SCNC: 9 MMOL/L (ref 5–15)
ANISOCYTOSIS BLD QL: NORMAL
APTT PPP: 27.9 SECONDS (ref 22.7–35.4)
AST SERPL-CCNC: 12 U/L (ref 1–32)
BASOPHILS # BLD MANUAL: 0.08 10*3/MM3 (ref 0–0.2)
BASOPHILS NFR BLD MANUAL: 1 % (ref 0–1.5)
BILIRUB SERPL-MCNC: <0.2 MG/DL (ref 0–1.2)
BLD GP AB SCN SERPL QL: NEGATIVE
BUN SERPL-MCNC: 10 MG/DL (ref 8–23)
BUN/CREAT SERPL: 13.5 (ref 7–25)
CALCIUM SPEC-SCNC: 8.6 MG/DL (ref 8.6–10.5)
CHLORIDE SERPL-SCNC: 108 MMOL/L (ref 98–107)
CO2 SERPL-SCNC: 23 MMOL/L (ref 22–29)
CREAT SERPL-MCNC: 0.74 MG/DL (ref 0.57–1)
DEPRECATED RDW RBC AUTO: 48.3 FL (ref 37–54)
EGFRCR SERPLBLD CKD-EPI 2021: 91 ML/MIN/1.73
EOSINOPHIL # BLD MANUAL: 0.32 10*3/MM3 (ref 0–0.4)
EOSINOPHIL NFR BLD MANUAL: 4 % (ref 0.3–6.2)
ERYTHROCYTE [DISTWIDTH] IN BLOOD BY AUTOMATED COUNT: 18.3 % (ref 12.3–15.4)
GEN 5 2HR TROPONIN T REFLEX: 16 NG/L
GLOBULIN UR ELPH-MCNC: 3 GM/DL
GLUCOSE SERPL-MCNC: 115 MG/DL (ref 65–99)
HCT VFR BLD AUTO: 23.5 % (ref 34–46.6)
HGB BLD-MCNC: 7.1 G/DL (ref 12–15.9)
HOLD SPECIMEN: NORMAL
HOLD SPECIMEN: NORMAL
HYPOCHROMIA BLD QL: NORMAL
INR PPP: 1.05 (ref 0.9–1.1)
LYMPHOCYTES # BLD MANUAL: 1.6 10*3/MM3 (ref 0.7–3.1)
LYMPHOCYTES NFR BLD MANUAL: 5.1 % (ref 5–12)
MAGNESIUM SERPL-MCNC: 2.1 MG/DL (ref 1.6–2.4)
MCH RBC QN AUTO: 22.5 PG (ref 26.6–33)
MCHC RBC AUTO-ENTMCNC: 30.2 G/DL (ref 31.5–35.7)
MCV RBC AUTO: 74.6 FL (ref 79–97)
MICROCYTES BLD QL: NORMAL
MONOCYTES # BLD: 0.4 10*3/MM3 (ref 0.1–0.9)
NEUTROPHILS # BLD AUTO: 5.53 10*3/MM3 (ref 1.7–7)
NEUTROPHILS NFR BLD MANUAL: 69.7 % (ref 42.7–76)
NRBC BLD AUTO-RTO: 0.5 /100 WBC (ref 0–0.2)
NT-PROBNP SERPL-MCNC: 210 PG/ML (ref 0–900)
PLAT MORPH BLD: NORMAL
PLATELET # BLD AUTO: 400 10*3/MM3 (ref 140–450)
PMV BLD AUTO: 10.5 FL (ref 6–12)
POIKILOCYTOSIS BLD QL SMEAR: NORMAL
POLYCHROMASIA BLD QL SMEAR: NORMAL
POTASSIUM SERPL-SCNC: 3.5 MMOL/L (ref 3.5–5.2)
PROT SERPL-MCNC: 6.7 G/DL (ref 6–8.5)
PROTHROMBIN TIME: 13.8 SECONDS (ref 11.7–14.2)
RBC # BLD AUTO: 3.15 10*6/MM3 (ref 3.77–5.28)
RH BLD: POSITIVE
SODIUM SERPL-SCNC: 140 MMOL/L (ref 136–145)
T&S EXPIRATION DATE: NORMAL
TROPONIN T DELTA: 5 NG/L
TROPONIN T SERPL HS-MCNC: 11 NG/L
VARIANT LYMPHS NFR BLD MANUAL: 20.2 % (ref 19.6–45.3)
WBC MORPH BLD: NORMAL
WBC NRBC COR # BLD AUTO: 7.93 10*3/MM3 (ref 3.4–10.8)
WHOLE BLOOD HOLD COAG: NORMAL
WHOLE BLOOD HOLD SPECIMEN: NORMAL

## 2023-11-27 PROCEDURE — 85025 COMPLETE CBC W/AUTO DIFF WBC: CPT

## 2023-11-27 PROCEDURE — P9016 RBC LEUKOCYTES REDUCED: HCPCS

## 2023-11-27 PROCEDURE — 83735 ASSAY OF MAGNESIUM: CPT | Performed by: EMERGENCY MEDICINE

## 2023-11-27 PROCEDURE — 36415 COLL VENOUS BLD VENIPUNCTURE: CPT

## 2023-11-27 PROCEDURE — 84484 ASSAY OF TROPONIN QUANT: CPT | Performed by: EMERGENCY MEDICINE

## 2023-11-27 PROCEDURE — 85730 THROMBOPLASTIN TIME PARTIAL: CPT | Performed by: EMERGENCY MEDICINE

## 2023-11-27 PROCEDURE — 93010 ELECTROCARDIOGRAM REPORT: CPT | Performed by: INTERNAL MEDICINE

## 2023-11-27 PROCEDURE — 84484 ASSAY OF TROPONIN QUANT: CPT

## 2023-11-27 PROCEDURE — 86900 BLOOD TYPING SEROLOGIC ABO: CPT | Performed by: EMERGENCY MEDICINE

## 2023-11-27 PROCEDURE — G0378 HOSPITAL OBSERVATION PER HR: HCPCS

## 2023-11-27 PROCEDURE — 86923 COMPATIBILITY TEST ELECTRIC: CPT

## 2023-11-27 PROCEDURE — 86850 RBC ANTIBODY SCREEN: CPT | Performed by: EMERGENCY MEDICINE

## 2023-11-27 PROCEDURE — 85007 BL SMEAR W/DIFF WBC COUNT: CPT | Performed by: EMERGENCY MEDICINE

## 2023-11-27 PROCEDURE — 85610 PROTHROMBIN TIME: CPT | Performed by: EMERGENCY MEDICINE

## 2023-11-27 PROCEDURE — 36430 TRANSFUSION BLD/BLD COMPNT: CPT

## 2023-11-27 PROCEDURE — 93005 ELECTROCARDIOGRAM TRACING: CPT

## 2023-11-27 PROCEDURE — 71045 X-RAY EXAM CHEST 1 VIEW: CPT

## 2023-11-27 PROCEDURE — 80053 COMPREHEN METABOLIC PANEL: CPT

## 2023-11-27 PROCEDURE — 99285 EMERGENCY DEPT VISIT HI MDM: CPT

## 2023-11-27 PROCEDURE — 83880 ASSAY OF NATRIURETIC PEPTIDE: CPT | Performed by: EMERGENCY MEDICINE

## 2023-11-27 PROCEDURE — 86901 BLOOD TYPING SEROLOGIC RH(D): CPT | Performed by: EMERGENCY MEDICINE

## 2023-11-27 PROCEDURE — 86900 BLOOD TYPING SEROLOGIC ABO: CPT

## 2023-11-27 PROCEDURE — 93005 ELECTROCARDIOGRAM TRACING: CPT | Performed by: EMERGENCY MEDICINE

## 2023-11-27 RX ORDER — SODIUM CHLORIDE 0.9 % (FLUSH) 0.9 %
10 SYRINGE (ML) INJECTION AS NEEDED
Status: DISCONTINUED | OUTPATIENT
Start: 2023-11-27 | End: 2023-11-29 | Stop reason: HOSPADM

## 2023-11-27 RX ORDER — ASPIRIN 325 MG
325 TABLET ORAL ONCE
Status: DISCONTINUED | OUTPATIENT
Start: 2023-11-27 | End: 2023-11-27

## 2023-11-28 ENCOUNTER — APPOINTMENT (OUTPATIENT)
Dept: CARDIOLOGY | Facility: HOSPITAL | Age: 63
DRG: 812 | End: 2023-11-28
Payer: MEDICARE

## 2023-11-28 LAB
ANION GAP SERPL CALCULATED.3IONS-SCNC: 8 MMOL/L (ref 5–15)
AORTIC ARCH: 2.5 CM
AORTIC DIMENSIONLESS INDEX: 0.6 (DI)
ASCENDING AORTA: 3.1 CM
BH BB BLOOD EXPIRATION DATE: NORMAL
BH BB BLOOD TYPE BARCODE: 5100
BH BB DISPENSE STATUS: NORMAL
BH BB PRODUCT CODE: NORMAL
BH BB UNIT NUMBER: NORMAL
BH CV ECHO MEAS - ACS: 2.19 CM
BH CV ECHO MEAS - AI P1/2T: 610.8 MSEC
BH CV ECHO MEAS - AO MAX PG: 12.1 MMHG
BH CV ECHO MEAS - AO MEAN PG: 6 MMHG
BH CV ECHO MEAS - AO ROOT DIAM: 3.6 CM
BH CV ECHO MEAS - AO V2 MAX: 174 CM/SEC
BH CV ECHO MEAS - AO V2 VTI: 39.1 CM
BH CV ECHO MEAS - AVA(I,D): 2.02 CM2
BH CV ECHO MEAS - EDV(CUBED): 117.6 ML
BH CV ECHO MEAS - EDV(MOD-SP2): 63 ML
BH CV ECHO MEAS - EDV(MOD-SP4): 67 ML
BH CV ECHO MEAS - EF(MOD-BP): 60.2 %
BH CV ECHO MEAS - EF(MOD-SP2): 63.5 %
BH CV ECHO MEAS - EF(MOD-SP4): 58.2 %
BH CV ECHO MEAS - ESV(CUBED): 29.6 ML
BH CV ECHO MEAS - ESV(MOD-SP2): 23 ML
BH CV ECHO MEAS - ESV(MOD-SP4): 28 ML
BH CV ECHO MEAS - FS: 36.9 %
BH CV ECHO MEAS - IVS/LVPW: 1.1 CM
BH CV ECHO MEAS - IVSD: 1.1 CM
BH CV ECHO MEAS - LAT PEAK E' VEL: 11.1 CM/SEC
BH CV ECHO MEAS - LV MASS(C)D: 188.1 GRAMS
BH CV ECHO MEAS - LV MAX PG: 5.7 MMHG
BH CV ECHO MEAS - LV MEAN PG: 3 MMHG
BH CV ECHO MEAS - LV V1 MAX: 119 CM/SEC
BH CV ECHO MEAS - LV V1 VTI: 25.2 CM
BH CV ECHO MEAS - LVIDD: 4.9 CM
BH CV ECHO MEAS - LVIDS: 3.1 CM
BH CV ECHO MEAS - LVOT AREA: 3.1 CM2
BH CV ECHO MEAS - LVOT DIAM: 2 CM
BH CV ECHO MEAS - LVPWD: 1 CM
BH CV ECHO MEAS - MED PEAK E' VEL: 10.1 CM/SEC
BH CV ECHO MEAS - MR MAX PG: 68.1 MMHG
BH CV ECHO MEAS - MR MAX VEL: 412.6 CM/SEC
BH CV ECHO MEAS - MV A DUR: 0.12 SEC
BH CV ECHO MEAS - MV A MAX VEL: 77.8 CM/SEC
BH CV ECHO MEAS - MV DEC SLOPE: 421.2 CM/SEC2
BH CV ECHO MEAS - MV DEC TIME: 0.24 SEC
BH CV ECHO MEAS - MV E MAX VEL: 108 CM/SEC
BH CV ECHO MEAS - MV E/A: 1.39
BH CV ECHO MEAS - MV MAX PG: 5.5 MMHG
BH CV ECHO MEAS - MV MEAN PG: 1.96 MMHG
BH CV ECHO MEAS - MV P1/2T: 82.6 MSEC
BH CV ECHO MEAS - MV V2 VTI: 35.3 CM
BH CV ECHO MEAS - MVA(P1/2T): 2.7 CM2
BH CV ECHO MEAS - MVA(VTI): 2.23 CM2
BH CV ECHO MEAS - PA ACC TIME: 0.13 SEC
BH CV ECHO MEAS - PA V2 MAX: 100.8 CM/SEC
BH CV ECHO MEAS - PULM A REVS DUR: 0.15 SEC
BH CV ECHO MEAS - PULM A REVS VEL: 36.5 CM/SEC
BH CV ECHO MEAS - PULM DIAS VEL: 53.3 CM/SEC
BH CV ECHO MEAS - PULM S/D: 1.26
BH CV ECHO MEAS - PULM SYS VEL: 67.3 CM/SEC
BH CV ECHO MEAS - RAP SYSTOLE: 3 MMHG
BH CV ECHO MEAS - RV MAX PG: 3.6 MMHG
BH CV ECHO MEAS - RV V1 MAX: 94.5 CM/SEC
BH CV ECHO MEAS - RV V1 VTI: 22.8 CM
BH CV ECHO MEAS - RVSP: 9.7 MMHG
BH CV ECHO MEAS - SV(LVOT): 78.9 ML
BH CV ECHO MEAS - SV(MOD-SP2): 40 ML
BH CV ECHO MEAS - SV(MOD-SP4): 39 ML
BH CV ECHO MEAS - TAPSE (>1.6): 2.09 CM
BH CV ECHO MEAS - TR MAX PG: 6.7 MMHG
BH CV ECHO MEAS - TR MAX VEL: 129 CM/SEC
BH CV ECHO MEASUREMENTS AVERAGE E/E' RATIO: 10.19
BH CV XLRA - RV BASE: 3.1 CM
BH CV XLRA - RV LENGTH: 7.2 CM
BH CV XLRA - RV MID: 2.43 CM
BH CV XLRA - TDI S': 12 CM/SEC
BUN SERPL-MCNC: 12 MG/DL (ref 8–23)
BUN/CREAT SERPL: 15.6 (ref 7–25)
CALCIUM SPEC-SCNC: 8.6 MG/DL (ref 8.6–10.5)
CHLORIDE SERPL-SCNC: 110 MMOL/L (ref 98–107)
CO2 SERPL-SCNC: 26 MMOL/L (ref 22–29)
CREAT SERPL-MCNC: 0.77 MG/DL (ref 0.57–1)
CROSSMATCH INTERPRETATION: NORMAL
DEPRECATED RDW RBC AUTO: 52.4 FL (ref 37–54)
EGFRCR SERPLBLD CKD-EPI 2021: 86.8 ML/MIN/1.73
ERYTHROCYTE [DISTWIDTH] IN BLOOD BY AUTOMATED COUNT: 18.5 % (ref 12.3–15.4)
GLUCOSE SERPL-MCNC: 101 MG/DL (ref 65–99)
HCT VFR BLD AUTO: 24.9 % (ref 34–46.6)
HCT VFR BLD AUTO: 31.2 % (ref 34–46.6)
HGB BLD-MCNC: 7.4 G/DL (ref 12–15.9)
HGB BLD-MCNC: 9.2 G/DL (ref 12–15.9)
LEFT ATRIUM VOLUME INDEX: 29.8 ML/M2
MCH RBC QN AUTO: 23.1 PG (ref 26.6–33)
MCHC RBC AUTO-ENTMCNC: 29.7 G/DL (ref 31.5–35.7)
MCV RBC AUTO: 77.8 FL (ref 79–97)
PLATELET # BLD AUTO: 311 10*3/MM3 (ref 140–450)
PMV BLD AUTO: 10.8 FL (ref 6–12)
POTASSIUM SERPL-SCNC: 3.8 MMOL/L (ref 3.5–5.2)
QT INTERVAL: 370 MS
QT INTERVAL: 438 MS
QTC INTERVAL: 430 MS
QTC INTERVAL: 452 MS
RBC # BLD AUTO: 3.2 10*6/MM3 (ref 3.77–5.28)
SINUS: 3.1 CM
SODIUM SERPL-SCNC: 144 MMOL/L (ref 136–145)
STJ: 2.8 CM
TROPONIN T SERPL HS-MCNC: 13 NG/L
UNIT  ABO: NORMAL
UNIT  RH: NORMAL
WBC NRBC COR # BLD AUTO: 7.23 10*3/MM3 (ref 3.4–10.8)

## 2023-11-28 PROCEDURE — 99222 1ST HOSP IP/OBS MODERATE 55: CPT | Performed by: PHYSICIAN ASSISTANT

## 2023-11-28 PROCEDURE — 93306 TTE W/DOPPLER COMPLETE: CPT

## 2023-11-28 PROCEDURE — 99222 1ST HOSP IP/OBS MODERATE 55: CPT | Performed by: STUDENT IN AN ORGANIZED HEALTH CARE EDUCATION/TRAINING PROGRAM

## 2023-11-28 PROCEDURE — 93005 ELECTROCARDIOGRAM TRACING: CPT | Performed by: NURSE PRACTITIONER

## 2023-11-28 PROCEDURE — 93010 ELECTROCARDIOGRAM REPORT: CPT | Performed by: INTERNAL MEDICINE

## 2023-11-28 PROCEDURE — 25810000003 SODIUM CHLORIDE 0.9 % SOLUTION: Performed by: STUDENT IN AN ORGANIZED HEALTH CARE EDUCATION/TRAINING PROGRAM

## 2023-11-28 PROCEDURE — 93306 TTE W/DOPPLER COMPLETE: CPT | Performed by: INTERNAL MEDICINE

## 2023-11-28 PROCEDURE — 85014 HEMATOCRIT: CPT | Performed by: NURSE PRACTITIONER

## 2023-11-28 PROCEDURE — 36430 TRANSFUSION BLD/BLD COMPNT: CPT

## 2023-11-28 PROCEDURE — 84484 ASSAY OF TROPONIN QUANT: CPT | Performed by: NURSE PRACTITIONER

## 2023-11-28 PROCEDURE — 85027 COMPLETE CBC AUTOMATED: CPT | Performed by: NURSE PRACTITIONER

## 2023-11-28 PROCEDURE — 85018 HEMOGLOBIN: CPT | Performed by: NURSE PRACTITIONER

## 2023-11-28 PROCEDURE — 86900 BLOOD TYPING SEROLOGIC ABO: CPT

## 2023-11-28 PROCEDURE — P9016 RBC LEUKOCYTES REDUCED: HCPCS

## 2023-11-28 PROCEDURE — 80048 BASIC METABOLIC PNL TOTAL CA: CPT | Performed by: NURSE PRACTITIONER

## 2023-11-28 PROCEDURE — 99222 1ST HOSP IP/OBS MODERATE 55: CPT | Performed by: INTERNAL MEDICINE

## 2023-11-28 PROCEDURE — 25010000002 NA FERRIC GLUC CPLX PER 12.5 MG: Performed by: STUDENT IN AN ORGANIZED HEALTH CARE EDUCATION/TRAINING PROGRAM

## 2023-11-28 RX ORDER — SODIUM CHLORIDE 0.9 % (FLUSH) 0.9 %
10 SYRINGE (ML) INJECTION AS NEEDED
Status: DISCONTINUED | OUTPATIENT
Start: 2023-11-28 | End: 2023-11-29 | Stop reason: HOSPADM

## 2023-11-28 RX ORDER — ONDANSETRON 4 MG/1
4 TABLET, FILM COATED ORAL EVERY 6 HOURS PRN
Status: DISCONTINUED | OUTPATIENT
Start: 2023-11-28 | End: 2023-11-29 | Stop reason: HOSPADM

## 2023-11-28 RX ORDER — CARVEDILOL 3.12 MG/1
3.12 TABLET ORAL 2 TIMES DAILY WITH MEALS
Status: DISCONTINUED | OUTPATIENT
Start: 2023-11-28 | End: 2023-11-29 | Stop reason: HOSPADM

## 2023-11-28 RX ORDER — PANTOPRAZOLE SODIUM 40 MG/1
40 TABLET, DELAYED RELEASE ORAL
Status: DISCONTINUED | OUTPATIENT
Start: 2023-11-28 | End: 2023-11-28

## 2023-11-28 RX ORDER — CALCIUM CARBONATE 500 MG/1
2 TABLET, CHEWABLE ORAL 2 TIMES DAILY PRN
Status: DISCONTINUED | OUTPATIENT
Start: 2023-11-28 | End: 2023-11-29 | Stop reason: HOSPADM

## 2023-11-28 RX ORDER — PANTOPRAZOLE SODIUM 40 MG/10ML
40 INJECTION, POWDER, LYOPHILIZED, FOR SOLUTION INTRAVENOUS
Status: DISCONTINUED | OUTPATIENT
Start: 2023-11-28 | End: 2023-11-29 | Stop reason: HOSPADM

## 2023-11-28 RX ORDER — SODIUM CHLORIDE 9 MG/ML
40 INJECTION, SOLUTION INTRAVENOUS AS NEEDED
Status: DISCONTINUED | OUTPATIENT
Start: 2023-11-28 | End: 2023-11-29 | Stop reason: HOSPADM

## 2023-11-28 RX ORDER — ACETAMINOPHEN 650 MG/1
650 SUPPOSITORY RECTAL EVERY 4 HOURS PRN
Status: DISCONTINUED | OUTPATIENT
Start: 2023-11-28 | End: 2023-11-29 | Stop reason: HOSPADM

## 2023-11-28 RX ORDER — ACETAMINOPHEN 160 MG/5ML
650 SOLUTION ORAL EVERY 4 HOURS PRN
Status: DISCONTINUED | OUTPATIENT
Start: 2023-11-28 | End: 2023-11-29 | Stop reason: HOSPADM

## 2023-11-28 RX ORDER — ATORVASTATIN CALCIUM 80 MG/1
80 TABLET, FILM COATED ORAL NIGHTLY
COMMUNITY

## 2023-11-28 RX ORDER — ACETAMINOPHEN 325 MG/1
650 TABLET ORAL EVERY 4 HOURS PRN
Status: DISCONTINUED | OUTPATIENT
Start: 2023-11-28 | End: 2023-11-29 | Stop reason: HOSPADM

## 2023-11-28 RX ORDER — ATORVASTATIN CALCIUM 80 MG/1
80 TABLET, FILM COATED ORAL NIGHTLY
Status: DISCONTINUED | OUTPATIENT
Start: 2023-11-28 | End: 2023-11-29 | Stop reason: HOSPADM

## 2023-11-28 RX ORDER — NITROGLYCERIN 0.4 MG/1
0.4 TABLET SUBLINGUAL
Status: DISCONTINUED | OUTPATIENT
Start: 2023-11-28 | End: 2023-11-29 | Stop reason: HOSPADM

## 2023-11-28 RX ORDER — ONDANSETRON 2 MG/ML
4 INJECTION INTRAMUSCULAR; INTRAVENOUS EVERY 6 HOURS PRN
Status: DISCONTINUED | OUTPATIENT
Start: 2023-11-28 | End: 2023-11-29 | Stop reason: HOSPADM

## 2023-11-28 RX ORDER — CLOBETASOL PROPIONATE 0.5 MG/G
CREAM TOPICAL 2 TIMES DAILY
Status: DISCONTINUED | OUTPATIENT
Start: 2023-11-28 | End: 2023-11-29 | Stop reason: HOSPADM

## 2023-11-28 RX ORDER — SODIUM CHLORIDE 0.9 % (FLUSH) 0.9 %
10 SYRINGE (ML) INJECTION EVERY 12 HOURS SCHEDULED
Status: DISCONTINUED | OUTPATIENT
Start: 2023-11-28 | End: 2023-11-29 | Stop reason: HOSPADM

## 2023-11-28 RX ADMIN — PANTOPRAZOLE SODIUM 40 MG: 40 INJECTION, POWDER, FOR SOLUTION INTRAVENOUS at 17:41

## 2023-11-28 RX ADMIN — CARVEDILOL 3.12 MG: 3.12 TABLET, FILM COATED ORAL at 09:08

## 2023-11-28 RX ADMIN — Medication 10 ML: at 20:18

## 2023-11-28 RX ADMIN — Medication 10 ML: at 09:04

## 2023-11-28 RX ADMIN — SODIUM CHLORIDE 250 MG: 9 INJECTION, SOLUTION INTRAVENOUS at 09:08

## 2023-11-28 RX ADMIN — PANTOPRAZOLE SODIUM 40 MG: 40 TABLET, DELAYED RELEASE ORAL at 02:35

## 2023-11-28 RX ADMIN — CLOBETASOL PROPIONATE CREAM USP, 0.05%: 0.5 CREAM TOPICAL at 20:18

## 2023-11-28 RX ADMIN — ATORVASTATIN CALCIUM 80 MG: 80 TABLET, FILM COATED ORAL at 02:29

## 2023-11-28 RX ADMIN — ATORVASTATIN CALCIUM 80 MG: 80 TABLET, FILM COATED ORAL at 20:18

## 2023-11-28 RX ADMIN — ACETAMINOPHEN 650 MG: 325 TABLET, FILM COATED ORAL at 02:30

## 2023-11-28 RX ADMIN — VENLAFAXINE HYDROCHLORIDE 112.5 MG: 37.5 CAPSULE, EXTENDED RELEASE ORAL at 20:18

## 2023-11-28 RX ADMIN — CLOBETASOL PROPIONATE CREAM USP, 0.05%: 0.5 CREAM TOPICAL at 09:04

## 2023-11-28 RX ADMIN — CARVEDILOL 3.12 MG: 3.12 TABLET, FILM COATED ORAL at 17:41

## 2023-11-28 RX ADMIN — VENLAFAXINE HYDROCHLORIDE 112.5 MG: 37.5 CAPSULE, EXTENDED RELEASE ORAL at 03:45

## 2023-11-28 NOTE — ED NOTES
"Pt arrived via pv from home w/ c/o midsternal cp x3 days w/ exertion. Per pt when she exerts herself she gets SOB, her throat hurts \"making it hurt to swallow,\" and then her chest begins to hurt. Pt A&O x4 during triage. Pt rates pain 8/10.   "

## 2023-11-28 NOTE — CONSULTS
Date of Hospital Visit: 23  Encounter Provider: Joseph Llanos MD  Place of Service: [facility] CARDIOLOGY  Patient Name: Tiera Abbott  :1960  Referral Provider: Mauro Fletcher MD    Chief complaint  Chest pain    History of Present Illness  63-year-old woman with CAD with prior inferior MI in  status post PCI, iron deficiency anemia on Venofer, who presented to the ED with exertional chest pain and dyspnea. On arrival to the ED, she was afebrile, tachycardic to the low 100s, respiratory rate 18, blood pressure 115/70, O2 saturation 96% on room air.  Blood work with high-sensitivity troponin 11 -> 16 -> 13.  Hemoglobin down to 7.1 from 8.9 a week ago.  Initial EKG with normal sinus rhythm and no acute ST-T changes.    Patient notes that she has similar symptoms when she becomes profoundly anemic.  She states that she is able to tell what her blood counts are can be based on her symptoms.  More recently, she has been experiencing dyspnea on exertion and chest pain with minimal activity.    Past Medical History:   Diagnosis Date    Acid reflux     Alcoholism in remission     SINCE AGE 22    Arthritis     Back pain     Cardiac disease     Chest pain     Coronary artery disease     mi    Depression     Eczema     Encounter for screening mammogram for breast cancer 2019    Fibrocystic breast     Heart attack 2008    Heart attack     Heart disease     History of anemia     History of bruising easily     History of COVID-19         Hypercholesterolemia     IBS (irritable bowel syndrome)     Inappropriate (too hot or too cold) temperature in local application and packing     always hot or cold    Joint pain, knee     LEFT KNEE    Low iron     Menopausal state     Muscle pain     Sleep apnea     DOES NOT USE MACHINE    Stiffness in joint        Past Surgical History:   Procedure Laterality Date    BILATERAL BREAST REDUCTION  2009    BREAST BIOPSY      CARDIAC  CATHETERIZATION      COLONOSCOPY  2010    COLONOSCOPY N/A 08/28/2017    One 4 mm polyp in the ascending colon, One 6 mm polyp in the ascending colon, One 4 mm polyp in the sigmoid colon, One 6 mm polyp in the rectum, One 5 mm polyp in the rectum,Diverticulosis, IH. PATH:  Tubular adenoma, hyperplastic polyp     COLONOSCOPY N/A 06/11/2019    Perianal skin tags found on perianal exam, One 3 mm polyp in the transverse colon, Two 3 to 4 mm polyps in the sigmoid colon, Diverticulosis, NBIH. Path: Hyperplastic polyp.    COLONOSCOPY N/A 11/3/2023    Procedure: COLONOSCOPY to cecum and TI with cold biopsy polypectomy;  Surgeon: Ottoniel Alonso MD;  Location: [facility] ENDOSCOPY;  Service: Gastroenterology;  Laterality: N/A;  pre: iron deficiency anemia  post: diverticulosis, normal TI, polyps    CORONARY STENT PLACEMENT      2    ENDOSCOPY N/A 08/25/2016    small hiatal hernia, normal stomach/duodenum, no specimens collected    ENDOSCOPY N/A 08/28/2017    Z line irregular, 3cm hiatal hernia, erythematous mucosa in gastric body/antrum and duodenopathy, normal 2-3 portions of duodenum    ENDOSCOPY N/A 06/11/2019    Z-line regular, Non-severe esophagitis, Gastritis, Small paraesophageal hernia. Path: Mild chronic inflammation.    ENDOSCOPY N/A 11/3/2023    Procedure: ESOPHAGOGASTRODUODENOSCOPY with cold biopsies;  Surgeon: Ottoniel Alonso MD;  Location: [facility] ENDOSCOPY;  Service: Gastroenterology;  Laterality: N/A;  pre: iron deficiency anemia  post: hiatal hernia    JOINT REPLACEMENT Left     total knee    KNEE SURGERY      2 knee sugeries on left knee    ID ARTHRP KNE CONDYLE&PLATU MEDIAL&LAT COMPARTMENTS Left 03/14/2016    Procedure: LEFT TOTAL KNEE ARTHROPLASTY;  Surgeon: Bladimir Mehta MD;  Location: [facility] MAIN OR;  Service: Orthopedics    REDUCTION MAMMAPLASTY Bilateral 2009    WISDOM TOOTH EXTRACTION      WOUND DEBRIDEMENT Bilateral     BREAST.  AFTER REDUCTION       Medications Prior to Admission   Medication Sig  Dispense Refill Last Dose    atorvastatin (LIPITOR) 80 MG tablet Take 1 tablet by mouth Every Night.       carvedilol (COREG) 3.125 MG tablet TAKE ONE TABLET BY MOUTH TWICE A DAY WITH MEALS (Patient taking differently: Take 1 tablet by mouth 2 (Two) Times a Day With Meals.) 180 tablet 2     clobetasol (TEMOVATE) 0.05 % cream Apply  topically to the appropriate area as directed 2 (Two) Times a Day. Sparingly, avoid face, short-term use only 30 g 1     clopidogrel (PLAVIX) 75 MG tablet TAKE ONE TABLET BY MOUTH DAILY (Patient taking differently: TAKE ONE TABLET BY MOUTH Nightly. LD 10/28) 90 tablet 0     pantoprazole (PROTONIX) 40 MG EC tablet Take 1 tablet by mouth Daily. (Patient taking differently: Take 1 tablet by mouth Every Night.) 90 tablet 3     venlafaxine XR (EFFEXOR-XR) 37.5 MG 24 hr capsule Take 3 capsules by mouth Daily. (Patient taking differently: Take 3 capsules by mouth Every Night.) 270 capsule 1        Current Meds  Scheduled Meds:atorvastatin, 80 mg, Oral, Nightly  carvedilol, 3.125 mg, Oral, BID With Meals  clobetasol, , Topical, BID  ferric gluconate, 250 mg, Intravenous, Daily  pantoprazole, 40 mg, Oral, Q AM  sodium chloride, 10 mL, Intravenous, Q12H  venlafaxine XR, 112.5 mg, Oral, Nightly      Continuous Infusions:   PRN Meds:.  acetaminophen **OR** acetaminophen **OR** acetaminophen    calcium carbonate    nitroglycerin    ondansetron **OR** ondansetron    sodium chloride    sodium chloride    sodium chloride    Allergies as of 2023    (No Known Allergies)       Social History     Socioeconomic History    Marital status: Single    Number of children: 1    Years of education: College   Tobacco Use    Smoking status: Former     Types: Electronic Cigarette, Cigarettes     Quit date:      Years since quittin.9    Smokeless tobacco: Never    Tobacco comments:     10-12 cigarettes daily   Vaping Use    Vaping Use: Never used   Substance and Sexual Activity    Alcohol use: No      "Comment: caffeine use- coffee    Drug use: No    Sexual activity: Not Currently     Birth control/protection: Post-menopausal       Family History   Problem Relation Age of Onset    Alzheimer's disease Mother     Breast cancer Mother     Diabetes Mother     Lung disease Father         pullmonary artery disease    Heart disease Father     Cancer Father 32    Heart disease Brother     Diabetes Brother     Heart attack Brother     Heart disease Brother     Diabetes Brother     Colon cancer Other     Heart attack Other     Diabetes Other     Heart disease Other     Diabetes Brother     JOSHUA disease Brother        REVIEW OF SYSTEMS:   12 point ROS was performed and is negative except as outlined in HPI          Objective:   Temp:  [97.8 °F (36.6 °C)-98.5 °F (36.9 °C)] 97.9 °F (36.6 °C)  Heart Rate:  [] 68  Resp:  [16-18] 18  BP: (100-132)/(47-71) 108/47  Body mass index is 27.47 kg/m².  Flowsheet Rows      Flowsheet Row First Filed Value   Admission Height 182.9 cm (72.01\") Documented at 11/27/2023 1933   Admission Weight 91.9 kg (202 lb 9.6 oz) Documented at 11/27/2023 1933          Vitals:    11/28/23 0700   BP: 108/47   Pulse: 68   Resp: 18   Temp: 97.9 °F (36.6 °C)   SpO2:        GEN: no distress, alert and oriented  HEENT: NACT, EOMI, moist mucous membranes  Lungs: CTAB, no wheezes, rales or rhonchi  CV: normal rate, regular rhythm, normal S1, S2, no murmurs, +2 radial pulses b/l, no carotid bruit  Abdomen: soft, nontender, nondistended, NABS  Extremities: no edema  Skin: no rash, warm, dry  Heme/Lymph: no bruising  Psych: organized thought, normal behavior and affect      Lab Review:   Results from last 7 days   Lab Units 11/28/23  0602 11/27/23  1936   SODIUM mmol/L 144 140   POTASSIUM mmol/L 3.8 3.5   CHLORIDE mmol/L 110* 108*   CO2 mmol/L 26.0 23.0   BUN mg/dL 12 10   CREATININE mg/dL 0.77 0.74   CALCIUM mg/dL 8.6 8.6   BILIRUBIN mg/dL  --  <0.2   ALK PHOS U/L  --  119*   ALT (SGPT) U/L  --  <5   AST (SGOT) " U/L  --  12   GLUCOSE mg/dL 101* 115*     Results from last 7 days   Lab Units 11/28/23  0602 11/27/23  2121 11/27/23 1936   HSTROP T ng/L 13 16* 11     Results from last 7 days   Lab Units 11/28/23  0602 11/27/23 1936   WBC 10*3/mm3 7.23 7.93   HEMOGLOBIN g/dL 7.4* 7.1*   HEMATOCRIT % 24.9* 23.5*   PLATELETS 10*3/mm3 311 400     Results from last 7 days   Lab Units 11/27/23  1936   INR  1.05   APTT seconds 27.9     Results from last 7 days   Lab Units 11/27/23 1936   MAGNESIUM mg/dL 2.1         I personally viewed and interpreted the patient's EKG/Telemetry data)      Profound anemia    Assessment and Plan:  #Iron deficiency anemia  #Chest pain/dyspnea on exertion  #CAD with prior inferior MI      63-year-old woman with CAD with prior inferior MI in 2008 status post PCI, iron deficiency anemia on Venofer, who presented to the ED with exertional chest pain and dyspnea, found to be severely anemic with hemoglobin of 7.1.    Differential for her symptoms includes her profound anemia versus progressive CAD. I discussed this at length with her. At this time, we are limited with management of her CAD as her anemia of unknown source is a contraindication to DAPT.  Would continue treatment and workup of the anemia and if patient's symptoms resolve or improve with improvement in hemoglobin, then we will defer further cardiac testing.  If patient's symptoms persist, can consider stress testing at that time.    Joseph Llanos MD  11/28/23  08:29 EST.

## 2023-11-28 NOTE — ED NOTES
"Nursing report ED to floor  Tiera Abbott  63 y.o.  female    HPI :   Chief Complaint   Patient presents with    Chest Pain       Admitting doctor:   Mauro Fletcher MD    Admitting diagnosis:   The primary encounter diagnosis was Iron deficiency anemia due to chronic blood loss. Diagnoses of Generalized weakness, Exertional dyspnea, and Chest pain, unspecified type were also pertinent to this visit.    Code status:   Current Code Status       Date Active Code Status Order ID Comments User Context       Prior            Allergies:   Patient has no known allergies.    Isolation:   No active isolations    Intake and Output  No intake or output data in the 24 hours ending 11/27/23 2242    Weight:       11/27/23 1933   Weight: 91.9 kg (202 lb 9.6 oz)       Most recent vitals:   Vitals:    11/27/23 1933 11/27/23 2035 11/27/23 2036 11/27/23 2041   BP: 115/70 111/61  123/68   BP Location: Left arm      Patient Position: Lying      Pulse: 106  70 79   Resp: 18 16     Temp: 98.5 °F (36.9 °C)      TempSrc: Tympanic      SpO2: 96%  99% 100%   Weight: 91.9 kg (202 lb 9.6 oz)      Height: 182.9 cm (72.01\")          Active LDAs/IV Access:   Lines, Drains & Airways       Active LDAs       Name Placement date Placement time Site Days    Peripheral IV 11/27/23 2041 Right Antecubital 11/27/23 2041  Antecubital  less than 1                    Labs (abnormal labs have a star):   Labs Reviewed   COMPREHENSIVE METABOLIC PANEL - Abnormal; Notable for the following components:       Result Value    Glucose 115 (*)     Chloride 108 (*)     Alkaline Phosphatase 119 (*)     All other components within normal limits    Narrative:     GFR Normal >60  Chronic Kidney Disease <60  Kidney Failure <15     CBC WITH AUTO DIFFERENTIAL - Abnormal; Notable for the following components:    RBC 3.15 (*)     Hemoglobin 7.1 (*)     Hematocrit 23.5 (*)     MCV 74.6 (*)     MCH 22.5 (*)     MCHC 30.2 (*)     RDW 18.3 (*)     nRBC 0.5 (*)     All other " components within normal limits   HIGH SENSITIVITIY TROPONIN T 2HR - Abnormal; Notable for the following components:    HS Troponin T 16 (*)     Troponin T Delta 5 (*)     All other components within normal limits    Narrative:     High Sensitive Troponin T Reference Range:  <14.0 ng/L- Negative Female for AMI  <22.0 ng/L- Negative Male for AMI  >=14 - Abnormal Female indicating possible myocardial injury.  >=22 - Abnormal Male indicating possible myocardial injury.   Clinicians would have to utilize clinical acumen, EKG, Troponin, and serial changes to determine if it is an Acute Myocardial Infarction or myocardial injury due to an underlying chronic condition.        TROPONIN - Normal    Narrative:     High Sensitive Troponin T Reference Range:  <14.0 ng/L- Negative Female for AMI  <22.0 ng/L- Negative Male for AMI  >=14 - Abnormal Female indicating possible myocardial injury.  >=22 - Abnormal Male indicating possible myocardial injury.   Clinicians would have to utilize clinical acumen, EKG, Troponin, and serial changes to determine if it is an Acute Myocardial Infarction or myocardial injury due to an underlying chronic condition.        BNP (IN-HOUSE) - Normal    Narrative:     This assay is used as an aid in the diagnosis of individuals suspected of having heart failure. It can be used as an aid in the diagnosis of acute decompensated heart failure (ADHF) in patients presenting with signs and symptoms of ADHF to the emergency department (ED). In addition, NT-proBNP of <300 pg/mL indicates ADHF is not likely.    Age Range Result Interpretation  NT-proBNP Concentration (pg/mL:      <50             Positive            >450                   Gray                 300-450                    Negative             <300    50-75           Positive            >900                  Gray                300-900                  Negative            <300      >75             Positive            >1800                  Louise                 300-1800                  Negative            <300   MAGNESIUM - Normal   PROTIME-INR - Normal   APTT - Normal   RAINBOW DRAW    Narrative:     The following orders were created for panel order Arnot Draw.  Procedure                               Abnormality         Status                     ---------                               -----------         ------                     Green Top (Gel)[[id number]]                                  Final result               Lavender Top[[id number]]                                     Final result               Gold Top - SST[[id number]]                                   Final result               Light Blue Top[[id number]]                                   Final result                 Please view results for these tests on the individual orders.   MANUAL DIFFERENTIAL   PREPARE RBC   TYPE AND SCREEN   CBC AND DIFFERENTIAL    Narrative:     The following orders were created for panel order CBC & Differential.  Procedure                               Abnormality         Status                     ---------                               -----------         ------                     CBC Auto Differential[[id number]]        Abnormal            Final result                 Please view results for these tests on the individual orders.   GREEN TOP   LAVENDER TOP   GOLD TOP - SST   LIGHT BLUE TOP       EKG:   ECG 12 Lead ED Triage Standing Order; Chest Pain   Preliminary Result   HEART RATE= 81  bpm   RR Interval= 741  ms   NE Interval= 132  ms   P Horizontal Axis= 3  deg   P Front Axis= 74  deg   QRSD Interval= 88  ms   QT Interval= 370  ms   QTcB= 430  ms   QRS Axis= 40  deg   T Wave Axis= 52  deg   - NORMAL ECG -   Sinus rhythm   Electronically Signed By:    Date and Time of Study: 2023-11-27 19:33:00          Meds given in ED:   Medications   sodium chloride 0.9 % flush 10 mL (has no administration in time range)       Imaging results:  XR Chest 1 View    Result Date:  2023  1. No active disease is seen in the chest with no significant change when compared to a prior chest x-ray on 2022. The etiology of this patient's chest pain is not established on this exam.  This report was finalized on 2023 8:46 PM by Dr. Stefan Mckenzie M.D on Workstation: [id number]       Ambulatory status:   - ambulates independently.     Social issues:   Social History     Socioeconomic History    Marital status: Single    Number of children: 1    Years of education: College   Tobacco Use    Smoking status: Former     Types: Electronic Cigarette, Cigarettes     Quit date:      Years since quittin.9    Smokeless tobacco: Never    Tobacco comments:     10-12 cigarettes daily   Substance and Sexual Activity    Alcohol use: No     Comment: caffeine use- coffee    Drug use: No    Sexual activity: Not Currently     Birth control/protection: Post-menopausal       NIH Stroke Scale:       Yoko Shannon RN  23 22:42 EST

## 2023-11-28 NOTE — PLAN OF CARE
Goal Outcome Evaluation:  Plan of Care Reviewed With: patient        Progress: improving  Outcome Evaluation: vss. telemetry monitor, sr. alert and oriented x4, room air. up ad [place]. 1 unit of prbc's ordered, transfusing. no c/o chest pain. tolerating regular diet.

## 2023-11-28 NOTE — SIGNIFICANT NOTE
11/28/23 1409   Therapy Assessment/Plan (PT)   Criteria for Skilled Interventions Met (PT) no problems identified which require skilled intervention  (pt is up ad [place] in room, no indication for PT at this time, will sign off)

## 2023-11-28 NOTE — CONSULTS
[facility]  Initial Inpatient Consult Note    Referring Provider: Dr. Fontaine    Reason for Consultation: GI bleed    Subjective     History of present illness:    63 y.o. female, a patient known to us, w/ PMHx of CAD w/ hx of stents, on plavix, IBS, hx of recurrent iron deficiency anemia requiring frequent IV infusions presented to the ED w/ exertional chest pain, SOB, and weakness. She was noted to have drop in her H/H and had a day of black tarry stools day prior to presentation. She reports she has been having intermittent black, tarry stools for a while, typically once a week. She has undergone EGD/colonoscopy, see below. She denies any melena for last 3 days though. She denies heartburn, dysphagia, odynophagia. She denies NSAIDs use.     EGD 11/03/2023: 4cm hiatal hernia. Normal stomach, normal duodenum. Path: Negative for celiac disease. No h.pylori.   Colon: Diverticulosis in the sigmoid colon and in descending colon. Nonbleeding internal hemorrhoids.  Two polyps resected from rectum. Otherwise normal. Path: hyperplastic polyps.     EGD 6/11/19 - esophagitis, gastritis, normal duodenum, small paraesophageal hernia  C-scope 6/11/2019 -multiple hyperplastic polyps in the transverse and sigmoid colon, diverticulosis, nonbleeding internal hemorrhoids.  Past Medical History:  Past Medical History:   Diagnosis Date    Acid reflux     Alcoholism in remission     SINCE AGE 22    Arthritis     Back pain     Cardiac disease     Chest pain     Coronary artery disease     mi    Depression     Eczema     Encounter for screening mammogram for breast cancer 06/23/2019    Fibrocystic breast     Heart attack 2008    Heart attack     Heart disease     History of anemia     History of bruising easily     History of COVID-19 2021    Hypercholesterolemia     IBS (irritable bowel syndrome)     Inappropriate (too hot or too cold) temperature in local application and packing     always hot or cold     Joint pain, knee     LEFT KNEE    Low iron     Menopausal state     Muscle pain     Sleep apnea     DOES NOT USE MACHINE    Stiffness in joint      Past Surgical History:  Past Surgical History:   Procedure Laterality Date    BILATERAL BREAST REDUCTION  2009    BREAST BIOPSY      CARDIAC CATHETERIZATION      COLONOSCOPY  2010    COLONOSCOPY N/A 08/28/2017    One 4 mm polyp in the ascending colon, One 6 mm polyp in the ascending colon, One 4 mm polyp in the sigmoid colon, One 6 mm polyp in the rectum, One 5 mm polyp in the rectum,Diverticulosis, IH. PATH:  Tubular adenoma, hyperplastic polyp     COLONOSCOPY N/A 06/11/2019    Perianal skin tags found on perianal exam, One 3 mm polyp in the transverse colon, Two 3 to 4 mm polyps in the sigmoid colon, Diverticulosis, NBIH. Path: Hyperplastic polyp.    COLONOSCOPY N/A 11/3/2023    Procedure: COLONOSCOPY to cecum and TI with cold biopsy polypectomy;  Surgeon: Ottoniel Alonso MD;  Location: [facility];  Service: Gastroenterology;  Laterality: N/A;  pre: iron deficiency anemia  post: diverticulosis, normal TI, polyps    CORONARY STENT PLACEMENT      2    ENDOSCOPY N/A 08/25/2016    small hiatal hernia, normal stomach/duodenum, no specimens collected    ENDOSCOPY N/A 08/28/2017    Z line irregular, 3cm hiatal hernia, erythematous mucosa in gastric body/antrum and duodenopathy, normal 2-3 portions of duodenum    ENDOSCOPY N/A 06/11/2019    Z-line regular, Non-severe esophagitis, Gastritis, Small paraesophageal hernia. Path: Mild chronic inflammation.    ENDOSCOPY N/A 11/3/2023    Procedure: ESOPHAGOGASTRODUODENOSCOPY with cold biopsies;  Surgeon: Ottoniel Alonso MD;  Location: [facility] END[facility];  Service: Gastroenterology;  Laterality: N/A;  pre: iron deficiency anemia  post: hiatal hernia    JOINT REPLACEMENT Left     total knee    KNEE SURGERY      2 knee sugeries on left knee    MT ARTHRP KNE CONDYLE&PLATU MEDIAL&LAT COMPARTMENTS Left 03/14/2016    Procedure: LEFT  TOTAL KNEE ARTHROPLASTY;  Surgeon: Bladimir Mehta MD;  Location: [facility] OR;  Service: Orthopedics    REDUCTION MAMMAPLASTY Bilateral 2009    WISDOM TOOTH EXTRACTION      WOUND DEBRIDEMENT Bilateral     BREAST.  AFTER REDUCTION      Social History:   Social History     Tobacco Use    Smoking status: Former     Types: Electronic Cigarette, Cigarettes     Quit date:      Years since quittin.9    Smokeless tobacco: Never    Tobacco comments:     10-12 cigarettes daily   Substance Use Topics    Alcohol use: No     Comment: caffeine use- coffee      Family History:  Family History   Problem Relation Age of Onset    Alzheimer's disease Mother     Breast cancer Mother     Diabetes Mother     Lung disease Father         pullmonary artery disease    Heart disease Father     Cancer Father 32    Heart disease Brother     Diabetes Brother     Heart attack Brother     Heart disease Brother     Diabetes Brother     Colon cancer Other     Heart attack Other     Diabetes Other     Heart disease Other     Diabetes Brother     JOSHUA disease Brother        Home Meds:  Medications Prior to Admission   Medication Sig Dispense Refill Last Dose    atorvastatin (LIPITOR) 80 MG tablet Take 1 tablet by mouth Every Night.       carvedilol (COREG) 3.125 MG tablet TAKE ONE TABLET BY MOUTH TWICE A DAY WITH MEALS (Patient taking differently: Take 1 tablet by mouth 2 (Two) Times a Day With Meals.) 180 tablet 2     clobetasol (TEMOVATE) 0.05 % cream Apply  topically to the appropriate area as directed 2 (Two) Times a Day. Sparingly, avoid face, short-term use only 30 g 1     clopidogrel (PLAVIX) 75 MG tablet TAKE ONE TABLET BY MOUTH DAILY (Patient taking differently: TAKE ONE TABLET BY MOUTH Nightly. LD 10/28) 90 tablet 0     pantoprazole (PROTONIX) 40 MG EC tablet Take 1 tablet by mouth Daily. (Patient taking differently: Take 1 tablet by mouth Every Night.) 90 tablet 3     venlafaxine XR (EFFEXOR-XR) 37.5 MG 24 hr capsule Take 3 capsules  "by mouth Daily. (Patient taking differently: Take 3 capsules by mouth Every Night.) 270 capsule 1      Current Meds:   atorvastatin, 80 mg, Oral, Nightly  carvedilol, 3.125 mg, Oral, BID With Meals  clobetasol, , Topical, BID  ferric gluconate, 250 mg, Intravenous, Daily  pantoprazole, 40 mg, Intravenous, BID AC  sodium chloride, 10 mL, Intravenous, Q12H  venlafaxine XR, 112.5 mg, Oral, Nightly      Allergies:  No Known Allergies    Objective     Vital Signs  Temp:  [97.8 °F (36.6 °C)-98.5 °F (36.9 °C)] 97.9 °F (36.6 °C)  Heart Rate:  [] 68  Resp:  [16-18] 18  BP: (100-132)/(47-71) 108/47  Physical Exam:  General Appearance:     Alert, cooperative, in no acute distress   Abdomen:     Normal bowel sounds, no masses, no organomegaly, soft     nontender, nondistended, no guarding, no rebound                 tenderness   Rectal:     Deferred       Results Review:   I reviewed the patient's new clinical results.    Results from last 7 days   Lab Units 11/28/23  0602 11/27/23 1936   WBC 10*3/mm3 7.23 7.93   HEMOGLOBIN g/dL 7.4* 7.1*   HEMATOCRIT % 24.9* 23.5*   PLATELETS 10*3/mm3 311 400     Results from last 7 days   Lab Units 11/28/23  0602 11/27/23  1936   SODIUM mmol/L 144 140   POTASSIUM mmol/L 3.8 3.5   CHLORIDE mmol/L 110* 108*   CO2 mmol/L 26.0 23.0   BUN mg/dL 12 10   CREATININE mg/dL 0.77 0.74   CALCIUM mg/dL 8.6 8.6   BILIRUBIN mg/dL  --  <0.2   ALK PHOS U/L  --  119*   ALT (SGPT) U/L  --  <5   AST (SGOT) U/L  --  12   GLUCOSE mg/dL 101* 115*     Results from last 7 days   Lab Units 11/27/23  1936   INR  1.05     No results found for: \"LIPASE\"    Radiology:  XR Chest 1 View   Final Result   1. No active disease is seen in the chest with no significant change   when compared to a prior chest x-ray on 12/7/2022. The etiology of this   patient's chest pain is not established on this exam.       This report was finalized on 11/27/2023 8:46 PM by Dr. Stefan Mckenzie M.D   on Workstation: [id number]        "         Assessment:   Microcytic iron deficiency anemia  s/p 1 unit of PRBCs   S/p EGD/colonoscopy 11/03/2023 where no obvious source of anemia/GI bleed found. Capsule endoscopy was recommended after the procedures, but patient has failed to schedule this.   Shortness of breath   Melena- intermittent; last episode 3 days ago; question small bowel AVMs    Plan:   No bleeding for last 3 days, low utility of NM RBCs scan at this time  Continue to trend H/H, transfuse to maintain Hgb of at least 7  Monitor for recurrent GI bleed  Will help patient set up capsule endoscopy. Unfortunately, this can't be done while inpatient.   Diet as tolerated     I discussed the patients findings and my recommendations with patient.         Gladis Patel PA-C  [facility]  [address] [place] [address]  Office: [phone]

## 2023-11-28 NOTE — CONSULTS
Subjective     REASON FOR CONSULTATION:  anemia  Provide an opinion on any further workup or treatment                             REQUESTING PHYSICIAN:  Zhen    RECORDS OBTAINED:  Records of the patients history including those obtained from the referring provider were reviewed and summarized in detail.    HISTORY OF PRESENT ILLNESS:  The patient is a 63 y.o. year old female who is here for an opinion about the above issue.    History of Present Illness   This is a pleasant 63-year-old woman known to me from clinic for recurrent iron deficiency anemia requiring frequent IV iron replacement.  She was seen most recently 8/21/2023 reporting fatigue dyspnea on exertion and melanotic stool at which time her hemoglobin had dropped 4 g to 8.6 with severe iron deficiency-ferritin 9/iron sat 3%-for which she was given IV Venofer 300 mg x 4 doses completed on 9/7/2023.  She was referred back to gastroenterology and underwent colonoscopy and EGD 11/3/2023 with no source of GI blood loss identified.  A capsule endoscopy was recommended.  She was seen in the office 11/20/2023 at which time her hemoglobin was 8.9/MCV 79.4 iron sat 5% and ferritin 16-further IV iron arranged but not yet given.  She presented to the ER on 11/27/2023 with exertional chest pain shortness of breath fatigue and weakness.  Hemoglobin had further decline to 7.1 with MCV 74.6.  She was having melanotic stool and diarrhea prior to admission.    Past Medical History:   Diagnosis Date    Acid reflux     Alcoholism in remission     SINCE AGE 22    Arthritis     Back pain     Cardiac disease     Chest pain     Coronary artery disease     mi    Depression     Eczema     Encounter for screening mammogram for breast cancer 06/23/2019    Fibrocystic breast     Heart attack 2008    Heart attack     Heart disease     History of anemia     History of bruising easily     History of COVID-19     2021    Hypercholesterolemia     IBS (irritable bowel syndrome)      Inappropriate (too hot or too cold) temperature in local application and packing     always hot or cold    Joint pain, knee     LEFT KNEE    Low iron     Menopausal state     Muscle pain     Sleep apnea     DOES NOT USE MACHINE    Stiffness in joint         Past Surgical History:   Procedure Laterality Date    BILATERAL BREAST REDUCTION  2009    BREAST BIOPSY      CARDIAC CATHETERIZATION      COLONOSCOPY  2010    COLONOSCOPY N/A 08/28/2017    One 4 mm polyp in the ascending colon, One 6 mm polyp in the ascending colon, One 4 mm polyp in the sigmoid colon, One 6 mm polyp in the rectum, One 5 mm polyp in the rectum,Diverticulosis, IH. PATH:  Tubular adenoma, hyperplastic polyp     COLONOSCOPY N/A 06/11/2019    Perianal skin tags found on perianal exam, One 3 mm polyp in the transverse colon, Two 3 to 4 mm polyps in the sigmoid colon, Diverticulosis, NBIH. Path: Hyperplastic polyp.    COLONOSCOPY N/A 11/3/2023    Procedure: COLONOSCOPY to cecum and TI with cold biopsy polypectomy;  Surgeon: Ottoniel Alonso MD;  Location: [facility] ENDOSCOPY;  Service: Gastroenterology;  Laterality: N/A;  pre: iron deficiency anemia  post: diverticulosis, normal TI, polyps    CORONARY STENT PLACEMENT      2    ENDOSCOPY N/A 08/25/2016    small hiatal hernia, normal stomach/duodenum, no specimens collected    ENDOSCOPY N/A 08/28/2017    Z line irregular, 3cm hiatal hernia, erythematous mucosa in gastric body/antrum and duodenopathy, normal 2-3 portions of duodenum    ENDOSCOPY N/A 06/11/2019    Z-line regular, Non-severe esophagitis, Gastritis, Small paraesophageal hernia. Path: Mild chronic inflammation.    ENDOSCOPY N/A 11/3/2023    Procedure: ESOPHAGOGASTRODUODENOSCOPY with cold biopsies;  Surgeon: Ottoniel Alonso MD;  Location: [facility] ENDOSCOPY;  Service: Gastroenterology;  Laterality: N/A;  pre: iron deficiency anemia  post: hiatal hernia    JOINT REPLACEMENT Left     total knee    KNEE SURGERY      2 knee sugeries on left knee    [place]  ARTHRP KNE CONDYLE&PLATU MEDIAL&LAT COMPARTMENTS Left 03/14/2016    Procedure: LEFT TOTAL KNEE ARTHROPLASTY;  Surgeon: Bladimir Mehta MD;  Location: [facility] OR;  Service: Orthopedics    REDUCTION MAMMAPLASTY Bilateral 2009    WISDOM TOOTH EXTRACTION      WOUND DEBRIDEMENT Bilateral     BREAST.  AFTER REDUCTION        No current facility-administered medications on file prior to encounter.     Current Outpatient Medications on File Prior to Encounter   Medication Sig Dispense Refill    atorvastatin (LIPITOR) 80 MG tablet Take 1 tablet by mouth Every Night.      carvedilol (COREG) 3.125 MG tablet TAKE ONE TABLET BY MOUTH TWICE A DAY WITH MEALS (Patient taking differently: Take 1 tablet by mouth 2 (Two) Times a Day With Meals.) 180 tablet 2    clobetasol (TEMOVATE) 0.05 % cream Apply  topically to the appropriate area as directed 2 (Two) Times a Day. Sparingly, avoid face, short-term use only 30 g 1    clopidogrel (PLAVIX) 75 MG tablet TAKE ONE TABLET BY MOUTH DAILY (Patient taking differently: TAKE ONE TABLET BY MOUTH Nightly. LD 10/28) 90 tablet 0    pantoprazole (PROTONIX) 40 MG EC tablet Take 1 tablet by mouth Daily. (Patient taking differently: Take 1 tablet by mouth Every Night.) 90 tablet 3    venlafaxine XR (EFFEXOR-XR) 37.5 MG 24 hr capsule Take 3 capsules by mouth Daily. (Patient taking differently: Take 3 capsules by mouth Every Night.) 270 capsule 1    [DISCONTINUED] ezetimibe (ZETIA) 10 MG tablet Daily.      [DISCONTINUED] acetaminophen (TYLENOL) 650 MG 8 hr tablet Take 1 tablet by mouth Every 8 (Eight) Hours As Needed for Mild Pain.      [DISCONTINUED] albuterol sulfate HFA (Ventolin HFA) 108 (90 Base) MCG/ACT inhaler       [DISCONTINUED] aspirin 325 MG EC tablet Take 1 tablet by mouth 1 (One) Time.      [DISCONTINUED] atorvastatin (LIPITOR) 80 MG tablet TAKE ONE TABLET BY MOUTH EVERY NIGHT AT BEDTIME 90 tablet 2    [DISCONTINUED] ibuprofen (ADVIL,MOTRIN) 800 MG tablet Take 1 tablet twice a day by oral  route.          ALLERGIES:  No Known Allergies     Social History     Socioeconomic History    Marital status: Single    Number of children: 1    Years of education: College   Tobacco Use    Smoking status: Former     Types: Electronic Cigarette, Cigarettes     Quit date:      Years since quittin.9    Smokeless tobacco: Never    Tobacco comments:     10-12 cigarettes daily   Vaping Use    Vaping Use: Never used   Substance and Sexual Activity    Alcohol use: No     Comment: caffeine use- coffee    Drug use: No    Sexual activity: Not Currently     Birth control/protection: Post-menopausal        Family History   Problem Relation Age of Onset    Alzheimer's disease Mother     Breast cancer Mother     Diabetes Mother     Lung disease Father         pullmonary artery disease    Heart disease Father     Cancer Father 32    Heart disease Brother     Diabetes Brother     Heart attack Brother     Heart disease Brother     Diabetes Brother     Colon cancer Other     Heart attack Other     Diabetes Other     Heart disease Other     Diabetes Brother     JOSHUA disease Brother         Review of Systems   Constitutional:  Positive for activity change. Negative for fever.   Respiratory:  Positive for shortness of breath.    Cardiovascular:  Positive for chest pain.   Gastrointestinal:  Positive for blood in stool and diarrhea.   Neurological:  Positive for weakness. Negative for dizziness and light-headedness.   Psychiatric/Behavioral: Negative.            Objective     Vitals:    23 2331 23 0010 23 0131 23 0320   BP: 131/71 132/65 100/57 130/53   BP Location: Right arm  Right arm    Patient Position: Lying  Lying    Pulse: 67 66 64 81   Resp: 18 18 17 18   Temp: 97.9 °F (36.6 °C) 97.8 °F (36.6 °C)  97.8 °F (36.6 °C)   TempSrc: Tympanic Tympanic  Oral   SpO2: 97% 100% 96% 98%   Weight:       Height:             2023     4:00 PM   Current Status   ECOG score 0       Physical Exam     CONSTITUTIONAL: pleasant well-developed adult woman sitting up in bed no distress  HEENT: no icterus, no thrush, moist membranes  LYMPH: no cervical or supraclavicular lad  CV: RRR, S1S2, no murmur  RESP: cta bilat, no wheezing, no rales  GI: soft, non-tender, no splenomegaly, +bs  MUSC: no edema, normal gait  NEURO: alert and oriented x3, normal strength  PSYCH: normal mood and affect      RECENT LABS:  Hematology WBC   Date Value Ref Range Status   11/28/2023 7.23 3.40 - 10.80 10*3/mm3 Final     RBC   Date Value Ref Range Status   11/28/2023 3.20 (L) 3.77 - 5.28 10*6/mm3 Final     Hemoglobin   Date Value Ref Range Status   11/28/2023 7.4 (L) 12.0 - 15.9 g/dL Final     Hematocrit   Date Value Ref Range Status   11/28/2023 24.9 (L) 34.0 - 46.6 % Final     Platelets   Date Value Ref Range Status   11/28/2023 311 140 - 450 10*3/mm3 Final        Lab Results   Component Value Date    GLUCOSE 101 (H) 11/28/2023    BUN 12 11/28/2023    CREATININE 0.77 11/28/2023    EGFRRESULT 84.2 10/25/2023    EGFR 86.8 11/28/2023    BCR 15.6 11/28/2023    K 3.8 11/28/2023    CO2 26.0 11/28/2023    CALCIUM 8.6 11/28/2023    PROTENTOTREF 6.7 10/25/2023    ALBUMIN 3.7 11/27/2023    BILITOT <0.2 11/27/2023    AST 12 11/27/2023    ALT <5 11/27/2023     PTT 27.9/PT 13.8  Ferritin 16.3/iron sat 5%    Assessment & Plan   *Recurrent iron deficiency anemia secondary to occult GI blood loss-small bowel source  Presents with symptomatic anemia (exertional chest pain, SOA, fatigue, weakness) hemoglobin 7.1 recent ferritin 16/iron sat 5% despite recent IV iron replacement completed 9/7/2023  EGD/colonoscopy 11/3/2023 unrevealing for source of GI blood loss    *Antiplatelet therapy with Plavix complicating above    Hematology recommendations/plan  Agree with transfusion support and IV iron  CBC monitoring  Consult GI-having acute small bowel bleeding may need tagged red cell study or spiral enteroscopy instead of capsule?

## 2023-11-28 NOTE — PLAN OF CARE
Problem: Adult Inpatient Plan of Care  Goal: Plan of Care Review  11/28/2023 0417 by Shasta Francisco, VANNESSA  Outcome: Ongoing, Progressing  Flowsheets (Taken 11/28/2023 0417)  Outcome Evaluation: VSS. AO x 4. Pt received 1 unit ordered PRBCs. NPO. NSR. SBA. Heme Onc and Cardio to see.  11/28/2023 0132 by Shasta Francisco, VANNESSA  Outcome: Ongoing, Progressing  Goal: Patient-Specific Goal (Individualized)  11/28/2023 0417 by Shasta Francisco, VANNESSA  Outcome: Ongoing, Progressing  11/28/2023 0132 by Shasta Francisco, VANNESSA  Outcome: Ongoing, Progressing  Goal: Absence of Hospital-Acquired Illness or Injury  11/28/2023 0417 by Shasta Francisco RN  Outcome: Ongoing, Progressing  11/28/2023 0132 by Shasta Francisco, VANNESSA  Outcome: Ongoing, Progressing  Intervention: Identify and Manage Fall Risk  Recent Flowsheet Documentation  Taken 11/28/2023 0416 by Shasta Francisco RN  Safety Promotion/Fall Prevention:   activity supervised   clutter free environment maintained   assistive device/personal items within reach   lighting adjusted   mobility aid in reach   nonskid shoes/slippers when out of bed   safety round/check completed   toileting scheduled  Taken 11/28/2023 0130 by Shasta Francisco, RN  Safety Promotion/Fall Prevention:   clutter free environment maintained   assistive device/personal items within reach   activity supervised   lighting adjusted   mobility aid in reach   nonskid shoes/slippers when out of bed   safety round/check completed   toileting scheduled  Intervention: Prevent and Manage VTE (Venous Thromboembolism) Risk  Recent Flowsheet Documentation  Taken 11/28/2023 0416 by Shasta Francisco, RN  Activity Management: activity encouraged  Taken 11/28/2023 0130 by Shasta Francisco, RN  Activity Management: activity encouraged  Goal: Optimal Comfort and Wellbeing  11/28/2023 0417 by Shasta Francisco, VANNESSA  Outcome: Ongoing, Progressing  11/28/2023 0132 by Shasta Francisco,  RN  Outcome: Ongoing, Progressing  Intervention: Monitor Pain and Promote Comfort  Recent Flowsheet Documentation  Taken 11/28/2023 0130 by Shasta Francisco, RN  Pain Management Interventions:   position adjusted   pillow support provided  Goal: Readiness for Transition of Care  11/28/2023 0417 by Shasta Francisco, RN  Outcome: Ongoing, Progressing  11/28/2023 0132 by Shasta Francisco, RN  Outcome: Ongoing, Progressing  Intervention: Mutually Develop Transition Plan  Recent Flowsheet Documentation  Taken 11/28/2023 0131 by Shasta Francisco, RN  Transportation Anticipated: family or friend will provide  Patient/Family Anticipated Services at Transition: none  Patient/Family Anticipates Transition to: home  Taken 11/28/2023 0130 by Shasta Francisco, RN  Equipment Currently Used at Home: none  Taken 11/28/2023 0128 by Shasta Francisco, RN  Equipment Currently Used at Home: none   Goal Outcome Evaluation:              Outcome Evaluation: VSS. AO x 4. Pt received 1 unit ordered PRBCs. NPO. NSR. SBA. Heme Onc and Cardio to see.
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
## 2023-11-28 NOTE — H&P
Patient Name:  Tiera Abbott  YOB: 1960  MRN:  [id number]  Admit Date:  11/27/2023  Patient Care Team:  Epley, James, APRN as PCP - General  Epley, James, APRN as PCP - Family Medicine  Hollis Fontaine MD as Consulting Physician (Cardiology)  Stefan Pelayo MD as Referring Physician (Internal Medicine)  Edgardo Harris MD as Consulting Physician (Hematology and Oncology)      Subjective   History Present Illness     Chief Complaint   Patient presents with    Chest Pain       History of Present Illness  Ms. Abbott is a 63 y.o. past medical history of CAD status post stent in 2008, chronic anemia from iron deficiency presenting with chest pain for the last 3 days.  She works at [employer] and noticed on black Friday that she was having chest pain and shortness of breath on exertion with associated neck pains.  States she feels like it was similar to her previous heart attack.  Patient has been worked up for iron deficiency anemia as an outpatient and is getting IV transfusions by hematology.  Recent skills by GI without any cause of GI bleeding.  The plan was to do a pill endoscopy in the future.  On Sunday patient did have some diarrhea, initially dark then dark and tarry then brown.  Patient has been having intermittent melena for weeks.  No further diarrhea since Sunday.  Patient denies any other sources of bleeding.    Review of Systems   Constitutional:  Negative for chills and fever.   HENT:  Negative for rhinorrhea and sore throat.    Respiratory:  Positive for shortness of breath. Negative for cough.    Cardiovascular:  Positive for chest pain. Negative for leg swelling.   Gastrointestinal:  Negative for abdominal pain, constipation, diarrhea, nausea and vomiting.        Melena   Genitourinary:  Negative for dysuria, frequency and urgency.   Musculoskeletal:  Negative for back pain and myalgias.   Skin:  Negative for rash.   Neurological:  Negative for dizziness and headaches.         Personal History     Past Medical History:   Diagnosis Date    Acid reflux     Alcoholism in remission     SINCE AGE 22    Arthritis     Back pain     Cardiac disease     Chest pain     Coronary artery disease     mi    Depression     Eczema     Encounter for screening mammogram for breast cancer 06/23/2019    Fibrocystic breast     Heart attack 2008    Heart attack     Heart disease     History of anemia     History of bruising easily     History of COVID-19     2021    Hypercholesterolemia     IBS (irritable bowel syndrome)     Inappropriate (too hot or too cold) temperature in local application and packing     always hot or cold    Joint pain, knee     LEFT KNEE    Low iron     Menopausal state     Muscle pain     Sleep apnea     DOES NOT USE MACHINE    Stiffness in joint      Past Surgical History:   Procedure Laterality Date    BILATERAL BREAST REDUCTION  2009    BREAST BIOPSY      CARDIAC CATHETERIZATION      COLONOSCOPY  2010    COLONOSCOPY N/A 08/28/2017    One 4 mm polyp in the ascending colon, One 6 mm polyp in the ascending colon, One 4 mm polyp in the sigmoid colon, One 6 mm polyp in the rectum, One 5 mm polyp in the rectum,Diverticulosis, IH. PATH:  Tubular adenoma, hyperplastic polyp     COLONOSCOPY N/A 06/11/2019    Perianal skin tags found on perianal exam, One 3 mm polyp in the transverse colon, Two 3 to 4 mm polyps in the sigmoid colon, Diverticulosis, NBIH. Path: Hyperplastic polyp.    COLONOSCOPY N/A 11/3/2023    Procedure: COLONOSCOPY to cecum and TI with cold biopsy polypectomy;  Surgeon: Ottoniel Alonso MD;  Location: [facility] ENDOSCOPY;  Service: Gastroenterology;  Laterality: N/A;  pre: iron deficiency anemia  post: diverticulosis, normal TI, polyps    CORONARY STENT PLACEMENT      2    ENDOSCOPY N/A 08/25/2016    small hiatal hernia, normal stomach/duodenum, no specimens collected    ENDOSCOPY N/A 08/28/2017    Z line irregular, 3cm hiatal hernia, erythematous mucosa in gastric  body/antrum and duodenopathy, normal 2-3 portions of duodenum    ENDOSCOPY N/A 2019    Z-line regular, Non-severe esophagitis, Gastritis, Small paraesophageal hernia. Path: Mild chronic inflammation.    ENDOSCOPY N/A 11/3/2023    Procedure: ESOPHAGOGASTRODUODENOSCOPY with cold biopsies;  Surgeon: Ottoniel Alonso MD;  Location: [facility] ENDOSCOPY;  Service: Gastroenterology;  Laterality: N/A;  pre: iron deficiency anemia  post: hiatal hernia    JOINT REPLACEMENT Left     total knee    KNEE SURGERY      2 knee sugeries on left knee    MD ARTHRP KNE CONDYLE&PLATU MEDIAL&LAT COMPARTMENTS Left 2016    Procedure: LEFT TOTAL KNEE ARTHROPLASTY;  Surgeon: Bladimir Mehta MD;  Location: [facility] MAIN OR;  Service: Orthopedics    REDUCTION MAMMAPLASTY Bilateral 2009    WISDOM TOOTH EXTRACTION      WOUND DEBRIDEMENT Bilateral     BREAST.  AFTER REDUCTION     Family History   Problem Relation Age of Onset    Alzheimer's disease Mother     Breast cancer Mother     Diabetes Mother     Lung disease Father         pullmonary artery disease    Heart disease Father     Cancer Father 32    Heart disease Brother     Diabetes Brother     Heart attack Brother     Heart disease Brother     Diabetes Brother     Colon cancer Other     Heart attack Other     Diabetes Other     Heart disease Other     Diabetes Brother     JOSHUA disease Brother      Social History     Tobacco Use    Smoking status: Former     Types: Electronic Cigarette, Cigarettes     Quit date:      Years since quittin.9    Smokeless tobacco: Never    Tobacco comments:     10-12 cigarettes daily   Vaping Use    Vaping Use: Never used   Substance Use Topics    Alcohol use: No     Comment: caffeine use- coffee    Drug use: No     No current facility-administered medications on file prior to encounter.     Current Outpatient Medications on File Prior to Encounter   Medication Sig Dispense Refill    atorvastatin (LIPITOR) 80 MG tablet Take 1 tablet by mouth Every  Night.      carvedilol (COREG) 3.125 MG tablet TAKE ONE TABLET BY MOUTH TWICE A DAY WITH MEALS (Patient taking differently: Take 1 tablet by mouth 2 (Two) Times a Day With Meals.) 180 tablet 2    clobetasol (TEMOVATE) 0.05 % cream Apply  topically to the appropriate area as directed 2 (Two) Times a Day. Sparingly, avoid face, short-term use only 30 g 1    clopidogrel (PLAVIX) 75 MG tablet TAKE ONE TABLET BY MOUTH DAILY (Patient taking differently: TAKE ONE TABLET BY MOUTH Nightly. LD 10/28) 90 tablet 0    pantoprazole (PROTONIX) 40 MG EC tablet Take 1 tablet by mouth Daily. (Patient taking differently: Take 1 tablet by mouth Every Night.) 90 tablet 3    venlafaxine XR (EFFEXOR-XR) 37.5 MG 24 hr capsule Take 3 capsules by mouth Daily. (Patient taking differently: Take 3 capsules by mouth Every Night.) 270 capsule 1    [DISCONTINUED] ezetimibe (ZETIA) 10 MG tablet Daily.      [DISCONTINUED] acetaminophen (TYLENOL) 650 MG 8 hr tablet Take 1 tablet by mouth Every 8 (Eight) Hours As Needed for Mild Pain.      [DISCONTINUED] albuterol sulfate HFA (Ventolin HFA) 108 (90 Base) MCG/ACT inhaler       [DISCONTINUED] aspirin 325 MG EC tablet Take 1 tablet by mouth 1 (One) Time.      [DISCONTINUED] atorvastatin (LIPITOR) 80 MG tablet TAKE ONE TABLET BY MOUTH EVERY NIGHT AT BEDTIME 90 tablet 2    [DISCONTINUED] ibuprofen (ADVIL,MOTRIN) 800 MG tablet Take 1 tablet twice a day by oral route.       No Known Allergies    Objective    Objective     Vital Signs  Temp:  [97.8 °F (36.6 °C)-98.5 °F (36.9 °C)] 97.9 °F (36.6 °C)  Heart Rate:  [] 68  Resp:  [16-18] 18  BP: (100-132)/(47-71) 108/47  SpO2:  [96 %-100 %] 98 %  on   ;   Device (Oxygen Therapy): room air  Body mass index is 27.47 kg/m².    Physical Exam  Constitutional:       General: She is not in acute distress.     Appearance: She is not ill-appearing.   Cardiovascular:      Rate and Rhythm: Normal rate and regular rhythm.   Pulmonary:      Effort: Pulmonary effort is  normal. No respiratory distress.   Abdominal:      General: Abdomen is flat. There is no distension.      Tenderness: There is no abdominal tenderness.   Musculoskeletal:         General: No swelling or deformity. Normal range of motion.   Skin:     General: Skin is warm and dry.      Coloration: Skin is pale.   Neurological:      General: No focal deficit present.      Mental Status: She is alert. Mental status is at baseline.         Results Review:  I reviewed the patient's new clinical results.  I reviewed the patient's new imaging results and agree with the interpretation.  I reviewed the patient's other test results and agree with the interpretation  I personally viewed and interpreted the patient's EKG/Telemetry data  Discussed with ED provider.    Lab Results (last 24 hours)       Procedure Component Value Units Date/Time    CBC & Differential [[id number]]  (Abnormal) Collected: 11/27/23 1936    Specimen: Blood from Arm, Right Updated: 11/27/23 2002    Narrative:      The following orders were created for panel order CBC & Differential.  Procedure                               Abnormality         Status                     ---------                               -----------         ------                     CBC Auto Differential[[id number]]        Abnormal            Final result                 Please view results for these tests on the individual orders.    Comprehensive Metabolic Panel [[id number]]  (Abnormal) Collected: 11/27/23 1936    Specimen: Blood from Arm, Right Updated: 11/27/23 2047     Glucose 115 mg/dL      BUN 10 mg/dL      Creatinine 0.74 mg/dL      Sodium 140 mmol/L      Potassium 3.5 mmol/L      Chloride 108 mmol/L      CO2 23.0 mmol/L      Calcium 8.6 mg/dL      Total Protein 6.7 g/dL      Albumin 3.7 g/dL      ALT (SGPT) <5 U/L      AST (SGOT) 12 U/L      Alkaline Phosphatase 119 U/L      Total Bilirubin <0.2 mg/dL      Globulin 3.0 gm/dL      A/G Ratio 1.2 g/dL      BUN/Creatinine Ratio  13.5     Anion Gap 9.0 mmol/L      eGFR 91.0 mL/min/1.73     Narrative:      GFR Normal >60  Chronic Kidney Disease <60  Kidney Failure <15      High Sensitivity Troponin T [[id number]]  (Normal) Collected: 11/27/23 1936    Specimen: Blood from Arm, Right Updated: 11/27/23 2032     HS Troponin T 11 ng/L     Narrative:      High Sensitive Troponin T Reference Range:  <14.0 ng/L- Negative Female for AMI  <22.0 ng/L- Negative Male for AMI  >=14 - Abnormal Female indicating possible myocardial injury.  >=22 - Abnormal Male indicating possible myocardial injury.   Clinicians would have to utilize clinical acumen, EKG, Troponin, and serial changes to determine if it is an Acute Myocardial Infarction or myocardial injury due to an underlying chronic condition.         CBC Auto Differential [[id number]]  (Abnormal) Collected: 11/27/23 1936    Specimen: Blood from Arm, Right Updated: 11/27/23 2002     WBC 7.93 10*3/mm3      RBC 3.15 10*6/mm3      Hemoglobin 7.1 g/dL      Hematocrit 23.5 %      MCV 74.6 fL      MCH 22.5 pg      MCHC 30.2 g/dL      RDW 18.3 %      RDW-SD 48.3 fl      MPV 10.5 fL      Platelets 400 10*3/mm3      nRBC 0.5 /100 WBC     Manual Differential [[id number]] Collected: 11/27/23 1936    Specimen: Blood from Arm, Right Updated: 11/27/23 2048     Neutrophil % 69.7 %      Lymphocyte % 20.2 %      Monocyte % 5.1 %      Eosinophil % 4.0 %      Basophil % 1.0 %      Neutrophils Absolute 5.53 10*3/mm3      Lymphocytes Absolute 1.60 10*3/mm3      Monocytes Absolute 0.40 10*3/mm3      Eosinophils Absolute 0.32 10*3/mm3      Basophils Absolute 0.08 10*3/mm3      Anisocytosis Mod/2+     Hypochromia Slight/1+     Microcytes Mod/2+     Poikilocytes Slight/1+     Polychromasia Large/3+     WBC Morphology Normal     Platelet Morphology Normal    BNP [[id number]]  (Normal) Collected: 11/27/23 1936    Specimen: Blood from Arm, Right Updated: 11/27/23 2120     proBNP 210.0 pg/mL     Narrative:      This assay is used as an  aid in the diagnosis of individuals suspected of having heart failure. It can be used as an aid in the diagnosis of acute decompensated heart failure (ADHF) in patients presenting with signs and symptoms of ADHF to the emergency department (ED). In addition, NT-proBNP of <300 pg/mL indicates ADHF is not likely.    Age Range Result Interpretation  NT-proBNP Concentration (pg/mL:      <50             Positive            >450                   Gray                 300-450                    Negative             <300    50-75           Positive            >900                  Gray                300-900                  Negative            <300      >75             Positive            >1800                  Gray                300-1800                  Negative            <300    Magnesium [[id number]]  (Normal) Collected: 11/27/23 1936    Specimen: Blood from Arm, Right Updated: 11/27/23 2120     Magnesium 2.1 mg/dL     Protime-INR [[id number]]  (Normal) Collected: 11/27/23 1936    Specimen: Blood from Arm, Right Updated: 11/27/23 2107     Protime 13.8 Seconds      INR 1.05    aPTT [[id number]]  (Normal) Collected: 11/27/23 1936    Specimen: Blood from Arm, Right Updated: 11/27/23 2107     PTT 27.9 seconds     High Sensitivity Troponin T 2Hr [[id number]]  (Abnormal) Collected: 11/27/23 2121    Specimen: Blood Updated: 11/27/23 2229     HS Troponin T 16 ng/L      Troponin T Delta 5 ng/L     Narrative:      High Sensitive Troponin T Reference Range:  <14.0 ng/L- Negative Female for AMI  <22.0 ng/L- Negative Male for AMI  >=14 - Abnormal Female indicating possible myocardial injury.  >=22 - Abnormal Male indicating possible myocardial injury.   Clinicians would have to utilize clinical acumen, EKG, Troponin, and serial changes to determine if it is an Acute Myocardial Infarction or myocardial injury due to an underlying chronic condition.         Basic Metabolic Panel [[id number]]  (Abnormal) Collected: 11/28/23 0602     Specimen: Blood Updated: 11/28/23 0656     Glucose 101 mg/dL      BUN 12 mg/dL      Creatinine 0.77 mg/dL      Sodium 144 mmol/L      Potassium 3.8 mmol/L      Chloride 110 mmol/L      CO2 26.0 mmol/L      Calcium 8.6 mg/dL      BUN/Creatinine Ratio 15.6     Anion Gap 8.0 mmol/L      eGFR 86.8 mL/min/1.73     Narrative:      GFR Normal >60  Chronic Kidney Disease <60  Kidney Failure <15      CBC (No Diff) [[id number]]  (Abnormal) Collected: 11/28/23 0602    Specimen: Blood Updated: 11/28/23 0646     WBC 7.23 10*3/mm3      RBC 3.20 10*6/mm3      Hemoglobin 7.4 g/dL      Hematocrit 24.9 %      MCV 77.8 fL      MCH 23.1 pg      MCHC 29.7 g/dL      RDW 18.5 %      RDW-SD 52.4 fl      MPV 10.8 fL      Platelets 311 10*3/mm3     High Sensitivity Troponin T [[id number]]  (Normal) Collected: 11/28/23 0602    Specimen: Blood Updated: 11/28/23 0656     HS Troponin T 13 ng/L     Narrative:      High Sensitive Troponin T Reference Range:  <14.0 ng/L- Negative Female for AMI  <22.0 ng/L- Negative Male for AMI  >=14 - Abnormal Female indicating possible myocardial injury.  >=22 - Abnormal Male indicating possible myocardial injury.   Clinicians would have to utilize clinical acumen, EKG, Troponin, and serial changes to determine if it is an Acute Myocardial Infarction or myocardial injury due to an underlying chronic condition.                 Imaging Results (Last 24 Hours)       Procedure Component Value Units Date/Time    XR Chest 1 View [[id number]] Collected: 11/27/23 2045     Updated: 11/27/23 2049    Narrative:      Emergency portable view of the chest on 11/27/2023     CLINICAL HISTORY: Chest pain     This is correlated to a prior chest x-ray on 12/7/2022     FINDINGS: The cardiomediastinal silhouette and the pulmonary vasculature  are within normal limits. The lungs are clear. The costophrenic angles  are sharp.        Impression:      1. No active disease is seen in the chest with no significant change  when compared to a  prior chest x-ray on 12/7/2022. The etiology of this  patient's chest pain is not established on this exam.     This report was finalized on 11/27/2023 8:46 PM by Dr. Stefan Mckenzie M.D  on Workstation: [id number]                   ECG 12 Lead Chest Pain   Preliminary Result   HEART RATE= 64  bpm   RR Interval= 938  ms   WA Interval= 139  ms   P Horizontal Axis= 38  deg   P Front Axis= 69  deg   QRSD Interval= 90  ms   QT Interval= 438  ms   QTcB= 452  ms   QRS Axis= 57  deg   T Wave Axis= 67  deg   - OTHERWISE NORMAL ECG -   Sinus rhythm   Low voltage, precordial leads   Electronically Signed By:    Date and Time of Study: 2023-11-28 02:34:05      ECG 12 Lead ED Triage Standing Order; Chest Pain   Preliminary Result   HEART RATE= 81  bpm   RR Interval= 741  ms   WA Interval= 132  ms   P Horizontal Axis= 3  deg   P Front Axis= 74  deg   QRSD Interval= 88  ms   QT Interval= 370  ms   QTcB= 430  ms   QRS Axis= 40  deg   T Wave Axis= 52  deg   - NORMAL ECG -   Sinus rhythm   Electronically Signed By:    Date and Time of Study: 2023-11-27 19:33:00      SCANNED - TELEMETRY     Final Result      SCANNED - TELEMETRY     Final Result           Assessment/Plan     Active Hospital Problems    Diagnosis  POA    **Profound anemia [D64.9]  Yes    Anemia [D64.9]  Yes    Chest pain [R07.9]  Yes    Melena [K92.1]  Yes    Coronary artery disease due to lipid rich plaque [I25.10, I25.83]  Yes    Iron deficiency anemia [D50.9]  Yes      Resolved Hospital Problems   No resolved problems to display.     Intermittent melena  Acute on chronic iron deficiency anemia   -Hematology consulted-agreed with IV iron  -GI consulted, they are planning planning on doing a pill endoscopy on her at some point as an outpatient.  Recent EGD/colonoscopy [facility] with no source of GI bleeding  -Received 1 unit of packed red blood cells in the emergency room, hemoglobin up to 7.4, trend hemoglobin every 8 will give additional unit of  blood  -PPI IV twice daily for now  -Holding Plavix    Chest pain and dyspnea on exertion associated neck pain  Coronary artery disease status post PCI  -cardiology consulted-with her anemia of unknown source contact is contraindicated.  If symptoms improve with improvement of hemoglobin no further cardiac testing.  If systems persist, plan for stress testing  -Plavix held as above  -Continue statin, Coreg      I discussed the patient's findings and my recommendations with patient, nursing staff, and consulting provider.    VTE Prophylaxis - SCDs.  Code Status - Full code.       Salomón Fontaine MD  [facility]  11/28/23  11:44 EST

## 2023-11-29 ENCOUNTER — TELEPHONE (OUTPATIENT)
Dept: GASTROENTEROLOGY | Facility: CLINIC | Age: 63
End: 2023-11-29
Payer: MEDICARE

## 2023-11-29 ENCOUNTER — READMISSION MANAGEMENT (OUTPATIENT)
Dept: CALL CENTER | Facility: HOSPITAL | Age: 63
End: 2023-11-29
Payer: MEDICARE

## 2023-11-29 VITALS
DIASTOLIC BLOOD PRESSURE: 64 MMHG | WEIGHT: 202 LBS | HEIGHT: 72 IN | SYSTOLIC BLOOD PRESSURE: 106 MMHG | TEMPERATURE: 97.4 F | RESPIRATION RATE: 18 BRPM | OXYGEN SATURATION: 100 % | BODY MASS INDEX: 27.36 KG/M2 | HEART RATE: 68 BPM

## 2023-11-29 LAB
ANION GAP SERPL CALCULATED.3IONS-SCNC: 7.4 MMOL/L (ref 5–15)
BASOPHILS # BLD AUTO: 0.03 10*3/MM3 (ref 0–0.2)
BASOPHILS NFR BLD AUTO: 0.3 % (ref 0–1.5)
BH BB BLOOD EXPIRATION DATE: NORMAL
BH BB BLOOD TYPE BARCODE: 5100
BH BB DISPENSE STATUS: NORMAL
BH BB PRODUCT CODE: NORMAL
BH BB UNIT NUMBER: NORMAL
BUN SERPL-MCNC: 10 MG/DL (ref 8–23)
BUN/CREAT SERPL: 13.2 (ref 7–25)
CALCIUM SPEC-SCNC: 8.4 MG/DL (ref 8.6–10.5)
CHLORIDE SERPL-SCNC: 109 MMOL/L (ref 98–107)
CO2 SERPL-SCNC: 25.6 MMOL/L (ref 22–29)
CREAT SERPL-MCNC: 0.76 MG/DL (ref 0.57–1)
CROSSMATCH INTERPRETATION: NORMAL
DEPRECATED RDW RBC AUTO: 55.3 FL (ref 37–54)
DEPRECATED RDW RBC AUTO: 56.1 FL (ref 37–54)
EGFRCR SERPLBLD CKD-EPI 2021: 88.2 ML/MIN/1.73
EOSINOPHIL # BLD AUTO: 0.32 10*3/MM3 (ref 0–0.4)
EOSINOPHIL NFR BLD AUTO: 3.5 % (ref 0.3–6.2)
ERYTHROCYTE [DISTWIDTH] IN BLOOD BY AUTOMATED COUNT: 20.8 % (ref 12.3–15.4)
ERYTHROCYTE [DISTWIDTH] IN BLOOD BY AUTOMATED COUNT: 20.9 % (ref 12.3–15.4)
FERRITIN SERPL-MCNC: 92.8 NG/ML (ref 13–150)
GLUCOSE SERPL-MCNC: 108 MG/DL (ref 65–99)
HCT VFR BLD AUTO: 27.8 % (ref 34–46.6)
HCT VFR BLD AUTO: 27.8 % (ref 34–46.6)
HCT VFR BLD AUTO: 29 % (ref 34–46.6)
HCT VFR BLD AUTO: 30.4 % (ref 34–46.6)
HGB BLD-MCNC: 8.3 G/DL (ref 12–15.9)
HGB BLD-MCNC: 8.3 G/DL (ref 12–15.9)
HGB BLD-MCNC: 8.4 G/DL (ref 12–15.9)
HGB BLD-MCNC: 9.3 G/DL (ref 12–15.9)
IRON 24H UR-MRATE: 61 MCG/DL (ref 37–145)
IRON SATN MFR SERPL: 16 % (ref 20–50)
LYMPHOCYTES # BLD AUTO: 2.32 10*3/MM3 (ref 0.7–3.1)
LYMPHOCYTES NFR BLD AUTO: 25.7 % (ref 19.6–45.3)
MCH RBC QN AUTO: 21.4 PG (ref 26.6–33)
MCH RBC QN AUTO: 22.3 PG (ref 26.6–33)
MCHC RBC AUTO-ENTMCNC: 29 G/DL (ref 31.5–35.7)
MCHC RBC AUTO-ENTMCNC: 29.9 G/DL (ref 31.5–35.7)
MCV RBC AUTO: 74 FL (ref 79–97)
MCV RBC AUTO: 74.5 FL (ref 79–97)
MONOCYTES # BLD AUTO: 0.66 10*3/MM3 (ref 0.1–0.9)
MONOCYTES NFR BLD AUTO: 7.3 % (ref 5–12)
NEUTROPHILS NFR BLD AUTO: 5.58 10*3/MM3 (ref 1.7–7)
NEUTROPHILS NFR BLD AUTO: 61.9 % (ref 42.7–76)
PLATELET # BLD AUTO: 324 10*3/MM3 (ref 140–450)
PLATELET # BLD AUTO: 356 10*3/MM3 (ref 140–450)
PMV BLD AUTO: 10 FL (ref 6–12)
PMV BLD AUTO: 10.3 FL (ref 6–12)
POTASSIUM SERPL-SCNC: 3.6 MMOL/L (ref 3.5–5.2)
RBC # BLD AUTO: 3.73 10*6/MM3 (ref 3.77–5.28)
RBC # BLD AUTO: 3.92 10*6/MM3 (ref 3.77–5.28)
SODIUM SERPL-SCNC: 142 MMOL/L (ref 136–145)
TIBC SERPL-MCNC: 386 MCG/DL (ref 298–536)
TRANSFERRIN SERPL-MCNC: 259 MG/DL (ref 200–360)
UNIT  ABO: NORMAL
UNIT  RH: NORMAL
WBC NRBC COR # BLD AUTO: 9.03 10*3/MM3 (ref 3.4–10.8)
WBC NRBC COR # BLD AUTO: 9.34 10*3/MM3 (ref 3.4–10.8)

## 2023-11-29 PROCEDURE — 25810000003 SODIUM CHLORIDE 0.9 % SOLUTION: Performed by: STUDENT IN AN ORGANIZED HEALTH CARE EDUCATION/TRAINING PROGRAM

## 2023-11-29 PROCEDURE — 82728 ASSAY OF FERRITIN: CPT | Performed by: INTERNAL MEDICINE

## 2023-11-29 PROCEDURE — 99232 SBSQ HOSP IP/OBS MODERATE 35: CPT | Performed by: INTERNAL MEDICINE

## 2023-11-29 PROCEDURE — 85025 COMPLETE CBC W/AUTO DIFF WBC: CPT | Performed by: INTERNAL MEDICINE

## 2023-11-29 PROCEDURE — 99232 SBSQ HOSP IP/OBS MODERATE 35: CPT | Performed by: STUDENT IN AN ORGANIZED HEALTH CARE EDUCATION/TRAINING PROGRAM

## 2023-11-29 PROCEDURE — 25010000002 NA FERRIC GLUC CPLX PER 12.5 MG: Performed by: STUDENT IN AN ORGANIZED HEALTH CARE EDUCATION/TRAINING PROGRAM

## 2023-11-29 PROCEDURE — 84466 ASSAY OF TRANSFERRIN: CPT | Performed by: INTERNAL MEDICINE

## 2023-11-29 PROCEDURE — 85027 COMPLETE CBC AUTOMATED: CPT | Performed by: STUDENT IN AN ORGANIZED HEALTH CARE EDUCATION/TRAINING PROGRAM

## 2023-11-29 PROCEDURE — 83540 ASSAY OF IRON: CPT | Performed by: INTERNAL MEDICINE

## 2023-11-29 PROCEDURE — 80048 BASIC METABOLIC PNL TOTAL CA: CPT | Performed by: STUDENT IN AN ORGANIZED HEALTH CARE EDUCATION/TRAINING PROGRAM

## 2023-11-29 PROCEDURE — 85018 HEMOGLOBIN: CPT | Performed by: NURSE PRACTITIONER

## 2023-11-29 PROCEDURE — 85014 HEMATOCRIT: CPT | Performed by: NURSE PRACTITIONER

## 2023-11-29 RX ADMIN — PANTOPRAZOLE SODIUM 40 MG: 40 INJECTION, POWDER, FOR SOLUTION INTRAVENOUS at 09:50

## 2023-11-29 RX ADMIN — Medication 10 ML: at 09:51

## 2023-11-29 RX ADMIN — CLOBETASOL PROPIONATE CREAM USP, 0.05%: 0.5 CREAM TOPICAL at 09:50

## 2023-11-29 RX ADMIN — SODIUM CHLORIDE 250 MG: 9 INJECTION, SOLUTION INTRAVENOUS at 09:56

## 2023-11-29 RX ADMIN — CARVEDILOL 3.12 MG: 3.12 TABLET, FILM COATED ORAL at 09:51

## 2023-11-29 NOTE — CASE MANAGEMENT/SOCIAL WORK
Discharge Planning Assessment  [facility]     Patient Name: Tiera Abbott  MRN: [id number]  Today's Date: 11/29/2023    Admit Date: 11/27/2023    Plan: Home   Discharge Needs Assessment       Row Name 11/29/23 1505       Living Environment    People in Home other (see comments)    Current Living Arrangements home    Potentially Unsafe Housing Conditions none    Primary Care Provided by self    Provides Primary Care For no one       Resource/Environmental Concerns    Resource/Environmental Concerns none       Transition Planning    Patient/Family Anticipates Transition to home    Patient/Family Anticipated Services at Transition none    Transportation Anticipated family or friend will provide       Discharge Needs Assessment    Readmission Within the Last 30 Days no previous admission in last 30 days    Equipment Currently Used at Home none    Concerns to be Addressed no discharge needs identified;denies needs/concerns at this time    Anticipated Changes Related to Illness none    Equipment Needed After Discharge none                   Discharge Plan       Row Name 11/29/23 1506       Plan    Plan Home    Plan Comments CCP met with patient at bedside. CCP role explained and discharge planning discussed. Face sheet verified and IMM noted. Patient's PCP is James Epley. Patient lives with a roommate. Patient has two steps to the entrance of her home. Patient's bedroom and bathroom are on the main level. Patient has used home health in the past but could not recall which agency. Patient has not been to SNF. Patient is independent with her ADLs and does not use DME. Patient inquired about financial assistance for hospital bill; CCP provided patient with [employer] office phone number and financial assistance application. Patient denies any DME/HH needs and plans to return home. CCP will follow for discharge home. Anna DUMONT                  Continued Care and Services - Admitted Since 11/27/2023    Coordination has not  been started for this encounter.          Demographic Summary       Row Name 11/29/23 1505       General Information    Admission Type inpatient    Arrived From emergency department    Required Notices Provided Important Message from Medicare    Referral Source admission list    Reason for Consult discharge planning    Preferred Language English                   Functional Status       Row Name 11/29/23 1505       Functional Status    Usual Activity Tolerance good    Current Activity Tolerance good       Functional Status, IADL    Medications independent    Meal Preparation independent    Housekeeping independent    Laundry independent    Shopping independent       Mental Status    General Appearance WDL WDL       Mental Status Summary    Recent Changes in Mental Status/Cognitive Functioning no changes                   Psychosocial    No documentation.                  Abuse/Neglect    No documentation.                  Legal    No documentation.                  Substance Abuse    No documentation.                  Patient Forms    No documentation.                     JULIA Muñoz

## 2023-11-29 NOTE — DISCHARGE SUMMARY
Patient Name: Tiera Abbott  : 1960  MRN: [id number]    Date of Admission: 2023  Date of Discharge:  2023  Primary Care Physician: Epley, James, APRN      Chief Complaint:   Chest Pain      Discharge Diagnoses     Active Hospital Problems    Diagnosis  POA    **Profound anemia [D64.9]  Yes    Anemia [D64.9]  Yes    Chest pain [R07.9]  Yes    Melena [K92.1]  Yes    Coronary artery disease due to lipid rich plaque [I25.10, I25.83]  Yes    Iron deficiency anemia [D50.9]  Yes      Resolved Hospital Problems   No resolved problems to display.        Hospital Course     Ms. Abbott is a 63 y.o. female with a history of CAD with prior stent who presented to [facility] initially complaining of dyspnea on exertion and chest pain.  Please see the admitting history and physical for further details.  She was found to have severe anemia and was admitted to the hospital for further evaluation and treatment.  She was given blood transfusion as well as IV iron.  She was seen by cardiology who was okay with stopping antiplatelet therapy for history of cardiac stent.  She was seen by hematology who managed her anemia.  Gastroenterology evaluated her.  She had recent EGD and colonoscopy that did not demonstrate source of bleeding.  They recommended outpatient capsule endoscopy after discharge.  Hemoglobin improved with blood transfusion and her symptoms have resolved.  Plan to discharge today with outpatient capsule study ASAP.  She will follow-up with CBC office for weekly CBC monitoring and to complete her IV iron therapy.      Day of Discharge     Subjective:  No new complaints.    Physical Exam:  Temp:  [97.4 °F (36.3 °C)-98.2 °F (36.8 °C)] 97.4 °F (36.3 °C)  Heart Rate:  [67-82] 68  Resp:  [16-18] 18  BP: (102-127)/(50-67) 106/64  Body mass index is 27.4 kg/m².  Physical Exam  Vitals and nursing note reviewed.   Constitutional:       Appearance: Normal appearance.   Pulmonary:      Effort:  Pulmonary effort is normal.   Neurological:      Mental Status: She is alert. Mental status is at baseline.   Psychiatric:         Mood and Affect: Mood normal.         Consultants       Start     Ordered    11/28/23 0959  Inpatient Gastroenterology Consult  Once        Specialty:  Gastroenterology  Provider:  Ottoniel Valera MD    11/28/23 0958    11/28/23 0148  Inpatient Cardiology Consult  Once        Specialty:  Cardiology  Provider:  Chayito Ramirez MD    11/28/23 0149 11/28/23 0148  Inpatient Hematology & Oncology Consult  Once        Specialty:  Hematology and Oncology  Provider:  Edgardo Harris MD    11/28/23 0149             Procedures     Imaging Results (All)       Procedure Component Value Units Date/Time    XR Chest 1 View [[id number]] Collected: 11/27/23 2045     Updated: 11/27/23 2049    Narrative:      Emergency portable view of the chest on 11/27/2023     CLINICAL HISTORY: Chest pain     This is correlated to a prior chest x-ray on 12/7/2022     FINDINGS: The cardiomediastinal silhouette and the pulmonary vasculature  are within normal limits. The lungs are clear. The costophrenic angles  are sharp.        Impression:      1. No active disease is seen in the chest with no significant change  when compared to a prior chest x-ray on 12/7/2022. The etiology of this  patient's chest pain is not established on this exam.       Results for orders placed during the hospital encounter of 11/27/23    Adult Transthoracic Echo Complete W/ Cont if Necessary Per Protocol    Interpretation Summary    Left ventricular systolic function is normal. Calculated left ventricular EF = 60.2%    Left ventricular diastolic function was normal.    Sclerotic aortic valve present with mild aortic valve regurgitation.  No significant aortic valve stenosis present.    Normal right ventricular size and function present.  No evidence for pulmonary hypertension.    Pertinent Labs     Results from last 7 days   Lab Units  "11/29/23  0810 11/28/23 2108 11/28/23 0602 11/27/23  1936   WBC 10*3/mm3 9.03  9.34  --  7.23 7.93   HEMOGLOBIN g/dL 8.4*  8.3*  8.3* 9.2* 7.4* 7.1*   PLATELETS 10*3/mm3 356  324  --  311 400     Results from last 7 days   Lab Units 11/29/23  0810 11/28/23  0602 11/27/23  1936   SODIUM mmol/L 142 144 140   POTASSIUM mmol/L 3.6 3.8 3.5   CHLORIDE mmol/L 109* 110* 108*   CO2 mmol/L 25.6 26.0 23.0   BUN mg/dL 10 12 10   CREATININE mg/dL 0.76 0.77 0.74   GLUCOSE mg/dL 108* 101* 115*   EGFR mL/min/1.73 88.2 86.8 91.0     Results from last 7 days   Lab Units 11/27/23  1936   ALBUMIN g/dL 3.7   BILIRUBIN mg/dL <0.2   ALK PHOS U/L 119*   AST (SGOT) U/L 12   ALT (SGPT) U/L <5     Results from last 7 days   Lab Units 11/29/23  0810 11/28/23  0602 11/27/23  1936   CALCIUM mg/dL 8.4* 8.6 8.6   ALBUMIN g/dL  --   --  3.7   MAGNESIUM mg/dL  --   --  2.1       Results from last 7 days   Lab Units 11/28/23  0602 11/27/23 2121 11/27/23 1936   HSTROP T ng/L 13 16* 11   PROBNP pg/mL  --   --  210.0           Invalid input(s): \"LDLCALC\"          Test Results Pending at Discharge       Discharge Details        Discharge Medications        Changes to Medications        Instructions Start Date   pantoprazole 40 MG EC tablet  Commonly known as: PROTONIX  What changed: when to take this   40 mg, Oral, Daily      venlafaxine XR 37.5 MG 24 hr capsule  Commonly known as: EFFEXOR-XR  What changed: when to take this   112.5 mg, Oral, Daily             Continue These Medications        Instructions Start Date   atorvastatin 80 MG tablet  Commonly known as: LIPITOR   80 mg, Oral, Nightly      carvedilol 3.125 MG tablet  Commonly known as: COREG   TAKE ONE TABLET BY MOUTH TWICE A DAY WITH MEALS      clobetasol 0.05 % cream  Commonly known as: TEMOVATE   Topical, 2 Times Daily, Sparingly, avoid face, short-term use only             Stop These Medications      clopidogrel 75 MG tablet  Commonly known as: PLAVIX              No Known " Allergies    Discharge Disposition:  Home or Self Care      Discharge Diet:  Diet Order   Procedures    Diet: Regular/House Diet; Texture: Regular Texture (IDDSI 7); Fluid Consistency: Thin (IDDSI 0)       Discharge Activity:   Activity Instructions       Activity as Tolerated              CODE STATUS:    Code Status and Medical Interventions:   Ordered at: 11/28/23 0150     Code Status (Patient has no pulse and is not breathing):    CPR (Attempt to Resuscitate)     Medical Interventions (Patient has pulse or is breathing):    Full Support       Future Appointments   Date Time Provider Department Center   12/6/2023  1:00 PM CHEMO INF 5 [facility]   12/13/2023  1:00 PM CHEMO INF 5 [facility]   12/29/2023  2:50 PM LAB CHAIR 6 [facility]   12/29/2023  3:20 PM Jenae Her APRN [facility]     Additional Instructions for the Follow-ups that You Need to Schedule       Discharge Follow-up with PCP   As directed       Currently Documented PCP:    Epley, James, APRN    PCP Phone Number:    [phone]     Follow Up Details: 1 to 2 weeks (or sooner if problems)               Follow-up Information       Epley, James, APRN .    Specialties: Nurse Practitioner, Family Medicine  Why: 1 to 2 weeks (or sooner if problems)  Contact information:  [address]  [place] [address]  [phone]                             Additional Instructions for the Follow-ups that You Need to Schedule       Discharge Follow-up with PCP   As directed       Currently Documented PCP:    Epley, James, APRN    PCP Phone Number:    [phone]     Follow Up Details: 1 to 2 weeks (or sooner if problems)            Time Spent on Discharge:  Greater than 30 minutes      Carson Barlow MD  [facility]  11/29/23  15:33 EST

## 2023-11-29 NOTE — PROGRESS NOTES
"    Patient Name: Tiera Abbott  :1960  63 y.o.      Patient Care Team:  Epley, James, APRN as PCP - General  Epley, James, APRN as PCP - Family Medicine  Hollis Fontaine MD as Consulting Physician (Cardiology)  Stefan Pelayo MD as Referring Physician (Internal Medicine)  Edgardo Harris MD as Consulting Physician (Hematology and Oncology)    Chief Complaint:   Chest pain    Interval History:   S/p pRBC, symptoms have resolved.     Objective   Vital Signs  Temp:  [97.5 °F (36.4 °C)-98.2 °F (36.8 °C)] 98.1 °F (36.7 °C)  Heart Rate:  [67-82] 79  Resp:  [16-18] 18  BP: (102-130)/(49-69) 102/53    Intake/Output Summary (Last 24 hours) at 2023 0834  Last data filed at 2023 1832  Gross per 24 hour   Intake 300 ml   Output --   Net 300 ml     Flowsheet Rows      Flowsheet Row First Filed Value   Admission Height 182.9 cm (72.01\") Documented at 2023   Admission Weight 91.9 kg (202 lb 9.6 oz) Documented at 2023            GEN: no distress, alert and oriented  HEENT: NACT, EOMI, moist mucous membranes  Lungs: CTAB, no wheezes, rales or rhonchi  CV: normal rate, regular rhythm, normal S1, S2, no murmurs, +2 radial pulses b/l, no carotid bruit  Abdomen: soft, nontender, nondistended, NABS  Extremities: no edema  Skin: no rash, warm, dry  Heme/Lymph: no bruising  Psych: organized thought, normal behavior and affect    Results Review:    Results from last 7 days   Lab Units 23  0602   SODIUM mmol/L 144   POTASSIUM mmol/L 3.8   CHLORIDE mmol/L 110*   CO2 mmol/L 26.0   BUN mg/dL 12   CREATININE mg/dL 0.77   GLUCOSE mg/dL 101*   CALCIUM mg/dL 8.6     Results from last 7 days   Lab Units 23  0602 231 23   HSTROP T ng/L 13 16* 11     Results from last 7 days   Lab Units 23  2108 23  0602   WBC 10*3/mm3  --  7.23   HEMOGLOBIN g/dL 9.2* 7.4*   HEMATOCRIT % 31.2* 24.9*   PLATELETS 10*3/mm3  --  311     Results from last 7 days   Lab " Units 11/27/23 1936   INR  1.05   APTT seconds 27.9     Results from last 7 days   Lab Units 11/27/23 1936   MAGNESIUM mg/dL 2.1                   Medication Review:   atorvastatin, 80 mg, Oral, Nightly  carvedilol, 3.125 mg, Oral, BID With Meals  clobetasol, , Topical, BID  ferric gluconate, 250 mg, Intravenous, Daily  pantoprazole, 40 mg, Intravenous, BID AC  sodium chloride, 10 mL, Intravenous, Q12H  venlafaxine XR, 112.5 mg, Oral, Nightly              Assessment & Plan   #Iron deficiency anemia  #Chest pain/dyspnea on exertion  #CAD with prior inferior MI        63-year-old woman with CAD with prior inferior MI in 2008 status post PCI, iron deficiency anemia on Venofer, who presented to the ED with exertional chest pain and dyspnea, found to be severely anemic with hemoglobin of 7.1.     Received 1 unit PRBCs yesterday hemoglobin up to 9.2.  Symptoms have resolved.    - No further cardiac workup indicated at this time.  She can be discharged from cardiac standpoint.  She can follow-up with Dr. Fontaine.    Joseph Llanos MD  [facility]  11/29/23  08:34 EST

## 2023-11-29 NOTE — PLAN OF CARE
Problem: Adult Inpatient Plan of Care  Goal: Plan of Care Review  Outcome: Ongoing, Progressing   Goal Outcome Evaluation:  VSS. RA. Pt received 1 unit PRBC yesterday. HGB up to 9.2. Pt ambulating in hallway independently. No c/o chest pain over night. Pt rested off and on throughout the night with no issues or complaints.

## 2023-11-29 NOTE — TELEPHONE ENCOUNTER
SPOKE WITH PT SHE IS STILL IN THE HOSPITAL BUT IS BEING DISCHARGED TODAY SHE IS SCHEDULED FOR CAPSULE ON OUR NEXT AVAILABLE DAY DEC 12 INSTRUCTIONS ARE BEING MAILED AND SENT TO [facility]

## 2023-11-29 NOTE — PROGRESS NOTES
CHIEF COMPLAINT: Anemia/GI blood loss    HISTORY OF PRESENT ILLNESS:   The patient is feeling better today after blood and iron.  She denies chest pain.  She had a stool overnight that she tells me was tarry.         Past Medical History, Past Surgical History, Social History, Family History have been reviewed and are without significant changes except as mentioned.    Review of Systems   A comprehensive 14 point review of systems was performed and was negative except as mentioned.    Medications:  The current medication list was reviewed in the EMR    ALLERGIES:  No Known Allergies    Objective      Vitals:    11/29/23 0700   BP: 102/53   Pulse: 79   Resp: 18   Temp: 98.1 °F (36.7 °C)   SpO2: 95%          Physical Exam    CONSTITUTIONAL: pleasant well-developed adult woman lying in bed no distress  HEENT: no icterus, no thrush, moist membranes  LYMPH: no cervical or supraclavicular lad  CV: RRR, S1S2, no murmur  RESP: cta bilat, no wheezing, no rales  GI: soft, non-tender, no splenomegaly, +bs  MUSC: no edema, normal gait  NEURO: alert and oriented x3, normal strength  PSYCH: normal mood      RECENT LABS:  Hematology Results from last 7 days   Lab Units 11/29/23  0810 11/28/23  2108 11/28/23  0602 11/27/23  1936   WBC 10*3/mm3 9.34  --  7.23 7.93   HEMOGLOBIN g/dL 8.3*  8.3* 9.2* 7.4* 7.1*   HEMATOCRIT % 27.8*  27.8* 31.2* 24.9* 23.5*   PLATELETS 10*3/mm3 324  --  311 400     2  Lab Results   Component Value Date    GLUCOSE 108 (H) 11/29/2023    BUN 10 11/29/2023    CREATININE 0.76 11/29/2023    EGFRIFNONA 95 05/23/2019    EGFRIFAFRI 115 05/23/2019    BCR 13.2 11/29/2023    CO2 25.6 11/29/2023    CALCIUM 8.4 (L) 11/29/2023    PROTENTOTREF 6.7 10/25/2023    ALBUMIN 3.7 11/27/2023    LABIL2 1.7 10/25/2023    AST 12 11/27/2023    ALT <5 11/27/2023       Lab Results   Component Value Date    IRON 21 (L) 11/20/2023    TIBC 402 11/20/2023    FERRITIN 16.30 11/20/2023       Lab Results   Component Value Date     ZQLTVNER65 410 03/28/2022       Lab Results   Component Value Date    FOLATE 6.0 03/28/2022          Assessment & Plan   *Recurrent iron deficiency anemia secondary to occult GI blood loss-small bowel source suspected  Presents with symptomatic anemia (exertional chest pain, SOA, fatigue, weakness) hemoglobin 7.1 recent ferritin 16/iron sat 5% despite recent IV iron replacement completed 9/7/2023  EGD/colonoscopy 11/3/2023 unrevealing for source of GI blood loss  Hemoglobin 8.3 today; receiving second dose of IV Ferrlecit with good tolerance     *Antiplatelet therapy with Plavix complicating above     Hematology recommendations/plan  Continue IV iron today  GI plans outpatient capsule study  If patient discharged I will arrange 2 additional doses of outpatient IV iron and weekly CBC to monitor closely until her capsule is complete    Please call if hematology further needed during the hospital stay.              11/29/2023      CC:

## 2023-11-29 NOTE — PROGRESS NOTES
[facility]  Inpatient Progress Note    Reason for Followup:  Anemia, melena    Subjective     Interval History:   No further melena    Current Facility-Administered Medications:     acetaminophen (TYLENOL) tablet 650 mg, 650 mg, Oral, Q4H PRN, 650 mg at 11/28/23 0230 **OR** acetaminophen (TYLENOL) 160 MG/5ML oral solution 650 mg, 650 mg, Oral, Q4H PRN **OR** acetaminophen (TYLENOL) suppository 650 mg, 650 mg, Rectal, Q4H PRN, Sherron Jacques APRN    atorvastatin (LIPITOR) tablet 80 mg, 80 mg, Oral, Nightly, Sherron Jacques APRN, 80 mg at 11/28/23 2018    calcium carbonate (TUMS) chewable tablet 500 mg (200 mg elemental), 2 tablet, Oral, BID PRN, Sherron Jacques APRN    carvedilol (COREG) tablet 3.125 mg, 3.125 mg, Oral, BID With Meals, Sherron Jacques APRN, 3.125 mg at 11/28/23 1741    clobetasol (TEMOVATE) 0.05 % cream, , Topical, BID, Sherron Jacques APRN, Given at 11/28/23 2018    ferric gluconate (FERRLECIT) 250 MG in sodium chloride 0.9% 250 mL IVPB, 250 mg, Intravenous, Daily, Salomón Fontaine MD, Stopped at 11/28/23 1119    nitroglycerin (NITROSTAT) SL tablet 0.4 mg, 0.4 mg, Sublingual, Q5 Min PRN, Sherron Jacques APRN    ondansetron (ZOFRAN) tablet 4 mg, 4 mg, Oral, Q6H PRN **OR** ondansetron (ZOFRAN) injection 4 mg, 4 mg, Intravenous, Q6H PRN, Sherron Jacques APRN    pantoprazole (PROTONIX) injection 40 mg, 40 mg, Intravenous, BID AC, Salomón Fontaine MD, 40 mg at 11/28/23 1741    sodium chloride 0.9 % flush 10 mL, 10 mL, Intravenous, PRN, Allison Ortiz MD    sodium chloride 0.9 % flush 10 mL, 10 mL, Intravenous, Q12H, Sherron Jacques APRN, 10 mL at 11/28/23 2018    sodium chloride 0.9 % flush 10 mL, 10 mL, Intravenous, PRN, Sherron Jacques APRN    sodium chloride 0.9 % infusion 40 mL, 40 mL, Intravenous, PRN, Sherron Jacques APRN    venlafaxine XR (EFFEXOR-XR) 24 hr capsule 112.5 mg, 112.5 mg, Oral, Nightly, Sawyer,  "Sherron Moreira, APRN, 112.5 mg at 11/28/23 2018    Objective     Vital Signs  Temp:  [97.5 °F (36.4 °C)-98.2 °F (36.8 °C)] 97.5 °F (36.4 °C)  Heart Rate:  [67-82] 82  Resp:  [16-18] 16  BP: (107-130)/(49-69) 127/67  Body mass index is 27.4 kg/m².    Intake/Output Summary (Last 24 hours) at 11/29/2023 0733  Last data filed at 11/28/2023 1832  Gross per 24 hour   Intake 300 ml   Output --   Net 300 ml     No intake/output data recorded.     Physical Exam:   General: patient awake, alert and cooperative   Abdomen: soft, nontender, nondistended; normal bowel sounds     Results Review:     I reviewed the patient's new clinical results.    Results from last 7 days   Lab Units 11/28/23 2108 11/28/23 0602 11/27/23 1936   WBC 10*3/mm3  --  7.23 7.93   HEMOGLOBIN g/dL 9.2* 7.4* 7.1*   HEMATOCRIT % 31.2* 24.9* 23.5*   PLATELETS 10*3/mm3  --  311 400     Results from last 7 days   Lab Units 11/28/23 0602 11/27/23 1936   SODIUM mmol/L 144 140   POTASSIUM mmol/L 3.8 3.5   CHLORIDE mmol/L 110* 108*   CO2 mmol/L 26.0 23.0   BUN mg/dL 12 10   CREATININE mg/dL 0.77 0.74   CALCIUM mg/dL 8.6 8.6   BILIRUBIN mg/dL  --  <0.2   ALK PHOS U/L  --  119*   ALT (SGPT) U/L  --  <5   AST (SGOT) U/L  --  12   GLUCOSE mg/dL 101* 115*     Results from last 7 days   Lab Units 11/27/23 1936   INR  1.05     No results found for: \"LIPASE\"    Radiology:  [unfilled]      Assessment & Plan   Assessment:   Microcytic anemia  Melena    All problems new to me today    Plan:   Hb improving, no current overt bleeding  Plan for capsule endoscopy as outpt, note sent to office to schedule    I discussed the patient's findings and my recommendations with patient.          Ottoniel Valera M.D.  [facility]  [address] [place] [address]  Office: [phone]             "

## 2023-11-30 ENCOUNTER — TRANSITIONAL CARE MANAGEMENT TELEPHONE ENCOUNTER (OUTPATIENT)
Dept: CALL CENTER | Facility: HOSPITAL | Age: 63
End: 2023-11-30
Payer: MEDICARE

## 2023-11-30 NOTE — OUTREACH NOTE
Call Center TCM Note      Flowsheet Row Responses   [facility] [place] patient discharged from? [place]   Does the patient have one of the following disease processes/diagnoses(primary or secondary)? Other   TCM attempt successful? Yes   Call start time 1553   Call end time 1600   Discharge diagnosis Profound anemia   Person spoke with today (if not patient) and relationship pt   Meds reviewed with patient/caregiver? Yes   Is the patient having any side effects they believe may be caused by any medication additions or changes? No   Does the patient have all medications ordered at discharge? N/A   Is the patient taking all medications as directed (includes completed medication regime)? Yes   Comments GI 12/12/23,  Oncology follows pt   Does the patient have an appointment with their PCP within 7-14 days of discharge? No   Nursing Interventions Patient declined scheduling/rescheduling appointment at this time   Psychosocial issues? No   Did the patient receive a copy of their discharge instructions? Yes   Nursing interventions Reviewed instructions with patient   What is the patient's perception of their health status since discharge? Improving   Is the patient/caregiver able to teach back signs and symptoms related to disease process for when to call PCP? Yes   Is the patient/caregiver able to teach back signs and symptoms related to disease process for when to call 911? Yes   Is the patient/caregiver able to teach back the hierarchy of who to call/visit for symptoms/problems? PCP, Specialist, Home health nurse, Urgent Care, ED, 911 Yes   If the patient is a current smoker, are they able to teach back resources for cessation? Not a smoker   TCM call completed? Yes   Wrap up additional comments Pt states she is still feeling tired. No questions/concerns. Pt declined making an appt with PCP office.   Call end time 1600   Would this patient benefit from a Referral to Amb Social Work? No   Is the patient interested in  additional calls from an ambulatory ? No            Sonali Retana RN    11/30/2023, 16:01 EST

## 2023-11-30 NOTE — OUTREACH NOTE
Prep Survey      Flowsheet Row Responses   [facility] patient discharged from? [place]   Is LACE score < 7 ? Yes   Eligibility [facility]   Date of Admission 11/27/23   Date of Discharge 11/29/23   Discharge Disposition Home or Self Care   Discharge diagnosis Profound anemia   Does the patient have one of the following disease processes/diagnoses(primary or secondary)? Other   Does the patient have Home health ordered? No   Is there a DME ordered? No   Prep survey completed? Yes            SCOTT DOYLE - Registered Nurse

## 2023-11-30 NOTE — CASE MANAGEMENT/SOCIAL WORK
Case Management Discharge Note      Final Note: discharged home         Selected Continued Care - Discharged on 11/29/2023 Admission date: 11/27/2023 - Discharge disposition: Home or Self Care      Destination    No services have been selected for the patient.                Durable Medical Equipment    No services have been selected for the patient.                Dialysis/Infusion    No services have been selected for the patient.                Home Medical Care    No services have been selected for the patient.                Therapy    No services have been selected for the patient.                Community Resources    No services have been selected for the patient.                Community & DME    No services have been selected for the patient.                    Transportation Services  Private: Car    Final Discharge Disposition Code: 01 - home or self-care

## 2023-12-01 NOTE — PROGRESS NOTES
Called to schedule appt for hospital fu. No answer unable to leave voicemail since mailbox full.     Hub to relay

## 2023-12-05 ENCOUNTER — INFUSION (OUTPATIENT)
Dept: ONCOLOGY | Facility: HOSPITAL | Age: 63
End: 2023-12-05
Payer: MEDICARE

## 2023-12-05 VITALS
OXYGEN SATURATION: 98 % | BODY MASS INDEX: 28.24 KG/M2 | RESPIRATION RATE: 16 BRPM | TEMPERATURE: 97.1 F | WEIGHT: 208.2 LBS | SYSTOLIC BLOOD PRESSURE: 144 MMHG | HEART RATE: 69 BPM | DIASTOLIC BLOOD PRESSURE: 75 MMHG

## 2023-12-05 DIAGNOSIS — D64.9 ANEMIA, UNSPECIFIED TYPE: Primary | ICD-10-CM

## 2023-12-05 LAB
BASOPHILS # BLD AUTO: 0.03 10*3/MM3 (ref 0–0.2)
BASOPHILS NFR BLD AUTO: 0.5 % (ref 0–1.5)
DEPRECATED RDW RBC AUTO: 71.9 FL (ref 37–54)
EOSINOPHIL # BLD AUTO: 0.22 10*3/MM3 (ref 0–0.4)
EOSINOPHIL NFR BLD AUTO: 3.3 % (ref 0.3–6.2)
ERYTHROCYTE [DISTWIDTH] IN BLOOD BY AUTOMATED COUNT: 25.5 % (ref 12.3–15.4)
HCT VFR BLD AUTO: 34.6 % (ref 34–46.6)
HGB BLD-MCNC: 10.4 G/DL (ref 12–15.9)
IMM GRANULOCYTES # BLD AUTO: 0.01 10*3/MM3 (ref 0–0.05)
IMM GRANULOCYTES NFR BLD AUTO: 0.2 % (ref 0–0.5)
LYMPHOCYTES # BLD AUTO: 1.48 10*3/MM3 (ref 0.7–3.1)
LYMPHOCYTES NFR BLD AUTO: 22.4 % (ref 19.6–45.3)
MCH RBC QN AUTO: 23.7 PG (ref 26.6–33)
MCHC RBC AUTO-ENTMCNC: 30.1 G/DL (ref 31.5–35.7)
MCV RBC AUTO: 78.8 FL (ref 79–97)
MONOCYTES # BLD AUTO: 0.42 10*3/MM3 (ref 0.1–0.9)
MONOCYTES NFR BLD AUTO: 6.4 % (ref 5–12)
NEUTROPHILS NFR BLD AUTO: 4.44 10*3/MM3 (ref 1.7–7)
NEUTROPHILS NFR BLD AUTO: 67.2 % (ref 42.7–76)
NRBC BLD AUTO-RTO: 0 /100 WBC (ref 0–0.2)
PLATELET # BLD AUTO: 309 10*3/MM3 (ref 140–450)
PMV BLD AUTO: 10.2 FL (ref 6–12)
RBC # BLD AUTO: 4.39 10*6/MM3 (ref 3.77–5.28)
WBC NRBC COR # BLD AUTO: 6.6 10*3/MM3 (ref 3.4–10.8)

## 2023-12-05 PROCEDURE — A9270 NON-COVERED ITEM OR SERVICE: HCPCS | Performed by: INTERNAL MEDICINE

## 2023-12-05 PROCEDURE — 96365 THER/PROPH/DIAG IV INF INIT: CPT

## 2023-12-05 PROCEDURE — 25810000003 SODIUM CHLORIDE 0.9 % SOLUTION: Performed by: NURSE PRACTITIONER

## 2023-12-05 PROCEDURE — 63710000001 ACETAMINOPHEN 325 MG TABLET: Performed by: INTERNAL MEDICINE

## 2023-12-05 PROCEDURE — 25810000003 SODIUM CHLORIDE 0.9 % SOLUTION: Performed by: INTERNAL MEDICINE

## 2023-12-05 PROCEDURE — 85025 COMPLETE CBC W/AUTO DIFF WBC: CPT

## 2023-12-05 PROCEDURE — 63710000001 DIPHENHYDRAMINE PER 50 MG: Performed by: INTERNAL MEDICINE

## 2023-12-05 PROCEDURE — 25010000002 IRON SUCROSE PER 1 MG: Performed by: NURSE PRACTITIONER

## 2023-12-05 RX ORDER — SODIUM CHLORIDE 9 MG/ML
20 INJECTION, SOLUTION INTRAVENOUS ONCE
Status: COMPLETED | OUTPATIENT
Start: 2023-12-05 | End: 2023-12-05

## 2023-12-05 RX ORDER — ACETAMINOPHEN 325 MG/1
650 TABLET ORAL ONCE
Status: COMPLETED | OUTPATIENT
Start: 2023-12-05 | End: 2023-12-05

## 2023-12-05 RX ORDER — DIPHENHYDRAMINE HCL 25 MG
25 CAPSULE ORAL ONCE
Status: COMPLETED | OUTPATIENT
Start: 2023-12-05 | End: 2023-12-05

## 2023-12-05 RX ADMIN — ACETAMINOPHEN 650 MG: 325 TABLET ORAL at 13:35

## 2023-12-05 RX ADMIN — SODIUM CHLORIDE 300 MG: 900 INJECTION, SOLUTION INTRAVENOUS at 13:59

## 2023-12-05 RX ADMIN — SODIUM CHLORIDE 20 ML/HR: 9 INJECTION, SOLUTION INTRAVENOUS at 13:30

## 2023-12-05 RX ADMIN — DIPHENHYDRAMINE HYDROCHLORIDE 25 MG: 25 CAPSULE ORAL at 13:35

## 2023-12-11 ENCOUNTER — TELEPHONE (OUTPATIENT)
Dept: ONCOLOGY | Facility: CLINIC | Age: 63
End: 2023-12-11

## 2023-12-11 NOTE — TELEPHONE ENCOUNTER
Caller: Tiera Abbott    Relationship to patient: Self    Best call back number: [phone]     Chief complaint: PT HAS ANOTHER TEST AND HAS TO BE BACK TO THAT OFFICE BY 4 PM AND WILL NOT BE ABLE TO MAKE THE INFUSION    Type of visit: INFUSION    Requested date: 12/18/23    If rescheduling, when is the original appointment: 12/12/23     Additional notes: PLEASE CALL THE PT BACK TO ADVISE OF NEW APPT DATE/TIME.

## 2023-12-12 ENCOUNTER — INFUSION (OUTPATIENT)
Dept: ONCOLOGY | Facility: HOSPITAL | Age: 63
End: 2023-12-12
Payer: MEDICARE

## 2023-12-12 ENCOUNTER — DOCUMENTATION (OUTPATIENT)
Dept: OTHER | Facility: HOSPITAL | Age: 63
End: 2023-12-12
Payer: MEDICARE

## 2023-12-12 ENCOUNTER — LAB (OUTPATIENT)
Dept: LAB | Facility: HOSPITAL | Age: 63
End: 2023-12-12
Payer: MEDICARE

## 2023-12-12 ENCOUNTER — CLINICAL SUPPORT (OUTPATIENT)
Dept: GASTROENTEROLOGY | Facility: CLINIC | Age: 63
End: 2023-12-12
Payer: MEDICARE

## 2023-12-12 ENCOUNTER — TELEPHONE (OUTPATIENT)
Dept: GASTROENTEROLOGY | Facility: CLINIC | Age: 63
End: 2023-12-12
Payer: MEDICARE

## 2023-12-12 VITALS
WEIGHT: 209 LBS | DIASTOLIC BLOOD PRESSURE: 71 MMHG | OXYGEN SATURATION: 99 % | TEMPERATURE: 96.8 F | SYSTOLIC BLOOD PRESSURE: 134 MMHG | BODY MASS INDEX: 28.35 KG/M2 | RESPIRATION RATE: 16 BRPM | HEART RATE: 70 BPM

## 2023-12-12 DIAGNOSIS — D50.9 IRON DEFICIENCY ANEMIA, UNSPECIFIED IRON DEFICIENCY ANEMIA TYPE: ICD-10-CM

## 2023-12-12 DIAGNOSIS — D64.9 ANEMIA, UNSPECIFIED TYPE: ICD-10-CM

## 2023-12-12 DIAGNOSIS — D50.9 IRON DEFICIENCY ANEMIA, UNSPECIFIED IRON DEFICIENCY ANEMIA TYPE: Primary | ICD-10-CM

## 2023-12-12 DIAGNOSIS — R19.5 OCCULT GI BLEEDING: Primary | ICD-10-CM

## 2023-12-12 LAB
BASOPHILS # BLD AUTO: 0.04 10*3/MM3 (ref 0–0.2)
BASOPHILS NFR BLD AUTO: 0.6 % (ref 0–1.5)
DEPRECATED RDW RBC AUTO: 71.2 FL (ref 37–54)
EOSINOPHIL # BLD AUTO: 0.24 10*3/MM3 (ref 0–0.4)
EOSINOPHIL NFR BLD AUTO: 3.5 % (ref 0.3–6.2)
ERYTHROCYTE [DISTWIDTH] IN BLOOD BY AUTOMATED COUNT: 25.2 % (ref 12.3–15.4)
HCT VFR BLD AUTO: 36.7 % (ref 34–46.6)
HGB BLD-MCNC: 11.4 G/DL (ref 12–15.9)
IMM GRANULOCYTES # BLD AUTO: 0.02 10*3/MM3 (ref 0–0.05)
IMM GRANULOCYTES NFR BLD AUTO: 0.3 % (ref 0–0.5)
LYMPHOCYTES # BLD AUTO: 1.87 10*3/MM3 (ref 0.7–3.1)
LYMPHOCYTES NFR BLD AUTO: 26.9 % (ref 19.6–45.3)
MCH RBC QN AUTO: 24.5 PG (ref 26.6–33)
MCHC RBC AUTO-ENTMCNC: 31.1 G/DL (ref 31.5–35.7)
MCV RBC AUTO: 78.9 FL (ref 79–97)
MONOCYTES # BLD AUTO: 0.57 10*3/MM3 (ref 0.1–0.9)
MONOCYTES NFR BLD AUTO: 8.2 % (ref 5–12)
NEUTROPHILS NFR BLD AUTO: 4.21 10*3/MM3 (ref 1.7–7)
NEUTROPHILS NFR BLD AUTO: 60.5 % (ref 42.7–76)
NRBC BLD AUTO-RTO: 0 /100 WBC (ref 0–0.2)
PLATELET # BLD AUTO: 280 10*3/MM3 (ref 140–450)
PMV BLD AUTO: 10.2 FL (ref 6–12)
RBC # BLD AUTO: 4.65 10*6/MM3 (ref 3.77–5.28)
WBC NRBC COR # BLD AUTO: 6.95 10*3/MM3 (ref 3.4–10.8)

## 2023-12-12 PROCEDURE — 85025 COMPLETE CBC W/AUTO DIFF WBC: CPT

## 2023-12-12 PROCEDURE — 36415 COLL VENOUS BLD VENIPUNCTURE: CPT

## 2023-12-12 NOTE — PROGRESS NOTES
Oncology Social Work    OSW spoke to patient who is requesting information on [facility] Financial Assistance Application. OSW provided the application and items needed to turn it in.  Encouraged her to call the [place] office if she has additional questions regarding the application.     Minna Antonio, ASHW, LCSW

## 2023-12-12 NOTE — NURSING NOTE
0912:  Pt here for 300 mg IV Venofer.  Pt currently NPO due to scan capsule started this am.  Explained to patient that she has oral pre-meds ordered and cannot take them this morning prior to Iron.  Discussed with Jenae WHEELER.  OK to reschedule to Monday.  Message sent to scheduling to change appointment.  CBC done.  Hgb 11.4.  Instructed pt that scheduling will call her with new appointment.  Pt v/u.

## 2023-12-14 ENCOUNTER — TELEPHONE (OUTPATIENT)
Dept: GASTROENTEROLOGY | Facility: CLINIC | Age: 63
End: 2023-12-14
Payer: MEDICARE

## 2023-12-14 NOTE — TELEPHONE ENCOUNTER
Patient called. No answer. Left message on an identified VM advising as per Dr. Alonso's note. Advised to call back with questions. Advised to call back for questions.  CT abd/pelvis enterography order placed.

## 2023-12-14 NOTE — TELEPHONE ENCOUNTER
Per Dr. Alonso:     please call the patient to the capsule did not leave the stomach at all no views of the small intestine were obtained.  Please schedule a CT abdomen pelvis enterography to look at the small intestine.  Office visit 4 to 6 weeks with an extender to review this test

## 2023-12-15 ENCOUNTER — TELEPHONE (OUTPATIENT)
Dept: GASTROENTEROLOGY | Facility: CLINIC | Age: 63
End: 2023-12-15
Payer: MEDICARE

## 2023-12-15 NOTE — TELEPHONE ENCOUNTER
CALLER: Tiera Abbott    RELATIONSHIP TO PATIENT:Self    BEST CALL BACK NUMBER: [phone]    CALL REGARDING: Requesting to Speak to  in Reference to Swallow Study/Capsule Study       Request a Call Back

## 2023-12-15 NOTE — TELEPHONE ENCOUNTER
This patient has serious concerns about the capsule not passing and the need for the CT enteroscopy.  She has clinical questions that I can not answer and is requesting a call from the physician Dr. Alonso or the PAEffie.  Please contact at your earliest convenience.

## 2023-12-18 ENCOUNTER — TELEPHONE (OUTPATIENT)
Dept: GASTROENTEROLOGY | Facility: CLINIC | Age: 63
End: 2023-12-18
Payer: MEDICARE

## 2023-12-18 ENCOUNTER — TELEPHONE (OUTPATIENT)
Dept: ONCOLOGY | Facility: CLINIC | Age: 63
End: 2023-12-18
Payer: MEDICARE

## 2023-12-18 DIAGNOSIS — D64.9 ANEMIA, UNSPECIFIED TYPE: Primary | ICD-10-CM

## 2023-12-18 NOTE — TELEPHONE ENCOUNTER
Hub staff attempted to follow warm transfer process and was unsuccessful     Caller: Tiera Abbott    Relationship to patient: Self    Best call back number: [phone]     Patient is needing:   PATIENT IS RETURNING A CALL FROM MICHEAL CATES PA-C. PATIENT STATES IT IS VITAL THAT SHE SPEAKS WITH SOMEONE TODAY.

## 2023-12-18 NOTE — TELEPHONE ENCOUNTER
Patient wondered why she used to only get one pill as a pre-med and now she gets two. Informed her that she previously received Injectafer which is pre-medicated with Compazine and now she gets Venofer which is pre-medicated with Tylenol and Benadryl. She verbalized understanding.

## 2023-12-18 NOTE — TELEPHONE ENCOUNTER
Caller: Tiera Abbott    Relationship to patient: Self    Best call back number: [phone]    Type of visit: LAB AND HOSPITAL FOLLOW UP    Requested date: PLEASE CALL TO RESCHEDULE     If rescheduling, when is the original appointment: 12/29     SHE ALSO STATES THAT DR. BRAVO WANTS HER TO COME IN FOR WEEKLY LABS. SHE IS WONDERING IF SHE CAN COME IN FOR THOSE TOMORROW.    HUB SPOKE TO NAVDEEP AT WARM TRANSFER WHO REQUESTED MESSAGE BE SENT REGARDING LABS.

## 2023-12-18 NOTE — TELEPHONE ENCOUNTER
Attempted to call the patient x 2 on 12/18/2023 to discuss capsule endoscopy and proceeding with CT enterography.  Left a voicemail advising her to call back to the office.

## 2023-12-18 NOTE — TELEPHONE ENCOUNTER
----- Message from Dima Taylor Rep sent at 12/18/2023  3:06 PM EST -----  Oscar, I think we got all the appointments figured out to her liking.    Please call her as she has questions about pre-meds? Please and thank you, Tessa

## 2023-12-19 ENCOUNTER — APPOINTMENT (OUTPATIENT)
Dept: ONCOLOGY | Facility: HOSPITAL | Age: 63
End: 2023-12-19
Payer: MEDICARE

## 2023-12-19 ENCOUNTER — LAB (OUTPATIENT)
Dept: LAB | Facility: HOSPITAL | Age: 63
End: 2023-12-19
Payer: MEDICARE

## 2023-12-19 ENCOUNTER — INFUSION (OUTPATIENT)
Dept: ONCOLOGY | Facility: HOSPITAL | Age: 63
End: 2023-12-19
Payer: MEDICARE

## 2023-12-19 VITALS
HEART RATE: 84 BPM | OXYGEN SATURATION: 95 % | TEMPERATURE: 97 F | DIASTOLIC BLOOD PRESSURE: 81 MMHG | WEIGHT: 205 LBS | RESPIRATION RATE: 16 BRPM | SYSTOLIC BLOOD PRESSURE: 135 MMHG | BODY MASS INDEX: 27.8 KG/M2

## 2023-12-19 DIAGNOSIS — D64.9 ANEMIA, UNSPECIFIED TYPE: Primary | ICD-10-CM

## 2023-12-19 LAB
BASOPHILS # BLD AUTO: 0.04 10*3/MM3 (ref 0–0.2)
BASOPHILS NFR BLD AUTO: 0.7 % (ref 0–1.5)
DEPRECATED RDW RBC AUTO: 71.2 FL (ref 37–54)
EOSINOPHIL # BLD AUTO: 0.2 10*3/MM3 (ref 0–0.4)
EOSINOPHIL NFR BLD AUTO: 3.6 % (ref 0.3–6.2)
ERYTHROCYTE [DISTWIDTH] IN BLOOD BY AUTOMATED COUNT: 23.9 % (ref 12.3–15.4)
HCT VFR BLD AUTO: 40.8 % (ref 34–46.6)
HGB BLD-MCNC: 12.2 G/DL (ref 12–15.9)
IMM GRANULOCYTES # BLD AUTO: 0.02 10*3/MM3 (ref 0–0.05)
IMM GRANULOCYTES NFR BLD AUTO: 0.4 % (ref 0–0.5)
LYMPHOCYTES # BLD AUTO: 1.81 10*3/MM3 (ref 0.7–3.1)
LYMPHOCYTES NFR BLD AUTO: 32.6 % (ref 19.6–45.3)
MCH RBC QN AUTO: 24.7 PG (ref 26.6–33)
MCHC RBC AUTO-ENTMCNC: 29.9 G/DL (ref 31.5–35.7)
MCV RBC AUTO: 82.6 FL (ref 79–97)
MONOCYTES # BLD AUTO: 0.44 10*3/MM3 (ref 0.1–0.9)
MONOCYTES NFR BLD AUTO: 7.9 % (ref 5–12)
NEUTROPHILS NFR BLD AUTO: 3.05 10*3/MM3 (ref 1.7–7)
NEUTROPHILS NFR BLD AUTO: 54.8 % (ref 42.7–76)
NRBC BLD AUTO-RTO: 0 /100 WBC (ref 0–0.2)
PLATELET # BLD AUTO: 290 10*3/MM3 (ref 140–450)
PMV BLD AUTO: 10.9 FL (ref 6–12)
RBC # BLD AUTO: 4.94 10*6/MM3 (ref 3.77–5.28)
WBC NRBC COR # BLD AUTO: 5.56 10*3/MM3 (ref 3.4–10.8)

## 2023-12-19 PROCEDURE — A9270 NON-COVERED ITEM OR SERVICE: HCPCS | Performed by: INTERNAL MEDICINE

## 2023-12-19 PROCEDURE — 25010000002 IRON SUCROSE PER 1 MG: Performed by: INTERNAL MEDICINE

## 2023-12-19 PROCEDURE — 63710000001 DIPHENHYDRAMINE PER 50 MG: Performed by: INTERNAL MEDICINE

## 2023-12-19 PROCEDURE — 63710000001 ACETAMINOPHEN 325 MG TABLET: Performed by: INTERNAL MEDICINE

## 2023-12-19 PROCEDURE — 85025 COMPLETE CBC W/AUTO DIFF WBC: CPT

## 2023-12-19 PROCEDURE — 25810000003 SODIUM CHLORIDE 0.9 % SOLUTION: Performed by: INTERNAL MEDICINE

## 2023-12-19 PROCEDURE — 96365 THER/PROPH/DIAG IV INF INIT: CPT

## 2023-12-19 RX ORDER — ACETAMINOPHEN 325 MG/1
650 TABLET ORAL ONCE
Status: COMPLETED | OUTPATIENT
Start: 2023-12-19 | End: 2023-12-19

## 2023-12-19 RX ORDER — DIPHENHYDRAMINE HCL 25 MG
25 CAPSULE ORAL ONCE
Status: COMPLETED | OUTPATIENT
Start: 2023-12-19 | End: 2023-12-19

## 2023-12-19 RX ORDER — SODIUM CHLORIDE 9 MG/ML
20 INJECTION, SOLUTION INTRAVENOUS ONCE
Status: COMPLETED | OUTPATIENT
Start: 2023-12-19 | End: 2023-12-19

## 2023-12-19 RX ADMIN — SODIUM CHLORIDE 20 ML/HR: 9 INJECTION, SOLUTION INTRAVENOUS at 14:03

## 2023-12-19 RX ADMIN — ACETAMINOPHEN 650 MG: 325 TABLET ORAL at 13:32

## 2023-12-19 RX ADMIN — DIPHENHYDRAMINE HYDROCHLORIDE 25 MG: 25 CAPSULE ORAL at 13:32

## 2023-12-19 RX ADMIN — SODIUM CHLORIDE 300 MG: 900 INJECTION, SOLUTION INTRAVENOUS at 14:04

## 2023-12-28 ENCOUNTER — LAB (OUTPATIENT)
Dept: LAB | Facility: HOSPITAL | Age: 63
End: 2023-12-28
Payer: MEDICARE

## 2023-12-28 ENCOUNTER — OFFICE VISIT (OUTPATIENT)
Dept: ONCOLOGY | Facility: CLINIC | Age: 63
End: 2023-12-28
Payer: MEDICARE

## 2023-12-28 VITALS
DIASTOLIC BLOOD PRESSURE: 79 MMHG | RESPIRATION RATE: 16 BRPM | BODY MASS INDEX: 27.8 KG/M2 | OXYGEN SATURATION: 97 % | HEART RATE: 87 BPM | HEIGHT: 72 IN | TEMPERATURE: 97.1 F | SYSTOLIC BLOOD PRESSURE: 151 MMHG

## 2023-12-28 DIAGNOSIS — D50.0 IRON DEFICIENCY ANEMIA DUE TO CHRONIC BLOOD LOSS: ICD-10-CM

## 2023-12-28 DIAGNOSIS — D64.9 ANEMIA, UNSPECIFIED TYPE: Primary | ICD-10-CM

## 2023-12-28 DIAGNOSIS — D64.9 ANEMIA, UNSPECIFIED TYPE: ICD-10-CM

## 2023-12-28 LAB
BASOPHILS # BLD AUTO: 0.04 10*3/MM3 (ref 0–0.2)
BASOPHILS NFR BLD AUTO: 0.7 % (ref 0–1.5)
DEPRECATED RDW RBC AUTO: 69.4 FL (ref 37–54)
EOSINOPHIL # BLD AUTO: 0.14 10*3/MM3 (ref 0–0.4)
EOSINOPHIL NFR BLD AUTO: 2.5 % (ref 0.3–6.2)
ERYTHROCYTE [DISTWIDTH] IN BLOOD BY AUTOMATED COUNT: 23.3 % (ref 12.3–15.4)
FERRITIN SERPL-MCNC: 156 NG/ML (ref 13–150)
HCT VFR BLD AUTO: 43.5 % (ref 34–46.6)
HGB BLD-MCNC: 13 G/DL (ref 12–15.9)
IMM GRANULOCYTES # BLD AUTO: 0.01 10*3/MM3 (ref 0–0.05)
IMM GRANULOCYTES NFR BLD AUTO: 0.2 % (ref 0–0.5)
IRON 24H UR-MRATE: 57 MCG/DL (ref 37–145)
IRON SATN MFR SERPL: 16 % (ref 20–50)
LYMPHOCYTES # BLD AUTO: 1.41 10*3/MM3 (ref 0.7–3.1)
LYMPHOCYTES NFR BLD AUTO: 25.6 % (ref 19.6–45.3)
MCH RBC QN AUTO: 24.8 PG (ref 26.6–33)
MCHC RBC AUTO-ENTMCNC: 29.9 G/DL (ref 31.5–35.7)
MCV RBC AUTO: 83 FL (ref 79–97)
MONOCYTES # BLD AUTO: 0.32 10*3/MM3 (ref 0.1–0.9)
MONOCYTES NFR BLD AUTO: 5.8 % (ref 5–12)
NEUTROPHILS NFR BLD AUTO: 3.58 10*3/MM3 (ref 1.7–7)
NEUTROPHILS NFR BLD AUTO: 65.2 % (ref 42.7–76)
NRBC BLD AUTO-RTO: 0 /100 WBC (ref 0–0.2)
PLATELET # BLD AUTO: 296 10*3/MM3 (ref 140–450)
PMV BLD AUTO: 9.9 FL (ref 6–12)
RBC # BLD AUTO: 5.24 10*6/MM3 (ref 3.77–5.28)
TIBC SERPL-MCNC: 353 MCG/DL (ref 298–536)
TRANSFERRIN SERPL-MCNC: 252 MG/DL (ref 200–360)
WBC NRBC COR # BLD AUTO: 5.5 10*3/MM3 (ref 3.4–10.8)

## 2023-12-28 PROCEDURE — 36415 COLL VENOUS BLD VENIPUNCTURE: CPT

## 2023-12-28 PROCEDURE — 82728 ASSAY OF FERRITIN: CPT

## 2023-12-28 PROCEDURE — 83540 ASSAY OF IRON: CPT

## 2023-12-28 PROCEDURE — 85025 COMPLETE CBC W/AUTO DIFF WBC: CPT

## 2023-12-28 PROCEDURE — 84466 ASSAY OF TRANSFERRIN: CPT

## 2023-12-28 NOTE — PROGRESS NOTES
"  REASON FOR FOLLOW-UP: History of recurrent iron deficiency anemia requiring repeated IV iron therapy.      History of Present Illness    Mrs. Abbott is a pleasant 63 y.o. female with above-mentioned history, who was brought to the office today for hospital follow-up and lab review.  She was recently admitted in November 2023 with recurrent GI bleeding.  She underwent EGD and colonoscopy during hospitalization.  She also received blood transfusion and 2 units of IV iron.  Following discharge she received 2 doses of outpatient IV iron.  She returns today to review these labs and discuss plan of care.  Unfortunately, the patient is very discouraged with her care overall.  She continues to be \"mentally not as sharp\".  He is very frustrated by her care through gastroenterology.  She underwent capsule endoscopy and reports the capsule was dropped on the floor prior to the procedure.  She has discussed with the  gastroenterology though does not feel satisfied with the answers she is receiving.  She is not yet been scheduled to see Dr. Alonso who has ordered a CT abdomen pelvis with contrast enterography.    Currently without signs or symptoms of bleeding.  She does question if her transfusion parameters to be 9.0 after discussion with cardiology during hospitalization.  Again she questions if she is receiving appropriate cardiac care.  She is rather tangential during our conversation today overall feeling    Past Medical History, Past Surgical History, Social History, Family History have been reviewed and are without significant changes.    Review of Systems  Medications:  The current medication list was reviewed in the EMR    ALLERGIES:    No Known Allergies    Objective      Vitals:    12/28/23 1559   BP: 151/79   Pulse: 87   Resp: 16   Temp: 97.1 °F (36.2 °C)   TempSrc: Temporal   SpO2: 97%   Weight: Comment: pt refused   Height: 182.9 cm (72.01\")   PainSc: 0-No pain           12/28/2023     4:02 PM "   Current Status   ECOG score 0       Physical Exam    CON: pleasant well-developed adult woman  HEENT: no icterus, no thrush, moist membranes  RESP: Breathing unlabored, no acute distress  MUSC: no edema, normal gait  NEURO: alert and oriented x3, normal strength  PSYCH: normal mood and affect  Exam unchanged- 12/28/2023      RECENT LABS:  Results from last 7 days   Lab Units 12/28/23  1554   WBC 10*3/mm3 5.50   NEUTROS ABS 10*3/mm3 3.58   HEMOGLOBIN g/dL 13.0   HEMATOCRIT % 43.5   PLATELETS 10*3/mm3 296       Results from last 7 days   Lab Units 12/28/23  1554   FERRITIN ng/mL 156.00*   IRON mcg/dL 57   TIBC mcg/dL 353                   Assessment & Plan    1.  Recurrent iron deficiency anemia suspected to be secondary to GI blood loss (established with GI).  Hemoglobin has dropped 4 g to 8.6 today with ferritin 9/iron sat 3% consistent with iron deficiency  Received Venofer 300 mg x 4 from 8/24/2023 - 9/7/2023.  EGD and colonoscopy 11/3/2023-nonbleeding internal hemorrhoids and 2 polyps removed for biopsy.  Recommended repeat colonoscopy in 3 years.  11/20/2023 hemoglobin 8.9, ferritin 16.3, iron saturation 5%.  Patient will require IV iron, pending insurance approval.  Hospitalization 11/27/2023 through 11/29/2023 recurrent iron deficiency anemia secondary to occult GI blood loss-small bowel source suspected  Presents with symptomatic anemia (exertional chest pain, SOA, fatigue, weakness) hemoglobin 7.1 recent ferritin 16/iron sat 5% despite recent IV iron replacement completed 9/7/2023  EGD/colonoscopy 11/3/2023 unrevealing for source of GI blood loss  Ferrlecit 250 mg given 11/28 and 11/29.  Venofer 300 mg administered 12/5/2023 and 12/19/2023  She is currently iron replete with a normal hemoglobin of 13.0, ferritin 156, iron saturation 16%    *Antiplatelet therapy with Plavix complicating above    *Intolerance to oral iron secondary to constipation      PLAN:  Today's visit, 12/28/2023 as detailed in the HPI  the patient expresses frustration with her overall medical care.  I did offer the patient outside referral for GI as well as hematology though she declines at this time.  She was reassured we are managing her iron deficiency anemia.  She does wish to discuss events of the capsule endoscopy and plan of care with CT abdomen pelvis enterography with Dr. Alonso.  I have asked our practice manager to discuss with gastroenterology sooner follow-up.  The patient was reassured she is not currently bleeding or iron deficient.  While I do not feel she needs follow-up frequently she would feel more comfortable with labs every 2 weeks with RN review.  She will follow-up with Dr. Harris in 6 weeks at which time we will repeat ferritin and iron profile.     I spent 50 minutes caring for Tiera on this date of service. This time includes time spent by me in the following activities: preparing for the visit, reviewing tests, obtaining and/or reviewing a separately obtained history, counseling and educating the patient/family/caregiver, documenting information in the medical record, and care coordination.     Ambika Woods, APRN  12/28/2023        CC:

## 2023-12-29 ENCOUNTER — TELEPHONE (OUTPATIENT)
Dept: ONCOLOGY | Facility: CLINIC | Age: 63
End: 2023-12-29
Payer: MEDICARE

## 2023-12-29 NOTE — TELEPHONE ENCOUNTER
Sent email to Dr Alonso's office to request they contact patient, manager stated she will reach out to patient.

## 2024-01-02 NOTE — TELEPHONE ENCOUNTER
Caller: Tiera Abbott    Relationship: Self    Best call back number: [phone]     What is the best time to reach you: 3-315 PM     Who are you requesting to speak with (clinical staff, provider,  specific staff member): MICHEAL    What was the call regarding: RETURNING CALL FROM [facility] REGARDING THE BEST TIME TO CALL PATIENT BACK. PATIENT ALSO WANTS TO DISCUSS SWITCHING TO A DIFFERENT PROVIDER AND BEING SEEN BEFORE TESTING ON 1/11/24. PLEASE CALL BACK ASAP TO ADVISE.

## 2024-01-03 NOTE — TELEPHONE ENCOUNTER
Spoke to the patient on the phone for approximately 30 minutes to discuss the video capsule endoscopy and the inability to visualize her small bowel.  We did discuss that unfortunately there is always a risk with VCE that it may not capture appropriately due to a multitude of reasons including the patient's medical history.  We discussed proceeding with CT enterography to evaluate for any small bowel findings or bleeding.  We discussed what this imaging entails and she has this scheduled for January 11.  We will have her follow-up with DILIA Menchaca in the office and patient will be established with Dr. Mcgee thereafter.

## 2024-01-25 ENCOUNTER — CLINICAL SUPPORT (OUTPATIENT)
Dept: ONCOLOGY | Facility: HOSPITAL | Age: 64
End: 2024-01-25
Payer: MEDICARE

## 2024-01-25 ENCOUNTER — TELEPHONE (OUTPATIENT)
Dept: ONCOLOGY | Facility: CLINIC | Age: 64
End: 2024-01-25
Payer: MEDICARE

## 2024-01-25 ENCOUNTER — LAB (OUTPATIENT)
Dept: LAB | Facility: HOSPITAL | Age: 64
End: 2024-01-25
Payer: MEDICARE

## 2024-01-25 DIAGNOSIS — D50.0 IRON DEFICIENCY ANEMIA DUE TO CHRONIC BLOOD LOSS: ICD-10-CM

## 2024-01-25 DIAGNOSIS — D50.0 IRON DEFICIENCY ANEMIA DUE TO CHRONIC BLOOD LOSS: Primary | ICD-10-CM

## 2024-01-25 LAB
BASOPHILS # BLD AUTO: 0.03 10*3/MM3 (ref 0–0.2)
BASOPHILS NFR BLD AUTO: 0.5 % (ref 0–1.5)
DEPRECATED RDW RBC AUTO: 63.9 FL (ref 37–54)
EOSINOPHIL # BLD AUTO: 0.16 10*3/MM3 (ref 0–0.4)
EOSINOPHIL NFR BLD AUTO: 2.5 % (ref 0.3–6.2)
ERYTHROCYTE [DISTWIDTH] IN BLOOD BY AUTOMATED COUNT: 21.8 % (ref 12.3–15.4)
HCT VFR BLD AUTO: 44.2 % (ref 34–46.6)
HGB BLD-MCNC: 14.1 G/DL (ref 12–15.9)
IMM GRANULOCYTES # BLD AUTO: 0.04 10*3/MM3 (ref 0–0.05)
IMM GRANULOCYTES NFR BLD AUTO: 0.6 % (ref 0–0.5)
LYMPHOCYTES # BLD AUTO: 1.84 10*3/MM3 (ref 0.7–3.1)
LYMPHOCYTES NFR BLD AUTO: 29 % (ref 19.6–45.3)
MCH RBC QN AUTO: 26.1 PG (ref 26.6–33)
MCHC RBC AUTO-ENTMCNC: 31.9 G/DL (ref 31.5–35.7)
MCV RBC AUTO: 81.9 FL (ref 79–97)
MONOCYTES # BLD AUTO: 0.38 10*3/MM3 (ref 0.1–0.9)
MONOCYTES NFR BLD AUTO: 6 % (ref 5–12)
NEUTROPHILS NFR BLD AUTO: 3.9 10*3/MM3 (ref 1.7–7)
NEUTROPHILS NFR BLD AUTO: 61.4 % (ref 42.7–76)
NRBC BLD AUTO-RTO: 0 /100 WBC (ref 0–0.2)
PLATELET # BLD AUTO: 260 10*3/MM3 (ref 140–450)
PMV BLD AUTO: 10.6 FL (ref 6–12)
RBC # BLD AUTO: 5.4 10*6/MM3 (ref 3.77–5.28)
WBC NRBC COR # BLD AUTO: 6.35 10*3/MM3 (ref 3.4–10.8)

## 2024-01-25 PROCEDURE — 36415 COLL VENOUS BLD VENIPUNCTURE: CPT

## 2024-01-25 PROCEDURE — 85025 COMPLETE CBC W/AUTO DIFF WBC: CPT

## 2024-01-25 NOTE — TELEPHONE ENCOUNTER
DATE OF SERVICE: 1/25/2024     PREOPERATIVE DIAGNOSES:  Cholelithiasis/cholecystitis     POSTOPERATIVE DIAGNOSES:  Same     PROCEDURE PERFORMED:  Robotic-assisted cholecystectomy.     SURGEON:  Brian Peralta MD     ASSISTANT:  None.     ANESTHESIA:  General endotracheal anesthesia plus local     COMPLICATIONS:  None.     SPECIMENS REMOVED:  Gallbladder.     IMPLANTS:  None.     ESTIMATED BLOOD LOSS:  25 cc's     DRAINS:  None.     HISTORY:  This is a 30 year old female who was referred by the ED. The patient has been having upper abdominal pain, intermittent nausea, vomiting, and an ultrasound which showed cholelithiasis.  I recommended a robotic, laparoscopic,possible open cholecystectomy which was scheduled for today.  I went through risks of bleeding, infection, anesthesia; injury to liver, biliary tree structures, small bowel, large bowel, stomach, pancreas; potential need to convert to an open procedure.  The patient was agreeable, signed a consent form.     PROCEDURE:  The patient was seen in the preoperative area, then transported to room #nine at the [facility] where they were placed on the operating room table in supine position.  General endotracheal anesthesia was done by the anesthesiologist and the nurse anesthetist.  Sequential compression devices were placed and used throughout the procedure.  The patient received 2 g of Ancef as prophylactic antibiotic coverage.  The abdomen was prepped and draped in usual sterile manner.  I did a time-out identifying the patient, surgeon, procedure and the birth date of [birth year].  When everyone in the room agreed, we began the procedure.  I made an incision to the right of the umbilicus making an incision with a 15 scalpel blade.  I went through the appropriate layers of the anterior abdominal wall using a 5 mm 0-degree scope through an optical 5 mm trocar.  The abdominal cavity was entered without problems. Abdomen was insufflated with 15 mmHg using  Please give patient a work note    If she has any outstanding illness and schedule appointment ASAP thank you

## 2024-01-25 NOTE — PROGRESS NOTES
Patient is here for lab review with RN. CBC reviewed, counts are stable for this patient at this time. Patient has no complaints. Copy of labs given to patient and follow up appointment reviewed. Patient is instructed to call the office with any concerns or new symptoms prior to next visit. Patient verbalized understanding and discharged in stable condition.    Component      Latest Ref Rng 1/25/2024   WBC      3.40 - 10.80 10*3/mm3 6.35    RBC      3.77 - 5.28 10*6/mm3 5.40 (H)    Hemoglobin      12.0 - 15.9 g/dL 14.1    Hematocrit      34.0 - 46.6 % 44.2    MCV      79.0 - 97.0 fL 81.9    MCH      26.6 - 33.0 pg 26.1 (L)    MCHC      31.5 - 35.7 g/dL 31.9    RDW      12.3 - 15.4 % 21.8 (H)    RDW-SD      37.0 - 54.0 fl 63.9 (H)    MPV      6.0 - 12.0 fL 10.6    Platelets      140 - 450 10*3/mm3 260    Neutrophil Rel %      42.7 - 76.0 % 61.4    Lymphocyte Rel %      19.6 - 45.3 % 29.0    Monocyte Rel %      5.0 - 12.0 % 6.0    Eosinophil Rel %      0.3 - 6.2 % 2.5    Basophil Rel %      0.0 - 1.5 % 0.5    Immature Granulocyte Rel %      0.0 - 0.5 % 0.6 (H)    Neutrophils Absolute      1.70 - 7.00 10*3/mm3 3.90    Lymphocytes Absolute      0.70 - 3.10 10*3/mm3 1.84    Monocytes Absolute      0.10 - 0.90 10*3/mm3 0.38    Eosinophils Absolute      0.00 - 0.40 10*3/mm3 0.16    Basophils Absolute      0.00 - 0.20 10*3/mm3 0.03    Immature Grans, Absolute      0.00 - 0.05 10*3/mm3 0.04    nRBC      0.0 - 0.2 /100 WBC 0.0       Legend:  (H) High  (L) Low

## 2024-01-25 NOTE — TELEPHONE ENCOUNTER
Called patient to ensure that she was still coming to her RN review. She said she's actually here but hasn't been called back to lab yet. Alerted reception that patient did not get marked as signed in. Alerted lab that patient is in the waiting area ready for lab draw.

## 2024-02-02 ENCOUNTER — TELEPHONE (OUTPATIENT)
Dept: ONCOLOGY | Facility: CLINIC | Age: 64
End: 2024-02-02
Payer: MEDICARE

## 2024-02-02 NOTE — TELEPHONE ENCOUNTER
Caller: Tiera Abbott    Relationship to patient: Self    Best call back number: [phone]    Chief complaint:     Type of visit: LAB & F/U 1     Requested date: CALL TO R/S END OF FEB    If rescheduling, when is the original appointment: 2/5/2024    Additional notes:

## 2024-02-07 ENCOUNTER — TELEPHONE (OUTPATIENT)
Dept: GASTROENTEROLOGY | Facility: CLINIC | Age: 64
End: 2024-02-07
Payer: MEDICARE

## 2024-02-07 NOTE — TELEPHONE ENCOUNTER
Hub staff attempted to follow warm transfer process and was unsuccessful     Caller: Tiera Abbott    Relationship to patient: Self    Best call back number: [phone]    Patient is needing: PATIENT CALLED IN, SHE IS REQUESTING SYBIL CONTACT HER SHE WANTS TO DISCUSS WITH HER WHEN SHE WANTS HER TO MAKE AN APPT TO DISCUSS THE UPCOMING CT RESULTS.

## 2024-02-08 ENCOUNTER — HOSPITAL ENCOUNTER (OUTPATIENT)
Facility: HOSPITAL | Age: 64
Discharge: HOME | End: 2024-02-08
Admitting: INTERNAL MEDICINE
Payer: MEDICARE

## 2024-02-08 DIAGNOSIS — D50.9 IRON DEFICIENCY ANEMIA, UNSPECIFIED IRON DEFICIENCY ANEMIA TYPE: ICD-10-CM

## 2024-02-08 DIAGNOSIS — R19.5 OCCULT GI BLEEDING: ICD-10-CM

## 2024-02-08 PROCEDURE — 74177 CT ABD & PELVIS W/CONTRAST: CPT

## 2024-02-08 PROCEDURE — 25510000001 IOPAMIDOL 61 % SOLUTION: Performed by: INTERNAL MEDICINE

## 2024-02-08 RX ORDER — EZETIMIBE 10 MG/1
1 TABLET ORAL DAILY
COMMUNITY
Start: 2024-01-18

## 2024-02-08 RX ORDER — CLOPIDOGREL BISULFATE 75 MG/1
1 TABLET ORAL DAILY
COMMUNITY
Start: 2024-01-18

## 2024-02-08 RX ADMIN — IOPAMIDOL 85 ML: 612 INJECTION, SOLUTION INTRAVENOUS at 11:16

## 2024-02-08 NOTE — PROGRESS NOTES
Please let this patient know that her CT scan does not show any obvious causes of her anemia.  She does have a sliding hiatal hernia.  She does have bilateral ovarian cyst which she would need to follow-up with gynecology if she had any further questions.  She does have a left adrenal nodule and would recommend follow-up with her PCP.

## 2024-02-15 ENCOUNTER — TELEPHONE (OUTPATIENT)
Dept: GASTROENTEROLOGY | Facility: CLINIC | Age: 64
End: 2024-02-15
Payer: MEDICARE

## 2024-03-04 ENCOUNTER — TELEPHONE (OUTPATIENT)
Dept: ONCOLOGY | Facility: CLINIC | Age: 64
End: 2024-03-04

## 2024-03-04 NOTE — TELEPHONE ENCOUNTER
Caller: Tiera Abbott    Relationship to patient: Self    Best call back number: [phone]    Chief complaint: RESCHEDULE     Type of visit: LAB AND FOLLOW UP     Requested date: NEXT MONDAY OR TUESDAY     If rescheduling, when is the original appointment: 2-26     Additional notes:PLEASE ADVISE

## 2024-03-11 ENCOUNTER — TELEPHONE (OUTPATIENT)
Dept: ONCOLOGY | Facility: CLINIC | Age: 64
End: 2024-03-11
Payer: MEDICARE

## 2024-03-11 ENCOUNTER — LAB (OUTPATIENT)
Dept: LAB | Facility: HOSPITAL | Age: 64
End: 2024-03-11
Payer: MEDICARE

## 2024-03-11 ENCOUNTER — APPOINTMENT (OUTPATIENT)
Dept: ONCOLOGY | Facility: HOSPITAL | Age: 64
End: 2024-03-11
Payer: MEDICARE

## 2024-03-11 DIAGNOSIS — D50.0 IRON DEFICIENCY ANEMIA DUE TO CHRONIC BLOOD LOSS: Primary | ICD-10-CM

## 2024-03-11 DIAGNOSIS — D50.0 IRON DEFICIENCY ANEMIA DUE TO CHRONIC BLOOD LOSS: ICD-10-CM

## 2024-03-11 LAB
BASOPHILS # BLD AUTO: 0.03 10*3/MM3 (ref 0–0.2)
BASOPHILS NFR BLD AUTO: 0.4 % (ref 0–1.5)
DAT POLY-SP REAG RBC QL: NEGATIVE
DEPRECATED RDW RBC AUTO: 56.6 FL (ref 37–54)
EOSINOPHIL # BLD AUTO: 0.17 10*3/MM3 (ref 0–0.4)
EOSINOPHIL NFR BLD AUTO: 2.3 % (ref 0.3–6.2)
ERYTHROCYTE [DISTWIDTH] IN BLOOD BY AUTOMATED COUNT: 18.9 % (ref 12.3–15.4)
FERRITIN SERPL-MCNC: 8.6 NG/ML (ref 13–150)
HAPTOGLOB SERPL-MCNC: 180 MG/DL (ref 30–200)
HCT VFR BLD AUTO: 29.9 % (ref 34–46.6)
HGB BLD-MCNC: 9 G/DL (ref 12–15.9)
HGB RETIC QN AUTO: 18.2 PG (ref 29.8–36.1)
IMM GRANULOCYTES # BLD AUTO: 0.03 10*3/MM3 (ref 0–0.05)
IMM GRANULOCYTES NFR BLD AUTO: 0.4 % (ref 0–0.5)
IMM RETICS NFR: 24.9 % (ref 3–15.8)
IRON 24H UR-MRATE: 15 MCG/DL (ref 37–145)
IRON SATN MFR SERPL: 4 % (ref 20–50)
LDH SERPL-CCNC: 164 U/L (ref 135–214)
LYMPHOCYTES # BLD AUTO: 1.64 10*3/MM3 (ref 0.7–3.1)
LYMPHOCYTES NFR BLD AUTO: 22.3 % (ref 19.6–45.3)
MCH RBC QN AUTO: 24.7 PG (ref 26.6–33)
MCHC RBC AUTO-ENTMCNC: 30.1 G/DL (ref 31.5–35.7)
MCV RBC AUTO: 81.9 FL (ref 79–97)
MONOCYTES # BLD AUTO: 0.36 10*3/MM3 (ref 0.1–0.9)
MONOCYTES NFR BLD AUTO: 4.9 % (ref 5–12)
NEUTROPHILS NFR BLD AUTO: 5.14 10*3/MM3 (ref 1.7–7)
NEUTROPHILS NFR BLD AUTO: 69.7 % (ref 42.7–76)
NRBC BLD AUTO-RTO: 0 /100 WBC (ref 0–0.2)
PLATELET # BLD AUTO: 260 10*3/MM3 (ref 140–450)
PMV BLD AUTO: 10.7 FL (ref 6–12)
RBC # BLD AUTO: 3.65 10*6/MM3 (ref 3.77–5.28)
RETICS # AUTO: 0.11 10*6/MM3 (ref 0.02–0.13)
RETICS/RBC NFR AUTO: 2.99 % (ref 0.7–1.9)
TIBC SERPL-MCNC: 406 MCG/DL (ref 298–536)
TRANSFERRIN SERPL-MCNC: 290 MG/DL (ref 200–360)
WBC NRBC COR # BLD AUTO: 7.37 10*3/MM3 (ref 3.4–10.8)

## 2024-03-11 PROCEDURE — 83010 ASSAY OF HAPTOGLOBIN QUANT: CPT | Performed by: INTERNAL MEDICINE

## 2024-03-11 PROCEDURE — 84466 ASSAY OF TRANSFERRIN: CPT

## 2024-03-11 PROCEDURE — 82728 ASSAY OF FERRITIN: CPT

## 2024-03-11 PROCEDURE — 83540 ASSAY OF IRON: CPT

## 2024-03-11 PROCEDURE — 86880 COOMBS TEST DIRECT: CPT | Performed by: INTERNAL MEDICINE

## 2024-03-11 PROCEDURE — 36415 COLL VENOUS BLD VENIPUNCTURE: CPT

## 2024-03-11 PROCEDURE — 85025 COMPLETE CBC W/AUTO DIFF WBC: CPT

## 2024-03-11 PROCEDURE — 83615 LACTATE (LD) (LDH) ENZYME: CPT

## 2024-03-11 PROCEDURE — 85046 RETICYTE/HGB CONCENTRATE: CPT

## 2024-03-11 NOTE — TELEPHONE ENCOUNTER
----- Message from Edgardo Harris MD sent at 3/11/2024  4:24 PM EDT -----  PIP iron levels very low needs IV iron asap venofer 300x4 or injectafer x2 lets go ahead refer her 2nd opinion [facility] if she is willing

## 2024-03-14 ENCOUNTER — TELEPHONE (OUTPATIENT)
Dept: ONCOLOGY | Facility: CLINIC | Age: 64
End: 2024-03-14
Payer: MEDICARE

## 2024-03-14 ENCOUNTER — OFFICE VISIT (OUTPATIENT)
Dept: FAMILY MEDICINE CLINIC | Facility: CLINIC | Age: 64
End: 2024-03-14
Payer: MEDICARE

## 2024-03-14 VITALS
TEMPERATURE: 97.7 F | SYSTOLIC BLOOD PRESSURE: 118 MMHG | OXYGEN SATURATION: 99 % | RESPIRATION RATE: 16 BRPM | DIASTOLIC BLOOD PRESSURE: 73 MMHG | HEIGHT: 72 IN | WEIGHT: 210 LBS | BODY MASS INDEX: 28.44 KG/M2 | HEART RATE: 94 BPM

## 2024-03-14 DIAGNOSIS — D50.0 IRON DEFICIENCY ANEMIA DUE TO CHRONIC BLOOD LOSS: ICD-10-CM

## 2024-03-14 DIAGNOSIS — R53.83 FATIGUE, UNSPECIFIED TYPE: ICD-10-CM

## 2024-03-14 DIAGNOSIS — R05.1 ACUTE COUGH: Primary | ICD-10-CM

## 2024-03-14 DIAGNOSIS — D50.9 IRON DEFICIENCY ANEMIA, UNSPECIFIED IRON DEFICIENCY ANEMIA TYPE: ICD-10-CM

## 2024-03-14 DIAGNOSIS — R73.9 HYPERGLYCEMIA: ICD-10-CM

## 2024-03-14 LAB
EXPIRATION DATE: NORMAL
FLUAV AG UPPER RESP QL IA.RAPID: NOT DETECTED
FLUBV AG UPPER RESP QL IA.RAPID: NOT DETECTED
INTERNAL CONTROL: NORMAL
Lab: NORMAL
SARS-COV-2 AG UPPER RESP QL IA.RAPID: NOT DETECTED

## 2024-03-14 PROCEDURE — 87428 SARSCOV & INF VIR A&B AG IA: CPT | Performed by: NURSE PRACTITIONER

## 2024-03-14 PROCEDURE — 99214 OFFICE O/P EST MOD 30 MIN: CPT | Performed by: NURSE PRACTITIONER

## 2024-03-14 RX ORDER — DEXTROMETHORPHAN HYDROBROMIDE AND PROMETHAZINE HYDROCHLORIDE 15; 6.25 MG/5ML; MG/5ML
5 SYRUP ORAL 4 TIMES DAILY PRN
Qty: 240 ML | Refills: 1 | Status: SHIPPED | OUTPATIENT
Start: 2024-03-14

## 2024-03-14 RX ORDER — OSELTAMIVIR PHOSPHATE 75 MG/1
75 CAPSULE ORAL 2 TIMES DAILY
Qty: 10 CAPSULE | Refills: 0 | Status: SHIPPED | OUTPATIENT
Start: 2024-03-14

## 2024-03-14 RX ORDER — IBUPROFEN 800 MG/1
1 TABLET ORAL 2 TIMES DAILY
COMMUNITY

## 2024-03-14 NOTE — TELEPHONE ENCOUNTER
Provider: Steven   Caller: patient  Relationship to Patient: self  Call Back Phone Number: [phone]  Reason for Call: Pt has the flu and will need to cancel iron infusion today.

## 2024-03-14 NOTE — PATIENT INSTRUCTIONS
Discharge instructions push fluids hydrate well  Continue to follow-up with hematology, cannot get behind on there is no easy way out at this time    Follow-up you will gastro agree    Labs today, will return office,    Tamiflu push fluids    If high fever chest pain increase shortness of breath emergency room.      Recommended vaccines if you have not had shingles vaccine Shingrix x 2  Prevnar 20 if not had  RSV if you have not had  Stay on top of COVID vaccines and flu shots    Follow-up GYN if you have not.      Update your condition Monday please thank you

## 2024-03-20 ENCOUNTER — TELEPHONE (OUTPATIENT)
Dept: FAMILY MEDICINE CLINIC | Facility: CLINIC | Age: 64
End: 2024-03-20
Payer: MEDICARE

## 2024-03-20 NOTE — TELEPHONE ENCOUNTER
Caller: Tiera Abbott    Relationship: Self    Best call back number: [phone]    What was the call regarding: PATIENT STATED [facility] IS NEEDING HER MEDICAL RECORDS FAXED OVER [phone]

## 2024-03-21 ENCOUNTER — INFUSION (OUTPATIENT)
Dept: ONCOLOGY | Facility: HOSPITAL | Age: 64
End: 2024-03-21
Payer: MEDICARE

## 2024-03-21 VITALS
WEIGHT: 198.6 LBS | HEART RATE: 72 BPM | SYSTOLIC BLOOD PRESSURE: 120 MMHG | RESPIRATION RATE: 16 BRPM | TEMPERATURE: 97.3 F | OXYGEN SATURATION: 97 % | DIASTOLIC BLOOD PRESSURE: 67 MMHG | BODY MASS INDEX: 26.94 KG/M2

## 2024-03-21 DIAGNOSIS — D50.0 IRON DEFICIENCY ANEMIA DUE TO CHRONIC BLOOD LOSS: Primary | ICD-10-CM

## 2024-03-21 DIAGNOSIS — D64.9 LOW HEMATOCRIT: ICD-10-CM

## 2024-03-21 DIAGNOSIS — D64.9 LOW HEMOGLOBIN: ICD-10-CM

## 2024-03-21 LAB
ABO GROUP BLD: NORMAL
BASOPHILS # BLD AUTO: 0.03 10*3/MM3 (ref 0–0.2)
BASOPHILS NFR BLD AUTO: 0.4 % (ref 0–1.5)
BLD GP AB SCN SERPL QL: NEGATIVE
DEPRECATED RDW RBC AUTO: 56.7 FL (ref 37–54)
EOSINOPHIL # BLD AUTO: 0.2 10*3/MM3 (ref 0–0.4)
EOSINOPHIL NFR BLD AUTO: 2.8 % (ref 0.3–6.2)
ERYTHROCYTE [DISTWIDTH] IN BLOOD BY AUTOMATED COUNT: 19.5 % (ref 12.3–15.4)
HCT VFR BLD AUTO: 28.8 % (ref 34–46.6)
HGB BLD-MCNC: 8.4 G/DL (ref 12–15.9)
IMM GRANULOCYTES # BLD AUTO: 0.03 10*3/MM3 (ref 0–0.05)
IMM GRANULOCYTES NFR BLD AUTO: 0.4 % (ref 0–0.5)
LYMPHOCYTES # BLD AUTO: 1.96 10*3/MM3 (ref 0.7–3.1)
LYMPHOCYTES NFR BLD AUTO: 27.7 % (ref 19.6–45.3)
MCH RBC QN AUTO: 23.4 PG (ref 26.6–33)
MCHC RBC AUTO-ENTMCNC: 29.2 G/DL (ref 31.5–35.7)
MCV RBC AUTO: 80.2 FL (ref 79–97)
MONOCYTES # BLD AUTO: 0.49 10*3/MM3 (ref 0.1–0.9)
MONOCYTES NFR BLD AUTO: 6.9 % (ref 5–12)
NEUTROPHILS NFR BLD AUTO: 4.37 10*3/MM3 (ref 1.7–7)
NEUTROPHILS NFR BLD AUTO: 61.8 % (ref 42.7–76)
NRBC BLD AUTO-RTO: 0 /100 WBC (ref 0–0.2)
PLATELET # BLD AUTO: 373 10*3/MM3 (ref 140–450)
PMV BLD AUTO: 11.3 FL (ref 6–12)
RBC # BLD AUTO: 3.59 10*6/MM3 (ref 3.77–5.28)
RH BLD: POSITIVE
T&S EXPIRATION DATE: NORMAL
WBC NRBC COR # BLD AUTO: 7.08 10*3/MM3 (ref 3.4–10.8)

## 2024-03-21 PROCEDURE — 25010000002 FERRIC CARBOXYMALTOSE 750 MG/15ML SOLUTION: Performed by: INTERNAL MEDICINE

## 2024-03-21 PROCEDURE — A9270 NON-COVERED ITEM OR SERVICE: HCPCS | Performed by: INTERNAL MEDICINE

## 2024-03-21 PROCEDURE — 63710000001 PROCHLORPERAZINE MALEATE PER 10 MG: Performed by: INTERNAL MEDICINE

## 2024-03-21 PROCEDURE — 86923 COMPATIBILITY TEST ELECTRIC: CPT

## 2024-03-21 PROCEDURE — 86901 BLOOD TYPING SEROLOGIC RH(D): CPT | Performed by: INTERNAL MEDICINE

## 2024-03-21 PROCEDURE — 86850 RBC ANTIBODY SCREEN: CPT | Performed by: INTERNAL MEDICINE

## 2024-03-21 PROCEDURE — 85025 COMPLETE CBC W/AUTO DIFF WBC: CPT

## 2024-03-21 PROCEDURE — 86900 BLOOD TYPING SEROLOGIC ABO: CPT | Performed by: INTERNAL MEDICINE

## 2024-03-21 PROCEDURE — 96374 THER/PROPH/DIAG INJ IV PUSH: CPT

## 2024-03-21 PROCEDURE — 25810000003 SODIUM CHLORIDE 0.9 % SOLUTION: Performed by: INTERNAL MEDICINE

## 2024-03-21 RX ORDER — DIPHENHYDRAMINE HCL 25 MG
25 CAPSULE ORAL ONCE
Status: CANCELLED | OUTPATIENT
Start: 2024-03-21 | End: 2024-03-21

## 2024-03-21 RX ORDER — SODIUM CHLORIDE 9 MG/ML
250 INJECTION, SOLUTION INTRAVENOUS AS NEEDED
Status: CANCELLED | OUTPATIENT
Start: 2024-03-21

## 2024-03-21 RX ORDER — SODIUM CHLORIDE 9 MG/ML
20 INJECTION, SOLUTION INTRAVENOUS ONCE
Status: COMPLETED | OUTPATIENT
Start: 2024-03-21 | End: 2024-03-21

## 2024-03-21 RX ORDER — ACETAMINOPHEN 325 MG/1
650 TABLET ORAL ONCE
Status: CANCELLED | OUTPATIENT
Start: 2024-03-21 | End: 2024-03-21

## 2024-03-21 RX ORDER — PROCHLORPERAZINE MALEATE 10 MG
10 TABLET ORAL ONCE
Status: COMPLETED | OUTPATIENT
Start: 2024-03-21 | End: 2024-03-21

## 2024-03-21 RX ADMIN — PROCHLORPERAZINE MALEATE 10 MG: 10 TABLET ORAL at 15:30

## 2024-03-21 RX ADMIN — SODIUM CHLORIDE 20 ML/HR: 9 INJECTION, SOLUTION INTRAVENOUS at 15:38

## 2024-03-21 RX ADMIN — FERRIC CARBOXYMALTOSE INJECTION 750 MG: 50 INJECTION, SOLUTION INTRAVENOUS at 15:38

## 2024-03-21 NOTE — PROGRESS NOTES
"Chief Complaint  Influenza (Pt brother tested positive flu Tuesday, pt has cough and fever. Pt has issues with her hemoglobin)    Subjective        Tiera Abbott presents to [facility]  History of Present Illness  Pleasant patient here today, around influenza now she has cough congestion, some mild fatigue without chest pain or increased shortness of breath,  Has some mild anemia hemoglobin, recurrent anemia CAD stable no chest pain shortness of breath presently  Influenza      Objective   Vital Signs:  /73   Pulse 94   Temp 97.7 °F (36.5 °C) (Infrared)   Resp 16   Ht 182.9 cm (72\")   Wt 95.3 kg (210 lb)   SpO2 99%   BMI 28.48 kg/m²   Estimated body mass index is 28.48 kg/m² as calculated from the following:    Height as of this encounter: 182.9 cm (72\").    Weight as of this encounter: 95.3 kg (210 lb).          Physical Exam  Vitals reviewed.   Constitutional:       General: She is not in acute distress.     Appearance: Normal appearance. She is well-developed. She is not ill-appearing, toxic-appearing or diaphoretic.   HENT:      Head: Normocephalic.      Mouth/Throat:      Pharynx: No oropharyngeal exudate.   Eyes:      Conjunctiva/sclera: Conjunctivae normal.      Pupils: Pupils are equal, round, and reactive to light.   Neck:      Vascular: No JVD.   Cardiovascular:      Rate and Rhythm: Normal rate and regular rhythm.      Heart sounds: Normal heart sounds. No murmur heard.     No friction rub.   Pulmonary:      Effort: Pulmonary effort is normal. No respiratory distress.      Breath sounds: Normal breath sounds. No wheezing.      Comments: Clear unlabored respirations less than 20 able to speak full sentences without dyspnea  Abdominal:      General: Bowel sounds are normal. There is no distension.      Palpations: Abdomen is soft. There is no mass.      Tenderness: There is no abdominal tenderness. There is no guarding.      Hernia: No hernia is present. "   Musculoskeletal:         General: No tenderness.      Cervical back: Neck supple.   Lymphadenopathy:      Cervical: No cervical adenopathy.   Skin:     General: Skin is warm and dry.      Capillary Refill: Capillary refill takes less than 2 seconds.   Neurological:      General: No focal deficit present.      Mental Status: She is alert and oriented to person, place, and time. Mental status is at baseline.   Psychiatric:         Mood and Affect: Mood normal.         Behavior: Behavior normal.         Thought Content: Thought content normal.         Judgment: Judgment normal.      Result Review :                Assessment and Plan   Diagnoses and all orders for this visit:    1. Acute cough (Primary)  -     POCT SARS-CoV-2 + Flu Antigen PRANAV  -     C-reactive protein  -     Sedimentation Rate  -     Comprehensive Metabolic Panel  -     TSH Rfx On Abnormal To Free T4  -     Vitamin B12 & Folate    2. Iron deficiency anemia, unspecified iron deficiency anemia type  -     Ambulatory Referral to Gastroenterology  -     C-reactive protein  -     Sedimentation Rate  -     Comprehensive Metabolic Panel  -     TSH Rfx On Abnormal To Free T4  -     Vitamin B12 & Folate    3. Iron deficiency anemia due to chronic blood loss  -     C-reactive protein  -     Sedimentation Rate  -     Comprehensive Metabolic Panel  -     TSH Rfx On Abnormal To Free T4  -     Vitamin B12 & Folate    4. Fatigue, unspecified type  -     C-reactive protein  -     Sedimentation Rate  -     Comprehensive Metabolic Panel  -     TSH Rfx On Abnormal To Free T4  -     Vitamin B12 & Folate    5. Hyperglycemia  -     Hemoglobin A1c    Other orders  -     oseltamivir (Tamiflu) 75 MG capsule; Take 1 capsule by mouth 2 (Two) Times a Day.  Dispense: 10 capsule; Refill: 0  -     promethazine-dextromethorphan (PROMETHAZINE-DM) 6.25-15 MG/5ML syrup; Take 5 mL by mouth 4 (Four) Times a Day As Needed for Cough.  Dispense: 240 mL; Refill: 1             Follow Up    Return in about 3 months (around 6/14/2024).  Patient was given instructions and counseling regarding her condition or for health maintenance advice. Please see specific information pulled into the AVS if appropriate.   Close exposure to influenza her influenza test here is negative but she very well may have influenza that she would like to proceed with Tamiflu risk-benefit discussed push fluids  Caution with cough syrup lowest effective dose  High fever chest pain increase shortness of breath  Follow-up with gastro, she is making arrangements,        Patient Instructions   Discharge instructions push fluids hydrate well  Continue to follow-up with hematology, cannot get behind on there is no easy way out at this time    Follow-up you will gastro agree    Labs today, will return office,    Tamiflu push fluids    If high fever chest pain increase shortness of breath emergency room.      Recommended vaccines if you have not had shingles vaccine Shingrix x 2  Prevnar 20 if not had  RSV if you have not had  Stay on top of COVID vaccines and flu shots    Follow-up GYN if you have not.      Update your condition Monday please thank you

## 2024-03-21 NOTE — NURSING NOTE
Patient reports severe fatigue,dyspnea on exertion, burning pain in neck and chest with exertion,muscle weakness in legs, and black,tarry stools x 4 weeks.She is changing her gastroenterologist to [facility] and does not have an appointment yet.Today's hgb is 8.4. She reports history of MI and 2 cardiac stents. She states that her cardiologist told her that the hgb should drop below 9. Reviewed with Dr. Harris. Orders received for transfusion of 1 unit RBCS and weekly CBC with RN review. Orders placed per Oscar clinical RN for Dr. Harris. Patient escorted to scheduling and then she is to go to lab. Instructed to keep fenwall bracelet on until transfusion complete. Understanding verbalized.

## 2024-03-22 ENCOUNTER — HOSPITAL ENCOUNTER (OUTPATIENT)
Dept: INFUSION THERAPY | Facility: HOSPITAL | Age: 64
Discharge: HOME | End: 2024-03-22
Payer: MEDICARE

## 2024-03-22 VITALS
TEMPERATURE: 97.7 F | HEART RATE: 79 BPM | DIASTOLIC BLOOD PRESSURE: 57 MMHG | RESPIRATION RATE: 18 BRPM | SYSTOLIC BLOOD PRESSURE: 111 MMHG | OXYGEN SATURATION: 96 %

## 2024-03-22 DIAGNOSIS — D64.9 LOW HEMOGLOBIN: ICD-10-CM

## 2024-03-22 PROCEDURE — 63710000001 DIPHENHYDRAMINE PER 50 MG: Performed by: INTERNAL MEDICINE

## 2024-03-22 PROCEDURE — 86900 BLOOD TYPING SEROLOGIC ABO: CPT

## 2024-03-22 PROCEDURE — 36430 TRANSFUSION BLD/BLD COMPNT: CPT

## 2024-03-22 PROCEDURE — P9016 RBC LEUKOCYTES REDUCED: HCPCS

## 2024-03-22 RX ORDER — ACETAMINOPHEN 325 MG/1
650 TABLET ORAL ONCE
Status: COMPLETED | OUTPATIENT
Start: 2024-03-22 | End: 2024-03-22

## 2024-03-22 RX ORDER — DIPHENHYDRAMINE HCL 25 MG
25 CAPSULE ORAL ONCE
Status: COMPLETED | OUTPATIENT
Start: 2024-03-22 | End: 2024-03-22

## 2024-03-22 RX ORDER — SODIUM CHLORIDE 9 MG/ML
250 INJECTION, SOLUTION INTRAVENOUS AS NEEDED
Status: DISCONTINUED | OUTPATIENT
Start: 2024-03-22 | End: 2024-03-24 | Stop reason: HOSPADM

## 2024-03-22 RX ADMIN — DIPHENHYDRAMINE HYDROCHLORIDE 25 MG: 25 CAPSULE ORAL at 12:20

## 2024-03-22 RX ADMIN — ACETAMINOPHEN 325MG 650 MG: 325 TABLET ORAL at 12:20

## 2024-03-23 LAB
BH BB BLOOD EXPIRATION DATE: NORMAL
BH BB BLOOD TYPE BARCODE: 5100
BH BB DISPENSE STATUS: NORMAL
BH BB PRODUCT CODE: NORMAL
BH BB UNIT NUMBER: NORMAL
CROSSMATCH INTERPRETATION: NORMAL
UNIT  ABO: NORMAL
UNIT  RH: NORMAL

## 2024-03-25 ENCOUNTER — LAB (OUTPATIENT)
Dept: LAB | Facility: HOSPITAL | Age: 64
End: 2024-03-25
Payer: MEDICARE

## 2024-03-25 ENCOUNTER — CLINICAL SUPPORT (OUTPATIENT)
Dept: ONCOLOGY | Facility: HOSPITAL | Age: 64
End: 2024-03-25
Payer: MEDICARE

## 2024-03-25 DIAGNOSIS — D50.0 IRON DEFICIENCY ANEMIA DUE TO CHRONIC BLOOD LOSS: ICD-10-CM

## 2024-03-25 DIAGNOSIS — D50.0 IRON DEFICIENCY ANEMIA DUE TO CHRONIC BLOOD LOSS: Primary | ICD-10-CM

## 2024-03-25 LAB
BASOPHILS # BLD AUTO: 0.05 10*3/MM3 (ref 0–0.2)
BASOPHILS NFR BLD AUTO: 0.6 % (ref 0–1.5)
DEPRECATED RDW RBC AUTO: 58 FL (ref 37–54)
EOSINOPHIL # BLD AUTO: 0.18 10*3/MM3 (ref 0–0.4)
EOSINOPHIL NFR BLD AUTO: 2.2 % (ref 0.3–6.2)
ERYTHROCYTE [DISTWIDTH] IN BLOOD BY AUTOMATED COUNT: 22.5 % (ref 12.3–15.4)
HCT VFR BLD AUTO: 33.7 % (ref 34–46.6)
HGB BLD-MCNC: 10.1 G/DL (ref 12–15.9)
IMM GRANULOCYTES # BLD AUTO: 0.09 10*3/MM3 (ref 0–0.05)
IMM GRANULOCYTES NFR BLD AUTO: 1.1 % (ref 0–0.5)
LYMPHOCYTES # BLD AUTO: 1.91 10*3/MM3 (ref 0.7–3.1)
LYMPHOCYTES NFR BLD AUTO: 23.2 % (ref 19.6–45.3)
MCH RBC QN AUTO: 24.9 PG (ref 26.6–33)
MCHC RBC AUTO-ENTMCNC: 30 G/DL (ref 31.5–35.7)
MCV RBC AUTO: 83 FL (ref 79–97)
MONOCYTES # BLD AUTO: 0.55 10*3/MM3 (ref 0.1–0.9)
MONOCYTES NFR BLD AUTO: 6.7 % (ref 5–12)
NEUTROPHILS NFR BLD AUTO: 5.47 10*3/MM3 (ref 1.7–7)
NEUTROPHILS NFR BLD AUTO: 66.2 % (ref 42.7–76)
NRBC BLD AUTO-RTO: 0.5 /100 WBC (ref 0–0.2)
PLATELET # BLD AUTO: 347 10*3/MM3 (ref 140–450)
PMV BLD AUTO: 10.3 FL (ref 6–12)
RBC # BLD AUTO: 4.06 10*6/MM3 (ref 3.77–5.28)
WBC NRBC COR # BLD AUTO: 8.25 10*3/MM3 (ref 3.4–10.8)

## 2024-03-25 PROCEDURE — 85025 COMPLETE CBC W/AUTO DIFF WBC: CPT

## 2024-03-25 PROCEDURE — 36415 COLL VENOUS BLD VENIPUNCTURE: CPT

## 2024-03-25 NOTE — PROGRESS NOTES
Patient is here for lab review with RN. CBC reviewed, counts are stable for this patient at this time. Patient has no complaints. She would like to go to [facility] for Gastroenterology Consult. Will enter order and alert Jodie, Referral Coordinator. Copy of labs given to patient and follow up appointment reviewed. Patient is instructed to call the office with any concerns or new symptoms prior to next visit. Patient verbalized understanding and discharged in stable condition.    Component      Latest Ref Rng 3/25/2024   WBC      3.40 - 10.80 10*3/mm3 8.25    RBC      3.77 - 5.28 10*6/mm3 4.06    Hemoglobin      12.0 - 15.9 g/dL 10.1 (L)    Hematocrit      34.0 - 46.6 % 33.7 (L)    MCV      79.0 - 97.0 fL 83.0    MCH      26.6 - 33.0 pg 24.9 (L)    MCHC      31.5 - 35.7 g/dL 30.0 (L)    RDW      12.3 - 15.4 % 22.5 (H)    RDW-SD      37.0 - 54.0 fl 58.0 (H)    MPV      6.0 - 12.0 fL 10.3    Platelets      140 - 450 10*3/mm3 347    Neutrophil Rel %      42.7 - 76.0 % 66.2    Lymphocyte Rel %      19.6 - 45.3 % 23.2    Monocyte Rel %      5.0 - 12.0 % 6.7    Eosinophil Rel %      0.3 - 6.2 % 2.2    Basophil Rel %      0.0 - 1.5 % 0.6    Immature Granulocyte Rel %      0.0 - 0.5 % 1.1 (H)    Neutrophils Absolute      1.70 - 7.00 10*3/mm3 5.47    Lymphocytes Absolute      0.70 - 3.10 10*3/mm3 1.91    Monocytes Absolute      0.10 - 0.90 10*3/mm3 0.55    Eosinophils Absolute      0.00 - 0.40 10*3/mm3 0.18    Basophils Absolute      0.00 - 0.20 10*3/mm3 0.05    Immature Grans, Absolute      0.00 - 0.05 10*3/mm3 0.09 (H)    nRBC      0.0 - 0.2 /100 WBC 0.5 (H)       Legend:  (L) Low  (H) High

## 2024-03-28 ENCOUNTER — INFUSION (OUTPATIENT)
Dept: ONCOLOGY | Facility: HOSPITAL | Age: 64
End: 2024-03-28
Payer: MEDICARE

## 2024-03-28 VITALS
BODY MASS INDEX: 27.42 KG/M2 | SYSTOLIC BLOOD PRESSURE: 108 MMHG | RESPIRATION RATE: 18 BRPM | HEART RATE: 85 BPM | OXYGEN SATURATION: 96 % | TEMPERATURE: 97.7 F | WEIGHT: 202.2 LBS | DIASTOLIC BLOOD PRESSURE: 60 MMHG

## 2024-03-28 DIAGNOSIS — D50.0 IRON DEFICIENCY ANEMIA DUE TO CHRONIC BLOOD LOSS: Primary | ICD-10-CM

## 2024-03-28 PROCEDURE — A9270 NON-COVERED ITEM OR SERVICE: HCPCS | Performed by: INTERNAL MEDICINE

## 2024-03-28 PROCEDURE — 25010000002 FERRIC CARBOXYMALTOSE 750 MG/15ML SOLUTION 15 ML VIAL: Performed by: INTERNAL MEDICINE

## 2024-03-28 PROCEDURE — 96374 THER/PROPH/DIAG INJ IV PUSH: CPT

## 2024-03-28 PROCEDURE — 25810000003 SODIUM CHLORIDE 0.9 % SOLUTION: Performed by: INTERNAL MEDICINE

## 2024-03-28 PROCEDURE — 63710000001 PROCHLORPERAZINE MALEATE PER 10 MG: Performed by: INTERNAL MEDICINE

## 2024-03-28 RX ORDER — PROCHLORPERAZINE MALEATE 10 MG
10 TABLET ORAL ONCE
Status: COMPLETED | OUTPATIENT
Start: 2024-03-28 | End: 2024-03-28

## 2024-03-28 RX ORDER — SODIUM CHLORIDE 9 MG/ML
20 INJECTION, SOLUTION INTRAVENOUS ONCE
Status: COMPLETED | OUTPATIENT
Start: 2024-03-28 | End: 2024-03-28

## 2024-03-28 RX ADMIN — PROCHLORPERAZINE MALEATE 10 MG: 10 TABLET ORAL at 15:07

## 2024-03-28 RX ADMIN — FERRIC CARBOXYMALTOSE INJECTION 750 MG: 50 INJECTION, SOLUTION INTRAVENOUS at 15:17

## 2024-03-28 RX ADMIN — SODIUM CHLORIDE 20 ML/HR: 9 INJECTION, SOLUTION INTRAVENOUS at 15:17

## 2024-04-01 ENCOUNTER — LAB (OUTPATIENT)
Dept: LAB | Facility: HOSPITAL | Age: 64
End: 2024-04-01
Payer: MEDICARE

## 2024-04-01 ENCOUNTER — CLINICAL SUPPORT (OUTPATIENT)
Dept: ONCOLOGY | Facility: HOSPITAL | Age: 64
End: 2024-04-01
Payer: MEDICARE

## 2024-04-01 DIAGNOSIS — D50.0 IRON DEFICIENCY ANEMIA DUE TO CHRONIC BLOOD LOSS: ICD-10-CM

## 2024-04-01 LAB
BASOPHILS # BLD AUTO: 0.04 10*3/MM3 (ref 0–0.2)
BASOPHILS NFR BLD AUTO: 0.6 % (ref 0–1.5)
DEPRECATED RDW RBC AUTO: 82.8 FL (ref 37–54)
EOSINOPHIL # BLD AUTO: 0.22 10*3/MM3 (ref 0–0.4)
EOSINOPHIL NFR BLD AUTO: 3.1 % (ref 0.3–6.2)
ERYTHROCYTE [DISTWIDTH] IN BLOOD BY AUTOMATED COUNT: 26.4 % (ref 12.3–15.4)
HCT VFR BLD AUTO: 37.5 % (ref 34–46.6)
HGB BLD-MCNC: 11.4 G/DL (ref 12–15.9)
IMM GRANULOCYTES # BLD AUTO: 0.04 10*3/MM3 (ref 0–0.05)
IMM GRANULOCYTES NFR BLD AUTO: 0.6 % (ref 0–0.5)
LYMPHOCYTES # BLD AUTO: 1.45 10*3/MM3 (ref 0.7–3.1)
LYMPHOCYTES NFR BLD AUTO: 20.7 % (ref 19.6–45.3)
MCH RBC QN AUTO: 26.7 PG (ref 26.6–33)
MCHC RBC AUTO-ENTMCNC: 30.4 G/DL (ref 31.5–35.7)
MCV RBC AUTO: 87.8 FL (ref 79–97)
MONOCYTES # BLD AUTO: 0.45 10*3/MM3 (ref 0.1–0.9)
MONOCYTES NFR BLD AUTO: 6.4 % (ref 5–12)
NEUTROPHILS NFR BLD AUTO: 4.8 10*3/MM3 (ref 1.7–7)
NEUTROPHILS NFR BLD AUTO: 68.6 % (ref 42.7–76)
NRBC BLD AUTO-RTO: 0 /100 WBC (ref 0–0.2)
PLATELET # BLD AUTO: 259 10*3/MM3 (ref 140–450)
PMV BLD AUTO: 10.6 FL (ref 6–12)
RBC # BLD AUTO: 4.27 10*6/MM3 (ref 3.77–5.28)
WBC NRBC COR # BLD AUTO: 7 10*3/MM3 (ref 3.4–10.8)

## 2024-04-01 PROCEDURE — 36415 COLL VENOUS BLD VENIPUNCTURE: CPT

## 2024-04-01 PROCEDURE — 85025 COMPLETE CBC W/AUTO DIFF WBC: CPT

## 2024-04-01 NOTE — PROGRESS NOTES
Patient is here for lab review with RN. CBC reviewed, counts are stable for this patient at this time. Patient has no complaints. Reminded her to contact [facility] for appointment. Copy of labs given to patient and follow up appointment reviewed. Patient is instructed to call the office with any concerns or new symptoms prior to next visit. Patient verbalized understanding and discharged in stable condition.    Component      Latest Ref Rng 4/1/2024   WBC      3.40 - 10.80 10*3/mm3 7.00    RBC      3.77 - 5.28 10*6/mm3 4.27    Hemoglobin      12.0 - 15.9 g/dL 11.4 (L)    Hematocrit      34.0 - 46.6 % 37.5    MCV      79.0 - 97.0 fL 87.8    MCH      26.6 - 33.0 pg 26.7    MCHC      31.5 - 35.7 g/dL 30.4 (L)    RDW      12.3 - 15.4 % 26.4 (H)    RDW-SD      37.0 - 54.0 fl 82.8 (H)    MPV      6.0 - 12.0 fL 10.6    Platelets      140 - 450 10*3/mm3 259    Neutrophil Rel %      42.7 - 76.0 % 68.6    Lymphocyte Rel %      19.6 - 45.3 % 20.7    Monocyte Rel %      5.0 - 12.0 % 6.4    Eosinophil Rel %      0.3 - 6.2 % 3.1    Basophil Rel %      0.0 - 1.5 % 0.6    Immature Granulocyte Rel %      0.0 - 0.5 % 0.6 (H)    Neutrophils Absolute      1.70 - 7.00 10*3/mm3 4.80    Lymphocytes Absolute      0.70 - 3.10 10*3/mm3 1.45    Monocytes Absolute      0.10 - 0.90 10*3/mm3 0.45    Eosinophils Absolute      0.00 - 0.40 10*3/mm3 0.22    Basophils Absolute      0.00 - 0.20 10*3/mm3 0.04    Immature Grans, Absolute      0.00 - 0.05 10*3/mm3 0.04    nRBC      0.0 - 0.2 /100 WBC 0.0       Legend:  (L) Low  (H) High

## 2024-04-08 ENCOUNTER — APPOINTMENT (OUTPATIENT)
Dept: ONCOLOGY | Facility: HOSPITAL | Age: 64
End: 2024-04-08
Payer: MEDICARE

## 2024-04-08 ENCOUNTER — LAB (OUTPATIENT)
Dept: LAB | Facility: HOSPITAL | Age: 64
End: 2024-04-08
Payer: MEDICARE

## 2024-04-08 ENCOUNTER — TELEPHONE (OUTPATIENT)
Dept: ONCOLOGY | Facility: CLINIC | Age: 64
End: 2024-04-08
Payer: MEDICARE

## 2024-04-08 DIAGNOSIS — D50.0 IRON DEFICIENCY ANEMIA DUE TO CHRONIC BLOOD LOSS: ICD-10-CM

## 2024-04-08 LAB
BASOPHILS # BLD AUTO: 0.06 10*3/MM3 (ref 0–0.2)
BASOPHILS NFR BLD AUTO: 0.9 % (ref 0–1.5)
DEPRECATED RDW RBC AUTO: 78.4 FL (ref 37–54)
EOSINOPHIL # BLD AUTO: 0.17 10*3/MM3 (ref 0–0.4)
EOSINOPHIL NFR BLD AUTO: 2.7 % (ref 0.3–6.2)
ERYTHROCYTE [DISTWIDTH] IN BLOOD BY AUTOMATED COUNT: 24.4 % (ref 12.3–15.4)
HCT VFR BLD AUTO: 41.8 % (ref 34–46.6)
HGB BLD-MCNC: 12.7 G/DL (ref 12–15.9)
IMM GRANULOCYTES # BLD AUTO: 0.05 10*3/MM3 (ref 0–0.05)
IMM GRANULOCYTES NFR BLD AUTO: 0.8 % (ref 0–0.5)
LYMPHOCYTES # BLD AUTO: 1.69 10*3/MM3 (ref 0.7–3.1)
LYMPHOCYTES NFR BLD AUTO: 26.6 % (ref 19.6–45.3)
MCH RBC QN AUTO: 26.9 PG (ref 26.6–33)
MCHC RBC AUTO-ENTMCNC: 30.4 G/DL (ref 31.5–35.7)
MCV RBC AUTO: 88.6 FL (ref 79–97)
MONOCYTES # BLD AUTO: 0.41 10*3/MM3 (ref 0.1–0.9)
MONOCYTES NFR BLD AUTO: 6.5 % (ref 5–12)
NEUTROPHILS NFR BLD AUTO: 3.97 10*3/MM3 (ref 1.7–7)
NEUTROPHILS NFR BLD AUTO: 62.5 % (ref 42.7–76)
NRBC BLD AUTO-RTO: 0 /100 WBC (ref 0–0.2)
PLATELET # BLD AUTO: 210 10*3/MM3 (ref 140–450)
PMV BLD AUTO: 10.9 FL (ref 6–12)
RBC # BLD AUTO: 4.72 10*6/MM3 (ref 3.77–5.28)
WBC NRBC COR # BLD AUTO: 6.35 10*3/MM3 (ref 3.4–10.8)

## 2024-04-08 PROCEDURE — 36415 COLL VENOUS BLD VENIPUNCTURE: CPT

## 2024-04-08 PROCEDURE — 85025 COMPLETE CBC W/AUTO DIFF WBC: CPT

## 2024-04-15 ENCOUNTER — LAB (OUTPATIENT)
Dept: LAB | Facility: HOSPITAL | Age: 64
End: 2024-04-15
Payer: MEDICARE

## 2024-04-15 ENCOUNTER — CLINICAL SUPPORT (OUTPATIENT)
Dept: ONCOLOGY | Facility: HOSPITAL | Age: 64
End: 2024-04-15
Payer: MEDICARE

## 2024-04-15 DIAGNOSIS — D50.0 IRON DEFICIENCY ANEMIA DUE TO CHRONIC BLOOD LOSS: ICD-10-CM

## 2024-04-15 LAB
BASOPHILS # BLD AUTO: 0.04 10*3/MM3 (ref 0–0.2)
BASOPHILS NFR BLD AUTO: 0.6 % (ref 0–1.5)
DEPRECATED RDW RBC AUTO: 71.2 FL (ref 37–54)
EOSINOPHIL # BLD AUTO: 0.26 10*3/MM3 (ref 0–0.4)
EOSINOPHIL NFR BLD AUTO: 4 % (ref 0.3–6.2)
ERYTHROCYTE [DISTWIDTH] IN BLOOD BY AUTOMATED COUNT: 22.5 % (ref 12.3–15.4)
HCT VFR BLD AUTO: 41.7 % (ref 34–46.6)
HGB BLD-MCNC: 13.6 G/DL (ref 12–15.9)
IMM GRANULOCYTES # BLD AUTO: 0.1 10*3/MM3 (ref 0–0.05)
IMM GRANULOCYTES NFR BLD AUTO: 1.5 % (ref 0–0.5)
LYMPHOCYTES # BLD AUTO: 1.88 10*3/MM3 (ref 0.7–3.1)
LYMPHOCYTES NFR BLD AUTO: 28.7 % (ref 19.6–45.3)
MCH RBC QN AUTO: 28.5 PG (ref 26.6–33)
MCHC RBC AUTO-ENTMCNC: 32.6 G/DL (ref 31.5–35.7)
MCV RBC AUTO: 87.2 FL (ref 79–97)
MONOCYTES # BLD AUTO: 0.3 10*3/MM3 (ref 0.1–0.9)
MONOCYTES NFR BLD AUTO: 4.6 % (ref 5–12)
NEUTROPHILS NFR BLD AUTO: 3.98 10*3/MM3 (ref 1.7–7)
NEUTROPHILS NFR BLD AUTO: 60.6 % (ref 42.7–76)
NRBC BLD AUTO-RTO: 0 /100 WBC (ref 0–0.2)
PLATELET # BLD AUTO: 203 10*3/MM3 (ref 140–450)
PMV BLD AUTO: 10.4 FL (ref 6–12)
RBC # BLD AUTO: 4.78 10*6/MM3 (ref 3.77–5.28)
WBC NRBC COR # BLD AUTO: 6.56 10*3/MM3 (ref 3.4–10.8)

## 2024-04-15 PROCEDURE — 36415 COLL VENOUS BLD VENIPUNCTURE: CPT

## 2024-04-15 PROCEDURE — 85025 COMPLETE CBC W/AUTO DIFF WBC: CPT

## 2024-04-15 NOTE — PROGRESS NOTES
Patient is here for lab review with RN. CBC reviewed, counts are stable for this patient at this time. Patient has no complaints. Patient states she will reach back out to [facility] to get GI appointment scheduled. Copy of labs given to patient and follow up appointment reviewed. Patient is instructed to call the office with any concerns or new symptoms prior to next visit. Patient verbalized understanding and discharged in stable condition.    Component      Latest Ref Rng 4/15/2024   WBC      3.40 - 10.80 10*3/mm3 6.56    RBC      3.77 - 5.28 10*6/mm3 4.78    Hemoglobin      12.0 - 15.9 g/dL 13.6    Hematocrit      34.0 - 46.6 % 41.7    MCV      79.0 - 97.0 fL 87.2    MCH      26.6 - 33.0 pg 28.5    MCHC      31.5 - 35.7 g/dL 32.6    RDW      12.3 - 15.4 % 22.5 (H)    RDW-SD      37.0 - 54.0 fl 71.2 (H)    MPV      6.0 - 12.0 fL 10.4    Platelets      140 - 450 10*3/mm3 203    Neutrophil Rel %      42.7 - 76.0 % 60.6    Lymphocyte Rel %      19.6 - 45.3 % 28.7    Monocyte Rel %      5.0 - 12.0 % 4.6 (L)    Eosinophil Rel %      0.3 - 6.2 % 4.0    Basophil Rel %      0.0 - 1.5 % 0.6    Immature Granulocyte Rel %      0.0 - 0.5 % 1.5 (H)    Neutrophils Absolute      1.70 - 7.00 10*3/mm3 3.98    Lymphocytes Absolute      0.70 - 3.10 10*3/mm3 1.88    Monocytes Absolute      0.10 - 0.90 10*3/mm3 0.30    Eosinophils Absolute      0.00 - 0.40 10*3/mm3 0.26    Basophils Absolute      0.00 - 0.20 10*3/mm3 0.04    Immature Grans, Absolute      0.00 - 0.05 10*3/mm3 0.10 (H)    nRBC      0.0 - 0.2 /100 WBC 0.0       Legend:  (H) High  (L) Low

## 2024-04-17 NOTE — PROGRESS NOTES
"  REASON FOR FOLLOW-UP: History of recurrent iron deficiency anemia requiring repeated IV iron therapy.      History of Present Illness    Mrs. Abbott is a pleasant 64-year-old woman who returned today for follow-up of recurrent iron deficiency anemia.  She was hospitalized in November with severe symptomatic iron deficiency anemia requiring IV iron and PRBC transfusions.  Since that hospitalization she has required IV iron on 2 occasions (2 doses each time) for recurrent iron deficiency and often precipitous drop in the hemoglobin.  She complains of dark tarry stool ongoing.  She is scheduled to see GI again tomorrow.    Past Medical History, Past Surgical History, Social History, Family History have been reviewed and are without significant changes except as mentioned from my consult note on 8/25/17.    Review of Systems   Constitutional:  Positive for fatigue. Negative for activity change and unexpected weight change.   HENT: Negative.     Respiratory:  Negative for shortness of breath.    Cardiovascular: Negative.    Gastrointestinal:  Negative for blood in stool, constipation and nausea.        Probable melena   Genitourinary: Negative.    Musculoskeletal: Negative.    Skin: Negative.    Neurological:  Negative for dizziness.   Hematological: Negative.    Psychiatric/Behavioral: Negative.       Medications:  The current medication list was reviewed in the EMR    ALLERGIES:    No Known Allergies    Objective      Vitals:    04/22/24 1550   BP: 136/79   Pulse: 76   Resp: 16   Temp: 97.5 °F (36.4 °C)   TempSrc: Temporal   SpO2: 97%   Weight: Comment: Patient Declined   Height: 182.9 cm (72\")   PainSc: 0-No pain           4/22/2024     3:43 PM   Current Status   ECOG score 0       Physical Exam    CON: pleasant well-developed adult woman  HEENT: no icterus, no thrush, moist membranes  CV: RRR, S1S2, no murmur  RESP: cta bilat, no wheezing, no rales  GI: soft, non-tender, no splenomegaly, +bs  MUSC: no edema, normal " gait  NEURO: alert and oriented x3, normal strength  PSYCH: normal mood and affect  Exam unchanged-4/22/2024  RECENT LABS:  Results from last 7 days   Lab Units 04/22/24  1546   WBC 10*3/mm3 6.69   NEUTROS ABS 10*3/mm3 4.27   HEMOGLOBIN g/dL 13.7   HEMATOCRIT % 44.7   PLATELETS 10*3/mm3 217     Results from last 7 days   Lab Units 04/22/24  1546   FERRITIN ng/mL 147.00   IRON mcg/dL 70   TIBC mcg/dL 313                 Assessment & Plan    1.  Recurrent iron deficiency anemia suspected to be secondary to GI blood loss (established with GI)  Patient reports negative EGD colonoscopy and capsule study but has ongoing melena and often precipitous drops in the hemoglobin and recurrent iron deficiency all highly suggestive of ongoing GI blood loss  Most recently received IV iron with 2 doses of Injectafer completed 3/28/2024  Hemoglobin 13.7, ferritin 147 and iron sat 22% today  2.  Intolerance to oral iron secondary to constipation    Hematology plan:  CBC every 2 weeks and monthly ferritin iron profile  Scheduled to see Dr. Mcgee tomorrow              4/22/2024      CC:

## 2024-04-22 ENCOUNTER — LAB (OUTPATIENT)
Dept: LAB | Facility: HOSPITAL | Age: 64
End: 2024-04-22
Payer: MEDICARE

## 2024-04-22 ENCOUNTER — OFFICE VISIT (OUTPATIENT)
Dept: ONCOLOGY | Facility: CLINIC | Age: 64
End: 2024-04-22
Payer: MEDICARE

## 2024-04-22 VITALS
BODY MASS INDEX: 27.42 KG/M2 | TEMPERATURE: 97.5 F | HEART RATE: 76 BPM | RESPIRATION RATE: 16 BRPM | HEIGHT: 72 IN | DIASTOLIC BLOOD PRESSURE: 79 MMHG | OXYGEN SATURATION: 97 % | SYSTOLIC BLOOD PRESSURE: 136 MMHG

## 2024-04-22 DIAGNOSIS — D50.0 IRON DEFICIENCY ANEMIA DUE TO CHRONIC BLOOD LOSS: ICD-10-CM

## 2024-04-22 DIAGNOSIS — T45.4X5D ADVERSE EFFECT OF IRON, SUBSEQUENT ENCOUNTER: ICD-10-CM

## 2024-04-22 DIAGNOSIS — D64.9 ANEMIA, UNSPECIFIED TYPE: ICD-10-CM

## 2024-04-22 DIAGNOSIS — D50.9 IRON DEFICIENCY ANEMIA, UNSPECIFIED IRON DEFICIENCY ANEMIA TYPE: ICD-10-CM

## 2024-04-22 DIAGNOSIS — D50.0 IRON DEFICIENCY ANEMIA DUE TO CHRONIC BLOOD LOSS: Primary | ICD-10-CM

## 2024-04-22 LAB
BASOPHILS # BLD AUTO: 0.04 10*3/MM3 (ref 0–0.2)
BASOPHILS NFR BLD AUTO: 0.6 % (ref 0–1.5)
DEPRECATED RDW RBC AUTO: 70.4 FL (ref 37–54)
EOSINOPHIL # BLD AUTO: 0.17 10*3/MM3 (ref 0–0.4)
EOSINOPHIL NFR BLD AUTO: 2.5 % (ref 0.3–6.2)
ERYTHROCYTE [DISTWIDTH] IN BLOOD BY AUTOMATED COUNT: 21.3 % (ref 12.3–15.4)
FERRITIN SERPL-MCNC: 147 NG/ML (ref 13–150)
HCT VFR BLD AUTO: 44.7 % (ref 34–46.6)
HGB BLD-MCNC: 13.7 G/DL (ref 12–15.9)
IMM GRANULOCYTES # BLD AUTO: 0.02 10*3/MM3 (ref 0–0.05)
IMM GRANULOCYTES NFR BLD AUTO: 0.3 % (ref 0–0.5)
IRON 24H UR-MRATE: 70 MCG/DL (ref 37–145)
IRON SATN MFR SERPL: 22 % (ref 20–50)
LYMPHOCYTES # BLD AUTO: 1.78 10*3/MM3 (ref 0.7–3.1)
LYMPHOCYTES NFR BLD AUTO: 26.6 % (ref 19.6–45.3)
MCH RBC QN AUTO: 27.6 PG (ref 26.6–33)
MCHC RBC AUTO-ENTMCNC: 30.6 G/DL (ref 31.5–35.7)
MCV RBC AUTO: 90.1 FL (ref 79–97)
MONOCYTES # BLD AUTO: 0.41 10*3/MM3 (ref 0.1–0.9)
MONOCYTES NFR BLD AUTO: 6.1 % (ref 5–12)
NEUTROPHILS NFR BLD AUTO: 4.27 10*3/MM3 (ref 1.7–7)
NEUTROPHILS NFR BLD AUTO: 63.9 % (ref 42.7–76)
NRBC BLD AUTO-RTO: 0 /100 WBC (ref 0–0.2)
PLATELET # BLD AUTO: 217 10*3/MM3 (ref 140–450)
PMV BLD AUTO: 10.4 FL (ref 6–12)
RBC # BLD AUTO: 4.96 10*6/MM3 (ref 3.77–5.28)
TIBC SERPL-MCNC: 313 MCG/DL (ref 298–536)
TRANSFERRIN SERPL-MCNC: 210 MG/DL (ref 200–360)
WBC NRBC COR # BLD AUTO: 6.69 10*3/MM3 (ref 3.4–10.8)

## 2024-04-22 PROCEDURE — 99214 OFFICE O/P EST MOD 30 MIN: CPT | Performed by: INTERNAL MEDICINE

## 2024-04-22 PROCEDURE — 83540 ASSAY OF IRON: CPT

## 2024-04-22 PROCEDURE — 84466 ASSAY OF TRANSFERRIN: CPT

## 2024-04-22 PROCEDURE — 85025 COMPLETE CBC W/AUTO DIFF WBC: CPT

## 2024-04-22 PROCEDURE — 1126F AMNT PAIN NOTED NONE PRSNT: CPT | Performed by: INTERNAL MEDICINE

## 2024-04-22 PROCEDURE — 82728 ASSAY OF FERRITIN: CPT

## 2024-04-22 PROCEDURE — 36415 COLL VENOUS BLD VENIPUNCTURE: CPT

## 2024-04-23 ENCOUNTER — TELEPHONE (OUTPATIENT)
Dept: GASTROENTEROLOGY | Facility: CLINIC | Age: 64
End: 2024-04-23
Payer: MEDICARE

## 2024-04-23 ENCOUNTER — OFFICE VISIT (OUTPATIENT)
Dept: GASTROENTEROLOGY | Facility: CLINIC | Age: 64
End: 2024-04-23
Payer: MEDICARE

## 2024-04-23 VITALS
HEART RATE: 72 BPM | TEMPERATURE: 96.9 F | HEIGHT: 72 IN | SYSTOLIC BLOOD PRESSURE: 133 MMHG | BODY MASS INDEX: 26.82 KG/M2 | DIASTOLIC BLOOD PRESSURE: 75 MMHG | WEIGHT: 198 LBS

## 2024-04-23 DIAGNOSIS — R19.5 OCCULT GI BLEEDING: Primary | ICD-10-CM

## 2024-04-23 PROCEDURE — 1160F RVW MEDS BY RX/DR IN RCRD: CPT | Performed by: INTERNAL MEDICINE

## 2024-04-23 PROCEDURE — 1159F MED LIST DOCD IN RCRD: CPT | Performed by: INTERNAL MEDICINE

## 2024-04-23 PROCEDURE — 99213 OFFICE O/P EST LOW 20 MIN: CPT | Performed by: INTERNAL MEDICINE

## 2024-04-30 ENCOUNTER — TELEPHONE (OUTPATIENT)
Dept: GASTROENTEROLOGY | Facility: CLINIC | Age: 64
End: 2024-04-30
Payer: MEDICARE

## 2024-04-30 RX ORDER — VENLAFAXINE HYDROCHLORIDE 37.5 MG/1
112.5 CAPSULE, EXTENDED RELEASE ORAL DAILY
Qty: 270 CAPSULE | Refills: 1 | Status: SHIPPED | OUTPATIENT
Start: 2024-04-30

## 2024-04-30 NOTE — TELEPHONE ENCOUNTER
Rx Refill Note  Requested Prescriptions     Pending Prescriptions Disp Refills    venlafaxine XR (EFFEXOR-XR) 37.5 MG 24 hr capsule [Pharmacy Med Name: VENLAFAXINE HCL ER 37.5 MG CAP] 90 capsule      Sig: TAKE THREE CAPSULES BY MOUTH DAILY      Last office visit with prescribing clinician: 3/14/2024   Last telemedicine visit with prescribing clinician: Visit date not found   Next office visit with prescribing clinician: 6/24/2024                         Would you like a call back once the refill request has been completed: [] Yes [] No    If the office needs to give you a call back, can they leave a voicemail: [] Yes [] No    Daphnie Rueda MA  04/30/24, 09:06 EDT

## 2024-05-01 ENCOUNTER — HOSPITAL ENCOUNTER (EMERGENCY)
Facility: HOSPITAL | Age: 64
Discharge: HOME | End: 2024-05-01
Attending: EMERGENCY MEDICINE
Payer: MEDICARE

## 2024-05-01 ENCOUNTER — APPOINTMENT (OUTPATIENT)
Dept: GENERAL RADIOLOGY | Facility: HOSPITAL | Age: 64
End: 2024-05-01
Payer: MEDICARE

## 2024-05-01 ENCOUNTER — TELEPHONE (OUTPATIENT)
Dept: GASTROENTEROLOGY | Facility: CLINIC | Age: 64
End: 2024-05-01
Payer: MEDICARE

## 2024-05-01 ENCOUNTER — APPOINTMENT (OUTPATIENT)
Dept: CT IMAGING | Facility: HOSPITAL | Age: 64
End: 2024-05-01
Payer: MEDICARE

## 2024-05-01 VITALS
HEART RATE: 78 BPM | BODY MASS INDEX: 26.41 KG/M2 | TEMPERATURE: 97.2 F | HEIGHT: 72 IN | OXYGEN SATURATION: 98 % | RESPIRATION RATE: 16 BRPM | DIASTOLIC BLOOD PRESSURE: 80 MMHG | WEIGHT: 195 LBS | SYSTOLIC BLOOD PRESSURE: 109 MMHG
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
## 2024-05-01 DIAGNOSIS — M25.551 RIGHT HIP PAIN: ICD-10-CM

## 2024-05-01 DIAGNOSIS — M25.532 LEFT WRIST PAIN: ICD-10-CM

## 2024-05-01 DIAGNOSIS — M25.512 ACUTE PAIN OF LEFT SHOULDER: ICD-10-CM

## 2024-05-01 DIAGNOSIS — M54.50 ACUTE MIDLINE LOW BACK PAIN WITHOUT SCIATICA: Primary | ICD-10-CM

## 2024-05-01 PROCEDURE — 72131 CT LUMBAR SPINE W/O DYE: CPT

## 2024-05-01 PROCEDURE — 72125 CT NECK SPINE W/O DYE: CPT

## 2024-05-01 PROCEDURE — 73030 X-RAY EXAM OF SHOULDER: CPT

## 2024-05-01 PROCEDURE — 73502 X-RAY EXAM HIP UNI 2-3 VIEWS: CPT

## 2024-05-01 PROCEDURE — 99284 EMERGENCY DEPT VISIT MOD MDM: CPT

## 2024-05-01 PROCEDURE — 73110 X-RAY EXAM OF WRIST: CPT

## 2024-05-01 RX ORDER — ACETAMINOPHEN 500 MG
1000 TABLET ORAL ONCE
Status: COMPLETED | OUTPATIENT
Start: 2024-05-01 | End: 2024-05-01

## 2024-05-01 RX ORDER — LIDOCAINE 50 MG/G
1 PATCH TOPICAL EVERY 24 HOURS
Qty: 14 EACH | Refills: 0 | Status: SHIPPED | OUTPATIENT
Start: 2024-05-01 | End: 2024-05-01 | Stop reason: SDUPTHER

## 2024-05-01 RX ORDER — METHOCARBAMOL 750 MG/1
750 TABLET, FILM COATED ORAL 3 TIMES DAILY PRN
Qty: 30 TABLET | Refills: 0 | Status: SHIPPED | OUTPATIENT
Start: 2024-05-01

## 2024-05-01 RX ORDER — DICLOFENAC SODIUM 75 MG/1
75 TABLET, DELAYED RELEASE ORAL 2 TIMES DAILY PRN
Qty: 30 TABLET | Refills: 0 | Status: SHIPPED | OUTPATIENT
Start: 2024-05-01

## 2024-05-01 RX ADMIN — ACETAMINOPHEN 1000 MG: 500 TABLET ORAL at 20:36

## 2024-05-02 NOTE — DISCHARGE INSTRUCTIONS
Follow-up with primary care provider.  Use Tylenol and Voltaren gel to help with pain.  Apply lidocaine patch to help with pain.  Use heating pad to help with sore muscles.  Return to ED for any worsening symptoms.
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
## 2024-05-06 ENCOUNTER — TELEPHONE (OUTPATIENT)
Dept: FAMILY MEDICINE CLINIC | Facility: CLINIC | Age: 64
End: 2024-05-06

## 2024-05-06 ENCOUNTER — CLINICAL SUPPORT (OUTPATIENT)
Dept: ONCOLOGY | Facility: HOSPITAL | Age: 64
End: 2024-05-06
Payer: MEDICARE

## 2024-05-06 ENCOUNTER — LAB (OUTPATIENT)
Dept: LAB | Facility: HOSPITAL | Age: 64
End: 2024-05-06
Payer: MEDICARE

## 2024-05-06 DIAGNOSIS — D50.0 IRON DEFICIENCY ANEMIA DUE TO CHRONIC BLOOD LOSS: Primary | ICD-10-CM

## 2024-05-06 DIAGNOSIS — D50.0 IRON DEFICIENCY ANEMIA DUE TO CHRONIC BLOOD LOSS: ICD-10-CM

## 2024-05-06 LAB
BASOPHILS # BLD AUTO: 0.04 10*3/MM3 (ref 0–0.2)
BASOPHILS NFR BLD AUTO: 0.5 % (ref 0–1.5)
DEPRECATED RDW RBC AUTO: 66.8 FL (ref 37–54)
EOSINOPHIL # BLD AUTO: 0.29 10*3/MM3 (ref 0–0.4)
EOSINOPHIL NFR BLD AUTO: 3.7 % (ref 0.3–6.2)
ERYTHROCYTE [DISTWIDTH] IN BLOOD BY AUTOMATED COUNT: 20.5 % (ref 12.3–15.4)
HCT VFR BLD AUTO: 40.5 % (ref 34–46.6)
HGB BLD-MCNC: 12.7 G/DL (ref 12–15.9)
IMM GRANULOCYTES # BLD AUTO: 0.01 10*3/MM3 (ref 0–0.05)
IMM GRANULOCYTES NFR BLD AUTO: 0.1 % (ref 0–0.5)
LYMPHOCYTES # BLD AUTO: 2.01 10*3/MM3 (ref 0.7–3.1)
LYMPHOCYTES NFR BLD AUTO: 25.3 % (ref 19.6–45.3)
MCH RBC QN AUTO: 28 PG (ref 26.6–33)
MCHC RBC AUTO-ENTMCNC: 31.4 G/DL (ref 31.5–35.7)
MCV RBC AUTO: 89.2 FL (ref 79–97)
MONOCYTES # BLD AUTO: 0.44 10*3/MM3 (ref 0.1–0.9)
MONOCYTES NFR BLD AUTO: 5.5 % (ref 5–12)
NEUTROPHILS NFR BLD AUTO: 5.14 10*3/MM3 (ref 1.7–7)
NEUTROPHILS NFR BLD AUTO: 64.9 % (ref 42.7–76)
NRBC BLD AUTO-RTO: 0 /100 WBC (ref 0–0.2)
PLATELET # BLD AUTO: 252 10*3/MM3 (ref 140–450)
PMV BLD AUTO: 10.7 FL (ref 6–12)
RBC # BLD AUTO: 4.54 10*6/MM3 (ref 3.77–5.28)
WBC NRBC COR # BLD AUTO: 7.93 10*3/MM3 (ref 3.4–10.8)

## 2024-05-06 PROCEDURE — 36415 COLL VENOUS BLD VENIPUNCTURE: CPT

## 2024-05-06 PROCEDURE — 85025 COMPLETE CBC W/AUTO DIFF WBC: CPT
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
## 2024-05-07 PROBLEM — R19.5 OCCULT GI BLEEDING: Status: ACTIVE | Noted: 2024-04-23

## 2024-05-16 NOTE — PROGRESS NOTES
Chief Complaint   Patient presents with   • Anemia   • Black or Bloody Stool       Tiera Abbott is a  64 y.o. female here for a follow up visit for melena and iron deficiency anemia due to chronic GI blood loss  .    HPI    Patient 64-year-old female with history of hyperlipidemia, coronary artery disease, IBS found with melena and underwent EGD and colonoscopy in the hospital without source of GI bleed.  Patient had attempted capsule endoscopy but capsule remained in the gastric lumen throughout the 8 hours.  Patient with no history of gastroparesis.  Patient underwent CT enterography and no lesions were identified.  Patient now returns for further recommendations.  Patient denies any nausea vomiting no hematemesis noted.    Past Medical History:   Diagnosis Date   • Acid reflux    • Alcoholism in remission     SINCE AGE 22   • Arthritis    • Back pain    • Cardiac disease    • Chest pain    • Coronary artery disease     mi   • Depression    • Eczema    • Encounter for screening mammogram for breast cancer 06/23/2019   • Fibrocystic breast    • Heart attack 2008   • Heart attack    • Heart disease    • History of anemia    • History of bruising easily    • History of COVID-19     2021   • Hypercholesterolemia    • IBS (irritable bowel syndrome)    • Inappropriate (too hot or too cold) temperature in local application and packing     always hot or cold   • Joint pain, knee     LEFT KNEE   • Low iron    • Menopausal state    • Muscle pain    • Sleep apnea     DOES NOT USE MACHINE   • Stiffness in joint          Current Outpatient Medications:   •  atorvastatin (LIPITOR) 80 MG tablet, Take 1 tablet by mouth Every Night., Disp: , Rfl:   •  carvedilol (COREG) 3.125 MG tablet, TAKE ONE TABLET BY MOUTH TWICE A DAY WITH MEALS (Patient taking differently: Take 1 tablet by mouth 2 (Two) Times a Day With Meals.), Disp: 180 tablet, Rfl: 2  •  clobetasol (TEMOVATE) 0.05 % cream, Apply  topically to the appropriate area as  directed 2 (Two) Times a Day. Sparingly, avoid face, short-term use only, Disp: 30 g, Rfl: 1  •  clopidogrel (PLAVIX) 75 MG tablet, Take 1 tablet by mouth Daily., Disp: , Rfl:   •  ezetimibe (ZETIA) 10 MG tablet, Take 1 tablet by mouth Daily., Disp: , Rfl:   •  ibuprofen (ADVIL,MOTRIN) 800 MG tablet, Take 1 tablet by mouth 2 (Two) Times a Day., Disp: , Rfl:   •  oseltamivir (Tamiflu) 75 MG capsule, Take 1 capsule by mouth 2 (Two) Times a Day., Disp: 10 capsule, Rfl: 0  •  pantoprazole (PROTONIX) 40 MG EC tablet, Take 1 tablet by mouth Daily. (Patient taking differently: Take 1 tablet by mouth Every Night.), Disp: 90 tablet, Rfl: 3  •  promethazine-dextromethorphan (PROMETHAZINE-DM) 6.25-15 MG/5ML syrup, Take 5 mL by mouth 4 (Four) Times a Day As Needed for Cough., Disp: 240 mL, Rfl: 1  •  diclofenac (VOLTAREN) 75 MG EC tablet, Take 1 tablet by mouth 2 (Two) Times a Day As Needed (pain)., Disp: 30 tablet, Rfl: 0  •  Diclofenac Sodium (VOLTAREN) 1 % gel gel, Apply 4 g topically to the appropriate area as directed 4 (Four) Times a Day As Needed (pain)., Disp: 100 g, Rfl: 0  •  methocarbamol (ROBAXIN) 750 MG tablet, Take 1 tablet by mouth 3 (Three) Times a Day As Needed for Muscle Spasms., Disp: 30 tablet, Rfl: 0  •  venlafaxine XR (EFFEXOR-XR) 37.5 MG 24 hr capsule, TAKE THREE CAPSULES BY MOUTH DAILY, Disp: 270 capsule, Rfl: 1    No Known Allergies    Social History     Socioeconomic History   • Marital status: Single   • Number of children: 1   • Years of education: College   Tobacco Use   • Smoking status: Former     Current packs/day: 0.00     Types: Electronic Cigarette, Cigarettes     Quit date: 2014     Years since quitting: 10.3   • Smokeless tobacco: Never   • Tobacco comments:     10-12 cigarettes daily   Vaping Use   • Vaping status: Never Used   Substance and Sexual Activity   • Alcohol use: No     Comment: caffeine use- coffee   • Drug use: No   • Sexual activity: Not Currently     Birth control/protection:  Post-menopausal       Family History   Problem Relation Age of Onset   • Alzheimer's disease Mother    • Breast cancer Mother    • Diabetes Mother    • Lung disease Father         pullmonary artery disease   • Heart disease Father    • Cancer Father 32   • Heart disease Brother    • Diabetes Brother    • Heart attack Brother    • Heart disease Brother    • Diabetes Brother    • Colon cancer Other    • Heart attack Other    • Diabetes Other    • Heart disease Other    • Diabetes Brother    • JOSHUA disease Brother        Review of Systems   Constitutional: Negative.    Respiratory: Negative.     Cardiovascular: Negative.    Gastrointestinal:  Positive for blood in stool. Negative for abdominal distention, abdominal pain, constipation, diarrhea, nausea, rectal pain and vomiting.   Musculoskeletal: Negative.  Negative for arthralgias and joint swelling.   Skin: Negative.    Hematological: Negative.      Vitals:    04/23/24 1548   BP: 133/75   Pulse: 72   Temp: 96.9 °F (36.1 °C)       Physical Exam  Vitals and nursing note reviewed.   Constitutional:       Appearance: Normal appearance. She is well-developed.   HENT:      Head: Normocephalic and atraumatic.   Eyes:      General: No scleral icterus.     Pupils: Pupils are equal, round, and reactive to light.   Cardiovascular:      Rate and Rhythm: Normal rate and regular rhythm.      Heart sounds: Normal heart sounds.   Pulmonary:      Effort: Pulmonary effort is normal. No respiratory distress.      Breath sounds: Normal breath sounds.   Abdominal:      General: Bowel sounds are normal. There is no distension or abdominal bruit.      Palpations: Abdomen is soft. Abdomen is not rigid. There is no shifting dullness, fluid wave, mass or pulsatile mass.      Tenderness: There is no abdominal tenderness. There is no guarding.      Hernia: No hernia is present.   Musculoskeletal:         General: No swelling or tenderness. Normal range of motion.   Skin:     General: Skin is  warm and dry.      Coloration: Skin is not jaundiced.      Findings: No rash.   Neurological:      General: No focal deficit present.      Mental Status: She is alert and oriented to person, place, and time.      Cranial Nerves: No cranial nerve deficit.   Psychiatric:         Behavior: Behavior normal.         Thought Content: Thought content normal.     Lab on 04/22/2024   Component Date Value Ref Range Status   • Ferritin 04/22/2024 147.00  13.00 - 150.00 ng/mL Final   • Iron 04/22/2024 70  37 - 145 mcg/dL Final   • Iron Saturation (TSAT) 04/22/2024 22  20 - 50 % Final   • Transferrin 04/22/2024 210  200 - 360 mg/dL Final   • TIBC 04/22/2024 313  298 - 536 mcg/dL Final   • WBC 04/22/2024 6.69  3.40 - 10.80 10*3/mm3 Final   • RBC 04/22/2024 4.96  3.77 - 5.28 10*6/mm3 Final   • Hemoglobin 04/22/2024 13.7  12.0 - 15.9 g/dL Final   • Hematocrit 04/22/2024 44.7  34.0 - 46.6 % Final   • MCV 04/22/2024 90.1  79.0 - 97.0 fL Final   • MCH 04/22/2024 27.6  26.6 - 33.0 pg Final   • MCHC 04/22/2024 30.6 (L)  31.5 - 35.7 g/dL Final   • RDW 04/22/2024 21.3 (H)  12.3 - 15.4 % Final   • RDW-SD 04/22/2024 70.4 (H)  37.0 - 54.0 fl Final   • MPV 04/22/2024 10.4  6.0 - 12.0 fL Final   • Platelets 04/22/2024 217  140 - 450 10*3/mm3 Final   • Neutrophil % 04/22/2024 63.9  42.7 - 76.0 % Final   • Lymphocyte % 04/22/2024 26.6  19.6 - 45.3 % Final   • Monocyte % 04/22/2024 6.1  5.0 - 12.0 % Final   • Eosinophil % 04/22/2024 2.5  0.3 - 6.2 % Final   • Basophil % 04/22/2024 0.6  0.0 - 1.5 % Final   • Immature Grans % 04/22/2024 0.3  0.0 - 0.5 % Final   • Neutrophils, Absolute 04/22/2024 4.27  1.70 - 7.00 10*3/mm3 Final   • Lymphocytes, Absolute 04/22/2024 1.78  0.70 - 3.10 10*3/mm3 Final   • Monocytes, Absolute 04/22/2024 0.41  0.10 - 0.90 10*3/mm3 Final   • Eosinophils, Absolute 04/22/2024 0.17  0.00 - 0.40 10*3/mm3 Final   • Basophils, Absolute 04/22/2024 0.04  0.00 - 0.20 10*3/mm3 Final   • Immature Grans, Absolute 04/22/2024 0.02  0.00  - 0.05 10*3/mm3 Final   • nRBC 04/22/2024 0.0  0.0 - 0.2 /100 WBC Final   Lab on 04/15/2024   Component Date Value Ref Range Status   • WBC 04/15/2024 6.56  3.40 - 10.80 10*3/mm3 Final   • RBC 04/15/2024 4.78  3.77 - 5.28 10*6/mm3 Final   • Hemoglobin 04/15/2024 13.6  12.0 - 15.9 g/dL Final   • Hematocrit 04/15/2024 41.7  34.0 - 46.6 % Final   • MCV 04/15/2024 87.2  79.0 - 97.0 fL Final   • MCH 04/15/2024 28.5  26.6 - 33.0 pg Final   • MCHC 04/15/2024 32.6  31.5 - 35.7 g/dL Final   • RDW 04/15/2024 22.5 (H)  12.3 - 15.4 % Final   • RDW-SD 04/15/2024 71.2 (H)  37.0 - 54.0 fl Final   • MPV 04/15/2024 10.4  6.0 - 12.0 fL Final   • Platelets 04/15/2024 203  140 - 450 10*3/mm3 Final   • Neutrophil % 04/15/2024 60.6  42.7 - 76.0 % Final   • Lymphocyte % 04/15/2024 28.7  19.6 - 45.3 % Final   • Monocyte % 04/15/2024 4.6 (L)  5.0 - 12.0 % Final   • Eosinophil % 04/15/2024 4.0  0.3 - 6.2 % Final   • Basophil % 04/15/2024 0.6  0.0 - 1.5 % Final   • Immature Grans % 04/15/2024 1.5 (H)  0.0 - 0.5 % Final   • Neutrophils, Absolute 04/15/2024 3.98  1.70 - 7.00 10*3/mm3 Final   • Lymphocytes, Absolute 04/15/2024 1.88  0.70 - 3.10 10*3/mm3 Final   • Monocytes, Absolute 04/15/2024 0.30  0.10 - 0.90 10*3/mm3 Final   • Eosinophils, Absolute 04/15/2024 0.26  0.00 - 0.40 10*3/mm3 Final   • Basophils, Absolute 04/15/2024 0.04  0.00 - 0.20 10*3/mm3 Final   • Immature Grans, Absolute 04/15/2024 0.10 (H)  0.00 - 0.05 10*3/mm3 Final   • nRBC 04/15/2024 0.0  0.0 - 0.2 /100 WBC Final   Lab on 04/08/2024   Component Date Value Ref Range Status   • WBC 04/08/2024 6.35  3.40 - 10.80 10*3/mm3 Final   • RBC 04/08/2024 4.72  3.77 - 5.28 10*6/mm3 Final   • Hemoglobin 04/08/2024 12.7  12.0 - 15.9 g/dL Final   • Hematocrit 04/08/2024 41.8  34.0 - 46.6 % Final   • MCV 04/08/2024 88.6  79.0 - 97.0 fL Final   • MCH 04/08/2024 26.9  26.6 - 33.0 pg Final   • MCHC 04/08/2024 30.4 (L)  31.5 - 35.7 g/dL Final   • RDW 04/08/2024 24.4 (H)  12.3 - 15.4 % Final    • RDW-SD 04/08/2024 78.4 (H)  37.0 - 54.0 fl Final   • MPV 04/08/2024 10.9  6.0 - 12.0 fL Final   • Platelets 04/08/2024 210  140 - 450 10*3/mm3 Final   • Neutrophil % 04/08/2024 62.5  42.7 - 76.0 % Final   • Lymphocyte % 04/08/2024 26.6  19.6 - 45.3 % Final   • Monocyte % 04/08/2024 6.5  5.0 - 12.0 % Final   • Eosinophil % 04/08/2024 2.7  0.3 - 6.2 % Final   • Basophil % 04/08/2024 0.9  0.0 - 1.5 % Final   • Immature Grans % 04/08/2024 0.8 (H)  0.0 - 0.5 % Final   • Neutrophils, Absolute 04/08/2024 3.97  1.70 - 7.00 10*3/mm3 Final   • Lymphocytes, Absolute 04/08/2024 1.69  0.70 - 3.10 10*3/mm3 Final   • Monocytes, Absolute 04/08/2024 0.41  0.10 - 0.90 10*3/mm3 Final   • Eosinophils, Absolute 04/08/2024 0.17  0.00 - 0.40 10*3/mm3 Final   • Basophils, Absolute 04/08/2024 0.06  0.00 - 0.20 10*3/mm3 Final   • Immature Grans, Absolute 04/08/2024 0.05  0.00 - 0.05 10*3/mm3 Final   • nRBC 04/08/2024 0.0  0.0 - 0.2 /100 WBC Final   Lab on 04/01/2024   Component Date Value Ref Range Status   • WBC 04/01/2024 7.00  3.40 - 10.80 10*3/mm3 Final   • RBC 04/01/2024 4.27  3.77 - 5.28 10*6/mm3 Final   • Hemoglobin 04/01/2024 11.4 (L)  12.0 - 15.9 g/dL Final   • Hematocrit 04/01/2024 37.5  34.0 - 46.6 % Final   • MCV 04/01/2024 87.8  79.0 - 97.0 fL Final   • MCH 04/01/2024 26.7  26.6 - 33.0 pg Final   • MCHC 04/01/2024 30.4 (L)  31.5 - 35.7 g/dL Final   • RDW 04/01/2024 26.4 (H)  12.3 - 15.4 % Final   • RDW-SD 04/01/2024 82.8 (H)  37.0 - 54.0 fl Final   • MPV 04/01/2024 10.6  6.0 - 12.0 fL Final   • Platelets 04/01/2024 259  140 - 450 10*3/mm3 Final   • Neutrophil % 04/01/2024 68.6  42.7 - 76.0 % Final   • Lymphocyte % 04/01/2024 20.7  19.6 - 45.3 % Final   • Monocyte % 04/01/2024 6.4  5.0 - 12.0 % Final   • Eosinophil % 04/01/2024 3.1  0.3 - 6.2 % Final   • Basophil % 04/01/2024 0.6  0.0 - 1.5 % Final   • Immature Grans % 04/01/2024 0.6 (H)  0.0 - 0.5 % Final   • Neutrophils, Absolute 04/01/2024 4.80  1.70 - 7.00 10*3/mm3 Final    • Lymphocytes, Absolute 04/01/2024 1.45  0.70 - 3.10 10*3/mm3 Final   • Monocytes, Absolute 04/01/2024 0.45  0.10 - 0.90 10*3/mm3 Final   • Eosinophils, Absolute 04/01/2024 0.22  0.00 - 0.40 10*3/mm3 Final   • Basophils, Absolute 04/01/2024 0.04  0.00 - 0.20 10*3/mm3 Final   • Immature Grans, Absolute 04/01/2024 0.04  0.00 - 0.05 10*3/mm3 Final   • nRBC 04/01/2024 0.0  0.0 - 0.2 /100 WBC Final   Lab on 03/25/2024   Component Date Value Ref Range Status   • WBC 03/25/2024 8.25  3.40 - 10.80 10*3/mm3 Final   • RBC 03/25/2024 4.06  3.77 - 5.28 10*6/mm3 Final   • Hemoglobin 03/25/2024 10.1 (L)  12.0 - 15.9 g/dL Final   • Hematocrit 03/25/2024 33.7 (L)  34.0 - 46.6 % Final   • MCV 03/25/2024 83.0  79.0 - 97.0 fL Final   • MCH 03/25/2024 24.9 (L)  26.6 - 33.0 pg Final   • MCHC 03/25/2024 30.0 (L)  31.5 - 35.7 g/dL Final   • RDW 03/25/2024 22.5 (H)  12.3 - 15.4 % Final   • RDW-SD 03/25/2024 58.0 (H)  37.0 - 54.0 fl Final   • MPV 03/25/2024 10.3  6.0 - 12.0 fL Final   • Platelets 03/25/2024 347  140 - 450 10*3/mm3 Final   • Neutrophil % 03/25/2024 66.2  42.7 - 76.0 % Final   • Lymphocyte % 03/25/2024 23.2  19.6 - 45.3 % Final   • Monocyte % 03/25/2024 6.7  5.0 - 12.0 % Final   • Eosinophil % 03/25/2024 2.2  0.3 - 6.2 % Final   • Basophil % 03/25/2024 0.6  0.0 - 1.5 % Final   • Immature Grans % 03/25/2024 1.1 (H)  0.0 - 0.5 % Final   • Neutrophils, Absolute 03/25/2024 5.47  1.70 - 7.00 10*3/mm3 Final   • Lymphocytes, Absolute 03/25/2024 1.91  0.70 - 3.10 10*3/mm3 Final   • Monocytes, Absolute 03/25/2024 0.55  0.10 - 0.90 10*3/mm3 Final   • Eosinophils, Absolute 03/25/2024 0.18  0.00 - 0.40 10*3/mm3 Final   • Basophils, Absolute 03/25/2024 0.05  0.00 - 0.20 10*3/mm3 Final   • Immature Grans, Absolute 03/25/2024 0.09 (H)  0.00 - 0.05 10*3/mm3 Final   • nRBC 03/25/2024 0.5 (H)  0.0 - 0.2 /100 WBC Final   Hospital Outpatient Visit on 03/22/2024   Component Date Value Ref Range Status   • Product Code 03/23/2024 [id number]    Final   • Unit Number 03/23/2024 [id number]   Final   • UNIT  ABO 03/23/2024 O   Final   • UNIT  RH 03/23/2024 POS   Final   • Crossmatch Interpretation 03/23/2024 Compatible   Final   • Dispense Status 03/23/2024 PT   Final   • Blood Expiration Date 03/23/2024 [id number]   Final   • Blood Type Barcode 03/23/2024 5100   Final   Infusion on 03/21/2024   Component Date Value Ref Range Status   • WBC 03/21/2024 7.08  3.40 - 10.80 10*3/mm3 Final   • RBC 03/21/2024 3.59 (L)  3.77 - 5.28 10*6/mm3 Final   • Hemoglobin 03/21/2024 8.4 (L)  12.0 - 15.9 g/dL Final   • Hematocrit 03/21/2024 28.8 (L)  34.0 - 46.6 % Final   • MCV 03/21/2024 80.2  79.0 - 97.0 fL Final   • MCH 03/21/2024 23.4 (L)  26.6 - 33.0 pg Final   • MCHC 03/21/2024 29.2 (L)  31.5 - 35.7 g/dL Final   • RDW 03/21/2024 19.5 (H)  12.3 - 15.4 % Final   • RDW-SD 03/21/2024 56.7 (H)  37.0 - 54.0 fl Final   • MPV 03/21/2024 11.3  6.0 - 12.0 fL Final   • Platelets 03/21/2024 373  140 - 450 10*3/mm3 Final   • Neutrophil % 03/21/2024 61.8  42.7 - 76.0 % Final   • Lymphocyte % 03/21/2024 27.7  19.6 - 45.3 % Final   • Monocyte % 03/21/2024 6.9  5.0 - 12.0 % Final   • Eosinophil % 03/21/2024 2.8  0.3 - 6.2 % Final   • Basophil % 03/21/2024 0.4  0.0 - 1.5 % Final   • Immature Grans % 03/21/2024 0.4  0.0 - 0.5 % Final   • Neutrophils, Absolute 03/21/2024 4.37  1.70 - 7.00 10*3/mm3 Final   • Lymphocytes, Absolute 03/21/2024 1.96  0.70 - 3.10 10*3/mm3 Final   • Monocytes, Absolute 03/21/2024 0.49  0.10 - 0.90 10*3/mm3 Final   • Eosinophils, Absolute 03/21/2024 0.20  0.00 - 0.40 10*3/mm3 Final   • Basophils, Absolute 03/21/2024 0.03  0.00 - 0.20 10*3/mm3 Final   • Immature Grans, Absolute 03/21/2024 0.03  0.00 - 0.05 10*3/mm3 Final   • nRBC 03/21/2024 0.0  0.0 - 0.2 /100 WBC Final   • ABO Type 03/21/2024 O   Final   • RH type 03/21/2024 Positive   Final   • Antibody Screen 03/21/2024 Negative   Final   • T&S Expiration Date 03/21/2024 3/24/2024 11:59:59 PM   Final    Office Visit on 03/14/2024   Component Date Value Ref Range Status   • SARS Antigen 03/14/2024 Not Detected  Not Detected, Presumptive Negative Final   • Influenza A Antigen PRANAV 03/14/2024 Not Detected  Not Detected Final   • Influenza B Antigen PRANAV 03/14/2024 Not Detected  Not Detected Final   • Internal Control 03/14/2024 Passed  Passed Final   • Lot Number 03/14/2024 [id number]   Final   • Expiration Date 03/14/2024 02/05/2025   Final   Lab on 03/11/2024   Component Date Value Ref Range Status   • WBC 03/11/2024 7.37  3.40 - 10.80 10*3/mm3 Final   • RBC 03/11/2024 3.65 (L)  3.77 - 5.28 10*6/mm3 Final   • Hemoglobin 03/11/2024 9.0 (L)  12.0 - 15.9 g/dL Final   • Hematocrit 03/11/2024 29.9 (L)  34.0 - 46.6 % Final   • MCV 03/11/2024 81.9  79.0 - 97.0 fL Final   • MCH 03/11/2024 24.7 (L)  26.6 - 33.0 pg Final   • MCHC 03/11/2024 30.1 (L)  31.5 - 35.7 g/dL Final   • RDW 03/11/2024 18.9 (H)  12.3 - 15.4 % Final   • RDW-SD 03/11/2024 56.6 (H)  37.0 - 54.0 fl Final   • MPV 03/11/2024 10.7  6.0 - 12.0 fL Final   • Platelets 03/11/2024 260  140 - 450 10*3/mm3 Final   • Neutrophil % 03/11/2024 69.7  42.7 - 76.0 % Final   • Lymphocyte % 03/11/2024 22.3  19.6 - 45.3 % Final   • Monocyte % 03/11/2024 4.9 (L)  5.0 - 12.0 % Final   • Eosinophil % 03/11/2024 2.3  0.3 - 6.2 % Final   • Basophil % 03/11/2024 0.4  0.0 - 1.5 % Final   • Immature Grans % 03/11/2024 0.4  0.0 - 0.5 % Final   • Neutrophils, Absolute 03/11/2024 5.14  1.70 - 7.00 10*3/mm3 Final   • Lymphocytes, Absolute 03/11/2024 1.64  0.70 - 3.10 10*3/mm3 Final   • Monocytes, Absolute 03/11/2024 0.36  0.10 - 0.90 10*3/mm3 Final   • Eosinophils, Absolute 03/11/2024 0.17  0.00 - 0.40 10*3/mm3 Final   • Basophils, Absolute 03/11/2024 0.03  0.00 - 0.20 10*3/mm3 Final   • Immature Grans, Absolute 03/11/2024 0.03  0.00 - 0.05 10*3/mm3 Final   • nRBC 03/11/2024 0.0  0.0 - 0.2 /100 WBC Final   • Ferritin 03/11/2024 8.60 (L)  13.00 - 150.00 ng/mL Final   • Iron 03/11/2024 15  (L)  37 - 145 mcg/dL Final   • Iron Saturation (TSAT) 03/11/2024 4 (L)  20 - 50 % Final   • Transferrin 03/11/2024 290  200 - 360 mg/dL Final   • TIBC 03/11/2024 406  298 - 536 mcg/dL Final   • Immature Reticulocyte Fraction 03/11/2024 24.9 (H)  3.0 - 15.8 % Final   • Reticulocyte % 03/11/2024 2.99 (H)  0.70 - 1.90 % Final   • Reticulocyte Absolute 03/11/2024 0.1103  0.0200 - 0.1300 10*6/mm3 Final   • Reticulocyte Hgb 03/11/2024 18.2 (L)  29.8 - 36.1 pg Final   • LDH 03/11/2024 164  135 - 214 U/L Final   • Haptoglobin 03/11/2024 180  30 - 200 mg/dL Final   • JANE 03/11/2024 Negative   Final       Diagnoses and all orders for this visit:    1. Occult GI bleeding (Primary)  -     Case Request; Standing  -     Implement Anesthesia orders day of procedure.; Standing  -     Case Request      Patient 64-year-old female with history of iron deficiency anemia, melena with occult GI bleeding underwent EGD and colonoscopy that failed to find a source.  Attempted capsule endoscopy noted the capsule stayed 8 hours in the gastric lumen and did not pass due to apparent gastroparesis.  Patient underwent CT enterography without obvious lesion now patient here for further recommendations.  Would recommend proceeding with push endoscopy for the capsule to deploy in the duodenal lumen to allow progression of the capsule down the small bowel.  Patient will follow-up clinically for source of GI blood loss.

## 2024-05-23 ENCOUNTER — TELEPHONE (OUTPATIENT)
Dept: GASTROENTEROLOGY | Facility: CLINIC | Age: 64
End: 2024-05-23

## 2024-05-23 ENCOUNTER — TELEPHONE (OUTPATIENT)
Dept: GASTROENTEROLOGY | Facility: CLINIC | Age: 64
End: 2024-05-23
Payer: MEDICARE

## 2024-05-23 ENCOUNTER — TELEPHONE (OUTPATIENT)
Dept: ONCOLOGY | Facility: CLINIC | Age: 64
End: 2024-05-23

## 2024-05-23 NOTE — TELEPHONE ENCOUNTER
Hub staff attempted to follow warm transfer process and was unsuccessful     Caller: Tiera Abbott    Relationship to patient: Self    Best call back number: [phone]    Patient is needing: PT RETURNED CALL TO SHERRON ABOUT RESCHEDULING HER PROCEDURE WITH DR TURNER. SHE IS OKAY WITH A DIFFERENT PROVIDER BUT SHE WILL NEED TO HAVE THE PREP INSTRUCTIONS AND IF THE DATE AND TIME WILL REMAIN TH SAME.      PLEASE CALL TO [facility].    GAVE PERM TO LEAVE DETAIL MESSAGE ON VOICEMAIL OR TEXT WITH TIME IF THE APPT IT WILL REMAIN ON 8/26 - PLEASE MAIL THE PREP INSTRUCTIONS.

## 2024-05-23 NOTE — TELEPHONE ENCOUNTER
Caller: Tiera Abbott    Relationship: Self    Best call back number: [phone]    What is the best time to reach you: ANYTIME    Who are you requesting to speak with (clinical staff, provider,  specific staff member): SCHEDULING     What was the call regarding: PT MISSED HER APPT ON 5-20 AND IS NOT FEELING WELL WANTS TO SEE IF SHE CAN COME IN TODAY, OR TOMORROW FOR A LAB  PLEASE ADVISE

## 2024-05-24 ENCOUNTER — TELEPHONE (OUTPATIENT)
Dept: ONCOLOGY | Facility: CLINIC | Age: 64
End: 2024-05-24
Payer: MEDICARE

## 2024-05-24 ENCOUNTER — TELEPHONE (OUTPATIENT)
Dept: GASTROENTEROLOGY | Facility: CLINIC | Age: 64
End: 2024-05-24
Payer: MEDICARE

## 2024-05-24 ENCOUNTER — LAB (OUTPATIENT)
Dept: LAB | Facility: HOSPITAL | Age: 64
End: 2024-05-24
Payer: MEDICARE

## 2024-05-24 ENCOUNTER — CLINICAL SUPPORT (OUTPATIENT)
Dept: ONCOLOGY | Facility: HOSPITAL | Age: 64
End: 2024-05-24
Payer: MEDICARE

## 2024-05-24 DIAGNOSIS — D50.0 IRON DEFICIENCY ANEMIA DUE TO CHRONIC BLOOD LOSS: ICD-10-CM

## 2024-05-24 LAB
BASOPHILS # BLD AUTO: 0.03 10*3/MM3 (ref 0–0.2)
BASOPHILS NFR BLD AUTO: 0.5 % (ref 0–1.5)
DEPRECATED RDW RBC AUTO: 59.7 FL (ref 37–54)
EOSINOPHIL # BLD AUTO: 0.19 10*3/MM3 (ref 0–0.4)
EOSINOPHIL NFR BLD AUTO: 3.3 % (ref 0.3–6.2)
ERYTHROCYTE [DISTWIDTH] IN BLOOD BY AUTOMATED COUNT: 18.4 % (ref 12.3–15.4)
FERRITIN SERPL-MCNC: 35.9 NG/ML (ref 13–150)
HCT VFR BLD AUTO: 35.4 % (ref 34–46.6)
HGB BLD-MCNC: 11.2 G/DL (ref 12–15.9)
IMM GRANULOCYTES # BLD AUTO: 0.02 10*3/MM3 (ref 0–0.05)
IMM GRANULOCYTES NFR BLD AUTO: 0.4 % (ref 0–0.5)
IRON 24H UR-MRATE: 38 MCG/DL (ref 37–145)
IRON SATN MFR SERPL: 11 % (ref 20–50)
LYMPHOCYTES # BLD AUTO: 1.5 10*3/MM3 (ref 0.7–3.1)
LYMPHOCYTES NFR BLD AUTO: 26.3 % (ref 19.6–45.3)
MCH RBC QN AUTO: 28.6 PG (ref 26.6–33)
MCHC RBC AUTO-ENTMCNC: 31.6 G/DL (ref 31.5–35.7)
MCV RBC AUTO: 90.3 FL (ref 79–97)
MONOCYTES # BLD AUTO: 0.31 10*3/MM3 (ref 0.1–0.9)
MONOCYTES NFR BLD AUTO: 5.4 % (ref 5–12)
NEUTROPHILS NFR BLD AUTO: 3.66 10*3/MM3 (ref 1.7–7)
NEUTROPHILS NFR BLD AUTO: 64.1 % (ref 42.7–76)
NRBC BLD AUTO-RTO: 0 /100 WBC (ref 0–0.2)
PLATELET # BLD AUTO: 236 10*3/MM3 (ref 140–450)
PMV BLD AUTO: 10.7 FL (ref 6–12)
RBC # BLD AUTO: 3.92 10*6/MM3 (ref 3.77–5.28)
TIBC SERPL-MCNC: 344 MCG/DL (ref 298–536)
TRANSFERRIN SERPL-MCNC: 231 MG/DL (ref 200–360)
WBC NRBC COR # BLD AUTO: 5.71 10*3/MM3 (ref 3.4–10.8)

## 2024-05-24 PROCEDURE — 82728 ASSAY OF FERRITIN: CPT

## 2024-05-24 PROCEDURE — 36415 COLL VENOUS BLD VENIPUNCTURE: CPT

## 2024-05-24 PROCEDURE — 83540 ASSAY OF IRON: CPT

## 2024-05-24 PROCEDURE — 84466 ASSAY OF TRANSFERRIN: CPT

## 2024-05-24 PROCEDURE — 85025 COMPLETE CBC W/AUTO DIFF WBC: CPT

## 2024-05-24 NOTE — TELEPHONE ENCOUNTER
Per oscar HADLEY   Someone already sent me this I replied with some instructions to nursing staff as I do not think she needs capsule placement at this time.         57 mins  Vivian Garcia was added by you.  56 mins  does she need a egd or anything ?    I'm looking through her chart for your notes     or if you can add to me that chart encounter you might have with the clinical pool       LEONIE Valera MD  She just needs FOBT cards for now, I'm not sure how to add you to a staff message, it was sent to my nursing pool and Effie    53 mins  ok     48 mins  AD  Vivian Garcia  gretchen are you going to cancel the appt or do u want me to    48 mins  ill cx it and let the pt know as well     35 mins  AD  Vivian Garcia  THX       PER NOTES FROM DISCUSSION WITH PROVIDER ABOUT EGD SPECIAL CASE

## 2024-05-24 NOTE — TELEPHONE ENCOUNTER
Patient called. No answer. Left message on an identified VM. Advised as per Dr. Valera's note. Left stool kit at  for .

## 2024-05-24 NOTE — TELEPHONE ENCOUNTER
Tiera was contacted by scheduling to arrange Injectafer appointments. She had further questions so she was transferred to me. She requested we order blood for her. I told her it was not indicated with a Hgb of 11.2 and that at this time, Injectafer is. She was asked if she ever saw GI yet. She was initially referred to [facility] at her request in March, but never reached back out to their scheduling department to arrange an appointment. She then decided that she'd rather just see [facility] provider again. However, she has yet to get set up with them. Will continue to encourage she follow up with them. She says she will call back to schedule Injectafer.

## 2024-05-24 NOTE — PROGRESS NOTES
Patient contacted via telephone for RN Review. Left a detailed message. Patient in need of Injectafer x2.     Component      Latest Ref Rng 5/24/2024   WBC      3.40 - 10.80 10*3/mm3 5.71    RBC      3.77 - 5.28 10*6/mm3 3.92    Hemoglobin      12.0 - 15.9 g/dL 11.2 (L)    Hematocrit      34.0 - 46.6 % 35.4    MCV      79.0 - 97.0 fL 90.3    MCH      26.6 - 33.0 pg 28.6    MCHC      31.5 - 35.7 g/dL 31.6    RDW      12.3 - 15.4 % 18.4 (H)    RDW-SD      37.0 - 54.0 fl 59.7 (H)    MPV      6.0 - 12.0 fL 10.7    Platelets      140 - 450 10*3/mm3 236    Neutrophil Rel %      42.7 - 76.0 % 64.1    Lymphocyte Rel %      19.6 - 45.3 % 26.3    Monocyte Rel %      5.0 - 12.0 % 5.4    Eosinophil Rel %      0.3 - 6.2 % 3.3    Basophil Rel %      0.0 - 1.5 % 0.5    Immature Granulocyte Rel %      0.0 - 0.5 % 0.4    Neutrophils Absolute      1.70 - 7.00 10*3/mm3 3.66    Lymphocytes Absolute      0.70 - 3.10 10*3/mm3 1.50    Monocytes Absolute      0.10 - 0.90 10*3/mm3 0.31    Eosinophils Absolute      0.00 - 0.40 10*3/mm3 0.19    Basophils Absolute      0.00 - 0.20 10*3/mm3 0.03    Immature Grans, Absolute      0.00 - 0.05 10*3/mm3 0.02    nRBC      0.0 - 0.2 /100 WBC 0.0    Iron      37 - 145 mcg/dL 38    Iron Saturation (TSAT)      20 - 50 % 11 (L)    Transferrin      200 - 360 mg/dL 231    TIBC      298 - 536 mcg/dL 344    Ferritin      13.00 - 150.00 ng/mL 35.90       Legend:  (L) Low  (H) High

## 2024-05-24 NOTE — TELEPHONE ENCOUNTER
Ottoniel Valera MD  [facility] Clinical 2 Pool; Effie Portillo PA-C  Was asked to schedule this patient for EGD for deployment of capsule (former Arely pt).  I have reviewed her chart extensively.  My recommendation would be that we have her submit stool for occult blood x 3.  If negative, then I feel that capsule endoscopy is probably very low yield at this time as her Hb is normal and her iron levels are normal as well.   If FOBT positive then we can proceed with VCE

## 2024-05-28 ENCOUNTER — TELEPHONE (OUTPATIENT)
Dept: GASTROENTEROLOGY | Facility: CLINIC | Age: 64
End: 2024-05-28
Payer: MEDICARE

## 2024-05-28 NOTE — TELEPHONE ENCOUNTER
Per oscar HADLEY   Someone already sent me this I replied with some instructions to nursing staff as I do not think she needs capsule placement at this time.         57 mins  Vivian Garcia was added by you.  56 mins  does she need a egd or anything ?    I'm looking through her chart for your notes     or if you can add to me that chart encounter you might have with the clinical pool       LEONIE Valera MD  She just needs FOBT cards for now, I'm not sure how to add you to a staff message, it was sent to my nursing pool and Effie    53 mins  ok     48 mins  AD  Vivian Garcia  gretchen are you going to cancel the appt or do u want me to    48 mins  ill cx it and let the pt know as well     35 mins  AD  Vivian Garcia  THX         PER NOTES FROM DISCUSSION WITH PROVIDER ABOUT EGD SPECIAL CASE     Ok for the hub to read   Pt needs to speak with eddie grace or vivian garcia

## 2024-06-03 ENCOUNTER — APPOINTMENT (OUTPATIENT)
Dept: ONCOLOGY | Facility: HOSPITAL | Age: 64
End: 2024-06-03
Payer: MEDICARE

## 2024-06-03 ENCOUNTER — INFUSION (OUTPATIENT)
Dept: ONCOLOGY | Facility: HOSPITAL | Age: 64
End: 2024-06-03
Payer: MEDICARE

## 2024-06-03 ENCOUNTER — LAB (OUTPATIENT)
Dept: LAB | Facility: HOSPITAL | Age: 64
End: 2024-06-03
Payer: MEDICARE

## 2024-06-03 VITALS
SYSTOLIC BLOOD PRESSURE: 123 MMHG | OXYGEN SATURATION: 97 % | TEMPERATURE: 97.1 F | BODY MASS INDEX: 26.53 KG/M2 | HEART RATE: 95 BPM | RESPIRATION RATE: 16 BRPM | DIASTOLIC BLOOD PRESSURE: 82 MMHG | WEIGHT: 195.6 LBS

## 2024-06-03 DIAGNOSIS — D50.0 IRON DEFICIENCY ANEMIA DUE TO CHRONIC BLOOD LOSS: Primary | ICD-10-CM

## 2024-06-03 LAB
BASOPHILS # BLD AUTO: 0.02 10*3/MM3 (ref 0–0.2)
BASOPHILS NFR BLD AUTO: 0.3 % (ref 0–1.5)
DEPRECATED RDW RBC AUTO: 57.5 FL (ref 37–54)
EOSINOPHIL # BLD AUTO: 0.12 10*3/MM3 (ref 0–0.4)
EOSINOPHIL NFR BLD AUTO: 1.9 % (ref 0.3–6.2)
ERYTHROCYTE [DISTWIDTH] IN BLOOD BY AUTOMATED COUNT: 18.3 % (ref 12.3–15.4)
HCT VFR BLD AUTO: 34.3 % (ref 34–46.6)
HGB BLD-MCNC: 10.7 G/DL (ref 12–15.9)
IMM GRANULOCYTES # BLD AUTO: 0.03 10*3/MM3 (ref 0–0.05)
IMM GRANULOCYTES NFR BLD AUTO: 0.5 % (ref 0–0.5)
LYMPHOCYTES # BLD AUTO: 1.91 10*3/MM3 (ref 0.7–3.1)
LYMPHOCYTES NFR BLD AUTO: 29.6 % (ref 19.6–45.3)
MCH RBC QN AUTO: 27.6 PG (ref 26.6–33)
MCHC RBC AUTO-ENTMCNC: 31.2 G/DL (ref 31.5–35.7)
MCV RBC AUTO: 88.4 FL (ref 79–97)
MONOCYTES # BLD AUTO: 0.4 10*3/MM3 (ref 0.1–0.9)
MONOCYTES NFR BLD AUTO: 6.2 % (ref 5–12)
NEUTROPHILS NFR BLD AUTO: 3.98 10*3/MM3 (ref 1.7–7)
NEUTROPHILS NFR BLD AUTO: 61.5 % (ref 42.7–76)
NRBC BLD AUTO-RTO: 0 /100 WBC (ref 0–0.2)
PLATELET # BLD AUTO: 268 10*3/MM3 (ref 140–450)
PMV BLD AUTO: 10 FL (ref 6–12)
RBC # BLD AUTO: 3.88 10*6/MM3 (ref 3.77–5.28)
WBC NRBC COR # BLD AUTO: 6.46 10*3/MM3 (ref 3.4–10.8)

## 2024-06-03 PROCEDURE — 63710000001 PROCHLORPERAZINE MALEATE PER 10 MG: Performed by: INTERNAL MEDICINE

## 2024-06-03 PROCEDURE — 25810000003 SODIUM CHLORIDE 0.9 % SOLUTION: Performed by: INTERNAL MEDICINE

## 2024-06-03 PROCEDURE — 85025 COMPLETE CBC W/AUTO DIFF WBC: CPT

## 2024-06-03 PROCEDURE — 96374 THER/PROPH/DIAG INJ IV PUSH: CPT

## 2024-06-03 PROCEDURE — A9270 NON-COVERED ITEM OR SERVICE: HCPCS | Performed by: INTERNAL MEDICINE

## 2024-06-03 PROCEDURE — 25010000002 FERRIC CARBOXYMALTOSE 750 MG/15ML SOLUTION 15 ML VIAL: Performed by: INTERNAL MEDICINE

## 2024-06-03 RX ORDER — PROCHLORPERAZINE MALEATE 10 MG
10 TABLET ORAL ONCE
Status: COMPLETED | OUTPATIENT
Start: 2024-06-03 | End: 2024-06-03

## 2024-06-03 RX ORDER — SODIUM CHLORIDE 9 MG/ML
20 INJECTION, SOLUTION INTRAVENOUS ONCE
Status: COMPLETED | OUTPATIENT
Start: 2024-06-03 | End: 2024-06-03

## 2024-06-03 RX ADMIN — SODIUM CHLORIDE 20 ML/HR: 9 INJECTION, SOLUTION INTRAVENOUS at 14:55

## 2024-06-03 RX ADMIN — FERRIC CARBOXYMALTOSE INJECTION 750 MG: 50 INJECTION, SOLUTION INTRAVENOUS at 15:06

## 2024-06-03 RX ADMIN — PROCHLORPERAZINE MALEATE 10 MG: 10 TABLET ORAL at 14:55

## 2024-06-10 ENCOUNTER — LAB (OUTPATIENT)
Dept: LAB | Facility: HOSPITAL | Age: 64
End: 2024-06-10
Payer: MEDICARE

## 2024-06-10 ENCOUNTER — INFUSION (OUTPATIENT)
Dept: ONCOLOGY | Facility: HOSPITAL | Age: 64
End: 2024-06-10
Payer: MEDICARE

## 2024-06-10 VITALS
RESPIRATION RATE: 16 BRPM | SYSTOLIC BLOOD PRESSURE: 128 MMHG | WEIGHT: 196.4 LBS | HEART RATE: 88 BPM | OXYGEN SATURATION: 97 % | BODY MASS INDEX: 26.64 KG/M2 | DIASTOLIC BLOOD PRESSURE: 76 MMHG | TEMPERATURE: 98 F

## 2024-06-10 DIAGNOSIS — D50.0 IRON DEFICIENCY ANEMIA DUE TO CHRONIC BLOOD LOSS: ICD-10-CM

## 2024-06-10 DIAGNOSIS — D50.0 IRON DEFICIENCY ANEMIA DUE TO CHRONIC BLOOD LOSS: Primary | ICD-10-CM

## 2024-06-10 LAB
BASOPHILS # BLD AUTO: 0.03 10*3/MM3 (ref 0–0.2)
BASOPHILS NFR BLD AUTO: 0.4 % (ref 0–1.5)
DEPRECATED RDW RBC AUTO: 62.8 FL (ref 37–54)
EOSINOPHIL # BLD AUTO: 0.19 10*3/MM3 (ref 0–0.4)
EOSINOPHIL NFR BLD AUTO: 2.6 % (ref 0.3–6.2)
ERYTHROCYTE [DISTWIDTH] IN BLOOD BY AUTOMATED COUNT: 19.7 % (ref 12.3–15.4)
HCT VFR BLD AUTO: 34.4 % (ref 34–46.6)
HGB BLD-MCNC: 10.8 G/DL (ref 12–15.9)
IMM GRANULOCYTES # BLD AUTO: 0.06 10*3/MM3 (ref 0–0.05)
IMM GRANULOCYTES NFR BLD AUTO: 0.8 % (ref 0–0.5)
LYMPHOCYTES # BLD AUTO: 1.67 10*3/MM3 (ref 0.7–3.1)
LYMPHOCYTES NFR BLD AUTO: 23.2 % (ref 19.6–45.3)
MCH RBC QN AUTO: 28.6 PG (ref 26.6–33)
MCHC RBC AUTO-ENTMCNC: 31.4 G/DL (ref 31.5–35.7)
MCV RBC AUTO: 91 FL (ref 79–97)
MONOCYTES # BLD AUTO: 0.45 10*3/MM3 (ref 0.1–0.9)
MONOCYTES NFR BLD AUTO: 6.2 % (ref 5–12)
NEUTROPHILS NFR BLD AUTO: 4.81 10*3/MM3 (ref 1.7–7)
NEUTROPHILS NFR BLD AUTO: 66.8 % (ref 42.7–76)
NRBC BLD AUTO-RTO: 0.3 /100 WBC (ref 0–0.2)
PLATELET # BLD AUTO: 243 10*3/MM3 (ref 140–450)
PMV BLD AUTO: 10.8 FL (ref 6–12)
RBC # BLD AUTO: 3.78 10*6/MM3 (ref 3.77–5.28)
WBC NRBC COR # BLD AUTO: 7.21 10*3/MM3 (ref 3.4–10.8)

## 2024-06-10 PROCEDURE — A9270 NON-COVERED ITEM OR SERVICE: HCPCS | Performed by: INTERNAL MEDICINE

## 2024-06-10 PROCEDURE — 25010000002 FERRIC CARBOXYMALTOSE 750 MG/15ML SOLUTION 15 ML VIAL: Performed by: INTERNAL MEDICINE

## 2024-06-10 PROCEDURE — 63710000001 PROCHLORPERAZINE MALEATE PER 10 MG: Performed by: INTERNAL MEDICINE

## 2024-06-10 PROCEDURE — 25810000003 SODIUM CHLORIDE 0.9 % SOLUTION: Performed by: INTERNAL MEDICINE

## 2024-06-10 PROCEDURE — 85025 COMPLETE CBC W/AUTO DIFF WBC: CPT

## 2024-06-10 PROCEDURE — 36415 COLL VENOUS BLD VENIPUNCTURE: CPT

## 2024-06-10 PROCEDURE — 96374 THER/PROPH/DIAG INJ IV PUSH: CPT

## 2024-06-10 RX ORDER — SODIUM CHLORIDE 9 MG/ML
20 INJECTION, SOLUTION INTRAVENOUS ONCE
Status: COMPLETED | OUTPATIENT
Start: 2024-06-10 | End: 2024-06-10

## 2024-06-10 RX ORDER — PROCHLORPERAZINE MALEATE 10 MG
10 TABLET ORAL ONCE
Status: COMPLETED | OUTPATIENT
Start: 2024-06-10 | End: 2024-06-10

## 2024-06-10 RX ADMIN — PROCHLORPERAZINE MALEATE 10 MG: 10 TABLET ORAL at 15:02

## 2024-06-10 RX ADMIN — SODIUM CHLORIDE 20 ML/HR: 9 INJECTION, SOLUTION INTRAVENOUS at 15:07

## 2024-06-10 RX ADMIN — FERRIC CARBOXYMALTOSE INJECTION 750 MG: 50 INJECTION, SOLUTION INTRAVENOUS at 15:07

## 2024-06-17 ENCOUNTER — LAB (OUTPATIENT)
Dept: LAB | Facility: HOSPITAL | Age: 64
End: 2024-06-17
Payer: MEDICARE

## 2024-06-17 ENCOUNTER — CLINICAL SUPPORT (OUTPATIENT)
Dept: ONCOLOGY | Facility: HOSPITAL | Age: 64
End: 2024-06-17
Payer: MEDICARE

## 2024-06-17 DIAGNOSIS — D50.0 IRON DEFICIENCY ANEMIA DUE TO CHRONIC BLOOD LOSS: ICD-10-CM

## 2024-06-17 LAB
BASOPHILS # BLD AUTO: 0.04 10*3/MM3 (ref 0–0.2)
BASOPHILS NFR BLD AUTO: 0.6 % (ref 0–1.5)
DEPRECATED RDW RBC AUTO: 69.6 FL (ref 37–54)
EOSINOPHIL # BLD AUTO: 0.14 10*3/MM3 (ref 0–0.4)
EOSINOPHIL NFR BLD AUTO: 2 % (ref 0.3–6.2)
ERYTHROCYTE [DISTWIDTH] IN BLOOD BY AUTOMATED COUNT: 20.4 % (ref 12.3–15.4)
FERRITIN SERPL-MCNC: 496 NG/ML (ref 13–150)
HCT VFR BLD AUTO: 37.6 % (ref 34–46.6)
HGB BLD-MCNC: 11.8 G/DL (ref 12–15.9)
IMM GRANULOCYTES # BLD AUTO: 0.03 10*3/MM3 (ref 0–0.05)
IMM GRANULOCYTES NFR BLD AUTO: 0.4 % (ref 0–0.5)
IRON 24H UR-MRATE: 61 MCG/DL (ref 37–145)
IRON SATN MFR SERPL: 20 % (ref 20–50)
LYMPHOCYTES # BLD AUTO: 1.41 10*3/MM3 (ref 0.7–3.1)
LYMPHOCYTES NFR BLD AUTO: 19.8 % (ref 19.6–45.3)
MCH RBC QN AUTO: 29.5 PG (ref 26.6–33)
MCHC RBC AUTO-ENTMCNC: 31.4 G/DL (ref 31.5–35.7)
MCV RBC AUTO: 94 FL (ref 79–97)
MONOCYTES # BLD AUTO: 0.41 10*3/MM3 (ref 0.1–0.9)
MONOCYTES NFR BLD AUTO: 5.8 % (ref 5–12)
NEUTROPHILS NFR BLD AUTO: 5.09 10*3/MM3 (ref 1.7–7)
NEUTROPHILS NFR BLD AUTO: 71.4 % (ref 42.7–76)
NRBC BLD AUTO-RTO: 0 /100 WBC (ref 0–0.2)
PLATELET # BLD AUTO: 231 10*3/MM3 (ref 140–450)
PMV BLD AUTO: 10.7 FL (ref 6–12)
RBC # BLD AUTO: 4 10*6/MM3 (ref 3.77–5.28)
TIBC SERPL-MCNC: 299 MCG/DL (ref 298–536)
TRANSFERRIN SERPL-MCNC: 201 MG/DL (ref 200–360)
WBC NRBC COR # BLD AUTO: 7.12 10*3/MM3 (ref 3.4–10.8)

## 2024-06-17 PROCEDURE — 36415 COLL VENOUS BLD VENIPUNCTURE: CPT

## 2024-06-17 PROCEDURE — 85025 COMPLETE CBC W/AUTO DIFF WBC: CPT

## 2024-06-17 PROCEDURE — 82728 ASSAY OF FERRITIN: CPT

## 2024-06-17 PROCEDURE — 84466 ASSAY OF TRANSFERRIN: CPT

## 2024-06-17 PROCEDURE — 83540 ASSAY OF IRON: CPT

## 2024-07-01 ENCOUNTER — CLINICAL SUPPORT (OUTPATIENT)
Dept: ONCOLOGY | Facility: HOSPITAL | Age: 64
End: 2024-07-01
Payer: MEDICARE

## 2024-07-01 ENCOUNTER — LAB (OUTPATIENT)
Dept: LAB | Facility: HOSPITAL | Age: 64
End: 2024-07-01
Payer: MEDICARE

## 2024-07-01 DIAGNOSIS — D50.0 IRON DEFICIENCY ANEMIA DUE TO CHRONIC BLOOD LOSS: Primary | ICD-10-CM

## 2024-07-01 DIAGNOSIS — D50.0 IRON DEFICIENCY ANEMIA DUE TO CHRONIC BLOOD LOSS: ICD-10-CM

## 2024-07-01 LAB
BASOPHILS # BLD AUTO: 0.04 10*3/MM3 (ref 0–0.2)
BASOPHILS NFR BLD AUTO: 0.7 % (ref 0–1.5)
DEPRECATED RDW RBC AUTO: 59 FL (ref 37–54)
EOSINOPHIL # BLD AUTO: 0.17 10*3/MM3 (ref 0–0.4)
EOSINOPHIL NFR BLD AUTO: 3 % (ref 0.3–6.2)
ERYTHROCYTE [DISTWIDTH] IN BLOOD BY AUTOMATED COUNT: 17.5 % (ref 12.3–15.4)
HCT VFR BLD AUTO: 37.9 % (ref 34–46.6)
HGB BLD-MCNC: 12.2 G/DL (ref 12–15.9)
IMM GRANULOCYTES # BLD AUTO: 0.01 10*3/MM3 (ref 0–0.05)
IMM GRANULOCYTES NFR BLD AUTO: 0.2 % (ref 0–0.5)
LYMPHOCYTES # BLD AUTO: 1.38 10*3/MM3 (ref 0.7–3.1)
LYMPHOCYTES NFR BLD AUTO: 24.6 % (ref 19.6–45.3)
MCH RBC QN AUTO: 29.9 PG (ref 26.6–33)
MCHC RBC AUTO-ENTMCNC: 32.2 G/DL (ref 31.5–35.7)
MCV RBC AUTO: 92.9 FL (ref 79–97)
MONOCYTES # BLD AUTO: 0.26 10*3/MM3 (ref 0.1–0.9)
MONOCYTES NFR BLD AUTO: 4.6 % (ref 5–12)
NEUTROPHILS NFR BLD AUTO: 3.75 10*3/MM3 (ref 1.7–7)
NEUTROPHILS NFR BLD AUTO: 66.9 % (ref 42.7–76)
NRBC BLD AUTO-RTO: 0 /100 WBC (ref 0–0.2)
PLATELET # BLD AUTO: 211 10*3/MM3 (ref 140–450)
PMV BLD AUTO: 10.7 FL (ref 6–12)
RBC # BLD AUTO: 4.08 10*6/MM3 (ref 3.77–5.28)
WBC NRBC COR # BLD AUTO: 5.61 10*3/MM3 (ref 3.4–10.8)

## 2024-07-01 PROCEDURE — 85025 COMPLETE CBC W/AUTO DIFF WBC: CPT

## 2024-07-01 PROCEDURE — 36415 COLL VENOUS BLD VENIPUNCTURE: CPT

## 2024-07-01 NOTE — PROGRESS NOTES
Patient is here for lab review with RN. CBC reviewed, counts are stable for this patient at this time. Patient has no complaints. Copy of labs given to patient and follow up appointment reviewed. Patient is instructed to call the office with any concerns or new symptoms prior to next visit. Patient verbalized understanding and discharged in stable condition.    Component      Latest Ref Rng 7/1/2024   WBC      3.40 - 10.80 10*3/mm3 5.61    RBC      3.77 - 5.28 10*6/mm3 4.08    Hemoglobin      12.0 - 15.9 g/dL 12.2    Hematocrit      34.0 - 46.6 % 37.9    MCV      79.0 - 97.0 fL 92.9    MCH      26.6 - 33.0 pg 29.9    MCHC      31.5 - 35.7 g/dL 32.2    RDW      12.3 - 15.4 % 17.5 (H)    RDW-SD      37.0 - 54.0 fl 59.0 (H)    MPV      6.0 - 12.0 fL 10.7    Platelets      140 - 450 10*3/mm3 211    Neutrophil Rel %      42.7 - 76.0 % 66.9    Lymphocyte Rel %      19.6 - 45.3 % 24.6    Monocyte Rel %      5.0 - 12.0 % 4.6 (L)    Eosinophil Rel %      0.3 - 6.2 % 3.0    Basophil Rel %      0.0 - 1.5 % 0.7    Immature Granulocyte Rel %      0.0 - 0.5 % 0.2    Neutrophils Absolute      1.70 - 7.00 10*3/mm3 3.75    Lymphocytes Absolute      0.70 - 3.10 10*3/mm3 1.38    Monocytes Absolute      0.10 - 0.90 10*3/mm3 0.26    Eosinophils Absolute      0.00 - 0.40 10*3/mm3 0.17    Basophils Absolute      0.00 - 0.20 10*3/mm3 0.04    Immature Grans, Absolute      0.00 - 0.05 10*3/mm3 0.01    nRBC      0.0 - 0.2 /100 WBC 0.0       Legend:  (H) High  (L) Low

## 2024-07-09 NOTE — PROGRESS NOTES
"  REASON FOR FOLLOW-UP: History of recurrent iron deficiency anemia requiring repeated IV iron therapy.      History of Present Illness    Mrs. Abbott is a pleasant 64-year-old woman who returned today for follow-up of recurrent iron deficiency anemia secondary to GI blood loss.  She has received several rounds of IV iron, most recently received 2 doses of Injectafer completed 6/10/2024.  She currently feels okay with no severe fatigue lightheadedness or dizziness.  She continues to have intermittent dark-colored stool alternating with normal-appearing stool.    Past Medical History, Past Surgical History, Social History, Family History have been reviewed and are without significant changes except as mentioned from my consult note on 8/25/17.    Review of Systems   Constitutional:  Positive for fatigue. Negative for activity change and unexpected weight change.   HENT: Negative.     Respiratory:  Negative for shortness of breath.    Cardiovascular: Negative.    Gastrointestinal:  Negative for blood in stool, constipation and nausea.        Probable melena   Genitourinary: Negative.    Musculoskeletal: Negative.    Skin: Negative.    Neurological:  Negative for dizziness.   Hematological: Negative.    Psychiatric/Behavioral: Negative.       Medications:  The current medication list was reviewed in the EMR    ALLERGIES:    No Known Allergies    Objective      Vitals:    07/15/24 1504   BP: 135/74   Pulse: 81   Resp: 16   Temp: 97.9 °F (36.6 °C)   TempSrc: Oral   SpO2: 95%   Weight: 89.4 kg (197 lb 1.6 oz)   Height: 182.9 cm (72\")   PainSc: 0-No pain             7/15/2024     3:04 PM   Current Status   ECOG score 0       Physical Exam    CON: pleasant well-developed adult woman  HEENT: no icterus, no thrush, moist membranes  CV: RRR, S1S2, no murmur  RESP: cta bilat, no wheezing, no rales  GI: soft, non-tender, no splenomegaly, +bs  MUSC: no edema, normal gait  NEURO: alert and oriented x3, normal strength  PSYCH: normal " mood and affect  Exam unchanged-7/15/2024  RECENT LABS:  Results from last 7 days   Lab Units 07/15/24  1453   WBC 10*3/mm3 6.02   NEUTROS ABS 10*3/mm3 4.10   HEMOGLOBIN g/dL 12.1   HEMATOCRIT % 39.0   PLATELETS 10*3/mm3 238       Results from last 7 days   Lab Units 07/15/24  1453   FERRITIN ng/mL 104.00   IRON mcg/dL 56   TIBC mcg/dL 302                   Assessment & Plan    1.  Recurrent iron deficiency anemia suspected to be secondary to GI blood loss (established with GI)  Patient reports negative EGD colonoscopy and capsule study but has ongoing melena and often precipitous drops in the hemoglobin and recurrent iron deficiency all highly suggestive of ongoing GI blood loss  Most recently received IV iron with 2 doses of Injectafer completed 6/10/24  Hemoglobin normal 12.1  2.  Intolerance to oral iron secondary to constipation    Hematology plan:  CBC every 4 weeks and monthly ferritin iron profile  Patient request for referral to [facility] for further assessment of recurrent iron deficiency anemia suspected GI blood loss-consult placed              7/15/2024      CC:

## 2024-07-15 ENCOUNTER — LAB (OUTPATIENT)
Dept: LAB | Facility: HOSPITAL | Age: 64
End: 2024-07-15
Payer: MEDICARE

## 2024-07-15 ENCOUNTER — OFFICE VISIT (OUTPATIENT)
Dept: ONCOLOGY | Facility: CLINIC | Age: 64
End: 2024-07-15
Payer: MEDICARE

## 2024-07-15 VITALS
TEMPERATURE: 97.9 F | WEIGHT: 197.1 LBS | HEART RATE: 81 BPM | DIASTOLIC BLOOD PRESSURE: 74 MMHG | BODY MASS INDEX: 26.7 KG/M2 | HEIGHT: 72 IN | OXYGEN SATURATION: 95 % | SYSTOLIC BLOOD PRESSURE: 135 MMHG | RESPIRATION RATE: 16 BRPM

## 2024-07-15 DIAGNOSIS — D50.0 IRON DEFICIENCY ANEMIA DUE TO CHRONIC BLOOD LOSS: Primary | ICD-10-CM

## 2024-07-15 DIAGNOSIS — R19.5 OCCULT GI BLEEDING: ICD-10-CM

## 2024-07-15 DIAGNOSIS — D50.0 IRON DEFICIENCY ANEMIA DUE TO CHRONIC BLOOD LOSS: ICD-10-CM

## 2024-07-15 DIAGNOSIS — T45.4X5D ADVERSE EFFECT OF IRON, SUBSEQUENT ENCOUNTER: ICD-10-CM

## 2024-07-15 LAB
BASOPHILS # BLD AUTO: 0.02 10*3/MM3 (ref 0–0.2)
BASOPHILS NFR BLD AUTO: 0.3 % (ref 0–1.5)
DEPRECATED RDW RBC AUTO: 54.9 FL (ref 37–54)
EOSINOPHIL # BLD AUTO: 0.17 10*3/MM3 (ref 0–0.4)
EOSINOPHIL NFR BLD AUTO: 2.8 % (ref 0.3–6.2)
ERYTHROCYTE [DISTWIDTH] IN BLOOD BY AUTOMATED COUNT: 15.9 % (ref 12.3–15.4)
FERRITIN SERPL-MCNC: 104 NG/ML (ref 13–150)
HCT VFR BLD AUTO: 39 % (ref 34–46.6)
HGB BLD-MCNC: 12.1 G/DL (ref 12–15.9)
IMM GRANULOCYTES # BLD AUTO: 0.02 10*3/MM3 (ref 0–0.05)
IMM GRANULOCYTES NFR BLD AUTO: 0.3 % (ref 0–0.5)
IRON 24H UR-MRATE: 56 MCG/DL (ref 37–145)
IRON SATN MFR SERPL: 19 % (ref 20–50)
LYMPHOCYTES # BLD AUTO: 1.39 10*3/MM3 (ref 0.7–3.1)
LYMPHOCYTES NFR BLD AUTO: 23.1 % (ref 19.6–45.3)
MCH RBC QN AUTO: 29.4 PG (ref 26.6–33)
MCHC RBC AUTO-ENTMCNC: 31 G/DL (ref 31.5–35.7)
MCV RBC AUTO: 94.9 FL (ref 79–97)
MONOCYTES # BLD AUTO: 0.32 10*3/MM3 (ref 0.1–0.9)
MONOCYTES NFR BLD AUTO: 5.3 % (ref 5–12)
NEUTROPHILS NFR BLD AUTO: 4.1 10*3/MM3 (ref 1.7–7)
NEUTROPHILS NFR BLD AUTO: 68.2 % (ref 42.7–76)
NRBC BLD AUTO-RTO: 0 /100 WBC (ref 0–0.2)
PLATELET # BLD AUTO: 238 10*3/MM3 (ref 140–450)
PMV BLD AUTO: 10.6 FL (ref 6–12)
RBC # BLD AUTO: 4.11 10*6/MM3 (ref 3.77–5.28)
TIBC SERPL-MCNC: 302 MCG/DL (ref 298–536)
TRANSFERRIN SERPL-MCNC: 203 MG/DL (ref 200–360)
WBC NRBC COR # BLD AUTO: 6.02 10*3/MM3 (ref 3.4–10.8)

## 2024-07-15 PROCEDURE — 1126F AMNT PAIN NOTED NONE PRSNT: CPT | Performed by: INTERNAL MEDICINE

## 2024-07-15 PROCEDURE — 83540 ASSAY OF IRON: CPT

## 2024-07-15 PROCEDURE — 36415 COLL VENOUS BLD VENIPUNCTURE: CPT

## 2024-07-15 PROCEDURE — 84466 ASSAY OF TRANSFERRIN: CPT

## 2024-07-15 PROCEDURE — 99214 OFFICE O/P EST MOD 30 MIN: CPT | Performed by: INTERNAL MEDICINE

## 2024-07-15 PROCEDURE — 85025 COMPLETE CBC W/AUTO DIFF WBC: CPT

## 2024-07-15 PROCEDURE — 82728 ASSAY OF FERRITIN: CPT

## 2024-07-18 ENCOUNTER — TELEPHONE (OUTPATIENT)
Dept: ONCOLOGY | Facility: CLINIC | Age: 64
End: 2024-07-18
Payer: MEDICARE

## 2024-07-18 NOTE — TELEPHONE ENCOUNTER
I called pt and left a voicemail Pt needs to return [facility]'s phone call to schedule appointment. [phone].

## 2024-07-25 ENCOUNTER — HOSPITAL ENCOUNTER (EMERGENCY)
Facility: HOSPITAL | Age: 64
Discharge: HOME | End: 2024-07-25
Attending: EMERGENCY MEDICINE
Payer: MEDICARE

## 2024-07-25 ENCOUNTER — APPOINTMENT (OUTPATIENT)
Dept: GENERAL RADIOLOGY | Facility: HOSPITAL | Age: 64
End: 2024-07-25
Payer: MEDICARE

## 2024-07-25 VITALS
BODY MASS INDEX: 25.73 KG/M2 | WEIGHT: 190 LBS | OXYGEN SATURATION: 98 % | DIASTOLIC BLOOD PRESSURE: 61 MMHG | HEART RATE: 80 BPM | HEIGHT: 72 IN | SYSTOLIC BLOOD PRESSURE: 132 MMHG | RESPIRATION RATE: 17 BRPM | TEMPERATURE: 97.7 F

## 2024-07-25 DIAGNOSIS — S70.12XA HEMATOMA OF LEFT THIGH, INITIAL ENCOUNTER: Primary | ICD-10-CM

## 2024-07-25 LAB
ALBUMIN SERPL-MCNC: 4.2 G/DL (ref 3.5–5.2)
ALBUMIN/GLOB SERPL: 1.6 G/DL
ALP SERPL-CCNC: 120 U/L (ref 39–117)
ALT SERPL W P-5'-P-CCNC: 8 U/L (ref 1–33)
ANION GAP SERPL CALCULATED.3IONS-SCNC: 8 MMOL/L (ref 5–15)
AST SERPL-CCNC: 17 U/L (ref 1–32)
BASOPHILS # BLD AUTO: 0.02 10*3/MM3 (ref 0–0.2)
BASOPHILS NFR BLD AUTO: 0.3 % (ref 0–1.5)
BILIRUB SERPL-MCNC: 0.2 MG/DL (ref 0–1.2)
BUN SERPL-MCNC: 9 MG/DL (ref 8–23)
BUN/CREAT SERPL: 12.9 (ref 7–25)
CALCIUM SPEC-SCNC: 9.1 MG/DL (ref 8.6–10.5)
CHLORIDE SERPL-SCNC: 106 MMOL/L (ref 98–107)
CO2 SERPL-SCNC: 25 MMOL/L (ref 22–29)
CREAT SERPL-MCNC: 0.7 MG/DL (ref 0.57–1)
DEPRECATED RDW RBC AUTO: 53.4 FL (ref 37–54)
EGFRCR SERPLBLD CKD-EPI 2021: 96.7 ML/MIN/1.73
EOSINOPHIL # BLD AUTO: 0.2 10*3/MM3 (ref 0–0.4)
EOSINOPHIL NFR BLD AUTO: 2.6 % (ref 0.3–6.2)
ERYTHROCYTE [DISTWIDTH] IN BLOOD BY AUTOMATED COUNT: 15.4 % (ref 12.3–15.4)
GLOBULIN UR ELPH-MCNC: 2.6 GM/DL
GLUCOSE SERPL-MCNC: 104 MG/DL (ref 65–99)
HCT VFR BLD AUTO: 38.2 % (ref 34–46.6)
HGB BLD-MCNC: 12 G/DL (ref 12–15.9)
HOLD SPECIMEN: NORMAL
IMM GRANULOCYTES # BLD AUTO: 0.01 10*3/MM3 (ref 0–0.05)
IMM GRANULOCYTES NFR BLD AUTO: 0.1 % (ref 0–0.5)
LYMPHOCYTES # BLD AUTO: 1.62 10*3/MM3 (ref 0.7–3.1)
LYMPHOCYTES NFR BLD AUTO: 21.1 % (ref 19.6–45.3)
MCH RBC QN AUTO: 29.2 PG (ref 26.6–33)
MCHC RBC AUTO-ENTMCNC: 31.4 G/DL (ref 31.5–35.7)
MCV RBC AUTO: 92.9 FL (ref 79–97)
MONOCYTES # BLD AUTO: 0.42 10*3/MM3 (ref 0.1–0.9)
MONOCYTES NFR BLD AUTO: 5.5 % (ref 5–12)
NEUTROPHILS NFR BLD AUTO: 5.42 10*3/MM3 (ref 1.7–7)
NEUTROPHILS NFR BLD AUTO: 70.4 % (ref 42.7–76)
PLATELET # BLD AUTO: 254 10*3/MM3 (ref 140–450)
PMV BLD AUTO: 10.8 FL (ref 6–12)
POTASSIUM SERPL-SCNC: 3.8 MMOL/L (ref 3.5–5.2)
PROT SERPL-MCNC: 6.8 G/DL (ref 6–8.5)
RBC # BLD AUTO: 4.11 10*6/MM3 (ref 3.77–5.28)
SODIUM SERPL-SCNC: 139 MMOL/L (ref 136–145)
TROPONIN T SERPL HS-MCNC: <6 NG/L
WBC NRBC COR # BLD AUTO: 7.69 10*3/MM3 (ref 3.4–10.8)
WHOLE BLOOD HOLD SPECIMEN: NORMAL

## 2024-07-25 PROCEDURE — 99284 EMERGENCY DEPT VISIT MOD MDM: CPT

## 2024-07-25 PROCEDURE — 84484 ASSAY OF TROPONIN QUANT: CPT | Performed by: EMERGENCY MEDICINE

## 2024-07-25 PROCEDURE — 85025 COMPLETE CBC W/AUTO DIFF WBC: CPT | Performed by: EMERGENCY MEDICINE

## 2024-07-25 PROCEDURE — 99283 EMERGENCY DEPT VISIT LOW MDM: CPT | Performed by: EMERGENCY MEDICINE

## 2024-07-25 PROCEDURE — 80053 COMPREHEN METABOLIC PANEL: CPT | Performed by: EMERGENCY MEDICINE

## 2024-07-25 PROCEDURE — 71046 X-RAY EXAM CHEST 2 VIEWS: CPT

## 2024-07-25 PROCEDURE — 93005 ELECTROCARDIOGRAM TRACING: CPT | Performed by: EMERGENCY MEDICINE

## 2024-07-25 PROCEDURE — 93010 ELECTROCARDIOGRAM REPORT: CPT | Performed by: INTERNAL MEDICINE

## 2024-07-25 RX ORDER — SODIUM CHLORIDE 0.9 % (FLUSH) 0.9 %
10 SYRINGE (ML) INJECTION AS NEEDED
Status: DISCONTINUED | OUTPATIENT
Start: 2024-07-25 | End: 2024-07-25 | Stop reason: HOSPADM

## 2024-07-25 RX ORDER — ASPIRIN 325 MG
325 TABLET ORAL ONCE
Status: COMPLETED | OUTPATIENT
Start: 2024-07-25 | End: 2024-07-25

## 2024-07-25 RX ADMIN — ASPIRIN 325 MG: 325 TABLET ORAL at 21:06

## 2024-07-26 LAB
QT INTERVAL: 397 MS
QTC INTERVAL: 461 MS

## 2024-07-26 NOTE — FSED PROVIDER NOTE
Subjective   History of Present Illness  Patient has a history of having a low hemoglobin of 9 she got 2 iron therapies and it was rechecked her hemoglobin was 12 after that hemoglobin was checked she developed a hematoma from an injury with some ecchymosis a week and a half ago.  She wanted to know if her hemoglobin was low.  She gets anxious she states sometimes she gets chest pain because of that chest tightness but that is not really there now but that is what she had said at triage.  It is really that hematoma that is already ecchymotic and her skin and starting to turn colors in her left thigh near her groin.      Review of Systems   All other systems reviewed and are negative.      Past Medical History:   Diagnosis Date    Acid reflux     Alcoholism in remission     SINCE AGE 22    Arthritis     Back pain     Cardiac disease     Chest pain     Coronary artery disease     mi    Depression     Eczema     Encounter for screening mammogram for breast cancer 06/23/2019    Fibrocystic breast     Heart attack 2008    Heart attack     Heart disease     History of anemia     History of bruising easily     History of COVID-19     2021    Hypercholesterolemia     IBS (irritable bowel syndrome)     Inappropriate (too hot or too cold) temperature in local application and packing     always hot or cold    Joint pain, knee     LEFT KNEE    Low iron     Menopausal state     Muscle pain     Sleep apnea     DOES NOT USE MACHINE    Stiffness in joint        No Known Allergies    Past Surgical History:   Procedure Laterality Date    BILATERAL BREAST REDUCTION  2009    BREAST BIOPSY      CARDIAC CATHETERIZATION      COLONOSCOPY  2010    COLONOSCOPY N/A 08/28/2017    One 4 mm polyp in the ascending colon, One 6 mm polyp in the ascending colon, One 4 mm polyp in the sigmoid colon, One 6 mm polyp in the rectum, One 5 mm polyp in the rectum,Diverticulosis, IH. PATH:  Tubular adenoma, hyperplastic polyp     COLONOSCOPY N/A 06/11/2019     Perianal skin tags found on perianal exam, One 3 mm polyp in the transverse colon, Two 3 to 4 mm polyps in the sigmoid colon, Diverticulosis, NBIH. Path: Hyperplastic polyp.    COLONOSCOPY N/A 11/3/2023    Procedure: COLONOSCOPY to cecum and TI with cold biopsy polypectomy;  Surgeon: Ottoniel Alonso MD;  Location: [facility] ENDOSCOPY;  Service: Gastroenterology;  Laterality: N/A;  pre: iron deficiency anemia  post: diverticulosis, normal TI, polyps    CORONARY STENT PLACEMENT      2    ENDOSCOPY N/A 08/25/2016    small hiatal hernia, normal stomach/duodenum, no specimens collected    ENDOSCOPY N/A 08/28/2017    Z line irregular, 3cm hiatal hernia, erythematous mucosa in gastric body/antrum and duodenopathy, normal 2-3 portions of duodenum    ENDOSCOPY N/A 06/11/2019    Z-line regular, Non-severe esophagitis, Gastritis, Small paraesophageal hernia. Path: Mild chronic inflammation.    ENDOSCOPY N/A 11/3/2023    Procedure: ESOPHAGOGASTRODUODENOSCOPY with cold biopsies;  Surgeon: Ottoniel Alonso MD;  Location: [facility] ENDOSCOPY;  Service: Gastroenterology;  Laterality: N/A;  pre: iron deficiency anemia  post: hiatal hernia    JOINT REPLACEMENT Left     total knee    KNEE SURGERY      2 knee sugeries on left knee    HI ARTHRP KNE CONDYLE&PLATU MEDIAL&LAT COMPARTMENTS Left 03/14/2016    Procedure: LEFT TOTAL KNEE ARTHROPLASTY;  Surgeon: Bladimir Mehta MD;  Location: [facility] OR;  Service: Orthopedics    REDUCTION MAMMAPLASTY Bilateral 2009    WISDOM TOOTH EXTRACTION      WOUND DEBRIDEMENT Bilateral     BREAST.  AFTER REDUCTION       Family History   Problem Relation Age of Onset    Alzheimer's disease Mother     Breast cancer Mother     Diabetes Mother     Lung disease Father         pullmonary artery disease    Heart disease Father     Cancer Father 32    Heart disease Brother     Diabetes Brother     Heart attack Brother     Heart disease Brother     Diabetes Brother     Colon cancer Other     Heart attack Other      Diabetes Other     Heart disease Other     Diabetes Brother     JOSHUA disease Brother        Social History     Socioeconomic History    Marital status: Single    Number of children: 1    Years of education: College   Tobacco Use    Smoking status: Former     Current packs/day: 0.00     Types: Electronic Cigarette, Cigarettes     Quit date: 2014     Years since quitting: 10.5    Smokeless tobacco: Never    Tobacco comments:     10-12 cigarettes daily   Vaping Use    Vaping status: Never Used   Substance and Sexual Activity    Alcohol use: No     Comment: caffeine use- coffee    Drug use: No    Sexual activity: Not Currently     Birth control/protection: Post-menopausal           Objective   Physical Exam  Vitals and nursing note reviewed.   Constitutional:       General: She is not in acute distress.     Appearance: She is well-developed. She is not ill-appearing, toxic-appearing or diaphoretic.   HENT:      Head: Normocephalic and atraumatic.   Eyes:      Extraocular Movements: Extraocular movements intact.      Pupils: Pupils are equal, round, and reactive to light.   Cardiovascular:      Rate and Rhythm: Normal rate and regular rhythm.   Pulmonary:      Effort: Pulmonary effort is normal. No tachypnea.      Breath sounds: Normal breath sounds.   Abdominal:      General: Bowel sounds are normal.      Palpations: Abdomen is soft.   Musculoskeletal:         General: Normal range of motion.      Cervical back: Normal range of motion and neck supple.      Comments: Left leg has a hematoma that is cylindrical in nature approximately 3-1/2 inches long and approximately 1 inch wide and that runs longitudinally under the skin there is ecchymosis dependently below this towards the foot but it is very firm and does not feel as if this is an active bleed the skin is loose all around it.   Skin:     General: Skin is warm and dry.      Capillary Refill: Capillary refill takes less than 2 seconds.      Findings: Ecchymosis  present.      Comments: As described above for musculoskeletal   Neurological:      Mental Status: She is alert and oriented to person, place, and time.   Psychiatric:         Mood and Affect: Mood is anxious.         Procedures           ED Course                                           Medical Decision Making  Patient's laboratories are normal and in fact her hemoglobin is 12.0 which is exactly where she left off before the injury causing hematoma to her left thigh.  Therefore there is no significant bleeding or loss of blood patient can be safely released she was relieved.  Cardiac workup was completely negative.  And she does not have chest pain now she does have a cardiac risk score that is why she is taking Plavix.  However she does not have chest pain it was more because of the worry about the hematoma that caused her to have chest wall discomfort I believe she does not have any of that now though.    Problems Addressed:  Hematoma of left thigh, initial encounter: complicated acute illness or injury    Amount and/or Complexity of Data Reviewed  Labs: ordered.     Details: Hemoglobin is 12.0 the patient's troponin is less than 6 sugars 104 normal electrolytes are normal liver enzymes normal except alk phos 120 that is almost normal the patient's white count is only 7.6.  Radiology: ordered.     Details: Chest x-ray is nonacute normal cardiopulmonary shadow  ECG/medicine tests: ordered.     Details: EKG interpreted by myself shows a normal sinus rhythm with a ventricular rate of 81 a AR interval 130 ms and QT corrected 461 ms there is no ST-T wave change I compared it to the old EKG of 11/28/2023 and there is no interval change.    Risk  OTC drugs.  Prescription drug management.        Final diagnoses:   Hematoma of left thigh, initial encounter       ED Disposition  ED Disposition       ED Disposition   Discharge    Condition   Stable    Comment   --               Epley, James, APRN  [address]  [place]  [address]  [place] [id number]  [phone]               Medication List        Changed      pantoprazole 40 MG EC tablet  Commonly known as: PROTONIX  Take 1 tablet by mouth Daily.  What changed: when to take this

## 2024-07-26 NOTE — DISCHARGE INSTRUCTIONS
Do not put heat on this yet allow this to absorb is much as possible.  Follow-up with your doctor.  Return as necessary.

## 2024-08-22 ENCOUNTER — TELEPHONE (OUTPATIENT)
Dept: ONCOLOGY | Facility: CLINIC | Age: 64
End: 2024-08-22

## 2024-08-28 ENCOUNTER — LAB (OUTPATIENT)
Dept: LAB | Facility: HOSPITAL | Age: 64
End: 2024-08-28
Payer: MEDICARE

## 2024-08-28 ENCOUNTER — CLINICAL SUPPORT (OUTPATIENT)
Dept: ONCOLOGY | Facility: HOSPITAL | Age: 64
End: 2024-08-28
Payer: MEDICARE

## 2024-08-28 DIAGNOSIS — D50.0 IRON DEFICIENCY ANEMIA DUE TO CHRONIC BLOOD LOSS: ICD-10-CM

## 2024-08-28 LAB
BASOPHILS # BLD AUTO: 0.04 10*3/MM3 (ref 0–0.2)
BASOPHILS NFR BLD AUTO: 0.6 % (ref 0–1.5)
DEPRECATED RDW RBC AUTO: 54.8 FL (ref 37–54)
EOSINOPHIL # BLD AUTO: 0.23 10*3/MM3 (ref 0–0.4)
EOSINOPHIL NFR BLD AUTO: 3.2 % (ref 0.3–6.2)
ERYTHROCYTE [DISTWIDTH] IN BLOOD BY AUTOMATED COUNT: 17 % (ref 12.3–15.4)
FERRITIN SERPL-MCNC: 13.4 NG/ML (ref 13–150)
HCT VFR BLD AUTO: 30.1 % (ref 34–46.6)
HGB BLD-MCNC: 8.9 G/DL (ref 12–15.9)
IMM GRANULOCYTES # BLD AUTO: 0.03 10*3/MM3 (ref 0–0.05)
IMM GRANULOCYTES NFR BLD AUTO: 0.4 % (ref 0–0.5)
IRON 24H UR-MRATE: 17 MCG/DL (ref 37–145)
IRON SATN MFR SERPL: 4 % (ref 20–50)
LYMPHOCYTES # BLD AUTO: 1.73 10*3/MM3 (ref 0.7–3.1)
LYMPHOCYTES NFR BLD AUTO: 24.4 % (ref 19.6–45.3)
MCH RBC QN AUTO: 26.1 PG (ref 26.6–33)
MCHC RBC AUTO-ENTMCNC: 29.6 G/DL (ref 31.5–35.7)
MCV RBC AUTO: 88.3 FL (ref 79–97)
MONOCYTES # BLD AUTO: 0.55 10*3/MM3 (ref 0.1–0.9)
MONOCYTES NFR BLD AUTO: 7.8 % (ref 5–12)
NEUTROPHILS NFR BLD AUTO: 4.5 10*3/MM3 (ref 1.7–7)
NEUTROPHILS NFR BLD AUTO: 63.6 % (ref 42.7–76)
NRBC BLD AUTO-RTO: 0 /100 WBC (ref 0–0.2)
PLATELET # BLD AUTO: 269 10*3/MM3 (ref 140–450)
PMV BLD AUTO: 11 FL (ref 6–12)
RBC # BLD AUTO: 3.41 10*6/MM3 (ref 3.77–5.28)
TIBC SERPL-MCNC: 404 MCG/DL (ref 298–536)
TRANSFERRIN SERPL-MCNC: 271 MG/DL (ref 200–360)
WBC NRBC COR # BLD AUTO: 7.08 10*3/MM3 (ref 3.4–10.8)

## 2024-08-28 PROCEDURE — 82728 ASSAY OF FERRITIN: CPT

## 2024-08-28 PROCEDURE — 83540 ASSAY OF IRON: CPT

## 2024-08-28 PROCEDURE — 84466 ASSAY OF TRANSFERRIN: CPT

## 2024-08-28 PROCEDURE — 36415 COLL VENOUS BLD VENIPUNCTURE: CPT

## 2024-08-28 PROCEDURE — 85025 COMPLETE CBC W/AUTO DIFF WBC: CPT

## 2024-08-29 ENCOUNTER — TELEPHONE (OUTPATIENT)
Dept: ONCOLOGY | Facility: CLINIC | Age: 64
End: 2024-08-29
Payer: MEDICARE

## 2024-08-29 RX ORDER — SODIUM CHLORIDE 9 MG/ML
20 INJECTION, SOLUTION INTRAVENOUS ONCE
OUTPATIENT
Start: 2024-09-11

## 2024-08-29 RX ORDER — PROCHLORPERAZINE MALEATE 10 MG
10 TABLET ORAL ONCE
OUTPATIENT
Start: 2024-09-04 | End: 2024-09-04

## 2024-08-29 RX ORDER — PROCHLORPERAZINE MALEATE 10 MG
10 TABLET ORAL ONCE
OUTPATIENT
Start: 2024-09-11 | End: 2024-09-11

## 2024-08-29 RX ORDER — SODIUM CHLORIDE 9 MG/ML
20 INJECTION, SOLUTION INTRAVENOUS ONCE
OUTPATIENT
Start: 2024-09-04

## 2024-08-29 NOTE — TELEPHONE ENCOUNTER
Left a detailed message on patient's voicemail stating she will need set up for Injectafer x2, per Dr. Harris. Will have Tessa reach out to schedule.

## 2024-09-05 ENCOUNTER — OFFICE VISIT (OUTPATIENT)
Dept: FAMILY MEDICINE CLINIC | Facility: CLINIC | Age: 64
End: 2024-09-05
Payer: MEDICARE

## 2024-09-05 VITALS
SYSTOLIC BLOOD PRESSURE: 126 MMHG | HEIGHT: 72 IN | DIASTOLIC BLOOD PRESSURE: 83 MMHG | HEART RATE: 83 BPM | WEIGHT: 197.3 LBS | BODY MASS INDEX: 26.72 KG/M2 | TEMPERATURE: 97.8 F | OXYGEN SATURATION: 99 %

## 2024-09-05 DIAGNOSIS — D50.8 OTHER IRON DEFICIENCY ANEMIA: Primary | ICD-10-CM

## 2024-09-05 DIAGNOSIS — I25.83 CORONARY ARTERY DISEASE DUE TO LIPID RICH PLAQUE: ICD-10-CM

## 2024-09-05 DIAGNOSIS — I25.10 CORONARY ARTERY DISEASE DUE TO LIPID RICH PLAQUE: ICD-10-CM

## 2024-09-05 DIAGNOSIS — R53.82 CHRONIC FATIGUE: ICD-10-CM

## 2024-09-05 PROCEDURE — 99214 OFFICE O/P EST MOD 30 MIN: CPT | Performed by: NURSE PRACTITIONER

## 2024-09-05 PROCEDURE — 1126F AMNT PAIN NOTED NONE PRSNT: CPT | Performed by: NURSE PRACTITIONER

## 2024-09-05 RX ORDER — CARVEDILOL 3.12 MG/1
3.12 TABLET ORAL 2 TIMES DAILY WITH MEALS
Qty: 180 TABLET | Refills: 2 | Status: SHIPPED | OUTPATIENT
Start: 2024-09-05

## 2024-09-05 RX ORDER — LIDOCAINE 50 MG/G
1 PATCH TOPICAL EVERY 24 HOURS
Qty: 90 PATCH | Refills: 3 | Status: SHIPPED | OUTPATIENT
Start: 2024-09-05

## 2024-09-05 RX ORDER — LIDOCAINE 50 MG/G
1 PATCH TOPICAL EVERY 24 HOURS
COMMUNITY
End: 2024-09-05 | Stop reason: SDUPTHER

## 2024-09-05 RX ORDER — PANTOPRAZOLE SODIUM 40 MG/1
40 TABLET, DELAYED RELEASE ORAL DAILY
Qty: 90 TABLET | Refills: 3 | Status: SHIPPED | OUTPATIENT
Start: 2024-09-05

## 2024-09-05 RX ORDER — METHOCARBAMOL 750 MG/1
750 TABLET, FILM COATED ORAL 3 TIMES DAILY PRN
Qty: 30 TABLET | Refills: 3 | Status: SHIPPED | OUTPATIENT
Start: 2024-09-05

## 2024-09-05 RX ORDER — VENLAFAXINE HYDROCHLORIDE 37.5 MG/1
112.5 CAPSULE, EXTENDED RELEASE ORAL DAILY
Qty: 270 CAPSULE | Refills: 1 | Status: SHIPPED | OUTPATIENT
Start: 2024-09-05

## 2024-09-05 NOTE — PATIENT INSTRUCTIONS
Discharge instruction continue present plan stay on top of your iron infusions if ever told you need infusion we want to get this urgently generally within the week or sooner    Hydrate well, you are at risk of falls now 1 year and needed, but as long as you are feeling good and you feel much better with the iron infusion return to work full duty 2 weeks as discussed    Otherwise I will see back in 6 months keep up the good work,
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
## 2024-09-06 ENCOUNTER — INFUSION (OUTPATIENT)
Dept: ONCOLOGY | Facility: HOSPITAL | Age: 64
End: 2024-09-06
Payer: MEDICARE

## 2024-09-06 VITALS
TEMPERATURE: 97.5 F | SYSTOLIC BLOOD PRESSURE: 111 MMHG | BODY MASS INDEX: 26.8 KG/M2 | WEIGHT: 197.6 LBS | OXYGEN SATURATION: 98 % | HEART RATE: 76 BPM | RESPIRATION RATE: 16 BRPM | DIASTOLIC BLOOD PRESSURE: 71 MMHG

## 2024-09-06 DIAGNOSIS — D50.9 IRON DEFICIENCY ANEMIA, UNSPECIFIED IRON DEFICIENCY ANEMIA TYPE: ICD-10-CM

## 2024-09-06 DIAGNOSIS — D50.0 IRON DEFICIENCY ANEMIA DUE TO CHRONIC BLOOD LOSS: Primary | ICD-10-CM

## 2024-09-06 LAB
BASOPHILS # BLD AUTO: 0.04 10*3/MM3 (ref 0–0.2)
BASOPHILS NFR BLD AUTO: 0.6 % (ref 0–1.5)
DEPRECATED RDW RBC AUTO: 56.1 FL (ref 37–54)
EOSINOPHIL # BLD AUTO: 0.19 10*3/MM3 (ref 0–0.4)
EOSINOPHIL NFR BLD AUTO: 2.9 % (ref 0.3–6.2)
ERYTHROCYTE [DISTWIDTH] IN BLOOD BY AUTOMATED COUNT: 18.1 % (ref 12.3–15.4)
HCT VFR BLD AUTO: 27.5 % (ref 34–46.6)
HGB BLD-MCNC: 8.1 G/DL (ref 12–15.9)
IMM GRANULOCYTES # BLD AUTO: 0.02 10*3/MM3 (ref 0–0.05)
IMM GRANULOCYTES NFR BLD AUTO: 0.3 % (ref 0–0.5)
LYMPHOCYTES # BLD AUTO: 1.52 10*3/MM3 (ref 0.7–3.1)
LYMPHOCYTES NFR BLD AUTO: 23 % (ref 19.6–45.3)
MCH RBC QN AUTO: 24.8 PG (ref 26.6–33)
MCHC RBC AUTO-ENTMCNC: 29.5 G/DL (ref 31.5–35.7)
MCV RBC AUTO: 84.4 FL (ref 79–97)
MONOCYTES # BLD AUTO: 0.42 10*3/MM3 (ref 0.1–0.9)
MONOCYTES NFR BLD AUTO: 6.4 % (ref 5–12)
NEUTROPHILS NFR BLD AUTO: 4.41 10*3/MM3 (ref 1.7–7)
NEUTROPHILS NFR BLD AUTO: 66.8 % (ref 42.7–76)
NRBC BLD AUTO-RTO: 0 /100 WBC (ref 0–0.2)
PLATELET # BLD AUTO: 316 10*3/MM3 (ref 140–450)
PMV BLD AUTO: 11.1 FL (ref 6–12)
RBC # BLD AUTO: 3.26 10*6/MM3 (ref 3.77–5.28)
WBC NRBC COR # BLD AUTO: 6.6 10*3/MM3 (ref 3.4–10.8)

## 2024-09-06 PROCEDURE — 25810000003 SODIUM CHLORIDE 0.9 % SOLUTION: Performed by: INTERNAL MEDICINE

## 2024-09-06 PROCEDURE — A9270 NON-COVERED ITEM OR SERVICE: HCPCS | Performed by: INTERNAL MEDICINE

## 2024-09-06 PROCEDURE — 96374 THER/PROPH/DIAG INJ IV PUSH: CPT

## 2024-09-06 PROCEDURE — 85025 COMPLETE CBC W/AUTO DIFF WBC: CPT

## 2024-09-06 PROCEDURE — 25010000002 FERRIC CARBOXYMALTOSE 750 MG/15ML SOLUTION 15 ML VIAL: Performed by: INTERNAL MEDICINE

## 2024-09-06 PROCEDURE — 63710000001 PROCHLORPERAZINE MALEATE PER 10 MG: Performed by: INTERNAL MEDICINE

## 2024-09-06 RX ORDER — PROCHLORPERAZINE MALEATE 10 MG
10 TABLET ORAL ONCE
Status: COMPLETED | OUTPATIENT
Start: 2024-09-06 | End: 2024-09-06

## 2024-09-06 RX ORDER — SODIUM CHLORIDE 9 MG/ML
20 INJECTION, SOLUTION INTRAVENOUS ONCE
Status: COMPLETED | OUTPATIENT
Start: 2024-09-06 | End: 2024-09-06

## 2024-09-06 RX ADMIN — SODIUM CHLORIDE 20 ML/HR: 9 INJECTION, SOLUTION INTRAVENOUS at 14:30

## 2024-09-06 RX ADMIN — PROCHLORPERAZINE MALEATE 10 MG: 10 TABLET ORAL at 14:14

## 2024-09-06 RX ADMIN — FERRIC CARBOXYMALTOSE INJECTION 750 MG: 50 INJECTION, SOLUTION INTRAVENOUS at 14:30

## 2024-09-16 ENCOUNTER — INFUSION (OUTPATIENT)
Dept: ONCOLOGY | Facility: HOSPITAL | Age: 64
End: 2024-09-16
Payer: MEDICARE

## 2024-09-16 VITALS
SYSTOLIC BLOOD PRESSURE: 144 MMHG | WEIGHT: 193.6 LBS | HEART RATE: 89 BPM | OXYGEN SATURATION: 97 % | TEMPERATURE: 97.5 F | RESPIRATION RATE: 16 BRPM | DIASTOLIC BLOOD PRESSURE: 74 MMHG | BODY MASS INDEX: 26.26 KG/M2

## 2024-09-16 DIAGNOSIS — D50.0 IRON DEFICIENCY ANEMIA DUE TO CHRONIC BLOOD LOSS: Primary | ICD-10-CM

## 2024-09-16 LAB
BASOPHILS # BLD AUTO: 0.03 10*3/MM3 (ref 0–0.2)
BASOPHILS NFR BLD AUTO: 0.4 % (ref 0–1.5)
DEPRECATED RDW RBC AUTO: 80.6 FL (ref 37–54)
EOSINOPHIL # BLD AUTO: 0.21 10*3/MM3 (ref 0–0.4)
EOSINOPHIL NFR BLD AUTO: 3 % (ref 0.3–6.2)
ERYTHROCYTE [DISTWIDTH] IN BLOOD BY AUTOMATED COUNT: 24.2 % (ref 12.3–15.4)
HCT VFR BLD AUTO: 34.4 % (ref 34–46.6)
HGB BLD-MCNC: 10 G/DL (ref 12–15.9)
IMM GRANULOCYTES # BLD AUTO: 0.02 10*3/MM3 (ref 0–0.05)
IMM GRANULOCYTES NFR BLD AUTO: 0.3 % (ref 0–0.5)
LYMPHOCYTES # BLD AUTO: 1.58 10*3/MM3 (ref 0.7–3.1)
LYMPHOCYTES NFR BLD AUTO: 22.5 % (ref 19.6–45.3)
MCH RBC QN AUTO: 26.4 PG (ref 26.6–33)
MCHC RBC AUTO-ENTMCNC: 29.1 G/DL (ref 31.5–35.7)
MCV RBC AUTO: 90.8 FL (ref 79–97)
MONOCYTES # BLD AUTO: 0.44 10*3/MM3 (ref 0.1–0.9)
MONOCYTES NFR BLD AUTO: 6.3 % (ref 5–12)
NEUTROPHILS NFR BLD AUTO: 4.73 10*3/MM3 (ref 1.7–7)
NEUTROPHILS NFR BLD AUTO: 67.5 % (ref 42.7–76)
NRBC BLD AUTO-RTO: 0 /100 WBC (ref 0–0.2)
PLATELET # BLD AUTO: 228 10*3/MM3 (ref 140–450)
PMV BLD AUTO: 10.8 FL (ref 6–12)
RBC # BLD AUTO: 3.79 10*6/MM3 (ref 3.77–5.28)
WBC NRBC COR # BLD AUTO: 7.01 10*3/MM3 (ref 3.4–10.8)

## 2024-09-16 PROCEDURE — 63710000001 PROCHLORPERAZINE MALEATE PER 10 MG: Performed by: INTERNAL MEDICINE

## 2024-09-16 PROCEDURE — 85025 COMPLETE CBC W/AUTO DIFF WBC: CPT | Performed by: INTERNAL MEDICINE

## 2024-09-16 PROCEDURE — A9270 NON-COVERED ITEM OR SERVICE: HCPCS | Performed by: INTERNAL MEDICINE

## 2024-09-16 PROCEDURE — 25010000002 FERRIC CARBOXYMALTOSE 750 MG/15ML SOLUTION 15 ML VIAL: Performed by: INTERNAL MEDICINE

## 2024-09-16 PROCEDURE — 96374 THER/PROPH/DIAG INJ IV PUSH: CPT

## 2024-09-16 PROCEDURE — 25810000003 SODIUM CHLORIDE 0.9 % SOLUTION: Performed by: INTERNAL MEDICINE

## 2024-09-16 RX ORDER — PROCHLORPERAZINE MALEATE 10 MG
10 TABLET ORAL ONCE
Status: COMPLETED | OUTPATIENT
Start: 2024-09-16 | End: 2024-09-16

## 2024-09-16 RX ORDER — SODIUM CHLORIDE 9 MG/ML
20 INJECTION, SOLUTION INTRAVENOUS ONCE
Status: COMPLETED | OUTPATIENT
Start: 2024-09-16 | End: 2024-09-16

## 2024-09-16 RX ADMIN — PROCHLORPERAZINE MALEATE 10 MG: 10 TABLET ORAL at 13:24

## 2024-09-16 RX ADMIN — FERRIC CARBOXYMALTOSE INJECTION 750 MG: 50 INJECTION, SOLUTION INTRAVENOUS at 13:41

## 2024-09-16 RX ADMIN — SODIUM CHLORIDE 20 ML/HR: 9 INJECTION, SOLUTION INTRAVENOUS at 13:31

## 2024-09-18 ENCOUNTER — TELEPHONE (OUTPATIENT)
Dept: FAMILY MEDICINE CLINIC | Facility: CLINIC | Age: 64
End: 2024-09-18

## 2024-09-18 NOTE — TELEPHONE ENCOUNTER
Caller: LORIN    Relationship: Other//    Best call back number:     [phone]  EXT [phone]         What was the call regarding:       WANTING TO CONFIRM THAT YOU GOT THE ATTENDING PHYSICIAN FORM THAT WAS FAXED OVER ON 9/16/2024      PLEASE CALL AND ADVISE

## 2024-09-23 NOTE — TELEPHONE ENCOUNTER
Pt called back regarding FMLA she adv she was in on 9/5 and thought all the paperwork was done then - does she need to come in again?

## 2024-09-27 ENCOUNTER — TELEPHONE (OUTPATIENT)
Dept: FAMILY MEDICINE CLINIC | Facility: CLINIC | Age: 64
End: 2024-09-27

## 2024-09-27 NOTE — TELEPHONE ENCOUNTER
Caller: ADELINE DISABILITY      Best call back number: [phone]  EXT [phone]     Patient is needing:   CALLING TO VERIFY AND CHECK THE STATUS OF DISABILITY FORMS THAT WERE FAXED TO THE OFFICE ON 9.16.24.     PLEASE CALL TO CONFIRM RECEIPT

## 2024-10-07 ENCOUNTER — TELEPHONE (OUTPATIENT)
Dept: ONCOLOGY | Facility: CLINIC | Age: 64
End: 2024-10-07
Payer: MEDICARE

## 2024-10-07 NOTE — TELEPHONE ENCOUNTER
Caller: Tiera Abbott    Relationship to patient: Self    Best call back number: [phone]    Chief complaint: PATIENT CALLED TO RESCHEDULE TIME     Type of visit: LABS    Requested date: 10-10-24 AT 3:30     If rescheduling, when is the original appointment: 10-10-24

## 2024-10-10 ENCOUNTER — LAB (OUTPATIENT)
Dept: LAB | Facility: HOSPITAL | Age: 64
End: 2024-10-10
Payer: MEDICARE

## 2024-10-10 ENCOUNTER — CLINICAL SUPPORT (OUTPATIENT)
Dept: ONCOLOGY | Facility: HOSPITAL | Age: 64
End: 2024-10-10
Payer: MEDICARE

## 2024-10-10 DIAGNOSIS — D50.0 IRON DEFICIENCY ANEMIA DUE TO CHRONIC BLOOD LOSS: ICD-10-CM

## 2024-10-10 LAB
BASOPHILS # BLD AUTO: 0.04 10*3/MM3 (ref 0–0.2)
BASOPHILS NFR BLD AUTO: 0.6 % (ref 0–1.5)
DEPRECATED RDW RBC AUTO: 67.9 FL (ref 37–54)
EOSINOPHIL # BLD AUTO: 0.21 10*3/MM3 (ref 0–0.4)
EOSINOPHIL NFR BLD AUTO: 3.3 % (ref 0.3–6.2)
ERYTHROCYTE [DISTWIDTH] IN BLOOD BY AUTOMATED COUNT: 20.3 % (ref 12.3–15.4)
FERRITIN SERPL-MCNC: 107 NG/ML (ref 13–150)
HCT VFR BLD AUTO: 38.9 % (ref 34–46.6)
HGB BLD-MCNC: 11.6 G/DL (ref 12–15.9)
IMM GRANULOCYTES # BLD AUTO: 0.02 10*3/MM3 (ref 0–0.05)
IMM GRANULOCYTES NFR BLD AUTO: 0.3 % (ref 0–0.5)
IRON 24H UR-MRATE: 54 MCG/DL (ref 37–145)
IRON SATN MFR SERPL: 16 % (ref 20–50)
LYMPHOCYTES # BLD AUTO: 1.64 10*3/MM3 (ref 0.7–3.1)
LYMPHOCYTES NFR BLD AUTO: 25.6 % (ref 19.6–45.3)
MCH RBC QN AUTO: 27.4 PG (ref 26.6–33)
MCHC RBC AUTO-ENTMCNC: 29.8 G/DL (ref 31.5–35.7)
MCV RBC AUTO: 92 FL (ref 79–97)
MONOCYTES # BLD AUTO: 0.3 10*3/MM3 (ref 0.1–0.9)
MONOCYTES NFR BLD AUTO: 4.7 % (ref 5–12)
NEUTROPHILS NFR BLD AUTO: 4.19 10*3/MM3 (ref 1.7–7)
NEUTROPHILS NFR BLD AUTO: 65.5 % (ref 42.7–76)
NRBC BLD AUTO-RTO: 0 /100 WBC (ref 0–0.2)
PLATELET # BLD AUTO: 245 10*3/MM3 (ref 140–450)
PMV BLD AUTO: 10.4 FL (ref 6–12)
RBC # BLD AUTO: 4.23 10*6/MM3 (ref 3.77–5.28)
TIBC SERPL-MCNC: 346 MCG/DL (ref 298–536)
TRANSFERRIN SERPL-MCNC: 232 MG/DL (ref 200–360)
WBC NRBC COR # BLD AUTO: 6.4 10*3/MM3 (ref 3.4–10.8)

## 2024-10-10 PROCEDURE — 82728 ASSAY OF FERRITIN: CPT

## 2024-10-10 PROCEDURE — 84466 ASSAY OF TRANSFERRIN: CPT

## 2024-10-10 PROCEDURE — 83540 ASSAY OF IRON: CPT

## 2024-10-10 PROCEDURE — 85025 COMPLETE CBC W/AUTO DIFF WBC: CPT

## 2024-10-10 PROCEDURE — 36415 COLL VENOUS BLD VENIPUNCTURE: CPT

## 2024-11-29 ENCOUNTER — PATIENT ROUNDING (BHMG ONLY) (OUTPATIENT)
Age: 64
End: 2024-11-29
Payer: MEDICARE

## 2024-11-29 NOTE — ED NOTES
Thank you for letting us care for you in your recent visit to our [facility] urgent care center. We would love to hear about your experience with us. Was this the first time you have visited our location?    We’re always looking for ways to make our patients’ experiences even better. Do you have any recommendations on ways we may improve?     I appreciate you taking the time to respond. Please be on the lookout for a survey about your recent visit from [facility] [employer] via text or email. We would greatly appreciate if you could fill that out and turn it back in. We want your voice to be heard and we value your feedback.   Thank you for choosing [facility] for your healthcare needs.     If you have concerns or would like to speak to me in person regarding your visit please feel free to give me a call. [phone]    Hope you get well soon and thank you.    Marshall Mora LPN Practice Manager

## 2024-11-29 NOTE — ED NOTES
Thank you for letting us care for you in your recent visit to our [facility] urgent care center. We would love to hear about your experience with us. Was this the first time you have visited our location?    We’re always looking for ways to make our patients’ experiences even better. Do you have any recommendations on ways we may improve?     I appreciate you taking the time to respond. Please be on the lookout for a survey about your recent visit from [facility] [employer] via text or email. We would greatly appreciate if you could fill that out and turn it back in. We want your voice to be heard and we value your feedback.   Thank you for choosing [facility] for your healthcare needs.     If you have concerns or would like to speak to me in person regarding your visit please feel free to give me a call. [phone]    Hope you get well soon and thank you.    Marshall Mora LPN Practice Manager

## 2024-12-02 ENCOUNTER — LAB (OUTPATIENT)
Dept: LAB | Facility: HOSPITAL | Age: 64
End: 2024-12-02
Payer: MEDICARE

## 2024-12-02 ENCOUNTER — TELEPHONE (OUTPATIENT)
Dept: ONCOLOGY | Facility: CLINIC | Age: 64
End: 2024-12-02
Payer: MEDICARE

## 2024-12-02 DIAGNOSIS — D50.0 IRON DEFICIENCY ANEMIA DUE TO CHRONIC BLOOD LOSS: ICD-10-CM

## 2024-12-02 LAB
BASOPHILS # BLD AUTO: 0.04 10*3/MM3 (ref 0–0.2)
BASOPHILS NFR BLD AUTO: 0.5 % (ref 0–1.5)
DEPRECATED RDW RBC AUTO: 52.7 FL (ref 37–54)
EOSINOPHIL # BLD AUTO: 0.19 10*3/MM3 (ref 0–0.4)
EOSINOPHIL NFR BLD AUTO: 2.5 % (ref 0.3–6.2)
ERYTHROCYTE [DISTWIDTH] IN BLOOD BY AUTOMATED COUNT: 17.1 % (ref 12.3–15.4)
FERRITIN SERPL-MCNC: 12 NG/ML (ref 13–150)
HCT VFR BLD AUTO: 34.8 % (ref 34–46.6)
HGB BLD-MCNC: 10.6 G/DL (ref 12–15.9)
IMM GRANULOCYTES # BLD AUTO: 0.06 10*3/MM3 (ref 0–0.05)
IMM GRANULOCYTES NFR BLD AUTO: 0.8 % (ref 0–0.5)
IRON 24H UR-MRATE: 39 MCG/DL (ref 37–145)
IRON SATN MFR SERPL: 10 % (ref 20–50)
LYMPHOCYTES # BLD AUTO: 2.1 10*3/MM3 (ref 0.7–3.1)
LYMPHOCYTES NFR BLD AUTO: 28.2 % (ref 19.6–45.3)
MCH RBC QN AUTO: 26.2 PG (ref 26.6–33)
MCHC RBC AUTO-ENTMCNC: 30.5 G/DL (ref 31.5–35.7)
MCV RBC AUTO: 86.1 FL (ref 79–97)
MONOCYTES # BLD AUTO: 0.45 10*3/MM3 (ref 0.1–0.9)
MONOCYTES NFR BLD AUTO: 6 % (ref 5–12)
NEUTROPHILS NFR BLD AUTO: 4.62 10*3/MM3 (ref 1.7–7)
NEUTROPHILS NFR BLD AUTO: 62 % (ref 42.7–76)
NRBC BLD AUTO-RTO: 0 /100 WBC (ref 0–0.2)
PLATELET # BLD AUTO: 245 10*3/MM3 (ref 140–450)
PMV BLD AUTO: 12 FL (ref 6–12)
RBC # BLD AUTO: 4.04 10*6/MM3 (ref 3.77–5.28)
TIBC SERPL-MCNC: 404 MCG/DL (ref 298–536)
TRANSFERRIN SERPL-MCNC: 271 MG/DL (ref 200–360)
WBC NRBC COR # BLD AUTO: 7.46 10*3/MM3 (ref 3.4–10.8)

## 2024-12-02 PROCEDURE — 84466 ASSAY OF TRANSFERRIN: CPT

## 2024-12-02 PROCEDURE — 82728 ASSAY OF FERRITIN: CPT

## 2024-12-02 PROCEDURE — 83540 ASSAY OF IRON: CPT

## 2024-12-02 PROCEDURE — 85025 COMPLETE CBC W/AUTO DIFF WBC: CPT

## 2024-12-02 PROCEDURE — 36415 COLL VENOUS BLD VENIPUNCTURE: CPT

## 2024-12-02 RX ORDER — SODIUM CHLORIDE 9 MG/ML
20 INJECTION, SOLUTION INTRAVENOUS ONCE
OUTPATIENT
Start: 2024-12-06

## 2024-12-02 RX ORDER — PROCHLORPERAZINE MALEATE 10 MG
10 TABLET ORAL ONCE
OUTPATIENT
Start: 2024-12-06 | End: 2024-12-06

## 2024-12-02 RX ORDER — SODIUM CHLORIDE 9 MG/ML
20 INJECTION, SOLUTION INTRAVENOUS ONCE
OUTPATIENT
Start: 2024-12-13

## 2024-12-02 RX ORDER — HYDROCORTISONE SODIUM SUCCINATE 100 MG/2ML
100 INJECTION INTRAMUSCULAR; INTRAVENOUS AS NEEDED
OUTPATIENT
Start: 2024-12-06

## 2024-12-02 RX ORDER — FAMOTIDINE 10 MG/ML
20 INJECTION, SOLUTION INTRAVENOUS AS NEEDED
OUTPATIENT
Start: 2024-12-06

## 2024-12-02 RX ORDER — DIPHENHYDRAMINE HYDROCHLORIDE 50 MG/ML
50 INJECTION INTRAMUSCULAR; INTRAVENOUS AS NEEDED
OUTPATIENT
Start: 2024-12-13

## 2024-12-02 RX ORDER — FAMOTIDINE 10 MG/ML
20 INJECTION, SOLUTION INTRAVENOUS AS NEEDED
OUTPATIENT
Start: 2024-12-13

## 2024-12-02 RX ORDER — HYDROCORTISONE SODIUM SUCCINATE 100 MG/2ML
100 INJECTION INTRAMUSCULAR; INTRAVENOUS AS NEEDED
OUTPATIENT
Start: 2024-12-13

## 2024-12-02 RX ORDER — PROCHLORPERAZINE MALEATE 10 MG
10 TABLET ORAL ONCE
OUTPATIENT
Start: 2024-12-13 | End: 2024-12-13

## 2024-12-02 RX ORDER — DIPHENHYDRAMINE HYDROCHLORIDE 50 MG/ML
50 INJECTION INTRAMUSCULAR; INTRAVENOUS AS NEEDED
OUTPATIENT
Start: 2024-12-06

## 2024-12-03 PROBLEM — T45.4X5A ADVERSE EFFECT OF IRON: Status: ACTIVE | Noted: 2024-12-03

## 2024-12-12 ENCOUNTER — TELEPHONE (OUTPATIENT)
Dept: ONCOLOGY | Facility: CLINIC | Age: 64
End: 2024-12-12

## 2024-12-12 ENCOUNTER — INFUSION (OUTPATIENT)
Dept: ONCOLOGY | Facility: HOSPITAL | Age: 64
End: 2024-12-12
Payer: MEDICARE

## 2024-12-12 VITALS
TEMPERATURE: 97.7 F | DIASTOLIC BLOOD PRESSURE: 52 MMHG | RESPIRATION RATE: 16 BRPM | SYSTOLIC BLOOD PRESSURE: 124 MMHG | OXYGEN SATURATION: 98 % | HEART RATE: 83 BPM | BODY MASS INDEX: 26.58 KG/M2 | WEIGHT: 196 LBS

## 2024-12-12 DIAGNOSIS — D50.0 IRON DEFICIENCY ANEMIA DUE TO CHRONIC BLOOD LOSS: Primary | ICD-10-CM

## 2024-12-12 DIAGNOSIS — T45.4X5A ADVERSE EFFECT OF IRON, INITIAL ENCOUNTER: ICD-10-CM

## 2024-12-12 PROCEDURE — A9270 NON-COVERED ITEM OR SERVICE: HCPCS | Performed by: INTERNAL MEDICINE

## 2024-12-12 PROCEDURE — 25010000002 FERRIC CARBOXYMALTOSE 750 MG/15ML SOLUTION 15 ML VIAL: Performed by: INTERNAL MEDICINE

## 2024-12-12 PROCEDURE — 96374 THER/PROPH/DIAG INJ IV PUSH: CPT

## 2024-12-12 PROCEDURE — 63710000001 PROCHLORPERAZINE MALEATE PER 10 MG: Performed by: INTERNAL MEDICINE

## 2024-12-12 RX ORDER — PROCHLORPERAZINE MALEATE 10 MG
10 TABLET ORAL ONCE
Status: COMPLETED | OUTPATIENT
Start: 2024-12-12 | End: 2024-12-12

## 2024-12-12 RX ADMIN — FERRIC CARBOXYMALTOSE INJECTION 750 MG: 50 INJECTION, SOLUTION INTRAVENOUS at 15:44

## 2024-12-12 RX ADMIN — PROCHLORPERAZINE MALEATE 10 MG: 10 TABLET ORAL at 15:39

## 2024-12-12 NOTE — TELEPHONE ENCOUNTER
Caller: Tiera Abbott    Relationship: Self    Best call back number: [phone]    What is the best time to reach you: ANYTIME    Who are you requesting to speak with (clinical staff, provider,  specific staff member): SCHEDULING    What was the call regarding: PT REQUESTING A CALL BACK IN REGARDS TO HER INFUSION SCHEDULE

## 2024-12-19 ENCOUNTER — INFUSION (OUTPATIENT)
Dept: ONCOLOGY | Facility: HOSPITAL | Age: 64
End: 2024-12-19
Payer: MEDICARE

## 2024-12-19 VITALS
WEIGHT: 197.4 LBS | DIASTOLIC BLOOD PRESSURE: 76 MMHG | RESPIRATION RATE: 16 BRPM | TEMPERATURE: 97.5 F | BODY MASS INDEX: 26.77 KG/M2 | SYSTOLIC BLOOD PRESSURE: 127 MMHG | HEART RATE: 83 BPM | OXYGEN SATURATION: 98 %

## 2024-12-19 DIAGNOSIS — D50.0 IRON DEFICIENCY ANEMIA DUE TO CHRONIC BLOOD LOSS: Primary | ICD-10-CM

## 2024-12-19 DIAGNOSIS — T45.4X5A ADVERSE EFFECT OF IRON, INITIAL ENCOUNTER: ICD-10-CM

## 2024-12-19 PROCEDURE — 96374 THER/PROPH/DIAG INJ IV PUSH: CPT

## 2024-12-19 PROCEDURE — A9270 NON-COVERED ITEM OR SERVICE: HCPCS | Performed by: INTERNAL MEDICINE

## 2024-12-19 PROCEDURE — 25010000002 FERRIC CARBOXYMALTOSE 750 MG/15ML SOLUTION 15 ML VIAL: Performed by: INTERNAL MEDICINE

## 2024-12-19 PROCEDURE — 63710000001 PROCHLORPERAZINE MALEATE PER 10 MG: Performed by: INTERNAL MEDICINE

## 2024-12-19 RX ORDER — PROCHLORPERAZINE MALEATE 10 MG
10 TABLET ORAL ONCE
Status: COMPLETED | OUTPATIENT
Start: 2024-12-19 | End: 2024-12-19

## 2024-12-19 RX ADMIN — FERRIC CARBOXYMALTOSE INJECTION 750 MG: 50 INJECTION, SOLUTION INTRAVENOUS at 15:58

## 2024-12-19 RX ADMIN — PROCHLORPERAZINE MALEATE 10 MG: 10 TABLET ORAL at 15:43

## 2025-01-08 NOTE — PROGRESS NOTES
"  REASON FOR FOLLOW-UP: History of recurrent iron deficiency anemia requiring repeated IV iron therapy.      History of Present Illness    Mrs. Abbott is a pleasant 64-year-old woman who returned today for follow-up of recurrent iron deficiency anemia secondary to GI blood loss.  She has received several rounds of IV iron, most recently received 2 doses of Injectafer completed December 2024.  She currently feels okay with no severe fatigue lightheadedness or dizziness.  She continues to have intermittent dark-colored stool alternating with normal-appearing stool.  She is scheduled for push enteroscopy through [facility] later this month.    Past Medical History, Past Surgical History, Social History, Family History have been reviewed and are without significant changes except as mentioned from my consult note on 8/25/17.    Review of Systems   Constitutional:  Positive for fatigue. Negative for activity change and unexpected weight change.   HENT: Negative.     Respiratory:  Negative for shortness of breath.    Cardiovascular: Negative.    Gastrointestinal:  Negative for blood in stool, constipation and nausea.        Probable melena   Genitourinary: Negative.    Musculoskeletal: Negative.    Skin: Negative.    Neurological:  Negative for dizziness.   Hematological: Negative.    Psychiatric/Behavioral: Negative.     ROS unchanged-1/13/2025  Medications:  The current medication list was reviewed in the EMR    ALLERGIES:    No Known Allergies    Objective      Vitals:    01/13/25 1510   BP: 137/68   Pulse: 78   Resp: 16   Temp: 97.6 °F (36.4 °C)   TempSrc: Oral   SpO2: 97%   Weight: 90.2 kg (198 lb 12.8 oz)   Height: 182.9 cm (72.01\")   PainSc: 0-No pain               1/13/2025     3:14 PM   Current Status   ECOG score 0       Physical Exam    CON: pleasant well-developed adult woman  HEENT: no icterus, no thrush, moist membranes  CV: RRR, S1S2, no murmur  RESP: cta bilat, no wheezing, no rales  GI: soft, non-tender, no " splenomegaly, +bs  MUSC: no edema, normal gait  NEURO: alert and oriented x3, normal strength  PSYCH: normal mood and affect  Exam unchanged-1/13/2025  RECENT LABS:  Results from last 7 days   Lab Units 01/13/25  1506   WBC 10*3/mm3 8.05   NEUTROS ABS 10*3/mm3 5.40   HEMOGLOBIN g/dL 12.0   HEMATOCRIT % 39.1   PLATELETS 10*3/mm3 265         Results from last 7 days   Lab Units 01/13/25  1506   FERRITIN ng/mL 89.60   IRON mcg/dL 60   TIBC mcg/dL 353                     Assessment & Plan    1.  Recurrent iron deficiency anemia suspected to be secondary to GI blood loss (established with GI)  Patient reports negative EGD colonoscopy and capsule study but has ongoing melena and often precipitous drops in the hemoglobin and recurrent iron deficiency all highly suggestive of ongoing GI blood loss  Most recently received IV iron with 2 doses of Injectafer completed December 2024  Hemoglobin normal 12.0  2.  Intolerance to oral iron secondary to constipation    Hematology plan:  Continue with close observation/monitoring for recurrent iron deficiency anemia-monthly CBC ferritin iron profile  IV iron replacement if ferritin less than 30 or iron sat less than 15%  Scheduled for push enteroscopy via [facility]              1/13/2025      CC:

## 2025-01-13 ENCOUNTER — OFFICE VISIT (OUTPATIENT)
Dept: ONCOLOGY | Facility: CLINIC | Age: 65
End: 2025-01-13
Payer: MEDICARE

## 2025-01-13 ENCOUNTER — LAB (OUTPATIENT)
Dept: LAB | Facility: HOSPITAL | Age: 65
End: 2025-01-13
Payer: MEDICARE

## 2025-01-13 VITALS
RESPIRATION RATE: 16 BRPM | TEMPERATURE: 97.6 F | OXYGEN SATURATION: 97 % | DIASTOLIC BLOOD PRESSURE: 68 MMHG | HEART RATE: 78 BPM | BODY MASS INDEX: 26.93 KG/M2 | WEIGHT: 198.8 LBS | HEIGHT: 72 IN | SYSTOLIC BLOOD PRESSURE: 137 MMHG

## 2025-01-13 DIAGNOSIS — T45.4X5D ADVERSE EFFECT OF IRON, SUBSEQUENT ENCOUNTER: Primary | ICD-10-CM

## 2025-01-13 DIAGNOSIS — D50.0 IRON DEFICIENCY ANEMIA DUE TO CHRONIC BLOOD LOSS: ICD-10-CM

## 2025-01-13 LAB
BASOPHILS # BLD AUTO: 0.05 10*3/MM3 (ref 0–0.2)
BASOPHILS NFR BLD AUTO: 0.6 % (ref 0–1.5)
DEPRECATED RDW RBC AUTO: 64.9 FL (ref 37–54)
EOSINOPHIL # BLD AUTO: 0.23 10*3/MM3 (ref 0–0.4)
EOSINOPHIL NFR BLD AUTO: 2.9 % (ref 0.3–6.2)
ERYTHROCYTE [DISTWIDTH] IN BLOOD BY AUTOMATED COUNT: 18.7 % (ref 12.3–15.4)
FERRITIN SERPL-MCNC: 89.6 NG/ML (ref 13–150)
HCT VFR BLD AUTO: 39.1 % (ref 34–46.6)
HGB BLD-MCNC: 12 G/DL (ref 12–15.9)
IMM GRANULOCYTES # BLD AUTO: 0.04 10*3/MM3 (ref 0–0.05)
IMM GRANULOCYTES NFR BLD AUTO: 0.5 % (ref 0–0.5)
IRON 24H UR-MRATE: 60 MCG/DL (ref 37–145)
IRON SATN MFR SERPL: 17 % (ref 20–50)
LYMPHOCYTES # BLD AUTO: 1.84 10*3/MM3 (ref 0.7–3.1)
LYMPHOCYTES NFR BLD AUTO: 22.9 % (ref 19.6–45.3)
MCH RBC QN AUTO: 28.8 PG (ref 26.6–33)
MCHC RBC AUTO-ENTMCNC: 30.7 G/DL (ref 31.5–35.7)
MCV RBC AUTO: 93.8 FL (ref 79–97)
MONOCYTES # BLD AUTO: 0.49 10*3/MM3 (ref 0.1–0.9)
MONOCYTES NFR BLD AUTO: 6.1 % (ref 5–12)
NEUTROPHILS NFR BLD AUTO: 5.4 10*3/MM3 (ref 1.7–7)
NEUTROPHILS NFR BLD AUTO: 67 % (ref 42.7–76)
NRBC BLD AUTO-RTO: 0 /100 WBC (ref 0–0.2)
PLATELET # BLD AUTO: 265 10*3/MM3 (ref 140–450)
PMV BLD AUTO: 10.2 FL (ref 6–12)
RBC # BLD AUTO: 4.17 10*6/MM3 (ref 3.77–5.28)
TIBC SERPL-MCNC: 353 MCG/DL (ref 298–536)
TRANSFERRIN SERPL-MCNC: 237 MG/DL (ref 200–360)
WBC NRBC COR # BLD AUTO: 8.05 10*3/MM3 (ref 3.4–10.8)

## 2025-01-13 PROCEDURE — 1126F AMNT PAIN NOTED NONE PRSNT: CPT | Performed by: INTERNAL MEDICINE

## 2025-01-13 PROCEDURE — 99214 OFFICE O/P EST MOD 30 MIN: CPT | Performed by: INTERNAL MEDICINE

## 2025-01-13 PROCEDURE — 84466 ASSAY OF TRANSFERRIN: CPT

## 2025-01-13 PROCEDURE — 82728 ASSAY OF FERRITIN: CPT

## 2025-01-13 PROCEDURE — G2211 COMPLEX E/M VISIT ADD ON: HCPCS | Performed by: INTERNAL MEDICINE

## 2025-01-13 PROCEDURE — 83540 ASSAY OF IRON: CPT

## 2025-01-13 PROCEDURE — 36415 COLL VENOUS BLD VENIPUNCTURE: CPT

## 2025-01-13 PROCEDURE — 85025 COMPLETE CBC W/AUTO DIFF WBC: CPT

## 2025-02-10 ENCOUNTER — TELEPHONE (OUTPATIENT)
Dept: ONCOLOGY | Facility: CLINIC | Age: 65
End: 2025-02-10
Payer: MEDICARE

## 2025-03-10 ENCOUNTER — LAB (OUTPATIENT)
Dept: LAB | Facility: HOSPITAL | Age: 65
End: 2025-03-10
Payer: MEDICARE

## 2025-03-10 ENCOUNTER — CLINICAL SUPPORT (OUTPATIENT)
Dept: ONCOLOGY | Facility: HOSPITAL | Age: 65
End: 2025-03-10
Payer: MEDICARE

## 2025-03-10 DIAGNOSIS — D50.0 IRON DEFICIENCY ANEMIA DUE TO CHRONIC BLOOD LOSS: Primary | ICD-10-CM

## 2025-03-10 DIAGNOSIS — D50.0 IRON DEFICIENCY ANEMIA DUE TO CHRONIC BLOOD LOSS: ICD-10-CM

## 2025-03-10 DIAGNOSIS — T45.4X5D ADVERSE EFFECT OF IRON, SUBSEQUENT ENCOUNTER: ICD-10-CM

## 2025-03-10 LAB
BASOPHILS # BLD AUTO: 0.04 10*3/MM3 (ref 0–0.2)
BASOPHILS NFR BLD AUTO: 0.5 % (ref 0–1.5)
DEPRECATED RDW RBC AUTO: 55.3 FL (ref 37–54)
EOSINOPHIL # BLD AUTO: 0.19 10*3/MM3 (ref 0–0.4)
EOSINOPHIL NFR BLD AUTO: 2.4 % (ref 0.3–6.2)
ERYTHROCYTE [DISTWIDTH] IN BLOOD BY AUTOMATED COUNT: 18 % (ref 12.3–15.4)
FERRITIN SERPL-MCNC: <8 NG/ML (ref 13–150)
HCT VFR BLD AUTO: 28 % (ref 34–46.6)
HGB BLD-MCNC: 8.3 G/DL (ref 12–15.9)
IMM GRANULOCYTES # BLD AUTO: 0.04 10*3/MM3 (ref 0–0.05)
IMM GRANULOCYTES NFR BLD AUTO: 0.5 % (ref 0–0.5)
IRON 24H UR-MRATE: 18 MCG/DL (ref 37–145)
IRON SATN MFR SERPL: 4 % (ref 20–50)
LYMPHOCYTES # BLD AUTO: 2 10*3/MM3 (ref 0.7–3.1)
LYMPHOCYTES NFR BLD AUTO: 25.3 % (ref 19.6–45.3)
MCH RBC QN AUTO: 25.1 PG (ref 26.6–33)
MCHC RBC AUTO-ENTMCNC: 29.6 G/DL (ref 31.5–35.7)
MCV RBC AUTO: 84.6 FL (ref 79–97)
MONOCYTES # BLD AUTO: 0.56 10*3/MM3 (ref 0.1–0.9)
MONOCYTES NFR BLD AUTO: 7.1 % (ref 5–12)
NEUTROPHILS NFR BLD AUTO: 5.07 10*3/MM3 (ref 1.7–7)
NEUTROPHILS NFR BLD AUTO: 64.2 % (ref 42.7–76)
NRBC BLD AUTO-RTO: 0 /100 WBC (ref 0–0.2)
PLATELET # BLD AUTO: 305 10*3/MM3 (ref 140–450)
PMV BLD AUTO: 11.1 FL (ref 6–12)
RBC # BLD AUTO: 3.31 10*6/MM3 (ref 3.77–5.28)
TIBC SERPL-MCNC: 425 MCG/DL (ref 298–536)
TRANSFERRIN SERPL-MCNC: 285 MG/DL (ref 200–360)
WBC NRBC COR # BLD AUTO: 7.9 10*3/MM3 (ref 3.4–10.8)

## 2025-03-10 PROCEDURE — 83540 ASSAY OF IRON: CPT

## 2025-03-10 PROCEDURE — 84466 ASSAY OF TRANSFERRIN: CPT

## 2025-03-10 PROCEDURE — 82728 ASSAY OF FERRITIN: CPT

## 2025-03-10 PROCEDURE — 84100 ASSAY OF PHOSPHORUS: CPT

## 2025-03-10 PROCEDURE — 85025 COMPLETE CBC W/AUTO DIFF WBC: CPT

## 2025-03-10 PROCEDURE — 36415 COLL VENOUS BLD VENIPUNCTURE: CPT

## 2025-03-11 LAB — PHOSPHATE SERPL-MCNC: 3.6 MG/DL (ref 2.5–4.5)

## 2025-03-11 RX ORDER — FAMOTIDINE 10 MG/ML
20 INJECTION, SOLUTION INTRAVENOUS AS NEEDED
OUTPATIENT
Start: 2025-03-24

## 2025-03-11 RX ORDER — SODIUM CHLORIDE 9 MG/ML
20 INJECTION, SOLUTION INTRAVENOUS ONCE
OUTPATIENT
Start: 2025-03-24

## 2025-03-11 RX ORDER — DIPHENHYDRAMINE HYDROCHLORIDE 50 MG/ML
50 INJECTION INTRAMUSCULAR; INTRAVENOUS AS NEEDED
OUTPATIENT
Start: 2025-03-27

## 2025-03-11 RX ORDER — DIPHENHYDRAMINE HYDROCHLORIDE 50 MG/ML
50 INJECTION INTRAMUSCULAR; INTRAVENOUS AS NEEDED
OUTPATIENT
Start: 2025-03-24

## 2025-03-11 RX ORDER — HYDROCORTISONE SODIUM SUCCINATE 100 MG/2ML
100 INJECTION INTRAMUSCULAR; INTRAVENOUS AS NEEDED
Status: CANCELLED | OUTPATIENT
Start: 2025-03-17

## 2025-03-11 RX ORDER — FAMOTIDINE 10 MG/ML
20 INJECTION, SOLUTION INTRAVENOUS AS NEEDED
OUTPATIENT
Start: 2025-03-27

## 2025-03-11 RX ORDER — SODIUM CHLORIDE 9 MG/ML
20 INJECTION, SOLUTION INTRAVENOUS ONCE
Status: CANCELLED | OUTPATIENT
Start: 2025-03-17

## 2025-03-11 RX ORDER — DIPHENHYDRAMINE HYDROCHLORIDE 50 MG/ML
50 INJECTION INTRAMUSCULAR; INTRAVENOUS AS NEEDED
OUTPATIENT
Start: 2025-03-20

## 2025-03-11 RX ORDER — HYDROCORTISONE SODIUM SUCCINATE 100 MG/2ML
100 INJECTION INTRAMUSCULAR; INTRAVENOUS AS NEEDED
OUTPATIENT
Start: 2025-03-31

## 2025-03-11 RX ORDER — FAMOTIDINE 10 MG/ML
20 INJECTION, SOLUTION INTRAVENOUS AS NEEDED
Status: CANCELLED | OUTPATIENT
Start: 2025-03-17

## 2025-03-11 RX ORDER — SODIUM CHLORIDE 9 MG/ML
20 INJECTION, SOLUTION INTRAVENOUS ONCE
OUTPATIENT
Start: 2025-03-31

## 2025-03-11 RX ORDER — HYDROCORTISONE SODIUM SUCCINATE 100 MG/2ML
100 INJECTION INTRAMUSCULAR; INTRAVENOUS AS NEEDED
OUTPATIENT
Start: 2025-03-20

## 2025-03-11 RX ORDER — DIPHENHYDRAMINE HYDROCHLORIDE 50 MG/ML
50 INJECTION INTRAMUSCULAR; INTRAVENOUS AS NEEDED
Status: CANCELLED | OUTPATIENT
Start: 2025-03-17

## 2025-03-11 RX ORDER — SODIUM CHLORIDE 9 MG/ML
20 INJECTION, SOLUTION INTRAVENOUS ONCE
OUTPATIENT
Start: 2025-03-20

## 2025-03-11 RX ORDER — HYDROCORTISONE SODIUM SUCCINATE 100 MG/2ML
100 INJECTION INTRAMUSCULAR; INTRAVENOUS AS NEEDED
OUTPATIENT
Start: 2025-03-24

## 2025-03-11 RX ORDER — FAMOTIDINE 10 MG/ML
20 INJECTION, SOLUTION INTRAVENOUS AS NEEDED
OUTPATIENT
Start: 2025-03-31

## 2025-03-11 RX ORDER — FAMOTIDINE 10 MG/ML
20 INJECTION, SOLUTION INTRAVENOUS AS NEEDED
OUTPATIENT
Start: 2025-03-20

## 2025-03-11 RX ORDER — DIPHENHYDRAMINE HYDROCHLORIDE 50 MG/ML
50 INJECTION INTRAMUSCULAR; INTRAVENOUS AS NEEDED
OUTPATIENT
Start: 2025-03-31

## 2025-03-11 RX ORDER — HYDROCORTISONE SODIUM SUCCINATE 100 MG/2ML
100 INJECTION INTRAMUSCULAR; INTRAVENOUS AS NEEDED
OUTPATIENT
Start: 2025-03-27

## 2025-03-11 RX ORDER — SODIUM CHLORIDE 9 MG/ML
20 INJECTION, SOLUTION INTRAVENOUS ONCE
OUTPATIENT
Start: 2025-03-27

## 2025-03-13 ENCOUNTER — TELEPHONE (OUTPATIENT)
Dept: ONCOLOGY | Facility: CLINIC | Age: 65
End: 2025-03-13
Payer: MEDICARE

## 2025-03-13 NOTE — TELEPHONE ENCOUNTER
----- Message from Oscar BARRIENTOS sent at 3/13/2025  1:05 PM EDT -----  Regarding: Venofer  Patient asked me to see if we could get her in sooner for Venofer. We DO have PA on file now. Let me know if you're able to work her in before Monday.

## 2025-03-14 ENCOUNTER — TELEPHONE (OUTPATIENT)
Dept: ONCOLOGY | Facility: CLINIC | Age: 65
End: 2025-03-14
Payer: MEDICARE

## 2025-03-14 ENCOUNTER — LAB (OUTPATIENT)
Dept: LAB | Facility: HOSPITAL | Age: 65
End: 2025-03-14
Payer: MEDICARE

## 2025-03-14 ENCOUNTER — CLINICAL SUPPORT (OUTPATIENT)
Dept: ONCOLOGY | Facility: HOSPITAL | Age: 65
End: 2025-03-14
Payer: MEDICARE

## 2025-03-14 DIAGNOSIS — D50.0 IRON DEFICIENCY ANEMIA DUE TO CHRONIC BLOOD LOSS: Primary | ICD-10-CM

## 2025-03-14 DIAGNOSIS — D50.0 IRON DEFICIENCY ANEMIA DUE TO CHRONIC BLOOD LOSS: ICD-10-CM

## 2025-03-14 LAB
BASOPHILS # BLD AUTO: 0.03 10*3/MM3 (ref 0–0.2)
BASOPHILS NFR BLD AUTO: 0.4 % (ref 0–1.5)
DEPRECATED RDW RBC AUTO: 54.2 FL (ref 37–54)
EOSINOPHIL # BLD AUTO: 0.2 10*3/MM3 (ref 0–0.4)
EOSINOPHIL NFR BLD AUTO: 3 % (ref 0.3–6.2)
ERYTHROCYTE [DISTWIDTH] IN BLOOD BY AUTOMATED COUNT: 18.2 % (ref 12.3–15.4)
HCT VFR BLD AUTO: 28.4 % (ref 34–46.6)
HGB BLD-MCNC: 8.4 G/DL (ref 12–15.9)
IMM GRANULOCYTES # BLD AUTO: 0.03 10*3/MM3 (ref 0–0.05)
IMM GRANULOCYTES NFR BLD AUTO: 0.4 % (ref 0–0.5)
LYMPHOCYTES # BLD AUTO: 1.6 10*3/MM3 (ref 0.7–3.1)
LYMPHOCYTES NFR BLD AUTO: 23.8 % (ref 19.6–45.3)
MCH RBC QN AUTO: 24.3 PG (ref 26.6–33)
MCHC RBC AUTO-ENTMCNC: 29.6 G/DL (ref 31.5–35.7)
MCV RBC AUTO: 82.3 FL (ref 79–97)
MONOCYTES # BLD AUTO: 0.49 10*3/MM3 (ref 0.1–0.9)
MONOCYTES NFR BLD AUTO: 7.3 % (ref 5–12)
NEUTROPHILS NFR BLD AUTO: 4.36 10*3/MM3 (ref 1.7–7)
NEUTROPHILS NFR BLD AUTO: 65.1 % (ref 42.7–76)
NRBC BLD AUTO-RTO: 0 /100 WBC (ref 0–0.2)
PLATELET # BLD AUTO: 287 10*3/MM3 (ref 140–450)
PMV BLD AUTO: 11.2 FL (ref 6–12)
RBC # BLD AUTO: 3.45 10*6/MM3 (ref 3.77–5.28)
WBC NRBC COR # BLD AUTO: 6.71 10*3/MM3 (ref 3.4–10.8)

## 2025-03-14 PROCEDURE — 85025 COMPLETE CBC W/AUTO DIFF WBC: CPT

## 2025-03-14 NOTE — PROGRESS NOTES
Patient is here for lab review with RN.  CBC reviewed, counts are stable for this patient at this time. Per Dr. Harris, we will not transfuse patient unless hemoglobin falls below 8.0. Copy of labs given to patient and follow up appointment reviewed. Patient is instructed to call the office with any concerns or new symptoms prior to next visit. Patient verbalized understanding and discharged in stable condition.    Lab Results   Component Value Date    WBC 6.71 03/14/2025    HGB 8.4 (L) 03/14/2025    HCT 28.4 (L) 03/14/2025    MCV 82.3 03/14/2025     03/14/2025

## 2025-03-14 NOTE — TELEPHONE ENCOUNTER
Received call from patient requesting to have her hemoglobin checked today. She had it checked on Monday of this week, and it was 8.2. She is concerned that it has dropped since then due to being increasingly symptomatic. Reviewed with Dr. Harris, and we will have patient come to office for a CBC and RN review today. Per Dr. Harris, if patient's hemoglobin is below 8.0, we will order 1 unit of PRBC to be transfused this weekend. Patient v/u.

## 2025-03-15 LAB
ALBUMIN SERPL-MCNC: 4 G/DL (ref 3.9–4.9)
ALP SERPL-CCNC: 121 IU/L (ref 44–121)
ALT SERPL-CCNC: 8 IU/L (ref 0–32)
AST SERPL-CCNC: 14 IU/L (ref 0–40)
BILIRUB SERPL-MCNC: <0.2 MG/DL (ref 0–1.2)
BUN SERPL-MCNC: 10 MG/DL (ref 8–27)
BUN/CREAT SERPL: 15 (ref 12–28)
CALCIUM SERPL-MCNC: 8.7 MG/DL (ref 8.7–10.3)
CHLORIDE SERPL-SCNC: 106 MMOL/L (ref 96–106)
CO2 SERPL-SCNC: 21 MMOL/L (ref 20–29)
CREAT SERPL-MCNC: 0.68 MG/DL (ref 0.57–1)
CRP SERPL-MCNC: <1 MG/L (ref 0–10)
EGFRCR SERPLBLD CKD-EPI 2021: 97 ML/MIN/1.73
ERYTHROCYTE [SEDIMENTATION RATE] IN BLOOD BY WESTERGREN METHOD: 28 MM/HR (ref 0–40)
FOLATE SERPL-MCNC: 6.1 NG/ML
GLOBULIN SER CALC-MCNC: 2 G/DL (ref 1.5–4.5)
GLUCOSE SERPL-MCNC: 88 MG/DL (ref 70–99)
HBA1C MFR BLD: 5.4 % (ref 4.8–5.6)
POTASSIUM SERPL-SCNC: 4.5 MMOL/L (ref 3.5–5.2)
PROT SERPL-MCNC: 6 G/DL (ref 6–8.5)
SODIUM SERPL-SCNC: 139 MMOL/L (ref 134–144)
TSH SERPL DL<=0.005 MIU/L-ACNC: 0.89 UIU/ML (ref 0.45–4.5)
VIT B12 SERPL-MCNC: 274 PG/ML (ref 232–1245)

## 2025-03-17 ENCOUNTER — INFUSION (OUTPATIENT)
Dept: ONCOLOGY | Facility: HOSPITAL | Age: 65
End: 2025-03-17
Payer: MEDICARE

## 2025-03-17 VITALS
WEIGHT: 198.2 LBS | SYSTOLIC BLOOD PRESSURE: 121 MMHG | TEMPERATURE: 97.7 F | DIASTOLIC BLOOD PRESSURE: 78 MMHG | RESPIRATION RATE: 16 BRPM | OXYGEN SATURATION: 99 % | HEART RATE: 83 BPM | BODY MASS INDEX: 26.87 KG/M2

## 2025-03-17 DIAGNOSIS — D50.0 IRON DEFICIENCY ANEMIA DUE TO CHRONIC BLOOD LOSS: ICD-10-CM

## 2025-03-17 DIAGNOSIS — T45.4X5A ADVERSE EFFECT OF IRON, INITIAL ENCOUNTER: Primary | ICD-10-CM

## 2025-03-17 PROCEDURE — 96374 THER/PROPH/DIAG INJ IV PUSH: CPT

## 2025-03-17 PROCEDURE — 63710000001 CETIRIZINE 10 MG TABLET: Performed by: INTERNAL MEDICINE

## 2025-03-17 PROCEDURE — 25010000002 IRON SUCROSE PER 1 MG: Performed by: INTERNAL MEDICINE

## 2025-03-17 PROCEDURE — 63710000001 ACETAMINOPHEN 325 MG TABLET: Performed by: INTERNAL MEDICINE

## 2025-03-17 PROCEDURE — A9270 NON-COVERED ITEM OR SERVICE: HCPCS | Performed by: INTERNAL MEDICINE

## 2025-03-17 RX ORDER — ACETAMINOPHEN 325 MG/1
650 TABLET ORAL ONCE
Status: COMPLETED | OUTPATIENT
Start: 2025-03-17 | End: 2025-03-17

## 2025-03-17 RX ORDER — CETIRIZINE HYDROCHLORIDE 10 MG/1
10 TABLET ORAL ONCE
Status: COMPLETED | OUTPATIENT
Start: 2025-03-17 | End: 2025-03-17

## 2025-03-17 RX ADMIN — IRON SUCROSE 200 MG: 20 INJECTION, SOLUTION INTRAVENOUS at 15:23

## 2025-03-17 RX ADMIN — CETIRIZINE HYDROCHLORIDE 10 MG: 10 TABLET, FILM COATED ORAL at 14:59

## 2025-03-17 RX ADMIN — ACETAMINOPHEN 650 MG: 325 TABLET ORAL at 14:59

## 2025-03-20 ENCOUNTER — INFUSION (OUTPATIENT)
Dept: ONCOLOGY | Facility: HOSPITAL | Age: 65
End: 2025-03-20
Payer: MEDICARE

## 2025-03-20 VITALS
TEMPERATURE: 97.8 F | OXYGEN SATURATION: 97 % | RESPIRATION RATE: 16 BRPM | WEIGHT: 198 LBS | HEART RATE: 90 BPM | DIASTOLIC BLOOD PRESSURE: 68 MMHG | BODY MASS INDEX: 26.85 KG/M2 | SYSTOLIC BLOOD PRESSURE: 106 MMHG

## 2025-03-20 DIAGNOSIS — T45.4X5A ADVERSE EFFECT OF IRON, INITIAL ENCOUNTER: Primary | ICD-10-CM

## 2025-03-20 DIAGNOSIS — D50.0 IRON DEFICIENCY ANEMIA DUE TO CHRONIC BLOOD LOSS: ICD-10-CM

## 2025-03-20 PROCEDURE — A9270 NON-COVERED ITEM OR SERVICE: HCPCS | Performed by: INTERNAL MEDICINE

## 2025-03-20 PROCEDURE — 96374 THER/PROPH/DIAG INJ IV PUSH: CPT

## 2025-03-20 PROCEDURE — 25010000002 IRON SUCROSE PER 1 MG: Performed by: INTERNAL MEDICINE

## 2025-03-20 PROCEDURE — 63710000001 CETIRIZINE 10 MG TABLET: Performed by: INTERNAL MEDICINE

## 2025-03-20 PROCEDURE — 63710000001 ACETAMINOPHEN 325 MG TABLET: Performed by: INTERNAL MEDICINE

## 2025-03-20 RX ORDER — ACETAMINOPHEN 325 MG/1
650 TABLET ORAL ONCE
Status: COMPLETED | OUTPATIENT
Start: 2025-03-20 | End: 2025-03-20

## 2025-03-20 RX ORDER — CETIRIZINE HYDROCHLORIDE 10 MG/1
10 TABLET ORAL ONCE
Status: COMPLETED | OUTPATIENT
Start: 2025-03-20 | End: 2025-03-20

## 2025-03-20 RX ADMIN — IRON SUCROSE 200 MG: 20 INJECTION, SOLUTION INTRAVENOUS at 12:10

## 2025-03-20 RX ADMIN — CETIRIZINE HYDROCHLORIDE 10 MG: 10 TABLET, FILM COATED ORAL at 11:45

## 2025-03-20 RX ADMIN — ACETAMINOPHEN 650 MG: 325 TABLET ORAL at 11:45

## 2025-03-24 ENCOUNTER — INFUSION (OUTPATIENT)
Dept: ONCOLOGY | Facility: HOSPITAL | Age: 65
End: 2025-03-24
Payer: MEDICARE

## 2025-03-24 VITALS
RESPIRATION RATE: 16 BRPM | BODY MASS INDEX: 26.71 KG/M2 | TEMPERATURE: 97.4 F | HEART RATE: 85 BPM | DIASTOLIC BLOOD PRESSURE: 62 MMHG | OXYGEN SATURATION: 99 % | WEIGHT: 197 LBS | SYSTOLIC BLOOD PRESSURE: 105 MMHG

## 2025-03-24 DIAGNOSIS — D50.0 IRON DEFICIENCY ANEMIA DUE TO CHRONIC BLOOD LOSS: ICD-10-CM

## 2025-03-24 DIAGNOSIS — T45.4X5A ADVERSE EFFECT OF IRON, INITIAL ENCOUNTER: Primary | ICD-10-CM

## 2025-03-24 PROCEDURE — 25010000002 IRON SUCROSE PER 1 MG: Performed by: INTERNAL MEDICINE

## 2025-03-24 PROCEDURE — 63710000001 CETIRIZINE 10 MG TABLET: Performed by: INTERNAL MEDICINE

## 2025-03-24 PROCEDURE — 63710000001 ACETAMINOPHEN 325 MG TABLET: Performed by: INTERNAL MEDICINE

## 2025-03-24 PROCEDURE — 96374 THER/PROPH/DIAG INJ IV PUSH: CPT

## 2025-03-24 PROCEDURE — A9270 NON-COVERED ITEM OR SERVICE: HCPCS | Performed by: INTERNAL MEDICINE

## 2025-03-24 RX ORDER — ACETAMINOPHEN 325 MG/1
650 TABLET ORAL ONCE
Status: COMPLETED | OUTPATIENT
Start: 2025-03-24 | End: 2025-03-24

## 2025-03-24 RX ORDER — CETIRIZINE HYDROCHLORIDE 10 MG/1
10 TABLET ORAL ONCE
Status: COMPLETED | OUTPATIENT
Start: 2025-03-24 | End: 2025-03-24

## 2025-03-24 RX ADMIN — ACETAMINOPHEN 650 MG: 325 TABLET ORAL at 14:47

## 2025-03-24 RX ADMIN — IRON SUCROSE 200 MG: 20 INJECTION, SOLUTION INTRAVENOUS at 15:08

## 2025-03-24 RX ADMIN — CETIRIZINE HYDROCHLORIDE 10 MG: 10 TABLET, FILM COATED ORAL at 14:47

## 2025-03-31 ENCOUNTER — INFUSION (OUTPATIENT)
Dept: ONCOLOGY | Facility: HOSPITAL | Age: 65
End: 2025-03-31
Payer: MEDICARE

## 2025-03-31 VITALS
BODY MASS INDEX: 27.01 KG/M2 | WEIGHT: 199.2 LBS | DIASTOLIC BLOOD PRESSURE: 73 MMHG | RESPIRATION RATE: 16 BRPM | HEART RATE: 93 BPM | OXYGEN SATURATION: 93 % | SYSTOLIC BLOOD PRESSURE: 124 MMHG | TEMPERATURE: 97.7 F

## 2025-03-31 DIAGNOSIS — D50.0 IRON DEFICIENCY ANEMIA DUE TO CHRONIC BLOOD LOSS: ICD-10-CM

## 2025-03-31 DIAGNOSIS — T45.4X5A ADVERSE EFFECT OF IRON, INITIAL ENCOUNTER: Primary | ICD-10-CM

## 2025-03-31 PROCEDURE — 63710000001 CETIRIZINE 10 MG TABLET: Performed by: INTERNAL MEDICINE

## 2025-03-31 PROCEDURE — 25010000002 IRON SUCROSE PER 1 MG: Performed by: INTERNAL MEDICINE

## 2025-03-31 PROCEDURE — A9270 NON-COVERED ITEM OR SERVICE: HCPCS | Performed by: INTERNAL MEDICINE

## 2025-03-31 PROCEDURE — 96374 THER/PROPH/DIAG INJ IV PUSH: CPT

## 2025-03-31 PROCEDURE — 63710000001 ACETAMINOPHEN 325 MG TABLET: Performed by: INTERNAL MEDICINE

## 2025-03-31 RX ORDER — SODIUM CHLORIDE 9 MG/ML
20 INJECTION, SOLUTION INTRAVENOUS ONCE
Status: DISCONTINUED | OUTPATIENT
Start: 2025-03-31 | End: 2025-03-31 | Stop reason: HOSPADM

## 2025-03-31 RX ORDER — ACETAMINOPHEN 325 MG/1
650 TABLET ORAL ONCE
Status: COMPLETED | OUTPATIENT
Start: 2025-03-31 | End: 2025-03-31

## 2025-03-31 RX ORDER — CETIRIZINE HYDROCHLORIDE 10 MG/1
10 TABLET ORAL ONCE
Status: COMPLETED | OUTPATIENT
Start: 2025-03-31 | End: 2025-03-31

## 2025-03-31 RX ADMIN — IRON SUCROSE 200 MG: 20 INJECTION, SOLUTION INTRAVENOUS at 15:26

## 2025-03-31 RX ADMIN — ACETAMINOPHEN 650 MG: 325 TABLET ORAL at 15:04

## 2025-03-31 RX ADMIN — CETIRIZINE HYDROCHLORIDE 10 MG: 10 TABLET, FILM COATED ORAL at 15:05

## 2025-03-31 NOTE — NURSING NOTE
Patient ordered to have 5 iron infusions.  Pt missed an appointment.  Needs to come back next week for the the 5th dose of iron.  Called scheduling and spoke to Maude.  Explained that patient needs an appointment for infusion next week.  Maude v/u.  Spoke to Oscar (Dr Harris's RN).  She is canceling appointment for next week for labs and RN review.  Pt to follow up with Dr. Harris the beginning of May per Oscar.  Instructed patient to call prior to then if she starts feeling poorly.  Pt v/u.

## 2025-04-08 ENCOUNTER — TELEPHONE (OUTPATIENT)
Dept: ONCOLOGY | Facility: CLINIC | Age: 65
End: 2025-04-08
Payer: MEDICARE

## 2025-04-08 ENCOUNTER — TELEPHONE (OUTPATIENT)
Dept: ONCOLOGY | Facility: CLINIC | Age: 65
End: 2025-04-08

## 2025-04-08 ENCOUNTER — OFFICE VISIT (OUTPATIENT)
Dept: ORTHOPEDIC SURGERY | Facility: CLINIC | Age: 65
End: 2025-04-08
Payer: MEDICARE

## 2025-04-08 VITALS — HEIGHT: 72 IN | TEMPERATURE: 96.7 F | BODY MASS INDEX: 26.74 KG/M2 | WEIGHT: 197.4 LBS

## 2025-04-08 DIAGNOSIS — T45.4X5A ADVERSE EFFECT OF IRON, INITIAL ENCOUNTER: ICD-10-CM

## 2025-04-08 DIAGNOSIS — D50.0 IRON DEFICIENCY ANEMIA DUE TO CHRONIC BLOOD LOSS: Primary | ICD-10-CM

## 2025-04-08 DIAGNOSIS — R52 PAIN: Primary | ICD-10-CM

## 2025-04-08 DIAGNOSIS — Z96.652 STATUS POST LEFT KNEE REPLACEMENT: ICD-10-CM

## 2025-04-08 DIAGNOSIS — M17.11 PRIMARY OSTEOARTHRITIS OF RIGHT KNEE: ICD-10-CM

## 2025-04-08 PROCEDURE — 1160F RVW MEDS BY RX/DR IN RCRD: CPT | Performed by: ORTHOPAEDIC SURGERY

## 2025-04-08 PROCEDURE — 99203 OFFICE O/P NEW LOW 30 MIN: CPT | Performed by: ORTHOPAEDIC SURGERY

## 2025-04-08 PROCEDURE — 1159F MED LIST DOCD IN RCRD: CPT | Performed by: ORTHOPAEDIC SURGERY

## 2025-04-08 PROCEDURE — 73562 X-RAY EXAM OF KNEE 3: CPT | Performed by: ORTHOPAEDIC SURGERY

## 2025-04-08 RX ORDER — CYCLOBENZAPRINE HCL 10 MG
TABLET ORAL
COMMUNITY

## 2025-04-08 NOTE — PROGRESS NOTES
Colonoscopy   WHAT YOU NEED TO KNOW:   A colonoscopy is a procedure to examine the inside of your colon (intestine) with a scope  Polyps or tissue growths may have been removed during your colonoscopy  It is normal to feel bloated and to have some abdominal discomfort  You should be passing gas  If you have hemorrhoids or you had polyps removed, you may have a small amount of bleeding  DISCHARGE INSTRUCTIONS:   Seek care immediately if:   · You have a large amount of bright red blood in your bowel movements  · Your abdomen is hard and firm and you have severe pain  · You have sudden trouble breathing  Contact your healthcare provider if:   · You develop a rash or hives  · You have a fever within 24 hours of your procedure  · You have not had a bowel movement for 3 days after your procedure  · You have questions or concerns about your condition or care  Activity:   · Do not lift, strain, or run  for 3 days after your procedure  · Rest after your procedure  You have been given medicine to relax you  Do not  drive or make important decisions until the day after your procedure  Return to your normal activity as directed  · Relieve gas and discomfort from bloating  by lying on your right side with a heating pad on your abdomen  You may need to take short walks to help the gas move out  Eat small meals until bloating is relieved  If you had polyps removed: For 7 days after your procedure:  · Do not  take aspirin  · Do not  go on long car rides  Help prevent constipation:   · Eat a variety of healthy foods  Healthy foods include fruit, vegetables, whole-grain breads, low-fat dairy products, beans, lean meat, and fish  Ask if you need to be on a special diet  Your healthcare provider may recommend that you eat high-fiber foods such as cooked beans  Fiber helps you have regular bowel movements  · Drink liquids as directed    Adults should drink between 9 and 13 eight-ounce cups of Patient: Tiera Abbott  YOB: 1960 65 y.o. female  Medical Record Number: [id number]    Chief Complaints:   Chief Complaint   Patient presents with    Right Knee - Initial Evaluation, Pain    Left Knee - Initial Evaluation, Pain       History of Present Illness:Tiera Abbott is a 65 y.o. female who presents with h/o left TKA  catching / locking -  started in Feb (2 months ago) had injury at work 1 year ago but apparently the knees are not included in WC (??) -  left feels better now, but now having bucking in the right knee on occasion -  few times a month. Also having LB , shoulder, neck and hip issues - WC issues.    Allergies: No Known Allergies    Medications:   Current Outpatient Medications   Medication Sig Dispense Refill    atorvastatin (LIPITOR) 80 MG tablet Take 1 tablet by mouth Every Night.      carvedilol (COREG) 3.125 MG tablet Take 1 tablet by mouth 2 (Two) Times a Day With Meals. 180 tablet 2    clobetasol (TEMOVATE) 0.05 % cream Apply  topically to the appropriate area as directed 2 (Two) Times a Day. Sparingly, avoid face, short-term use only 30 g 1    clopidogrel (PLAVIX) 75 MG tablet Take 1 tablet by mouth Daily.      cyclobenzaprine (FLEXERIL) 10 MG tablet TAKE 1 TABLET BY MOUTH EVERY DAY AS NEEDED FOR 90 DAYS      Diclofenac Sodium (VOLTAREN) 1 % gel gel Apply 4 g topically to the appropriate area as directed 4 (Four) Times a Day As Needed (pain). 100 g 0    ezetimibe (ZETIA) 10 MG tablet Take 1 tablet by mouth Daily.      lidocaine (LIDODERM) 5 % Place 1 patch on the skin as directed by provider Daily. As needed 90 patch 3    pantoprazole (PROTONIX) 40 MG EC tablet Take 1 tablet by mouth Daily. 90 tablet 3    venlafaxine XR (EFFEXOR-XR) 37.5 MG 24 hr capsule Take 3 capsules by mouth Daily. 270 capsule 1    amoxicillin-clavulanate (AUGMENTIN) 875-125 MG per tablet Take one tablet po bid x 7 days. (Patient not taking: Reported on 4/8/2025) 14 tablet 0    methocarbamol (ROBAXIN) 750 MG  "tablet Take 1 tablet by mouth 3 (Three) Times a Day As Needed for Muscle Spasms. (Patient not taking: Reported on 4/8/2025) 30 tablet 3    oseltamivir (Tamiflu) 75 MG capsule Take 1 capsule by mouth 2 (Two) Times a Day. (Patient not taking: Reported on 4/8/2025) 10 capsule 0     No current facility-administered medications for this visit.         The following portions of the patient's history were reviewed and updated as appropriate: allergies, current medications, past family history, past medical history, past social history, past surgical history and problem list.    Review of Systems:   Pertinent positives/negatives listed in HPI above    Physical Exam:   Vitals:    04/08/25 1333   Temp: 96.7 °F (35.9 °C)   TempSrc: Temporal   Weight: 89.5 kg (197 lb 6.4 oz)   Height: 182.9 cm (72\")   PainSc: 3    PainLoc: Knee       General: A and O x 3, ASA, NAD      Knee Exam List: Knee:  bilateral    ALIGNMENT:     Neutral  ,   Patella tracks   midline    GAIT:     Nonantalgic    SKIN:    No abnormality    RANGE OF MOTION:   0  -  135   DEG    STRENGTH:   5 / 5    LIGAMENTS:    No varus / valgus instability.   Negative  Lachman.    MENISCUS:     Negative   Yara       DISTAL PULSES:    Paplable    DISTAL SENSATION :   Intact    LYMPHATICS:     No   lymphadenopathy    OTHER:          - No  effusion      - No crepitance with ROM      - No Swelling /  tenderness to palpation pes anserine bursa        Radiology:  Xrays 3views chito knees (ap,lateral, sunrise) were ordered and reviewed for evaluation of knee pain demonstrating right medial mild joint space narrowing and a well positioned left knee replacement without evidence of wear, loosening or osteolysis. There are no previous films for comparision.     Assessment/Plan: left tka nad right mild OA structurally things luann and feel OK-  I will send to PT, rto PRN      Diagnoses and all orders for this visit:    1. Pain (Primary)  -     XR Knee 3 View Bilateral         Bladimir B. " liquid every day  Ask what amount is best for you  For most people, good liquids to drink are water, juice, and milk  · Exercise as directed  Talk to your healthcare provider about the best exercise plan for you  Exercise can help prevent constipation, decrease your blood pressure and improve your health  Follow up with your healthcare provider as directed:  Write down your questions so you remember to ask them during your visits  © 2017 [employer] Information is for End User's use only and may not be sold, redistributed or otherwise used for commercial purposes  All illustrations and images included in CareNotes® are the copyrighted property of [employer]  or Isrrael Dots[employer]  The above information is an  only  It is not intended as medical advice for individual conditions or treatments  Talk to your doctor, nurse or pharmacist before following any medical regimen to see if it is safe and effective for you  MD Tyson  4/8/2025

## 2025-04-08 NOTE — TELEPHONE ENCOUNTER
Provider: Steven  Caller: patient  Relationship to Patient: self  Call Back Phone Number:   Reason for Call:

## 2025-04-08 NOTE — TELEPHONE ENCOUNTER
Caller: Tiera Abbott    Relationship: Self    Best call back number: [phone]     What is the best time to reach you: ASAP    Who are you requesting to speak with (clinical staff, provider,  specific staff member): SAMY    What was the call regarding: PT HAD A CALL FROM SAMY ABOUT SCHEDULING PLEASE CALL PT BACK ASAP.

## 2025-04-08 NOTE — TELEPHONE ENCOUNTER
----- Message from Edgardo Harris sent at 4/8/2025  1:00 PM EDT -----  Cbc when in 4/10 for venofer  ----- Message -----  From: Luzma Arias RegSched Rep  Sent: 4/8/2025  12:51 PM EDT  To: Edgardo Harris MD; Oscar Llamas RN    Patient getting last Venofer on Thursday. Seeing you on 5/12. Patient says you and her cardiologist need to get on the same page as she has been short of breath.She is concerned that perhaps she should get labs checked in 2 wks rather than waiting a whole month? Please advise, thank you, Tessa

## 2025-04-08 NOTE — TELEPHONE ENCOUNTER
Provider: Steven  Caller: patient  Relationship to Patient:self  Call Back Phone Number: [phone]   Reason for Call: Pt calling to see about getting labs and iron infusion.

## 2025-04-11 ENCOUNTER — APPOINTMENT (OUTPATIENT)
Dept: GENERAL RADIOLOGY | Facility: HOSPITAL | Age: 65
End: 2025-04-11
Payer: MEDICARE

## 2025-04-11 ENCOUNTER — HOSPITAL ENCOUNTER (EMERGENCY)
Facility: HOSPITAL | Age: 65
Discharge: HOME | End: 2025-04-11
Attending: STUDENT IN AN ORGANIZED HEALTH CARE EDUCATION/TRAINING PROGRAM
Payer: MEDICARE

## 2025-04-11 VITALS
OXYGEN SATURATION: 97 % | HEART RATE: 62 BPM | TEMPERATURE: 97.7 F | RESPIRATION RATE: 16 BRPM | DIASTOLIC BLOOD PRESSURE: 54 MMHG | SYSTOLIC BLOOD PRESSURE: 120 MMHG

## 2025-04-11 DIAGNOSIS — R07.9 NONSPECIFIC CHEST PAIN: Primary | ICD-10-CM

## 2025-04-11 LAB
ALBUMIN SERPL-MCNC: 4.2 G/DL (ref 3.5–5.2)
ALBUMIN/GLOB SERPL: 1.6 G/DL
ALP SERPL-CCNC: 138 U/L (ref 39–117)
ALT SERPL W P-5'-P-CCNC: 7 U/L (ref 1–33)
ANION GAP SERPL CALCULATED.3IONS-SCNC: 11 MMOL/L (ref 5–15)
APTT PPP: 29.5 SECONDS (ref 22.7–35.4)
AST SERPL-CCNC: 14 U/L (ref 1–32)
BASOPHILS # BLD AUTO: 0.03 10*3/MM3 (ref 0–0.2)
BASOPHILS NFR BLD AUTO: 0.5 % (ref 0–1.5)
BILIRUB SERPL-MCNC: 0.3 MG/DL (ref 0–1.2)
BUN SERPL-MCNC: 10 MG/DL (ref 8–23)
BUN/CREAT SERPL: 13.2 (ref 7–25)
CALCIUM SPEC-SCNC: 9.2 MG/DL (ref 8.6–10.5)
CHLORIDE SERPL-SCNC: 106 MMOL/L (ref 98–107)
CO2 SERPL-SCNC: 22 MMOL/L (ref 22–29)
CREAT SERPL-MCNC: 0.76 MG/DL (ref 0.57–1)
DEPRECATED RDW RBC AUTO: 52.5 FL (ref 37–54)
EGFRCR SERPLBLD CKD-EPI 2021: 87.1 ML/MIN/1.73
EOSINOPHIL # BLD AUTO: 0.18 10*3/MM3 (ref 0–0.4)
EOSINOPHIL NFR BLD AUTO: 3.1 % (ref 0.3–6.2)
ERYTHROCYTE [DISTWIDTH] IN BLOOD BY AUTOMATED COUNT: 18.2 % (ref 12.3–15.4)
GEN 5 1HR TROPONIN T REFLEX: 9 NG/L
GLOBULIN UR ELPH-MCNC: 2.7 GM/DL
GLUCOSE SERPL-MCNC: 115 MG/DL (ref 65–99)
HCT VFR BLD AUTO: 33.3 % (ref 34–46.6)
HGB BLD-MCNC: 9.8 G/DL (ref 12–15.9)
IMM GRANULOCYTES # BLD AUTO: 0.02 10*3/MM3 (ref 0–0.05)
IMM GRANULOCYTES NFR BLD AUTO: 0.3 % (ref 0–0.5)
INR PPP: 1.17 (ref 0.9–1.1)
LYMPHOCYTES # BLD AUTO: 1.13 10*3/MM3 (ref 0.7–3.1)
LYMPHOCYTES NFR BLD AUTO: 19.7 % (ref 19.6–45.3)
MCH RBC QN AUTO: 23.2 PG (ref 26.6–33)
MCHC RBC AUTO-ENTMCNC: 29.4 G/DL (ref 31.5–35.7)
MCV RBC AUTO: 78.9 FL (ref 79–97)
MONOCYTES # BLD AUTO: 0.36 10*3/MM3 (ref 0.1–0.9)
MONOCYTES NFR BLD AUTO: 6.3 % (ref 5–12)
NEUTROPHILS NFR BLD AUTO: 4.02 10*3/MM3 (ref 1.7–7)
NEUTROPHILS NFR BLD AUTO: 70.1 % (ref 42.7–76)
NRBC BLD AUTO-RTO: 0 /100 WBC (ref 0–0.2)
NT-PROBNP SERPL-MCNC: 97.1 PG/ML (ref 0–900)
PLATELET # BLD AUTO: 253 10*3/MM3 (ref 140–450)
PMV BLD AUTO: 10.9 FL (ref 6–12)
POTASSIUM SERPL-SCNC: 4.2 MMOL/L (ref 3.5–5.2)
PROT SERPL-MCNC: 6.9 G/DL (ref 6–8.5)
PROTHROMBIN TIME: 14.9 SECONDS (ref 11.7–14.2)
RBC # BLD AUTO: 4.22 10*6/MM3 (ref 3.77–5.28)
SODIUM SERPL-SCNC: 139 MMOL/L (ref 136–145)
TROPONIN T NUMERIC DELTA: 0 NG/L
TROPONIN T SERPL HS-MCNC: 9 NG/L
WBC NRBC COR # BLD AUTO: 5.74 10*3/MM3 (ref 3.4–10.8)

## 2025-04-11 PROCEDURE — 71045 X-RAY EXAM CHEST 1 VIEW: CPT

## 2025-04-11 PROCEDURE — 85730 THROMBOPLASTIN TIME PARTIAL: CPT | Performed by: STUDENT IN AN ORGANIZED HEALTH CARE EDUCATION/TRAINING PROGRAM

## 2025-04-11 PROCEDURE — 85610 PROTHROMBIN TIME: CPT | Performed by: STUDENT IN AN ORGANIZED HEALTH CARE EDUCATION/TRAINING PROGRAM

## 2025-04-11 PROCEDURE — 93005 ELECTROCARDIOGRAM TRACING: CPT | Performed by: STUDENT IN AN ORGANIZED HEALTH CARE EDUCATION/TRAINING PROGRAM

## 2025-04-11 PROCEDURE — 84484 ASSAY OF TROPONIN QUANT: CPT | Performed by: STUDENT IN AN ORGANIZED HEALTH CARE EDUCATION/TRAINING PROGRAM

## 2025-04-11 PROCEDURE — 83880 ASSAY OF NATRIURETIC PEPTIDE: CPT | Performed by: STUDENT IN AN ORGANIZED HEALTH CARE EDUCATION/TRAINING PROGRAM

## 2025-04-11 PROCEDURE — 99284 EMERGENCY DEPT VISIT MOD MDM: CPT

## 2025-04-11 PROCEDURE — 85025 COMPLETE CBC W/AUTO DIFF WBC: CPT | Performed by: STUDENT IN AN ORGANIZED HEALTH CARE EDUCATION/TRAINING PROGRAM

## 2025-04-11 PROCEDURE — 80053 COMPREHEN METABOLIC PANEL: CPT | Performed by: STUDENT IN AN ORGANIZED HEALTH CARE EDUCATION/TRAINING PROGRAM

## 2025-04-11 PROCEDURE — 36415 COLL VENOUS BLD VENIPUNCTURE: CPT
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
## 2025-04-14 LAB
QT INTERVAL: 412 MS
QTC INTERVAL: 434 MS

## 2025-04-22 ENCOUNTER — TELEPHONE (OUTPATIENT)
Dept: ONCOLOGY | Facility: CLINIC | Age: 65
End: 2025-04-22
Payer: MEDICARE

## 2025-04-22 DIAGNOSIS — D50.0 IRON DEFICIENCY ANEMIA DUE TO CHRONIC BLOOD LOSS: Primary | ICD-10-CM

## 2025-04-22 NOTE — TELEPHONE ENCOUNTER
Caller: Tiera Abbott    Relationship: Self    Best call back number: [phone]    Who are you requesting to speak with (clinical staff, provider,  specific staff member): MADELINE    What was the call regarding: PT ASKING FOR MADELINE TO CALL HER BACK.  WANTING TO KNOW IF SHE CAN HAVE HER LABS DRAWN IN THE NEXT COUPLE OF DAYS, SHE IS SYMPTOMATIC

## 2025-04-23 NOTE — TELEPHONE ENCOUNTER
Made two attempts to reach patient via telephone, one yesterday, one today. Left a voicemail yesterday requesting call back from patient.

## 2025-04-24 ENCOUNTER — TELEPHONE (OUTPATIENT)
Dept: FAMILY MEDICINE CLINIC | Facility: CLINIC | Age: 65
End: 2025-04-24

## 2025-04-24 ENCOUNTER — LAB (OUTPATIENT)
Dept: LAB | Facility: HOSPITAL | Age: 65
End: 2025-04-24
Payer: MEDICARE

## 2025-04-24 DIAGNOSIS — T45.4X5A ADVERSE EFFECT OF IRON, INITIAL ENCOUNTER: ICD-10-CM

## 2025-04-24 DIAGNOSIS — D50.0 IRON DEFICIENCY ANEMIA DUE TO CHRONIC BLOOD LOSS: ICD-10-CM

## 2025-04-24 LAB
BASOPHILS # BLD AUTO: 0.03 10*3/MM3 (ref 0–0.2)
BASOPHILS NFR BLD AUTO: 0.4 % (ref 0–1.5)
DEPRECATED RDW RBC AUTO: 55.5 FL (ref 37–54)
EOSINOPHIL # BLD AUTO: 0.2 10*3/MM3 (ref 0–0.4)
EOSINOPHIL NFR BLD AUTO: 3 % (ref 0.3–6.2)
ERYTHROCYTE [DISTWIDTH] IN BLOOD BY AUTOMATED COUNT: 19.3 % (ref 12.3–15.4)
FERRITIN SERPL-MCNC: 23.9 NG/ML (ref 13–150)
HCT VFR BLD AUTO: 33 % (ref 34–46.6)
HGB BLD-MCNC: 9.5 G/DL (ref 12–15.9)
IMM GRANULOCYTES # BLD AUTO: 0.08 10*3/MM3 (ref 0–0.05)
IMM GRANULOCYTES NFR BLD AUTO: 1.2 % (ref 0–0.5)
IRON 24H UR-MRATE: 22 MCG/DL (ref 37–145)
IRON SATN MFR SERPL: 5 % (ref 20–50)
LYMPHOCYTES # BLD AUTO: 1.64 10*3/MM3 (ref 0.7–3.1)
LYMPHOCYTES NFR BLD AUTO: 24.4 % (ref 19.6–45.3)
MCH RBC QN AUTO: 22.7 PG (ref 26.6–33)
MCHC RBC AUTO-ENTMCNC: 28.8 G/DL (ref 31.5–35.7)
MCV RBC AUTO: 78.9 FL (ref 79–97)
MONOCYTES # BLD AUTO: 0.56 10*3/MM3 (ref 0.1–0.9)
MONOCYTES NFR BLD AUTO: 8.3 % (ref 5–12)
NEUTROPHILS NFR BLD AUTO: 4.2 10*3/MM3 (ref 1.7–7)
NEUTROPHILS NFR BLD AUTO: 62.7 % (ref 42.7–76)
NRBC BLD AUTO-RTO: 0 /100 WBC (ref 0–0.2)
PLATELET # BLD AUTO: 298 10*3/MM3 (ref 140–450)
PMV BLD AUTO: 10.8 FL (ref 6–12)
RBC # BLD AUTO: 4.18 10*6/MM3 (ref 3.77–5.28)
TIBC SERPL-MCNC: 416 MCG/DL (ref 298–536)
TRANSFERRIN SERPL-MCNC: 279 MG/DL (ref 200–360)
WBC NRBC COR # BLD AUTO: 6.71 10*3/MM3 (ref 3.4–10.8)

## 2025-04-24 PROCEDURE — 36415 COLL VENOUS BLD VENIPUNCTURE: CPT

## 2025-04-24 PROCEDURE — 84466 ASSAY OF TRANSFERRIN: CPT

## 2025-04-24 PROCEDURE — 85025 COMPLETE CBC W/AUTO DIFF WBC: CPT

## 2025-04-24 PROCEDURE — 82728 ASSAY OF FERRITIN: CPT

## 2025-04-24 PROCEDURE — 83540 ASSAY OF IRON: CPT

## 2025-04-24 NOTE — TELEPHONE ENCOUNTER
Caller: Tiera Abbott    Relationship: Self    Best call back number: [phone]     What is the medical concern/diagnosis: HOSPITAL FOLLOW UP STRESS TEST FROM ER    What specialty or service is being requested: CARDIOLOGIST    What is the provider, practice or medical service name: [facility]    What is the office location: [facility]    What is the office phone number:   FAX: [phone]      PLEASE CALL TO DISCUSS, AS NEEDED

## 2025-04-25 NOTE — TELEPHONE ENCOUNTER
Please call patient how she is feeling  What can I do for her, was not sure on the message thank you

## 2025-04-28 ENCOUNTER — TELEPHONE (OUTPATIENT)
Dept: FAMILY MEDICINE CLINIC | Facility: CLINIC | Age: 65
End: 2025-04-28
Payer: MEDICARE

## 2025-04-28 RX ORDER — SODIUM CHLORIDE 9 MG/ML
20 INJECTION, SOLUTION INTRAVENOUS ONCE
Status: CANCELLED | OUTPATIENT
Start: 2025-05-12

## 2025-04-28 RX ORDER — FAMOTIDINE 20 MG/1
20 TABLET, FILM COATED ORAL ONCE
Status: CANCELLED | OUTPATIENT
Start: 2025-06-02

## 2025-04-28 RX ORDER — CETIRIZINE HYDROCHLORIDE 10 MG/1
10 TABLET ORAL ONCE
Status: CANCELLED | OUTPATIENT
Start: 2025-06-02

## 2025-04-28 RX ORDER — FAMOTIDINE 10 MG/ML
20 INJECTION, SOLUTION INTRAVENOUS AS NEEDED
Status: CANCELLED | OUTPATIENT
Start: 2025-05-19

## 2025-04-28 RX ORDER — DIPHENHYDRAMINE HYDROCHLORIDE 50 MG/ML
50 INJECTION, SOLUTION INTRAMUSCULAR; INTRAVENOUS AS NEEDED
Status: CANCELLED | OUTPATIENT
Start: 2025-05-12

## 2025-04-28 RX ORDER — FAMOTIDINE 20 MG/1
20 TABLET, FILM COATED ORAL ONCE
Status: CANCELLED | OUTPATIENT
Start: 2025-05-26

## 2025-04-28 RX ORDER — HYDROCORTISONE SODIUM SUCCINATE 100 MG/2ML
100 INJECTION INTRAMUSCULAR; INTRAVENOUS AS NEEDED
Status: CANCELLED | OUTPATIENT
Start: 2025-05-26

## 2025-04-28 RX ORDER — CETIRIZINE HYDROCHLORIDE 10 MG/1
10 TABLET ORAL ONCE
Status: CANCELLED | OUTPATIENT
Start: 2025-05-19

## 2025-04-28 RX ORDER — ACETAMINOPHEN 325 MG/1
650 TABLET ORAL ONCE
Status: CANCELLED | OUTPATIENT
Start: 2025-05-19

## 2025-04-28 RX ORDER — FAMOTIDINE 10 MG/ML
20 INJECTION, SOLUTION INTRAVENOUS AS NEEDED
Status: CANCELLED | OUTPATIENT
Start: 2025-05-12

## 2025-04-28 RX ORDER — HYDROCORTISONE SODIUM SUCCINATE 100 MG/2ML
100 INJECTION INTRAMUSCULAR; INTRAVENOUS AS NEEDED
Status: CANCELLED | OUTPATIENT
Start: 2025-05-19

## 2025-04-28 RX ORDER — SODIUM CHLORIDE 9 MG/ML
20 INJECTION, SOLUTION INTRAVENOUS ONCE
Status: CANCELLED | OUTPATIENT
Start: 2025-05-26

## 2025-04-28 RX ORDER — HYDROCORTISONE SODIUM SUCCINATE 100 MG/2ML
100 INJECTION INTRAMUSCULAR; INTRAVENOUS AS NEEDED
Status: CANCELLED | OUTPATIENT
Start: 2025-06-02

## 2025-04-28 RX ORDER — ACETAMINOPHEN 325 MG/1
650 TABLET ORAL ONCE
Status: CANCELLED | OUTPATIENT
Start: 2025-05-05

## 2025-04-28 RX ORDER — HYDROCORTISONE SODIUM SUCCINATE 100 MG/2ML
100 INJECTION INTRAMUSCULAR; INTRAVENOUS AS NEEDED
Status: CANCELLED | OUTPATIENT
Start: 2025-05-05

## 2025-04-28 RX ORDER — FAMOTIDINE 10 MG/ML
20 INJECTION, SOLUTION INTRAVENOUS AS NEEDED
Status: CANCELLED | OUTPATIENT
Start: 2025-05-05

## 2025-04-28 RX ORDER — DIPHENHYDRAMINE HYDROCHLORIDE 50 MG/ML
50 INJECTION, SOLUTION INTRAMUSCULAR; INTRAVENOUS AS NEEDED
Status: CANCELLED | OUTPATIENT
Start: 2025-05-26

## 2025-04-28 RX ORDER — SODIUM CHLORIDE 9 MG/ML
20 INJECTION, SOLUTION INTRAVENOUS ONCE
Status: CANCELLED | OUTPATIENT
Start: 2025-05-19

## 2025-04-28 RX ORDER — DIPHENHYDRAMINE HYDROCHLORIDE 50 MG/ML
50 INJECTION, SOLUTION INTRAMUSCULAR; INTRAVENOUS AS NEEDED
Status: CANCELLED | OUTPATIENT
Start: 2025-05-05

## 2025-04-28 RX ORDER — CETIRIZINE HYDROCHLORIDE 10 MG/1
10 TABLET ORAL ONCE
Status: CANCELLED | OUTPATIENT
Start: 2025-05-12

## 2025-04-28 RX ORDER — ACETAMINOPHEN 325 MG/1
650 TABLET ORAL ONCE
Status: CANCELLED | OUTPATIENT
Start: 2025-05-26

## 2025-04-28 RX ORDER — FAMOTIDINE 20 MG/1
20 TABLET, FILM COATED ORAL ONCE
Status: CANCELLED | OUTPATIENT
Start: 2025-05-19

## 2025-04-28 RX ORDER — FAMOTIDINE 20 MG/1
20 TABLET, FILM COATED ORAL ONCE
Status: CANCELLED | OUTPATIENT
Start: 2025-05-05

## 2025-04-28 RX ORDER — DIPHENHYDRAMINE HYDROCHLORIDE 50 MG/ML
50 INJECTION, SOLUTION INTRAMUSCULAR; INTRAVENOUS AS NEEDED
Status: CANCELLED | OUTPATIENT
Start: 2025-06-02

## 2025-04-28 RX ORDER — DIPHENHYDRAMINE HYDROCHLORIDE 50 MG/ML
50 INJECTION, SOLUTION INTRAMUSCULAR; INTRAVENOUS AS NEEDED
Status: CANCELLED | OUTPATIENT
Start: 2025-05-19

## 2025-04-28 RX ORDER — CETIRIZINE HYDROCHLORIDE 10 MG/1
10 TABLET ORAL ONCE
Status: CANCELLED | OUTPATIENT
Start: 2025-05-26

## 2025-04-28 RX ORDER — SODIUM CHLORIDE 9 MG/ML
20 INJECTION, SOLUTION INTRAVENOUS ONCE
Status: CANCELLED | OUTPATIENT
Start: 2025-05-05

## 2025-04-28 RX ORDER — ACETAMINOPHEN 325 MG/1
650 TABLET ORAL ONCE
Status: CANCELLED | OUTPATIENT
Start: 2025-06-02

## 2025-04-28 RX ORDER — SODIUM CHLORIDE 9 MG/ML
20 INJECTION, SOLUTION INTRAVENOUS ONCE
Status: CANCELLED | OUTPATIENT
Start: 2025-06-02

## 2025-04-28 RX ORDER — FAMOTIDINE 20 MG/1
20 TABLET, FILM COATED ORAL ONCE
Status: CANCELLED | OUTPATIENT
Start: 2025-05-12

## 2025-04-28 RX ORDER — CETIRIZINE HYDROCHLORIDE 10 MG/1
10 TABLET ORAL ONCE
Status: CANCELLED | OUTPATIENT
Start: 2025-05-05

## 2025-04-28 RX ORDER — FAMOTIDINE 10 MG/ML
20 INJECTION, SOLUTION INTRAVENOUS AS NEEDED
Status: CANCELLED | OUTPATIENT
Start: 2025-06-02

## 2025-04-28 RX ORDER — HYDROCORTISONE SODIUM SUCCINATE 100 MG/2ML
100 INJECTION INTRAMUSCULAR; INTRAVENOUS AS NEEDED
Status: CANCELLED | OUTPATIENT
Start: 2025-05-12

## 2025-04-28 RX ORDER — ACETAMINOPHEN 325 MG/1
650 TABLET ORAL ONCE
Status: CANCELLED | OUTPATIENT
Start: 2025-05-12

## 2025-04-28 RX ORDER — FAMOTIDINE 10 MG/ML
20 INJECTION, SOLUTION INTRAVENOUS AS NEEDED
Status: CANCELLED | OUTPATIENT
Start: 2025-05-26

## 2025-04-28 NOTE — TELEPHONE ENCOUNTER
Caller: Tiera Abbott    Relationship: Self    Best call back number: [phone]     Do you know the name of the person who called: EMBER    What was the call regarding: PATIENT LEFT BY AMBULANCE TO ER 4/11 SEVERE CHEST PAINS RACING HEART AND OUT OF BREATH AND LIGHTHEADED CAME UPON HER SUDDENLY WHILE SHE WAS AT WORK WITH A CUSTOMER.  EMERGENCY ROOM DOCTOR TOLD HER TO FOLLOW UP WITH HER CARDIOLOGIST FOR  A STRESS TEST HER CARDIOLOGIST DR ESTEBAN CANNOT SEE HER UNTIL JUNE.  SHE SPOKE WITH [facility] SAID THAT HER PCP SHOULD REFER HER.  IF YOU LOOK AT HER Shinto RECORD.  SHE JUST WENT THROUGH A SERIES OF INFUSIONS WHICH SHE NEEDS TO REPEAT BECAUSE HER SATURATION IS SO LOW  PLEASE CALL TO ADVISE

## 2025-04-29 DIAGNOSIS — R07.9 CHEST PAIN, UNSPECIFIED TYPE: Primary | ICD-10-CM

## 2025-04-29 NOTE — TELEPHONE ENCOUNTER
Hub staff attempted to follow warm transfer process and was unsuccessful     Caller: Tiera Abbott    Relationship to patient: Self    Best call back number: [phone]     Patient is needing: PATIENT IS REQUESTING TO SPEAK TO JAMES EPLEY REGARDING THIS REFERRAL. SHE STATES IT IS VERY URGENT

## 2025-04-29 NOTE — TELEPHONE ENCOUNTER
I placed an urgent referral to cardiology someone should reach out to her soon hopefully she is feeling better thank you

## 2025-04-29 NOTE — TELEPHONE ENCOUNTER
Hub staff attempted to follow warm transfer process and was unsuccessful     Caller: Tiera Abbott    Relationship to patient: Self    Best call back number: [phone]     Patient is needing: PATIENT STATED THAT SHE WAS RETURNING EMBER'S PHONE CALL IN REGARDS TO REFERRAL FOR [facility]

## 2025-05-01 ENCOUNTER — TELEPHONE (OUTPATIENT)
Dept: ONCOLOGY | Facility: CLINIC | Age: 65
End: 2025-05-01

## 2025-05-01 NOTE — TELEPHONE ENCOUNTER
Caller: Tiera Abbott    Relationship: Self    Best call back number: [phone]      What was the call regarding: PATIENT WANTED TO SPEAK TO MADELINE ABOUT HER INFUSION MEDICATION

## 2025-05-01 NOTE — TELEPHONE ENCOUNTER
Patient feels that Venofer is no longer effective. She says she hasn't had to get it back to back like this until recently. She says things were better when she could get Injectafer. She reports that before she got her last dose with her last cycle of Venofer, she wound up leaving work in an ambulance because she thought she was having a heart attack and she feels these symptoms are related to her iron deficiency. Unfortunately, her insurance requires her to use Venofer, Ferrlecit or Feraheme. We do not give Ferrlecit to patients anymore unless necessary because of infusion reaction rates. Patient was informed about Feraheme and she would like to try it out.

## 2025-05-02 NOTE — TELEPHONE ENCOUNTER
Spoke with Dr. Harris via telephone since he is not in office today. He is agreeable to switching the patient to Feraheme x2.

## 2025-05-05 ENCOUNTER — INFUSION (OUTPATIENT)
Dept: ONCOLOGY | Facility: HOSPITAL | Age: 65
End: 2025-05-05
Payer: MEDICARE

## 2025-05-05 ENCOUNTER — TELEPHONE (OUTPATIENT)
Dept: ONCOLOGY | Facility: CLINIC | Age: 65
End: 2025-05-05

## 2025-05-05 VITALS
DIASTOLIC BLOOD PRESSURE: 66 MMHG | RESPIRATION RATE: 16 BRPM | HEART RATE: 63 BPM | WEIGHT: 198.6 LBS | OXYGEN SATURATION: 97 % | TEMPERATURE: 97.2 F | BODY MASS INDEX: 26.94 KG/M2 | SYSTOLIC BLOOD PRESSURE: 150 MMHG

## 2025-05-05 DIAGNOSIS — T45.4X5A ADVERSE EFFECT OF IRON, INITIAL ENCOUNTER: Primary | ICD-10-CM

## 2025-05-05 DIAGNOSIS — D50.0 IRON DEFICIENCY ANEMIA DUE TO CHRONIC BLOOD LOSS: ICD-10-CM

## 2025-05-05 PROCEDURE — 63710000001 CETIRIZINE 10 MG TABLET: Performed by: NURSE PRACTITIONER

## 2025-05-05 PROCEDURE — 63710000001 FAMOTIDINE 20 MG TABLET: Performed by: NURSE PRACTITIONER

## 2025-05-05 PROCEDURE — A9270 NON-COVERED ITEM OR SERVICE: HCPCS | Performed by: NURSE PRACTITIONER

## 2025-05-05 PROCEDURE — 96374 THER/PROPH/DIAG INJ IV PUSH: CPT

## 2025-05-05 PROCEDURE — 63710000001 ACETAMINOPHEN 325 MG TABLET: Performed by: NURSE PRACTITIONER

## 2025-05-05 PROCEDURE — 25010000002 FERUMOXYTOL 510 MG/17ML SOLUTION 17 ML VIAL: Performed by: NURSE PRACTITIONER

## 2025-05-05 RX ORDER — FAMOTIDINE 20 MG/1
20 TABLET, FILM COATED ORAL ONCE
Status: COMPLETED | OUTPATIENT
Start: 2025-05-05 | End: 2025-05-05

## 2025-05-05 RX ORDER — CETIRIZINE HYDROCHLORIDE 10 MG/1
10 TABLET ORAL ONCE
Status: COMPLETED | OUTPATIENT
Start: 2025-05-05 | End: 2025-05-05

## 2025-05-05 RX ORDER — SODIUM CHLORIDE 9 MG/ML
20 INJECTION, SOLUTION INTRAVENOUS ONCE
Status: CANCELLED | OUTPATIENT
Start: 2025-05-06

## 2025-05-05 RX ORDER — SODIUM CHLORIDE 9 MG/ML
20 INJECTION, SOLUTION INTRAVENOUS ONCE
Status: DISCONTINUED | OUTPATIENT
Start: 2025-05-05 | End: 2025-05-05 | Stop reason: HOSPADM

## 2025-05-05 RX ORDER — FAMOTIDINE 20 MG/1
20 TABLET, FILM COATED ORAL ONCE
Status: CANCELLED | OUTPATIENT
Start: 2025-05-12

## 2025-05-05 RX ORDER — CETIRIZINE HYDROCHLORIDE 10 MG/1
10 TABLET ORAL ONCE
Status: CANCELLED | OUTPATIENT
Start: 2025-05-06

## 2025-05-05 RX ORDER — DIPHENHYDRAMINE HYDROCHLORIDE 50 MG/ML
50 INJECTION, SOLUTION INTRAMUSCULAR; INTRAVENOUS AS NEEDED
Status: CANCELLED | OUTPATIENT
Start: 2025-05-06

## 2025-05-05 RX ORDER — FAMOTIDINE 10 MG/ML
20 INJECTION, SOLUTION INTRAVENOUS AS NEEDED
Status: CANCELLED | OUTPATIENT
Start: 2025-05-06

## 2025-05-05 RX ORDER — SODIUM CHLORIDE 9 MG/ML
20 INJECTION, SOLUTION INTRAVENOUS ONCE
Status: CANCELLED | OUTPATIENT
Start: 2025-05-12

## 2025-05-05 RX ORDER — DIPHENHYDRAMINE HYDROCHLORIDE 50 MG/ML
50 INJECTION, SOLUTION INTRAMUSCULAR; INTRAVENOUS AS NEEDED
Status: CANCELLED | OUTPATIENT
Start: 2025-05-12

## 2025-05-05 RX ORDER — HYDROCORTISONE SODIUM SUCCINATE 100 MG/2ML
100 INJECTION INTRAMUSCULAR; INTRAVENOUS AS NEEDED
Status: CANCELLED | OUTPATIENT
Start: 2025-05-12

## 2025-05-05 RX ORDER — ACETAMINOPHEN 325 MG/1
650 TABLET ORAL ONCE
Status: CANCELLED | OUTPATIENT
Start: 2025-05-12

## 2025-05-05 RX ORDER — FAMOTIDINE 20 MG/1
20 TABLET, FILM COATED ORAL ONCE
Status: CANCELLED | OUTPATIENT
Start: 2025-05-06

## 2025-05-05 RX ORDER — HYDROCORTISONE SODIUM SUCCINATE 100 MG/2ML
100 INJECTION INTRAMUSCULAR; INTRAVENOUS AS NEEDED
Status: CANCELLED | OUTPATIENT
Start: 2025-05-06

## 2025-05-05 RX ORDER — ACETAMINOPHEN 325 MG/1
650 TABLET ORAL ONCE
Status: COMPLETED | OUTPATIENT
Start: 2025-05-05 | End: 2025-05-05

## 2025-05-05 RX ORDER — FAMOTIDINE 10 MG/ML
20 INJECTION, SOLUTION INTRAVENOUS AS NEEDED
Status: CANCELLED | OUTPATIENT
Start: 2025-05-12

## 2025-05-05 RX ORDER — CETIRIZINE HYDROCHLORIDE 10 MG/1
10 TABLET ORAL ONCE
Status: CANCELLED | OUTPATIENT
Start: 2025-05-12

## 2025-05-05 RX ORDER — ACETAMINOPHEN 325 MG/1
650 TABLET ORAL ONCE
Status: CANCELLED | OUTPATIENT
Start: 2025-05-06

## 2025-05-05 RX ADMIN — FAMOTIDINE 20 MG: 20 TABLET, FILM COATED ORAL at 15:03

## 2025-05-05 RX ADMIN — ACETAMINOPHEN 650 MG: 325 TABLET ORAL at 15:03

## 2025-05-05 RX ADMIN — FERUMOXYTOL 510 MG: 510 INJECTION INTRAVENOUS at 15:31

## 2025-05-05 RX ADMIN — CETIRIZINE HYDROCHLORIDE 10 MG: 10 TABLET, FILM COATED ORAL at 15:04

## 2025-05-05 NOTE — TELEPHONE ENCOUNTER
Caller: Tiera Abbott    Relationship: Self    Best call back number: [phone]    Who are you requesting to speak with (clinical staff, provider,  specific staff member): MADELINE    What was the call regarding: PT REQUESTING CALLBACK FROM MADELINE REGARDING TODAY'S APPT

## 2025-05-09 ENCOUNTER — TELEPHONE (OUTPATIENT)
Dept: ONCOLOGY | Facility: HOSPITAL | Age: 65
End: 2025-05-09
Payer: MEDICARE

## 2025-05-09 NOTE — TELEPHONE ENCOUNTER
Patient called late Friday she wanted to see if you could get Pre Med order she got sick las infusion  she has an appt 5/12 at 2pm

## 2025-05-12 ENCOUNTER — TELEPHONE (OUTPATIENT)
Dept: ONCOLOGY | Facility: CLINIC | Age: 65
End: 2025-05-12

## 2025-05-12 ENCOUNTER — INFUSION (OUTPATIENT)
Dept: ONCOLOGY | Facility: HOSPITAL | Age: 65
End: 2025-05-12
Payer: MEDICARE

## 2025-05-12 VITALS
BODY MASS INDEX: 26.69 KG/M2 | SYSTOLIC BLOOD PRESSURE: 122 MMHG | OXYGEN SATURATION: 98 % | WEIGHT: 196.8 LBS | HEART RATE: 85 BPM | TEMPERATURE: 96.8 F | DIASTOLIC BLOOD PRESSURE: 64 MMHG | RESPIRATION RATE: 16 BRPM

## 2025-05-12 DIAGNOSIS — D50.0 IRON DEFICIENCY ANEMIA DUE TO CHRONIC BLOOD LOSS: Primary | ICD-10-CM

## 2025-05-12 DIAGNOSIS — T45.4X5A ADVERSE EFFECT OF IRON, INITIAL ENCOUNTER: ICD-10-CM

## 2025-05-12 PROCEDURE — 96375 TX/PRO/DX INJ NEW DRUG ADDON: CPT

## 2025-05-12 PROCEDURE — A9270 NON-COVERED ITEM OR SERVICE: HCPCS | Performed by: NURSE PRACTITIONER

## 2025-05-12 PROCEDURE — 63710000001 FAMOTIDINE 20 MG TABLET: Performed by: NURSE PRACTITIONER

## 2025-05-12 PROCEDURE — 96374 THER/PROPH/DIAG INJ IV PUSH: CPT

## 2025-05-12 PROCEDURE — 25010000002 FERUMOXYTOL 510 MG/17ML SOLUTION 17 ML VIAL: Performed by: NURSE PRACTITIONER

## 2025-05-12 PROCEDURE — 25010000002 HYDROCORTISONE SOD SUC (PF) 100 MG RECONSTITUTED SOLUTION: Performed by: INTERNAL MEDICINE

## 2025-05-12 PROCEDURE — 63710000001 ACETAMINOPHEN 325 MG TABLET: Performed by: NURSE PRACTITIONER

## 2025-05-12 PROCEDURE — 63710000001 CETIRIZINE 10 MG TABLET: Performed by: NURSE PRACTITIONER

## 2025-05-12 RX ORDER — ACETAMINOPHEN 325 MG/1
650 TABLET ORAL ONCE
Status: COMPLETED | OUTPATIENT
Start: 2025-05-12 | End: 2025-05-12

## 2025-05-12 RX ORDER — CETIRIZINE HYDROCHLORIDE 10 MG/1
10 TABLET ORAL ONCE
Status: COMPLETED | OUTPATIENT
Start: 2025-05-12 | End: 2025-05-12

## 2025-05-12 RX ORDER — HYDROCORTISONE SODIUM SUCCINATE 100 MG/2ML
50 INJECTION INTRAMUSCULAR; INTRAVENOUS AS NEEDED
Status: COMPLETED | OUTPATIENT
Start: 2025-05-12 | End: 2025-05-12

## 2025-05-12 RX ORDER — FAMOTIDINE 20 MG/1
20 TABLET, FILM COATED ORAL ONCE
Status: COMPLETED | OUTPATIENT
Start: 2025-05-12 | End: 2025-05-12

## 2025-05-12 RX ADMIN — FERUMOXYTOL 510 MG: 510 INJECTION INTRAVENOUS at 15:02

## 2025-05-12 RX ADMIN — ACETAMINOPHEN 650 MG: 325 TABLET ORAL at 14:46

## 2025-05-12 RX ADMIN — CETIRIZINE HYDROCHLORIDE 10 MG: 10 TABLET, FILM COATED ORAL at 14:46

## 2025-05-12 RX ADMIN — FAMOTIDINE 20 MG: 20 TABLET, FILM COATED ORAL at 14:46

## 2025-05-12 RX ADMIN — HYDROCORTISONE SODIUM SUCCINATE 50 MG: 100 INJECTION, POWDER, FOR SOLUTION INTRAMUSCULAR; INTRAVENOUS at 14:50

## 2025-05-12 NOTE — TELEPHONE ENCOUNTER
Patient states she was clammy with nausea/vomiting and diarrhea after her infusion last week. Discussed w/ Dr. Harris. Will add 50mg of solu cortef to iron infusion. Plan sent to pharmacist and nursing communication made. Pt v/u. Melissa Rueda RN

## 2025-05-16 ENCOUNTER — TELEPHONE (OUTPATIENT)
Dept: FAMILY MEDICINE CLINIC | Facility: CLINIC | Age: 65
End: 2025-05-16

## 2025-05-16 NOTE — TELEPHONE ENCOUNTER
Caller: Tiera Abbott    Relationship to patient: Self    Best call back number: [phone]    Chief complaint: FMLA PAPERWORK F/U    Type of visit: OFFICE VISIT    Requested date: ASAP     Additional notes:  I TOLD PATIENT THE FIRST AVAILABLE I HAD WAS JUNE 3RD AND SHE SAID SHE'S NEVER HAD TO WAIT THAT LONG AND WOULD LIKE TO SPEAK WITH SOMEONE IN THE OFFICE. TRIED TRANSFERRING BUT NO ANSWER.    PLEASE CALL TO SCHEDULE.

## 2025-05-29 ENCOUNTER — LAB (OUTPATIENT)
Dept: LAB | Facility: HOSPITAL | Age: 65
End: 2025-05-29
Payer: MEDICARE

## 2025-05-29 ENCOUNTER — TELEPHONE (OUTPATIENT)
Dept: ONCOLOGY | Facility: CLINIC | Age: 65
End: 2025-05-29
Payer: MEDICARE

## 2025-05-29 DIAGNOSIS — D50.0 IRON DEFICIENCY ANEMIA DUE TO CHRONIC BLOOD LOSS: Primary | ICD-10-CM

## 2025-05-29 DIAGNOSIS — D50.0 IRON DEFICIENCY ANEMIA DUE TO CHRONIC BLOOD LOSS: ICD-10-CM

## 2025-05-29 LAB
BASOPHILS # BLD AUTO: 0.03 10*3/MM3 (ref 0–0.2)
BASOPHILS NFR BLD AUTO: 0.5 % (ref 0–1.5)
DEPRECATED RDW RBC AUTO: 78.8 FL (ref 37–54)
EOSINOPHIL # BLD AUTO: 0.19 10*3/MM3 (ref 0–0.4)
EOSINOPHIL NFR BLD AUTO: 3.3 % (ref 0.3–6.2)
ERYTHROCYTE [DISTWIDTH] IN BLOOD BY AUTOMATED COUNT: 25 % (ref 12.3–15.4)
FERRITIN SERPL-MCNC: 47.1 NG/ML (ref 13–150)
HCT VFR BLD AUTO: 33 % (ref 34–46.6)
HGB BLD-MCNC: 9.9 G/DL (ref 12–15.9)
HGB RETIC QN AUTO: 28.7 PG (ref 29.8–36.1)
IMM GRANULOCYTES # BLD AUTO: 0.02 10*3/MM3 (ref 0–0.05)
IMM GRANULOCYTES NFR BLD AUTO: 0.3 % (ref 0–0.5)
IMM RETICS NFR: 27.5 % (ref 3–15.8)
IRON 24H UR-MRATE: 30 MCG/DL (ref 37–145)
IRON SATN MFR SERPL: 8 % (ref 20–50)
LYMPHOCYTES # BLD AUTO: 1.42 10*3/MM3 (ref 0.7–3.1)
LYMPHOCYTES NFR BLD AUTO: 24.4 % (ref 19.6–45.3)
MCH RBC QN AUTO: 25.9 PG (ref 26.6–33)
MCHC RBC AUTO-ENTMCNC: 30 G/DL (ref 31.5–35.7)
MCV RBC AUTO: 86.4 FL (ref 79–97)
MONOCYTES # BLD AUTO: 0.48 10*3/MM3 (ref 0.1–0.9)
MONOCYTES NFR BLD AUTO: 8.2 % (ref 5–12)
NEUTROPHILS NFR BLD AUTO: 3.68 10*3/MM3 (ref 1.7–7)
NEUTROPHILS NFR BLD AUTO: 63.3 % (ref 42.7–76)
NRBC BLD AUTO-RTO: 0 /100 WBC (ref 0–0.2)
PLATELET # BLD AUTO: 256 10*3/MM3 (ref 140–450)
PMV BLD AUTO: 10.3 FL (ref 6–12)
RBC # BLD AUTO: 3.82 10*6/MM3 (ref 3.77–5.28)
RETICS # AUTO: 0.13 10*6/MM3 (ref 0.02–0.13)
RETICS/RBC NFR AUTO: 3.44 % (ref 0.7–1.9)
TIBC SERPL-MCNC: 359 MCG/DL (ref 298–536)
TRANSFERRIN SERPL-MCNC: 241 MG/DL (ref 200–360)
WBC NRBC COR # BLD AUTO: 5.82 10*3/MM3 (ref 3.4–10.8)

## 2025-05-29 PROCEDURE — 83540 ASSAY OF IRON: CPT

## 2025-05-29 PROCEDURE — 85025 COMPLETE CBC W/AUTO DIFF WBC: CPT

## 2025-05-29 PROCEDURE — 36415 COLL VENOUS BLD VENIPUNCTURE: CPT

## 2025-05-29 PROCEDURE — 85046 RETICYTE/HGB CONCENTRATE: CPT

## 2025-05-29 PROCEDURE — 82728 ASSAY OF FERRITIN: CPT

## 2025-05-29 PROCEDURE — 84466 ASSAY OF TRANSFERRIN: CPT

## 2025-05-29 NOTE — TELEPHONE ENCOUNTER
Returned call to patient: Iron sat 5, hgb 9.5, and ferritin 23 on 4/24. Pt completed two doses of feraheme on 5/12. She is saying she has yet to perk up and still feels SOA and loss of balance which are her typical symptoms. Discussed w/ Dr. Harris. Okay to bring in for recheck today. Labs ordered. Pt notified. Message sent to scheduling Melissa Rueda RN

## 2025-05-29 NOTE — TELEPHONE ENCOUNTER
Hub staff attempted to follow warm transfer process and was unsuccessful     Caller: Tiera Abbott    Relationship to patient: Self    Best call back number: [phone]      Patient is needing: PT IS FEELING LIKE SHE HAS IN THE PAST POSS LOW IRON SYMPTOMS NO RED FLAG WORDS USED, DID ADVISE SHE IS SHORT OF AIR WHEN MOVING AROUND, HAVING BALANCE ISSUES AND WOULD LIKE HER IRON CHECKED ASAP. TRIED TO WT TO CLINICAL THEY ADV TO SEND HP ENCOUNTER. PLEASE CB PT TO ADVISE

## 2025-05-30 ENCOUNTER — RESULTS FOLLOW-UP (OUTPATIENT)
Dept: LAB | Facility: HOSPITAL | Age: 65
End: 2025-05-30
Payer: MEDICARE

## 2025-06-02 ENCOUNTER — TELEPHONE (OUTPATIENT)
Dept: FAMILY MEDICINE CLINIC | Facility: CLINIC | Age: 65
End: 2025-06-02

## 2025-06-02 RX ORDER — FAMOTIDINE 20 MG/1
20 TABLET, FILM COATED ORAL ONCE
OUTPATIENT
Start: 2025-06-06

## 2025-06-02 RX ORDER — FAMOTIDINE 10 MG/ML
20 INJECTION, SOLUTION INTRAVENOUS AS NEEDED
OUTPATIENT
Start: 2025-06-06

## 2025-06-02 RX ORDER — HYDROCORTISONE SODIUM SUCCINATE 100 MG/2ML
100 INJECTION INTRAMUSCULAR; INTRAVENOUS AS NEEDED
OUTPATIENT
Start: 2025-06-13

## 2025-06-02 RX ORDER — DIPHENHYDRAMINE HYDROCHLORIDE 50 MG/ML
50 INJECTION, SOLUTION INTRAMUSCULAR; INTRAVENOUS AS NEEDED
OUTPATIENT
Start: 2025-06-06

## 2025-06-02 RX ORDER — FAMOTIDINE 10 MG/ML
20 INJECTION, SOLUTION INTRAVENOUS AS NEEDED
OUTPATIENT
Start: 2025-06-13

## 2025-06-02 RX ORDER — DIPHENHYDRAMINE HYDROCHLORIDE 50 MG/ML
50 INJECTION, SOLUTION INTRAMUSCULAR; INTRAVENOUS AS NEEDED
OUTPATIENT
Start: 2025-06-13

## 2025-06-02 RX ORDER — ACETAMINOPHEN 325 MG/1
650 TABLET ORAL ONCE
OUTPATIENT
Start: 2025-06-06

## 2025-06-02 RX ORDER — SODIUM CHLORIDE 9 MG/ML
20 INJECTION, SOLUTION INTRAVENOUS ONCE
OUTPATIENT
Start: 2025-06-13

## 2025-06-02 RX ORDER — HYDROCORTISONE SODIUM SUCCINATE 100 MG/2ML
50 INJECTION INTRAMUSCULAR; INTRAVENOUS ONCE
Start: 2025-06-06 | End: 2025-06-06

## 2025-06-02 RX ORDER — HYDROCORTISONE SODIUM SUCCINATE 100 MG/2ML
50 INJECTION INTRAMUSCULAR; INTRAVENOUS ONCE
Start: 2025-06-13 | End: 2025-06-13

## 2025-06-02 RX ORDER — FAMOTIDINE 20 MG/1
20 TABLET, FILM COATED ORAL ONCE
OUTPATIENT
Start: 2025-06-13

## 2025-06-02 RX ORDER — CETIRIZINE HYDROCHLORIDE 10 MG/1
10 TABLET ORAL ONCE
OUTPATIENT
Start: 2025-06-06

## 2025-06-02 RX ORDER — HYDROCORTISONE SODIUM SUCCINATE 100 MG/2ML
100 INJECTION INTRAMUSCULAR; INTRAVENOUS AS NEEDED
OUTPATIENT
Start: 2025-06-06

## 2025-06-02 RX ORDER — SODIUM CHLORIDE 9 MG/ML
20 INJECTION, SOLUTION INTRAVENOUS ONCE
OUTPATIENT
Start: 2025-06-06

## 2025-06-02 RX ORDER — ACETAMINOPHEN 325 MG/1
650 TABLET ORAL ONCE
OUTPATIENT
Start: 2025-06-13

## 2025-06-02 RX ORDER — CETIRIZINE HYDROCHLORIDE 10 MG/1
10 TABLET ORAL ONCE
OUTPATIENT
Start: 2025-06-13

## 2025-06-02 NOTE — TELEPHONE ENCOUNTER
Hub staff attempted to follow warm transfer process and was unsuccessful     Caller: Tiera Abbott    Relationship to patient: Self    Best call back number: [phone]     Patient is needing: PATIENT REQUESTING TO SPEAK DIRECTLY TO OFFICE IN REGARDS TO PAPERWORK

## 2025-06-03 NOTE — TELEPHONE ENCOUNTER
Spoke with PT. PT had to cancel appt today but rescheduled to this Friday to review and complete fmla paperwork

## 2025-06-04 ENCOUNTER — TELEPHONE (OUTPATIENT)
Dept: ONCOLOGY | Facility: CLINIC | Age: 65
End: 2025-06-04
Payer: MEDICARE

## 2025-06-04 NOTE — TELEPHONE ENCOUNTER
Caller: Tiera Abbott    Relationship: Self    Best call back number: [phone]    Who are you requesting to speak with (clinical staff, provider,  specific staff member): SAMY    What was the call regarding: PT RETURNING SAMY'S CALL TO SCHEDULE INFUSIONS. PT STATES SAMY HAS CALLED SEVERAL TIMES, NO NOTES IN CHART

## 2025-06-05 ENCOUNTER — TELEPHONE (OUTPATIENT)
Dept: ONCOLOGY | Facility: CLINIC | Age: 65
End: 2025-06-05
Payer: MEDICARE

## 2025-06-05 NOTE — TELEPHONE ENCOUNTER
----- Message from Michelle MCDONOUGH sent at 5/30/2025  2:09 PM EDT -----  Please call pt to schedule feraheme x2, she is aware. [facility]     Thank you!

## 2025-06-06 ENCOUNTER — OFFICE VISIT (OUTPATIENT)
Dept: FAMILY MEDICINE CLINIC | Facility: CLINIC | Age: 65
End: 2025-06-06
Payer: MEDICARE

## 2025-06-06 ENCOUNTER — TELEPHONE (OUTPATIENT)
Dept: ONCOLOGY | Facility: CLINIC | Age: 65
End: 2025-06-06
Payer: MEDICARE

## 2025-06-06 VITALS
TEMPERATURE: 97.6 F | HEART RATE: 81 BPM | OXYGEN SATURATION: 98 % | HEIGHT: 72 IN | WEIGHT: 202.4 LBS | SYSTOLIC BLOOD PRESSURE: 135 MMHG | DIASTOLIC BLOOD PRESSURE: 66 MMHG | BODY MASS INDEX: 27.41 KG/M2

## 2025-06-06 DIAGNOSIS — R26.81 GAIT INSTABILITY: ICD-10-CM

## 2025-06-06 DIAGNOSIS — R53.82 CHRONIC FATIGUE: ICD-10-CM

## 2025-06-06 DIAGNOSIS — I25.83 CORONARY ARTERY DISEASE DUE TO LIPID RICH PLAQUE: ICD-10-CM

## 2025-06-06 DIAGNOSIS — I25.10 CORONARY ARTERY DISEASE DUE TO LIPID RICH PLAQUE: ICD-10-CM

## 2025-06-06 DIAGNOSIS — D50.8 OTHER IRON DEFICIENCY ANEMIA: Primary | ICD-10-CM

## 2025-06-06 NOTE — PROGRESS NOTES
"Chief Complaint  FMLA    Subjective        Tiera Abbott presents to [facility]  History of Present Illness  Patient is here to discuss her disabilities, related to, chronic anemia, requiring recurrent infusions, close observation with hematology she has had multiple transfusions in the past,  She has CAD which is presently stable, she needs FMLA completed, updated more specifically, to her needs, she has chronic fatigue sometimes needs to take off several days, she has worked at her job for quite a few years, she is good about it, and thankfully they worked with her, she has a good work ethic   No acute chest pain shortness of breath weakness her labs are up-to-date she has a good follow-up as well she has had multiple negative heme test, it looks to be likely absorption difficulty          Objective   Vital Signs:  /66   Pulse 81   Temp 97.6 °F (36.4 °C) (Oral)   Ht 182.9 cm (72\")   Wt 91.8 kg (202 lb 6.4 oz)   SpO2 98%   BMI 27.45 kg/m²   Estimated body mass index is 27.45 kg/m² as calculated from the following:    Height as of this encounter: 182.9 cm (72\").    Weight as of this encounter: 91.8 kg (202 lb 6.4 oz).            Physical Exam  Constitutional:       General: She is not in acute distress.     Appearance: Normal appearance. She is not ill-appearing, toxic-appearing or diaphoretic.      Comments: Pleasant no distress she appears well   Eyes:      Conjunctiva/sclera: Conjunctivae normal.      Pupils: Pupils are equal, round, and reactive to light.   Pulmonary:      Effort: Pulmonary effort is normal. No respiratory distress.   Skin:     General: Skin is warm and dry.   Neurological:      General: No focal deficit present.      Mental Status: Mental status is at baseline.   Psychiatric:         Mood and Affect: Mood normal.         Thought Content: Thought content normal.         Judgment: Judgment normal.        Result Review :                Assessment and Plan "   Diagnoses and all orders for this visit:    1. Other iron deficiency anemia (Primary)    2. Coronary artery disease due to lipid rich plaque    3. Chronic fatigue    4. Gait instability           I spent 33 minutes caring for Tiera on this date of service. This time includes time spent by me in the following activities:preparing for the visit, reviewing tests, performing a medically appropriate examination and/or evaluation , counseling and educating the patient/family/caregiver, ordering medications, tests, or procedures, documenting information in the medical record, and care coordination  Follow Up   No follow-ups on file.  Patient was given instructions and counseling regarding her condition or for health maintenance advice. Please see specific information pulled into the AVS if appropriate.     There are no Patient Instructions on file for this visit.     Severe chronic anemia but treatable condition and generally she does well although she does have periods where she requires more frequent observation  And during her periods of weakness especially when her iron drops  Legitimate reason to be off during these times as well she can be quite a productive worker when she is feeling good which is quite frequent as well    If she needs off next week I will cover her and she should return to office reevaluate emergency room if increased shortness of breath chest pain weakness,    Updated her FMLA, this is ongoing FMLA she should have full coverage for this, and or [employer] disability act as well,  Expect her to be a productive employee but she does need all 4 transfusions of her iron infusions and to follow-up with her specialist cardiologist hematologist and here as well as  Extended periods if needed during flareups    She will follow-up in a couple weeks to make sure we get a good follow-up and need to see her more frequently,  This was more of a review and discussion of her present situation, reviewed, need for  close lab intervention and monitoring as well as stay on top of her iron infusions with hematology and her present medications, low threshold for any ER evaluation especially for increased weakness syncope chest pain shortness of breath ER      Follow-up in a couple weeks, will catch up on any other needs that she may have to make sure she is feeling well

## 2025-06-13 ENCOUNTER — INFUSION (OUTPATIENT)
Dept: ONCOLOGY | Facility: HOSPITAL | Age: 65
End: 2025-06-13
Payer: MEDICARE

## 2025-06-13 VITALS
RESPIRATION RATE: 16 BRPM | WEIGHT: 197.6 LBS | OXYGEN SATURATION: 98 % | TEMPERATURE: 97.7 F | DIASTOLIC BLOOD PRESSURE: 66 MMHG | BODY MASS INDEX: 26.8 KG/M2 | SYSTOLIC BLOOD PRESSURE: 110 MMHG | HEART RATE: 81 BPM

## 2025-06-13 DIAGNOSIS — D50.0 IRON DEFICIENCY ANEMIA DUE TO CHRONIC BLOOD LOSS: ICD-10-CM

## 2025-06-13 DIAGNOSIS — T45.4X5A ADVERSE EFFECT OF IRON, INITIAL ENCOUNTER: Primary | ICD-10-CM

## 2025-06-13 LAB
BASOPHILS # BLD AUTO: 0.03 10*3/MM3 (ref 0–0.2)
BASOPHILS NFR BLD AUTO: 0.4 % (ref 0–1.5)
DEPRECATED RDW RBC AUTO: 69 FL (ref 37–54)
EOSINOPHIL # BLD AUTO: 0.22 10*3/MM3 (ref 0–0.4)
EOSINOPHIL NFR BLD AUTO: 3.2 % (ref 0.3–6.2)
ERYTHROCYTE [DISTWIDTH] IN BLOOD BY AUTOMATED COUNT: 22.6 % (ref 12.3–15.4)
HCT VFR BLD AUTO: 28.6 % (ref 34–46.6)
HGB BLD-MCNC: 8.5 G/DL (ref 12–15.9)
IMM GRANULOCYTES # BLD AUTO: 0.02 10*3/MM3 (ref 0–0.05)
IMM GRANULOCYTES NFR BLD AUTO: 0.3 % (ref 0–0.5)
LYMPHOCYTES # BLD AUTO: 1.76 10*3/MM3 (ref 0.7–3.1)
LYMPHOCYTES NFR BLD AUTO: 25.5 % (ref 19.6–45.3)
MCH RBC QN AUTO: 25 PG (ref 26.6–33)
MCHC RBC AUTO-ENTMCNC: 29.7 G/DL (ref 31.5–35.7)
MCV RBC AUTO: 84.1 FL (ref 79–97)
MONOCYTES # BLD AUTO: 0.43 10*3/MM3 (ref 0.1–0.9)
MONOCYTES NFR BLD AUTO: 6.2 % (ref 5–12)
NEUTROPHILS NFR BLD AUTO: 4.43 10*3/MM3 (ref 1.7–7)
NEUTROPHILS NFR BLD AUTO: 64.4 % (ref 42.7–76)
NRBC BLD AUTO-RTO: 0 /100 WBC (ref 0–0.2)
PLATELET # BLD AUTO: 278 10*3/MM3 (ref 140–450)
PMV BLD AUTO: 11.3 FL (ref 6–12)
RBC # BLD AUTO: 3.4 10*6/MM3 (ref 3.77–5.28)
WBC NRBC COR # BLD AUTO: 6.89 10*3/MM3 (ref 3.4–10.8)

## 2025-06-13 PROCEDURE — A9270 NON-COVERED ITEM OR SERVICE: HCPCS | Performed by: INTERNAL MEDICINE

## 2025-06-13 PROCEDURE — 63710000001 CETIRIZINE 10 MG TABLET: Performed by: INTERNAL MEDICINE

## 2025-06-13 PROCEDURE — 96374 THER/PROPH/DIAG INJ IV PUSH: CPT

## 2025-06-13 PROCEDURE — 85025 COMPLETE CBC W/AUTO DIFF WBC: CPT | Performed by: NURSE PRACTITIONER

## 2025-06-13 PROCEDURE — 63710000001 FAMOTIDINE 20 MG TABLET: Performed by: INTERNAL MEDICINE

## 2025-06-13 PROCEDURE — 25010000002 HYDROCORTISONE SOD SUC (PF) 100 MG RECONSTITUTED SOLUTION: Performed by: INTERNAL MEDICINE

## 2025-06-13 PROCEDURE — 96375 TX/PRO/DX INJ NEW DRUG ADDON: CPT

## 2025-06-13 PROCEDURE — 63710000001 ACETAMINOPHEN 325 MG TABLET: Performed by: INTERNAL MEDICINE

## 2025-06-13 PROCEDURE — 25010000002 FERUMOXYTOL 510 MG/17ML SOLUTION 17 ML VIAL: Performed by: INTERNAL MEDICINE

## 2025-06-13 RX ORDER — HYDROCORTISONE SODIUM SUCCINATE 100 MG/2ML
50 INJECTION INTRAMUSCULAR; INTRAVENOUS ONCE
Status: COMPLETED | OUTPATIENT
Start: 2025-06-13 | End: 2025-06-13

## 2025-06-13 RX ORDER — ACETAMINOPHEN 325 MG/1
650 TABLET ORAL ONCE
Status: COMPLETED | OUTPATIENT
Start: 2025-06-13 | End: 2025-06-13

## 2025-06-13 RX ORDER — FAMOTIDINE 20 MG/1
20 TABLET, FILM COATED ORAL ONCE
Status: COMPLETED | OUTPATIENT
Start: 2025-06-13 | End: 2025-06-13

## 2025-06-13 RX ORDER — CETIRIZINE HYDROCHLORIDE 10 MG/1
10 TABLET ORAL ONCE
Status: COMPLETED | OUTPATIENT
Start: 2025-06-13 | End: 2025-06-13

## 2025-06-13 RX ADMIN — FERUMOXYTOL 510 MG: 510 INJECTION INTRAVENOUS at 16:04

## 2025-06-13 RX ADMIN — HYDROCORTISONE SODIUM SUCCINATE 50 MG: 100 INJECTION, POWDER, FOR SOLUTION INTRAMUSCULAR; INTRAVENOUS at 15:29

## 2025-06-13 RX ADMIN — CETIRIZINE HYDROCHLORIDE 10 MG: 10 TABLET, FILM COATED ORAL at 15:24

## 2025-06-13 RX ADMIN — FAMOTIDINE 20 MG: 20 TABLET, FILM COATED ORAL at 15:24

## 2025-06-13 RX ADMIN — ACETAMINOPHEN 650 MG: 325 TABLET ORAL at 15:24

## 2025-06-13 NOTE — NURSING NOTE
Patient here for Feraheme iron infusion. Pt also received Feraheme in early May, as well as a full round of Venofer in March. Pt states she usually feels better after having iron infusions, but this last time she received Feraheme she felt little to no improvement.   Spoke with Ambika WHEELER. Per SUMMER, okay to draw CBC with iron infusion today.   Lab Results   Component Value Date    WBC 6.89 06/13/2025    HGB 8.5 (L) 06/13/2025    HCT 28.6 (L) 06/13/2025    MCV 84.1 06/13/2025     06/13/2025    Patient's Hgb has decreased from 9.9 two weeks ago, to 8.5 today. Patient does not qualify for blood transfusion yet, but we will re-draw CBC lab with her Feraheme infusion on 6/19. Pt also instructed to contact her gastroenterologist per SUMMER, because it is looking like she is actively bleeding. Pt v/u.

## 2025-06-18 ENCOUNTER — TELEPHONE (OUTPATIENT)
Dept: CARDIOLOGY | Age: 65
End: 2025-06-18

## 2025-06-18 NOTE — TELEPHONE ENCOUNTER
Caller: Tiera Abbott    Relationship to patient: Self    Best call back number: [phone]    Chief complaint: UNWELL VOMITING     Type of visit: NEW PATIENT    Requested date: ASAP     If rescheduling, when is the original appointment: 06.18.25     Additional notes:PATIENT WOKE UP VOMITING AND SICK NEEDS TO MOVE APPOINTMENT. NEEDS APPOINTMENT ASAP, REFUSED FIRST AVAILABLE APPOINTMENT ON 08.01.25. PLEASE CALL AND ADVISE. WILLING TO CHANGE PROVIDERS FOR SOONER APPOINTMENT.

## 2025-06-19 ENCOUNTER — INFUSION (OUTPATIENT)
Dept: ONCOLOGY | Facility: HOSPITAL | Age: 65
End: 2025-06-19
Payer: MEDICARE

## 2025-06-19 VITALS
WEIGHT: 197.2 LBS | RESPIRATION RATE: 16 BRPM | TEMPERATURE: 97 F | BODY MASS INDEX: 26.75 KG/M2 | SYSTOLIC BLOOD PRESSURE: 133 MMHG | OXYGEN SATURATION: 99 % | HEART RATE: 92 BPM | DIASTOLIC BLOOD PRESSURE: 74 MMHG

## 2025-06-19 DIAGNOSIS — T45.4X5A ADVERSE EFFECT OF IRON, INITIAL ENCOUNTER: ICD-10-CM

## 2025-06-19 DIAGNOSIS — D50.0 IRON DEFICIENCY ANEMIA DUE TO CHRONIC BLOOD LOSS: Primary | ICD-10-CM

## 2025-06-19 LAB
BASOPHILS # BLD AUTO: 0.04 10*3/MM3 (ref 0–0.2)
BASOPHILS NFR BLD AUTO: 0.6 % (ref 0–1.5)
DEPRECATED RDW RBC AUTO: 80.3 FL (ref 37–54)
EOSINOPHIL # BLD AUTO: 0.23 10*3/MM3 (ref 0–0.4)
EOSINOPHIL NFR BLD AUTO: 3.5 % (ref 0.3–6.2)
ERYTHROCYTE [DISTWIDTH] IN BLOOD BY AUTOMATED COUNT: 25.5 % (ref 12.3–15.4)
HCT VFR BLD AUTO: 34.3 % (ref 34–46.6)
HGB BLD-MCNC: 10.1 G/DL (ref 12–15.9)
IMM GRANULOCYTES # BLD AUTO: 0.03 10*3/MM3 (ref 0–0.05)
IMM GRANULOCYTES NFR BLD AUTO: 0.5 % (ref 0–0.5)
LYMPHOCYTES # BLD AUTO: 1.29 10*3/MM3 (ref 0.7–3.1)
LYMPHOCYTES NFR BLD AUTO: 19.4 % (ref 19.6–45.3)
MCH RBC QN AUTO: 25.9 PG (ref 26.6–33)
MCHC RBC AUTO-ENTMCNC: 29.4 G/DL (ref 31.5–35.7)
MCV RBC AUTO: 87.9 FL (ref 79–97)
MONOCYTES # BLD AUTO: 0.41 10*3/MM3 (ref 0.1–0.9)
MONOCYTES NFR BLD AUTO: 6.2 % (ref 5–12)
NEUTROPHILS NFR BLD AUTO: 4.64 10*3/MM3 (ref 1.7–7)
NEUTROPHILS NFR BLD AUTO: 69.8 % (ref 42.7–76)
NRBC BLD AUTO-RTO: 0 /100 WBC (ref 0–0.2)
PLATELET # BLD AUTO: 295 10*3/MM3 (ref 140–450)
PMV BLD AUTO: 10.8 FL (ref 6–12)
RBC # BLD AUTO: 3.9 10*6/MM3 (ref 3.77–5.28)
WBC NRBC COR # BLD AUTO: 6.64 10*3/MM3 (ref 3.4–10.8)

## 2025-06-19 PROCEDURE — A9270 NON-COVERED ITEM OR SERVICE: HCPCS | Performed by: INTERNAL MEDICINE

## 2025-06-19 PROCEDURE — 63710000001 CETIRIZINE 10 MG TABLET: Performed by: INTERNAL MEDICINE

## 2025-06-19 PROCEDURE — 63710000001 FAMOTIDINE 20 MG TABLET: Performed by: INTERNAL MEDICINE

## 2025-06-19 PROCEDURE — 25010000002 HYDROCORTISONE SOD SUC (PF) 100 MG RECONSTITUTED SOLUTION: Performed by: INTERNAL MEDICINE

## 2025-06-19 PROCEDURE — 63710000001 ACETAMINOPHEN 325 MG TABLET: Performed by: INTERNAL MEDICINE

## 2025-06-19 PROCEDURE — 96375 TX/PRO/DX INJ NEW DRUG ADDON: CPT

## 2025-06-19 PROCEDURE — 96374 THER/PROPH/DIAG INJ IV PUSH: CPT

## 2025-06-19 PROCEDURE — 25010000002 FERUMOXYTOL 510 MG/17ML SOLUTION 17 ML VIAL: Performed by: INTERNAL MEDICINE

## 2025-06-19 PROCEDURE — 85025 COMPLETE CBC W/AUTO DIFF WBC: CPT

## 2025-06-19 RX ORDER — FAMOTIDINE 20 MG/1
20 TABLET, FILM COATED ORAL ONCE
Status: COMPLETED | OUTPATIENT
Start: 2025-06-19 | End: 2025-06-19

## 2025-06-19 RX ORDER — HYDROCORTISONE SODIUM SUCCINATE 100 MG/2ML
50 INJECTION INTRAMUSCULAR; INTRAVENOUS ONCE
Status: COMPLETED | OUTPATIENT
Start: 2025-06-19 | End: 2025-06-19

## 2025-06-19 RX ORDER — CETIRIZINE HYDROCHLORIDE 10 MG/1
10 TABLET ORAL ONCE
Status: COMPLETED | OUTPATIENT
Start: 2025-06-19 | End: 2025-06-19

## 2025-06-19 RX ORDER — ACETAMINOPHEN 325 MG/1
650 TABLET ORAL ONCE
Status: COMPLETED | OUTPATIENT
Start: 2025-06-19 | End: 2025-06-19

## 2025-06-19 RX ADMIN — HYDROCORTISONE SODIUM SUCCINATE 50 MG: 100 INJECTION, POWDER, FOR SOLUTION INTRAMUSCULAR; INTRAVENOUS at 14:25

## 2025-06-19 RX ADMIN — ACETAMINOPHEN 650 MG: 325 TABLET ORAL at 14:25

## 2025-06-19 RX ADMIN — CETIRIZINE HYDROCHLORIDE 10 MG: 10 TABLET, FILM COATED ORAL at 14:25

## 2025-06-19 RX ADMIN — FERUMOXYTOL 510 MG: 510 INJECTION INTRAVENOUS at 14:45

## 2025-06-19 RX ADMIN — FAMOTIDINE 20 MG: 20 TABLET, FILM COATED ORAL at 14:25

## 2025-07-01 ENCOUNTER — TELEPHONE (OUTPATIENT)
Dept: ONCOLOGY | Facility: CLINIC | Age: 65
End: 2025-07-01
Payer: MEDICARE

## 2025-07-01 NOTE — TELEPHONE ENCOUNTER
Caller: Tiera Abbott    Relationship to patient: Self    Best call back number: [phone]    Type of visit: LAB, FU    Requested date: PT WOULD LIKE A SOONER APPT     If rescheduling, when is the original appointment: 8/25     Additional notes:PT WAS ASKED IF SHE WOULD BE WILLING TO SEE A NP TO POSSIBLY BE SEEN SOONER, AND SHE DECLINED.  PT ONLY WANTS TO SEE DR BRAVO

## 2025-07-02 NOTE — TELEPHONE ENCOUNTER
Called patient and lvm that Dr Harris does not have any sooner appts than what she has , advised that she could back and see if there is any cancellations but that we do no have a cancellation list but people do call and cancel appts offered to set the patient up with NP if she would like to see NP, to call us back.

## 2025-07-24 ENCOUNTER — TELEPHONE (OUTPATIENT)
Dept: ONCOLOGY | Facility: CLINIC | Age: 65
End: 2025-07-24
Payer: MEDICARE

## 2025-07-24 DIAGNOSIS — D50.0 IRON DEFICIENCY ANEMIA DUE TO CHRONIC BLOOD LOSS: Primary | ICD-10-CM

## 2025-07-24 NOTE — TELEPHONE ENCOUNTER
Provider: Steven  Caller: patient  Relationship to Patient: self  Call Back Phone Number: [phone]  Reason for Call: patient wants to come in for labs because she feels like her HGB has dropped

## 2025-07-25 NOTE — TELEPHONE ENCOUNTER
Reviewed pts call with Dr. Harris, he recommends obtaining lab work. A message has been sent to scheduling.

## 2025-07-28 ENCOUNTER — LAB (OUTPATIENT)
Dept: LAB | Facility: HOSPITAL | Age: 65
End: 2025-07-28
Payer: MEDICARE

## 2025-07-28 DIAGNOSIS — Z79.899 HIGH RISK MEDICATION USE: ICD-10-CM

## 2025-07-28 DIAGNOSIS — D50.0 IRON DEFICIENCY ANEMIA DUE TO CHRONIC BLOOD LOSS: ICD-10-CM

## 2025-07-28 LAB
BASOPHILS # BLD AUTO: 0.05 10*3/MM3 (ref 0–0.2)
BASOPHILS NFR BLD AUTO: 0.7 % (ref 0–1.5)
DEPRECATED RDW RBC AUTO: 60.2 FL (ref 37–54)
EOSINOPHIL # BLD AUTO: 0.27 10*3/MM3 (ref 0–0.4)
EOSINOPHIL NFR BLD AUTO: 3.7 % (ref 0.3–6.2)
ERYTHROCYTE [DISTWIDTH] IN BLOOD BY AUTOMATED COUNT: 18.6 % (ref 12.3–15.4)
FERRITIN SERPL-MCNC: 18.6 NG/ML (ref 13–150)
HCT VFR BLD AUTO: 36.6 % (ref 34–46.6)
HGB BLD-MCNC: 11.3 G/DL (ref 12–15.9)
IMM GRANULOCYTES # BLD AUTO: 0.03 10*3/MM3 (ref 0–0.05)
IMM GRANULOCYTES NFR BLD AUTO: 0.4 % (ref 0–0.5)
IRON 24H UR-MRATE: 28 MCG/DL (ref 37–145)
IRON SATN MFR SERPL: 7 % (ref 20–50)
LYMPHOCYTES # BLD AUTO: 1.94 10*3/MM3 (ref 0.7–3.1)
LYMPHOCYTES NFR BLD AUTO: 26.3 % (ref 19.6–45.3)
MCH RBC QN AUTO: 27 PG (ref 26.6–33)
MCHC RBC AUTO-ENTMCNC: 30.9 G/DL (ref 31.5–35.7)
MCV RBC AUTO: 87.6 FL (ref 79–97)
MONOCYTES # BLD AUTO: 0.63 10*3/MM3 (ref 0.1–0.9)
MONOCYTES NFR BLD AUTO: 8.5 % (ref 5–12)
NEUTROPHILS NFR BLD AUTO: 4.45 10*3/MM3 (ref 1.7–7)
NEUTROPHILS NFR BLD AUTO: 60.4 % (ref 42.7–76)
NRBC BLD AUTO-RTO: 0 /100 WBC (ref 0–0.2)
PLATELET # BLD AUTO: 269 10*3/MM3 (ref 140–450)
PMV BLD AUTO: 10.8 FL (ref 6–12)
RBC # BLD AUTO: 4.18 10*6/MM3 (ref 3.77–5.28)
TIBC SERPL-MCNC: 411 MCG/DL (ref 298–536)
TRANSFERRIN SERPL-MCNC: 276 MG/DL (ref 200–360)
WBC NRBC COR # BLD AUTO: 7.37 10*3/MM3 (ref 3.4–10.8)

## 2025-07-28 PROCEDURE — 84100 ASSAY OF PHOSPHORUS: CPT

## 2025-07-28 PROCEDURE — 85025 COMPLETE CBC W/AUTO DIFF WBC: CPT

## 2025-07-28 PROCEDURE — 82728 ASSAY OF FERRITIN: CPT

## 2025-07-28 PROCEDURE — 84466 ASSAY OF TRANSFERRIN: CPT

## 2025-07-28 PROCEDURE — 36415 COLL VENOUS BLD VENIPUNCTURE: CPT

## 2025-07-28 PROCEDURE — 83540 ASSAY OF IRON: CPT

## 2025-07-29 ENCOUNTER — TELEPHONE (OUTPATIENT)
Dept: ONCOLOGY | Facility: CLINIC | Age: 65
End: 2025-07-29
Payer: MEDICARE

## 2025-07-29 ENCOUNTER — RESULTS FOLLOW-UP (OUTPATIENT)
Dept: LAB | Facility: HOSPITAL | Age: 65
End: 2025-07-29
Payer: MEDICARE

## 2025-07-29 DIAGNOSIS — D50.0 IRON DEFICIENCY ANEMIA DUE TO CHRONIC BLOOD LOSS: Primary | ICD-10-CM

## 2025-07-29 DIAGNOSIS — Z79.899 HIGH RISK MEDICATION USE: ICD-10-CM

## 2025-07-29 LAB — PHOSPHATE SERPL-MCNC: 2.8 MG/DL (ref 2.5–4.5)

## 2025-07-29 NOTE — PROGRESS NOTES
Phosphorus lab added to labs from previous day, new supportive plan entered for Feraheme 510 mg IV x 2 weekly doses per in-basket message Dr. Harris

## 2025-07-29 NOTE — TELEPHONE ENCOUNTER
----- Message from Katja STREETER sent at 7/29/2025 11:16 AM EDT -----  Please schedule patient for 2 doses of IV Feraheme, one week apart.    I have left patient a message that you will be calling to set up.    Thank you,  Katja

## 2025-07-29 NOTE — TELEPHONE ENCOUNTER
Left message letting patient know her iron levels were low again, and Dr. Harris is ordering 2 more doses of IV Feraheme.  Let her know scheduling will be calling to schedule the IV iron.  Left RN direct number for call back if patient has questions.[phone]

## 2025-07-30 ENCOUNTER — TELEPHONE (OUTPATIENT)
Dept: ONCOLOGY | Facility: CLINIC | Age: 65
End: 2025-07-30
Payer: MEDICARE

## 2025-07-30 NOTE — TELEPHONE ENCOUNTER
Call to Ms. Abbott, as  had sent message requesting RN call patient re: Venofer vs. Feraheme  infusions.  Received voicemail and message left with RN direct phone number to call back to discuss, [phone].

## 2025-07-31 ENCOUNTER — TELEPHONE (OUTPATIENT)
Dept: ONCOLOGY | Facility: CLINIC | Age: 65
End: 2025-07-31
Payer: MEDICARE

## 2025-07-31 NOTE — TELEPHONE ENCOUNTER
Received call from Ms. Abbott and after a very long conversation, she is concerned that she has had several doses of Feraheme lately, and her iron labs are staying low. She would like to see if Dr. Harris would consider trying to get Injectafer approved by insurance.  Let her know that DR. Harris is out of the office until Monday, but will make sure and send a message to him regarding this and we will let her know.  Understanding verbalized.

## 2025-08-04 ENCOUNTER — TELEPHONE (OUTPATIENT)
Dept: ONCOLOGY | Facility: CLINIC | Age: 65
End: 2025-08-04
Payer: MEDICARE

## 2025-08-05 ENCOUNTER — TELEPHONE (OUTPATIENT)
Dept: ONCOLOGY | Facility: CLINIC | Age: 65
End: 2025-08-05
Payer: MEDICARE

## 2025-08-07 ENCOUNTER — INFUSION (OUTPATIENT)
Dept: ONCOLOGY | Facility: HOSPITAL | Age: 65
End: 2025-08-07
Payer: MEDICARE

## 2025-08-07 VITALS
SYSTOLIC BLOOD PRESSURE: 136 MMHG | HEART RATE: 105 BPM | WEIGHT: 197.2 LBS | OXYGEN SATURATION: 99 % | BODY MASS INDEX: 26.75 KG/M2 | RESPIRATION RATE: 16 BRPM | TEMPERATURE: 97 F | DIASTOLIC BLOOD PRESSURE: 75 MMHG

## 2025-08-07 DIAGNOSIS — D50.0 IRON DEFICIENCY ANEMIA DUE TO CHRONIC BLOOD LOSS: ICD-10-CM

## 2025-08-07 DIAGNOSIS — T45.4X5A ADVERSE EFFECT OF IRON, INITIAL ENCOUNTER: Primary | ICD-10-CM

## 2025-08-07 PROCEDURE — A9270 NON-COVERED ITEM OR SERVICE: HCPCS | Performed by: NURSE PRACTITIONER

## 2025-08-07 PROCEDURE — 63710000001 PROCHLORPERAZINE MALEATE PER 10 MG: Performed by: NURSE PRACTITIONER

## 2025-08-07 PROCEDURE — 25010000002 FERRIC CARBOXYMALTOSE 750 MG/15ML SOLUTION 15 ML VIAL: Performed by: NURSE PRACTITIONER

## 2025-08-07 PROCEDURE — 96374 THER/PROPH/DIAG INJ IV PUSH: CPT

## 2025-08-07 RX ORDER — PROCHLORPERAZINE MALEATE 10 MG
10 TABLET ORAL ONCE
Status: COMPLETED | OUTPATIENT
Start: 2025-08-07 | End: 2025-08-07

## 2025-08-07 RX ADMIN — PROCHLORPERAZINE MALEATE 10 MG: 10 TABLET ORAL at 14:00

## 2025-08-07 RX ADMIN — SODIUM CHLORIDE 750 MG: 9 INJECTION, SOLUTION INTRAVENOUS at 14:10

## 2025-08-13 ENCOUNTER — OFFICE VISIT (OUTPATIENT)
Dept: CARDIOLOGY | Facility: CLINIC | Age: 65
End: 2025-08-13
Payer: MEDICARE

## 2025-08-13 VITALS
BODY MASS INDEX: 26.68 KG/M2 | DIASTOLIC BLOOD PRESSURE: 72 MMHG | HEIGHT: 72 IN | OXYGEN SATURATION: 95 % | WEIGHT: 197 LBS | HEART RATE: 84 BPM | SYSTOLIC BLOOD PRESSURE: 118 MMHG

## 2025-08-13 DIAGNOSIS — Z98.61 CAD S/P PERCUTANEOUS CORONARY ANGIOPLASTY: Primary | ICD-10-CM

## 2025-08-13 DIAGNOSIS — I25.10 CAD S/P PERCUTANEOUS CORONARY ANGIOPLASTY: Primary | ICD-10-CM

## 2025-08-13 PROCEDURE — 99214 OFFICE O/P EST MOD 30 MIN: CPT | Performed by: STUDENT IN AN ORGANIZED HEALTH CARE EDUCATION/TRAINING PROGRAM

## 2025-08-13 PROCEDURE — 1160F RVW MEDS BY RX/DR IN RCRD: CPT | Performed by: STUDENT IN AN ORGANIZED HEALTH CARE EDUCATION/TRAINING PROGRAM

## 2025-08-13 PROCEDURE — 1159F MED LIST DOCD IN RCRD: CPT | Performed by: STUDENT IN AN ORGANIZED HEALTH CARE EDUCATION/TRAINING PROGRAM

## 2025-08-14 ENCOUNTER — RESULTS FOLLOW-UP (OUTPATIENT)
Dept: CARDIOLOGY | Facility: CLINIC | Age: 65
End: 2025-08-14
Payer: MEDICARE

## 2025-08-14 ENCOUNTER — INFUSION (OUTPATIENT)
Dept: ONCOLOGY | Facility: HOSPITAL | Age: 65
End: 2025-08-14
Payer: MEDICARE

## 2025-08-14 VITALS
WEIGHT: 198 LBS | OXYGEN SATURATION: 97 % | BODY MASS INDEX: 26.85 KG/M2 | RESPIRATION RATE: 16 BRPM | DIASTOLIC BLOOD PRESSURE: 69 MMHG | HEART RATE: 87 BPM | TEMPERATURE: 97.3 F | SYSTOLIC BLOOD PRESSURE: 111 MMHG

## 2025-08-14 DIAGNOSIS — D50.0 IRON DEFICIENCY ANEMIA DUE TO CHRONIC BLOOD LOSS: ICD-10-CM

## 2025-08-14 DIAGNOSIS — T45.4X5A ADVERSE EFFECT OF IRON, INITIAL ENCOUNTER: Primary | ICD-10-CM

## 2025-08-14 LAB
CHOLEST SERPL-MCNC: 120 MG/DL (ref 100–199)
HDLC SERPL-MCNC: 41 MG/DL
LDLC SERPL CALC-MCNC: 57 MG/DL (ref 0–99)
TRIGL SERPL-MCNC: 122 MG/DL (ref 0–149)
VLDLC SERPL CALC-MCNC: 22 MG/DL (ref 5–40)

## 2025-08-14 PROCEDURE — A9270 NON-COVERED ITEM OR SERVICE: HCPCS | Performed by: INTERNAL MEDICINE

## 2025-08-14 PROCEDURE — 63710000001 PROCHLORPERAZINE MALEATE PER 10 MG: Performed by: INTERNAL MEDICINE

## 2025-08-14 PROCEDURE — 25010000002 FERRIC CARBOXYMALTOSE 750 MG/15ML SOLUTION 15 ML VIAL: Performed by: INTERNAL MEDICINE

## 2025-08-14 PROCEDURE — 96365 THER/PROPH/DIAG IV INF INIT: CPT

## 2025-08-14 RX ORDER — DIPHENHYDRAMINE HYDROCHLORIDE 50 MG/ML
50 INJECTION, SOLUTION INTRAMUSCULAR; INTRAVENOUS AS NEEDED
Status: CANCELLED | OUTPATIENT
Start: 2025-08-14

## 2025-08-14 RX ORDER — PROCHLORPERAZINE MALEATE 10 MG
10 TABLET ORAL ONCE
Status: COMPLETED | OUTPATIENT
Start: 2025-08-14 | End: 2025-08-14

## 2025-08-14 RX ORDER — SODIUM CHLORIDE 9 MG/ML
20 INJECTION, SOLUTION INTRAVENOUS ONCE
Status: CANCELLED | OUTPATIENT
Start: 2025-08-14

## 2025-08-14 RX ORDER — FAMOTIDINE 10 MG/ML
20 INJECTION, SOLUTION INTRAVENOUS AS NEEDED
Status: CANCELLED | OUTPATIENT
Start: 2025-08-14

## 2025-08-14 RX ORDER — HYDROCORTISONE SODIUM SUCCINATE 100 MG/2ML
100 INJECTION INTRAMUSCULAR; INTRAVENOUS AS NEEDED
Status: CANCELLED | OUTPATIENT
Start: 2025-08-14

## 2025-08-14 RX ADMIN — PROCHLORPERAZINE MALEATE 10 MG: 10 TABLET ORAL at 14:30

## 2025-08-14 RX ADMIN — SODIUM CHLORIDE 750 MG: 9 INJECTION, SOLUTION INTRAVENOUS at 14:40

## 2025-08-25 ENCOUNTER — LAB (OUTPATIENT)
Dept: LAB | Facility: HOSPITAL | Age: 65
End: 2025-08-25
Payer: MEDICARE

## 2025-08-25 ENCOUNTER — OFFICE VISIT (OUTPATIENT)
Dept: ONCOLOGY | Facility: CLINIC | Age: 65
End: 2025-08-25
Payer: MEDICARE

## 2025-08-25 VITALS
SYSTOLIC BLOOD PRESSURE: 116 MMHG | OXYGEN SATURATION: 97 % | TEMPERATURE: 97.1 F | HEIGHT: 72 IN | BODY MASS INDEX: 26.41 KG/M2 | WEIGHT: 195 LBS | DIASTOLIC BLOOD PRESSURE: 73 MMHG | HEART RATE: 73 BPM

## 2025-08-25 DIAGNOSIS — D50.0 IRON DEFICIENCY ANEMIA DUE TO CHRONIC BLOOD LOSS: ICD-10-CM

## 2025-08-25 DIAGNOSIS — T45.4X5D ADVERSE EFFECT OF IRON, SUBSEQUENT ENCOUNTER: ICD-10-CM

## 2025-08-25 DIAGNOSIS — D50.0 IRON DEFICIENCY ANEMIA DUE TO CHRONIC BLOOD LOSS: Primary | ICD-10-CM

## 2025-08-25 DIAGNOSIS — D50.9 IRON DEFICIENCY ANEMIA, UNSPECIFIED IRON DEFICIENCY ANEMIA TYPE: ICD-10-CM

## 2025-08-25 LAB
BASOPHILS # BLD AUTO: 0.04 10*3/MM3 (ref 0–0.2)
BASOPHILS NFR BLD AUTO: 0.6 % (ref 0–1.5)
DEPRECATED RDW RBC AUTO: 71 FL (ref 37–54)
EOSINOPHIL # BLD AUTO: 0.19 10*3/MM3 (ref 0–0.4)
EOSINOPHIL NFR BLD AUTO: 3 % (ref 0.3–6.2)
ERYTHROCYTE [DISTWIDTH] IN BLOOD BY AUTOMATED COUNT: 20.6 % (ref 12.3–15.4)
FERRITIN SERPL-MCNC: 293 NG/ML (ref 13–150)
HCT VFR BLD AUTO: 38 % (ref 34–46.6)
HGB BLD-MCNC: 11.8 G/DL (ref 12–15.9)
IMM GRANULOCYTES # BLD AUTO: 0.01 10*3/MM3 (ref 0–0.05)
IMM GRANULOCYTES NFR BLD AUTO: 0.2 % (ref 0–0.5)
IRON 24H UR-MRATE: 60 MCG/DL (ref 37–145)
IRON SATN MFR SERPL: 20 % (ref 20–50)
LYMPHOCYTES # BLD AUTO: 1.32 10*3/MM3 (ref 0.7–3.1)
LYMPHOCYTES NFR BLD AUTO: 20.8 % (ref 19.6–45.3)
MCH RBC QN AUTO: 28.8 PG (ref 26.6–33)
MCHC RBC AUTO-ENTMCNC: 31.1 G/DL (ref 31.5–35.7)
MCV RBC AUTO: 92.7 FL (ref 79–97)
MONOCYTES # BLD AUTO: 0.42 10*3/MM3 (ref 0.1–0.9)
MONOCYTES NFR BLD AUTO: 6.6 % (ref 5–12)
NEUTROPHILS NFR BLD AUTO: 4.37 10*3/MM3 (ref 1.7–7)
NEUTROPHILS NFR BLD AUTO: 68.8 % (ref 42.7–76)
NRBC BLD AUTO-RTO: 0 /100 WBC (ref 0–0.2)
PLATELET # BLD AUTO: 225 10*3/MM3 (ref 140–450)
PMV BLD AUTO: 11.2 FL (ref 6–12)
RBC # BLD AUTO: 4.1 10*6/MM3 (ref 3.77–5.28)
TIBC SERPL-MCNC: 307 MCG/DL (ref 298–536)
TRANSFERRIN SERPL-MCNC: 206 MG/DL (ref 200–360)
WBC NRBC COR # BLD AUTO: 6.35 10*3/MM3 (ref 3.4–10.8)

## 2025-08-25 PROCEDURE — 85025 COMPLETE CBC W/AUTO DIFF WBC: CPT

## 2025-08-25 PROCEDURE — 1126F AMNT PAIN NOTED NONE PRSNT: CPT | Performed by: INTERNAL MEDICINE

## 2025-08-25 PROCEDURE — 84466 ASSAY OF TRANSFERRIN: CPT

## 2025-08-25 PROCEDURE — 99214 OFFICE O/P EST MOD 30 MIN: CPT | Performed by: INTERNAL MEDICINE

## 2025-08-25 PROCEDURE — 36415 COLL VENOUS BLD VENIPUNCTURE: CPT

## 2025-08-25 PROCEDURE — 82728 ASSAY OF FERRITIN: CPT

## 2025-08-25 PROCEDURE — 83540 ASSAY OF IRON: CPT

## (undated) DEVICE — SINGLE-USE BIOPSY FORCEPS: Brand: RADIAL JAW 4

## (undated) DEVICE — KT ORCA ORCAPOD DISP STRL

## (undated) DEVICE — TUBING, SUCTION, 1/4" X 10', STRAIGHT: Brand: MEDLINE

## (undated) DEVICE — MSK ENDO PORT O2 POM ELITE CURAPLEX A/

## (undated) DEVICE — THE TORRENT IRRIGATION SCOPE CONNECTOR IS USED WITH THE TORRENT IRRIGATION TUBING TO PROVIDE IRRIGATION FLUIDS SUCH AS STERILE WATER DURING GASTROINTESTINAL ENDOSCOPIC PROCEDURES WHEN USED IN CONJUNCTION WITH AN IRRIGATION PUMP (OR ELECTROSURGICAL UNIT).: Brand: TORRENT

## (undated) DEVICE — FRCP BX RADJAW4 NDL 2.8 240CM LG OG BX40

## (undated) DEVICE — THE SINGLE USE ETRAP – POLYP TRAP IS USED FOR SUCTION RETRIEVAL OF ENDOSCOPICALLY REMOVED POLYPS.: Brand: ETRAP

## (undated) DEVICE — CANN NASL CO2 TRULINK W/O2 A/

## (undated) DEVICE — Device: Brand: DEFENDO AIR/WATER/SUCTION AND BIOPSY VALVE

## (undated) DEVICE — BITEBLOCK OMNI BLOC

## (undated) DEVICE — SNAR POLYP SENSATION STDOVL 27 240 BX40

## (undated) DEVICE — LN SMPL CO2 SHTRM SD STREAM W/M LUER

## (undated) DEVICE — ADAPT CLN BIOGUARD AIR/H2O DISP

## (undated) DEVICE — TBG 02 CRUSH RESIST LF CLR 7FT

## (undated) DEVICE — BLCK/BITE BLOX W/DENTL/RIM W/STRAP 54F

## (undated) DEVICE — SENSR O2 OXIMAX FNGR A/ 18IN NONSTR